# Patient Record
Sex: MALE | Race: WHITE | NOT HISPANIC OR LATINO | Employment: OTHER | URBAN - METROPOLITAN AREA
[De-identification: names, ages, dates, MRNs, and addresses within clinical notes are randomized per-mention and may not be internally consistent; named-entity substitution may affect disease eponyms.]

---

## 2020-01-16 DIAGNOSIS — M25.572 PAIN IN JOINT, ANKLE AND FOOT, LEFT: ICD-10-CM

## 2020-01-16 DIAGNOSIS — M20.21 HALLUX RIGIDUS OF RIGHT FOOT: Primary | ICD-10-CM

## 2020-01-23 ENCOUNTER — HOSPITAL ENCOUNTER (OUTPATIENT)
Dept: GENERAL RADIOLOGY | Facility: HOSPITAL | Age: 60
Discharge: HOME | End: 2020-01-23
Admitting: PODIATRIST

## 2020-01-23 ENCOUNTER — HOSPITAL ENCOUNTER (OUTPATIENT)
Dept: GENERAL RADIOLOGY | Facility: HOSPITAL | Age: 60
Discharge: HOME | End: 2020-01-23

## 2020-01-23 DIAGNOSIS — M25.572 PAIN IN JOINT, ANKLE AND FOOT, LEFT: ICD-10-CM

## 2020-01-23 DIAGNOSIS — M20.21 HALLUX RIGIDUS OF RIGHT FOOT: ICD-10-CM

## 2020-01-23 PROCEDURE — 73630 X-RAY EXAM OF FOOT: CPT

## 2020-01-23 PROCEDURE — 73610 X-RAY EXAM OF ANKLE: CPT

## 2025-03-20 ENCOUNTER — APPOINTMENT (OUTPATIENT)
Dept: GENERAL RADIOLOGY | Facility: HOSPITAL | Age: 65
End: 2025-03-20
Payer: MEDICARE

## 2025-03-20 ENCOUNTER — APPOINTMENT (OUTPATIENT)
Dept: CT IMAGING | Facility: HOSPITAL | Age: 65
End: 2025-03-20
Payer: MEDICARE

## 2025-03-20 ENCOUNTER — HOSPITAL ENCOUNTER (INPATIENT)
Facility: HOSPITAL | Age: 65
LOS: 4 days | End: 2025-03-24
Attending: EMERGENCY MEDICINE | Admitting: INTERNAL MEDICINE
Payer: MEDICARE

## 2025-03-20 DIAGNOSIS — D72.828 NEUTROPHILIA: ICD-10-CM

## 2025-03-20 DIAGNOSIS — L03.116 CELLULITIS OF LEFT LEG: ICD-10-CM

## 2025-03-20 DIAGNOSIS — E87.20 LACTIC ACIDOSIS: ICD-10-CM

## 2025-03-20 DIAGNOSIS — M54.32 SCIATICA OF LEFT SIDE: Primary | ICD-10-CM

## 2025-03-20 LAB
ALBUMIN SERPL-MCNC: 4.1 G/DL (ref 3.5–5.2)
ALBUMIN/GLOB SERPL: 1.1 G/DL
ALP SERPL-CCNC: 95 U/L (ref 39–117)
ALT SERPL W P-5'-P-CCNC: 26 U/L (ref 1–41)
ANION GAP SERPL CALCULATED.3IONS-SCNC: 13.7 MMOL/L (ref 5–15)
AST SERPL-CCNC: 26 U/L (ref 1–40)
BACTERIA UR QL AUTO: ABNORMAL /HPF
BASOPHILS # BLD AUTO: 0.03 10*3/MM3 (ref 0–0.2)
BASOPHILS # BLD AUTO: 0.1 10*3/MM3 (ref 0–0.2)
BASOPHILS NFR BLD AUTO: 0.1 % (ref 0–1.5)
BASOPHILS NFR BLD AUTO: 0.3 % (ref 0–1.5)
BILIRUB SERPL-MCNC: 0.8 MG/DL (ref 0–1.2)
BILIRUB UR QL STRIP: NEGATIVE
BUN SERPL-MCNC: 26 MG/DL (ref 8–23)
BUN/CREAT SERPL: 18.4 (ref 7–25)
CALCIUM SPEC-SCNC: 9.8 MG/DL (ref 8.6–10.5)
CHLORIDE SERPL-SCNC: 97 MMOL/L (ref 98–107)
CLARITY UR: CLEAR
CO2 SERPL-SCNC: 19.3 MMOL/L (ref 22–29)
COLOR UR: YELLOW
CREAT SERPL-MCNC: 1.41 MG/DL (ref 0.76–1.27)
CRP SERPL-MCNC: 20.82 MG/DL (ref 0–0.5)
D-LACTATE SERPL-SCNC: 3 MMOL/L (ref 0.5–2)
DEPRECATED RDW RBC AUTO: 44.2 FL (ref 37–54)
DEPRECATED RDW RBC AUTO: 45 FL (ref 37–54)
EGFRCR SERPLBLD CKD-EPI 2021: 56 ML/MIN/1.73
EOSINOPHIL # BLD AUTO: 0 10*3/MM3 (ref 0–0.4)
EOSINOPHIL # BLD AUTO: 0.01 10*3/MM3 (ref 0–0.4)
EOSINOPHIL NFR BLD AUTO: 0 % (ref 0.3–6.2)
EOSINOPHIL NFR BLD AUTO: 0 % (ref 0.3–6.2)
ERYTHROCYTE [DISTWIDTH] IN BLOOD BY AUTOMATED COUNT: 14.7 % (ref 12.3–15.4)
ERYTHROCYTE [DISTWIDTH] IN BLOOD BY AUTOMATED COUNT: 15 % (ref 12.3–15.4)
ERYTHROCYTE [SEDIMENTATION RATE] IN BLOOD: 45 MM/HR (ref 0–20)
FLUAV SUBTYP SPEC NAA+PROBE: NOT DETECTED
FLUBV RNA ISLT QL NAA+PROBE: NOT DETECTED
GLOBULIN UR ELPH-MCNC: 3.8 GM/DL
GLUCOSE BLDC GLUCOMTR-MCNC: 503 MG/DL (ref 70–130)
GLUCOSE SERPL-MCNC: 290 MG/DL (ref 65–99)
GLUCOSE UR STRIP-MCNC: ABNORMAL MG/DL
HBA1C MFR BLD: 11 % (ref 4.8–5.6)
HCT VFR BLD AUTO: 44.6 % (ref 37.5–51)
HCT VFR BLD AUTO: 46.9 % (ref 37.5–51)
HGB BLD-MCNC: 14.8 G/DL (ref 13–17.7)
HGB BLD-MCNC: 15.5 G/DL (ref 13–17.7)
HGB UR QL STRIP.AUTO: ABNORMAL
HOLD SPECIMEN: NORMAL
HOLD SPECIMEN: NORMAL
HYALINE CASTS UR QL AUTO: ABNORMAL /LPF
IMM GRANULOCYTES # BLD AUTO: 0.17 10*3/MM3 (ref 0–0.05)
IMM GRANULOCYTES # BLD AUTO: 0.52 10*3/MM3 (ref 0–0.05)
IMM GRANULOCYTES NFR BLD AUTO: 0.6 % (ref 0–0.5)
IMM GRANULOCYTES NFR BLD AUTO: 1.5 % (ref 0–0.5)
KETONES UR QL STRIP: ABNORMAL
LEUKOCYTE ESTERASE UR QL STRIP.AUTO: NEGATIVE
LIPASE SERPL-CCNC: 35 U/L (ref 13–60)
LYMPHOCYTES # BLD AUTO: 0.8 10*3/MM3 (ref 0.7–3.1)
LYMPHOCYTES # BLD AUTO: 0.87 10*3/MM3 (ref 0.7–3.1)
LYMPHOCYTES NFR BLD AUTO: 2.3 % (ref 19.6–45.3)
LYMPHOCYTES NFR BLD AUTO: 3.1 % (ref 19.6–45.3)
MCH RBC QN AUTO: 27.2 PG (ref 26.6–33)
MCH RBC QN AUTO: 27.5 PG (ref 26.6–33)
MCHC RBC AUTO-ENTMCNC: 33 G/DL (ref 31.5–35.7)
MCHC RBC AUTO-ENTMCNC: 33.2 G/DL (ref 31.5–35.7)
MCV RBC AUTO: 82.3 FL (ref 79–97)
MCV RBC AUTO: 82.7 FL (ref 79–97)
MONOCYTES # BLD AUTO: 1.79 10*3/MM3 (ref 0.1–0.9)
MONOCYTES # BLD AUTO: 2.19 10*3/MM3 (ref 0.1–0.9)
MONOCYTES NFR BLD AUTO: 5.1 % (ref 5–12)
MONOCYTES NFR BLD AUTO: 7.8 % (ref 5–12)
MRSA DNA SPEC QL NAA+PROBE: ABNORMAL
NEUTROPHILS NFR BLD AUTO: 24.94 10*3/MM3 (ref 1.7–7)
NEUTROPHILS NFR BLD AUTO: 31.97 10*3/MM3 (ref 1.7–7)
NEUTROPHILS NFR BLD AUTO: 88.4 % (ref 42.7–76)
NEUTROPHILS NFR BLD AUTO: 90.8 % (ref 42.7–76)
NITRITE UR QL STRIP: NEGATIVE
NRBC BLD AUTO-RTO: 0 /100 WBC (ref 0–0.2)
PH UR STRIP.AUTO: 5.5 [PH] (ref 4.5–8)
PLAT MORPH BLD: NORMAL
PLATELET # BLD AUTO: 297 10*3/MM3 (ref 140–450)
PLATELET # BLD AUTO: 309 10*3/MM3 (ref 140–450)
PMV BLD AUTO: 10.5 FL (ref 6–12)
PMV BLD AUTO: 10.6 FL (ref 6–12)
POTASSIUM SERPL-SCNC: 4.6 MMOL/L (ref 3.5–5.2)
PROCALCITONIN SERPL-MCNC: 8.02 NG/ML (ref 0–0.25)
PROT SERPL-MCNC: 7.9 G/DL (ref 6–8.5)
PROT UR QL STRIP: ABNORMAL
RBC # BLD AUTO: 5.39 10*6/MM3 (ref 4.14–5.8)
RBC # BLD AUTO: 5.7 10*6/MM3 (ref 4.14–5.8)
RBC # UR STRIP: ABNORMAL /HPF
RBC MORPH BLD: NORMAL
REF LAB TEST METHOD: ABNORMAL
SARS-COV-2 RNA RESP QL NAA+PROBE: NOT DETECTED
SODIUM SERPL-SCNC: 130 MMOL/L (ref 136–145)
SP GR UR STRIP: 1.02 (ref 1–1.03)
SQUAMOUS #/AREA URNS HPF: ABNORMAL /HPF
UROBILINOGEN UR QL STRIP: ABNORMAL
WBC # UR STRIP: ABNORMAL /HPF
WBC MORPH BLD: NORMAL
WBC NRBC COR # BLD AUTO: 28.21 10*3/MM3 (ref 3.4–10.8)
WBC NRBC COR # BLD AUTO: 35.18 10*3/MM3 (ref 3.4–10.8)
WHOLE BLOOD HOLD COAG: NORMAL
WHOLE BLOOD HOLD SPECIMEN: NORMAL

## 2025-03-20 PROCEDURE — 99223 1ST HOSP IP/OBS HIGH 75: CPT | Performed by: INTERNAL MEDICINE

## 2025-03-20 PROCEDURE — 85007 BL SMEAR W/DIFF WBC COUNT: CPT | Performed by: EMERGENCY MEDICINE

## 2025-03-20 PROCEDURE — 25010000002 VANCOMYCIN 750 MG RECONSTITUTED SOLUTION 1 EACH VIAL: Performed by: INTERNAL MEDICINE

## 2025-03-20 PROCEDURE — 25810000003 SODIUM CHLORIDE 0.9 % SOLUTION: Performed by: EMERGENCY MEDICINE

## 2025-03-20 PROCEDURE — 73630 X-RAY EXAM OF FOOT: CPT

## 2025-03-20 PROCEDURE — 25010000002 FENTANYL CITRATE (PF) 50 MCG/ML SOLUTION: Performed by: EMERGENCY MEDICINE

## 2025-03-20 PROCEDURE — 71046 X-RAY EXAM CHEST 2 VIEWS: CPT

## 2025-03-20 PROCEDURE — 82948 REAGENT STRIP/BLOOD GLUCOSE: CPT

## 2025-03-20 PROCEDURE — 87077 CULTURE AEROBIC IDENTIFY: CPT | Performed by: EMERGENCY MEDICINE

## 2025-03-20 PROCEDURE — 87641 MR-STAPH DNA AMP PROBE: CPT | Performed by: INTERNAL MEDICINE

## 2025-03-20 PROCEDURE — 25810000003 SODIUM CHLORIDE 0.9 % SOLUTION: Performed by: INTERNAL MEDICINE

## 2025-03-20 PROCEDURE — 85025 COMPLETE CBC W/AUTO DIFF WBC: CPT | Performed by: EMERGENCY MEDICINE

## 2025-03-20 PROCEDURE — 25010000002 VANCOMYCIN 1.5 G RECONSTITUTED SOLUTION 1 EACH VIAL: Performed by: INTERNAL MEDICINE

## 2025-03-20 PROCEDURE — 36415 COLL VENOUS BLD VENIPUNCTURE: CPT

## 2025-03-20 PROCEDURE — 87040 BLOOD CULTURE FOR BACTERIA: CPT | Performed by: EMERGENCY MEDICINE

## 2025-03-20 PROCEDURE — 87154 CUL TYP ID BLD PTHGN 6+ TRGT: CPT | Performed by: EMERGENCY MEDICINE

## 2025-03-20 PROCEDURE — 25010000002 ENOXAPARIN PER 10 MG: Performed by: INTERNAL MEDICINE

## 2025-03-20 PROCEDURE — 74176 CT ABD & PELVIS W/O CONTRAST: CPT

## 2025-03-20 PROCEDURE — 25010000002 DEXAMETHASONE PER 1 MG: Performed by: EMERGENCY MEDICINE

## 2025-03-20 PROCEDURE — 63710000001 INSULIN LISPRO (HUMAN) PER 5 UNITS: Performed by: INTERNAL MEDICINE

## 2025-03-20 PROCEDURE — 81001 URINALYSIS AUTO W/SCOPE: CPT | Performed by: EMERGENCY MEDICINE

## 2025-03-20 PROCEDURE — 84145 PROCALCITONIN (PCT): CPT | Performed by: INTERNAL MEDICINE

## 2025-03-20 PROCEDURE — 73610 X-RAY EXAM OF ANKLE: CPT

## 2025-03-20 PROCEDURE — 25010000002 CEFEPIME PER 500 MG: Performed by: INTERNAL MEDICINE

## 2025-03-20 PROCEDURE — 87636 SARSCOV2 & INF A&B AMP PRB: CPT | Performed by: EMERGENCY MEDICINE

## 2025-03-20 PROCEDURE — 83605 ASSAY OF LACTIC ACID: CPT | Performed by: EMERGENCY MEDICINE

## 2025-03-20 PROCEDURE — 99285 EMERGENCY DEPT VISIT HI MDM: CPT | Performed by: EMERGENCY MEDICINE

## 2025-03-20 PROCEDURE — 94799 UNLISTED PULMONARY SVC/PX: CPT

## 2025-03-20 PROCEDURE — 80053 COMPREHEN METABOLIC PANEL: CPT | Performed by: EMERGENCY MEDICINE

## 2025-03-20 PROCEDURE — 85652 RBC SED RATE AUTOMATED: CPT | Performed by: INTERNAL MEDICINE

## 2025-03-20 PROCEDURE — 87181 SC STD AGAR DILUTION PER AGT: CPT | Performed by: EMERGENCY MEDICINE

## 2025-03-20 PROCEDURE — 83036 HEMOGLOBIN GLYCOSYLATED A1C: CPT | Performed by: INTERNAL MEDICINE

## 2025-03-20 PROCEDURE — 86140 C-REACTIVE PROTEIN: CPT | Performed by: INTERNAL MEDICINE

## 2025-03-20 PROCEDURE — 87186 SC STD MICRODIL/AGAR DIL: CPT | Performed by: EMERGENCY MEDICINE

## 2025-03-20 PROCEDURE — 63710000001 INSULIN GLARGINE PER 5 UNITS: Performed by: INTERNAL MEDICINE

## 2025-03-20 PROCEDURE — 25810000003 SODIUM CHLORIDE 0.9 % SOLUTION 500 ML FLEX CONT: Performed by: INTERNAL MEDICINE

## 2025-03-20 PROCEDURE — 94660 CPAP INITIATION&MGMT: CPT

## 2025-03-20 PROCEDURE — 83690 ASSAY OF LIPASE: CPT | Performed by: EMERGENCY MEDICINE

## 2025-03-20 RX ORDER — METOPROLOL SUCCINATE 50 MG/1
50 TABLET, EXTENDED RELEASE ORAL DAILY
Status: DISCONTINUED | OUTPATIENT
Start: 2025-03-20 | End: 2025-03-24 | Stop reason: HOSPADM

## 2025-03-20 RX ORDER — ENOXAPARIN SODIUM 100 MG/ML
40 INJECTION SUBCUTANEOUS EVERY 12 HOURS
Status: DISCONTINUED | OUTPATIENT
Start: 2025-03-20 | End: 2025-03-24 | Stop reason: HOSPADM

## 2025-03-20 RX ORDER — DEXAMETHASONE SODIUM PHOSPHATE 4 MG/ML
10 INJECTION, SOLUTION INTRA-ARTICULAR; INTRALESIONAL; INTRAMUSCULAR; INTRAVENOUS; SOFT TISSUE ONCE
Status: COMPLETED | OUTPATIENT
Start: 2025-03-20 | End: 2025-03-20

## 2025-03-20 RX ORDER — ACETAMINOPHEN 325 MG/1
650 TABLET ORAL EVERY 4 HOURS PRN
Status: DISCONTINUED | OUTPATIENT
Start: 2025-03-20 | End: 2025-03-24 | Stop reason: HOSPADM

## 2025-03-20 RX ORDER — DULOXETIN HYDROCHLORIDE 60 MG/1
60 CAPSULE, DELAYED RELEASE ORAL DAILY
Status: DISCONTINUED | OUTPATIENT
Start: 2025-03-20 | End: 2025-03-24 | Stop reason: HOSPADM

## 2025-03-20 RX ORDER — BISACODYL 5 MG/1
5 TABLET, DELAYED RELEASE ORAL DAILY PRN
Status: DISCONTINUED | OUTPATIENT
Start: 2025-03-20 | End: 2025-03-24 | Stop reason: HOSPADM

## 2025-03-20 RX ORDER — BISACODYL 10 MG
10 SUPPOSITORY, RECTAL RECTAL DAILY PRN
Status: DISCONTINUED | OUTPATIENT
Start: 2025-03-20 | End: 2025-03-24 | Stop reason: HOSPADM

## 2025-03-20 RX ORDER — SODIUM CHLORIDE 9 MG/ML
125 INJECTION, SOLUTION INTRAVENOUS CONTINUOUS
Status: DISCONTINUED | OUTPATIENT
Start: 2025-03-20 | End: 2025-03-21

## 2025-03-20 RX ORDER — SODIUM CHLORIDE 0.9 % (FLUSH) 0.9 %
10 SYRINGE (ML) INJECTION EVERY 12 HOURS SCHEDULED
Status: DISCONTINUED | OUTPATIENT
Start: 2025-03-20 | End: 2025-03-24 | Stop reason: HOSPADM

## 2025-03-20 RX ORDER — SODIUM CHLORIDE 9 MG/ML
40 INJECTION, SOLUTION INTRAVENOUS AS NEEDED
Status: DISCONTINUED | OUTPATIENT
Start: 2025-03-20 | End: 2025-03-24 | Stop reason: HOSPADM

## 2025-03-20 RX ORDER — NICOTINE POLACRILEX 4 MG
15 LOZENGE BUCCAL
Status: DISCONTINUED | OUTPATIENT
Start: 2025-03-20 | End: 2025-03-24 | Stop reason: HOSPADM

## 2025-03-20 RX ORDER — SODIUM CHLORIDE 0.9 % (FLUSH) 0.9 %
10 SYRINGE (ML) INJECTION AS NEEDED
Status: DISCONTINUED | OUTPATIENT
Start: 2025-03-20 | End: 2025-03-24 | Stop reason: HOSPADM

## 2025-03-20 RX ORDER — HYDROCODONE BITARTRATE AND ACETAMINOPHEN 5; 325 MG/1; MG/1
1 TABLET ORAL 2 TIMES DAILY
Refills: 0 | Status: DISCONTINUED | OUTPATIENT
Start: 2025-03-20 | End: 2025-03-23

## 2025-03-20 RX ORDER — ONDANSETRON 2 MG/ML
4 INJECTION INTRAMUSCULAR; INTRAVENOUS EVERY 6 HOURS PRN
Status: DISCONTINUED | OUTPATIENT
Start: 2025-03-20 | End: 2025-03-24 | Stop reason: HOSPADM

## 2025-03-20 RX ORDER — AMOXICILLIN 250 MG
2 CAPSULE ORAL 2 TIMES DAILY PRN
Status: DISCONTINUED | OUTPATIENT
Start: 2025-03-20 | End: 2025-03-24 | Stop reason: HOSPADM

## 2025-03-20 RX ORDER — LOSARTAN POTASSIUM 50 MG/1
100 TABLET ORAL DAILY
Status: DISCONTINUED | OUTPATIENT
Start: 2025-03-20 | End: 2025-03-24 | Stop reason: HOSPADM

## 2025-03-20 RX ORDER — INSULIN LISPRO 100 [IU]/ML
1-200 INJECTION, SOLUTION INTRAVENOUS; SUBCUTANEOUS
Status: DISCONTINUED | OUTPATIENT
Start: 2025-03-20 | End: 2025-03-24 | Stop reason: HOSPADM

## 2025-03-20 RX ORDER — ONDANSETRON 4 MG/1
4 TABLET, ORALLY DISINTEGRATING ORAL EVERY 6 HOURS PRN
Status: DISCONTINUED | OUTPATIENT
Start: 2025-03-20 | End: 2025-03-24 | Stop reason: HOSPADM

## 2025-03-20 RX ORDER — ACETAMINOPHEN 650 MG/1
650 SUPPOSITORY RECTAL EVERY 4 HOURS PRN
Status: DISCONTINUED | OUTPATIENT
Start: 2025-03-20 | End: 2025-03-24 | Stop reason: HOSPADM

## 2025-03-20 RX ORDER — ATORVASTATIN CALCIUM 20 MG/1
20 TABLET, FILM COATED ORAL NIGHTLY
Status: DISCONTINUED | OUTPATIENT
Start: 2025-03-20 | End: 2025-03-24 | Stop reason: HOSPADM

## 2025-03-20 RX ORDER — NITROGLYCERIN 0.4 MG/1
0.4 TABLET SUBLINGUAL
Status: DISCONTINUED | OUTPATIENT
Start: 2025-03-20 | End: 2025-03-22

## 2025-03-20 RX ORDER — DEXTROSE MONOHYDRATE 25 G/50ML
10-50 INJECTION, SOLUTION INTRAVENOUS
Status: DISCONTINUED | OUTPATIENT
Start: 2025-03-20 | End: 2025-03-24 | Stop reason: HOSPADM

## 2025-03-20 RX ORDER — PREGABALIN 75 MG/1
150 CAPSULE ORAL 2 TIMES DAILY
Status: DISCONTINUED | OUTPATIENT
Start: 2025-03-20 | End: 2025-03-24 | Stop reason: HOSPADM

## 2025-03-20 RX ORDER — IBUPROFEN 600 MG/1
1 TABLET ORAL
Status: DISCONTINUED | OUTPATIENT
Start: 2025-03-20 | End: 2025-03-24 | Stop reason: HOSPADM

## 2025-03-20 RX ORDER — FENTANYL CITRATE 50 UG/ML
50 INJECTION, SOLUTION INTRAMUSCULAR; INTRAVENOUS ONCE
Refills: 0 | Status: COMPLETED | OUTPATIENT
Start: 2025-03-20 | End: 2025-03-20

## 2025-03-20 RX ORDER — INSULIN LISPRO 100 [IU]/ML
1-200 INJECTION, SOLUTION INTRAVENOUS; SUBCUTANEOUS AS NEEDED
Status: DISCONTINUED | OUTPATIENT
Start: 2025-03-20 | End: 2025-03-24 | Stop reason: HOSPADM

## 2025-03-20 RX ORDER — AMLODIPINE BESYLATE 5 MG/1
10 TABLET ORAL DAILY
Status: DISCONTINUED | OUTPATIENT
Start: 2025-03-20 | End: 2025-03-24 | Stop reason: HOSPADM

## 2025-03-20 RX ORDER — ACETAMINOPHEN 160 MG/5ML
650 SOLUTION ORAL EVERY 4 HOURS PRN
Status: DISCONTINUED | OUTPATIENT
Start: 2025-03-20 | End: 2025-03-24 | Stop reason: HOSPADM

## 2025-03-20 RX ORDER — POLYETHYLENE GLYCOL 3350 17 G/17G
17 POWDER, FOR SOLUTION ORAL DAILY PRN
Status: DISCONTINUED | OUTPATIENT
Start: 2025-03-20 | End: 2025-03-24 | Stop reason: HOSPADM

## 2025-03-20 RX ADMIN — INSULIN LISPRO 16 UNITS: 100 INJECTION, SOLUTION INTRAVENOUS; SUBCUTANEOUS at 21:08

## 2025-03-20 RX ADMIN — VANCOMYCIN HYDROCHLORIDE 2250 MG: 750 INJECTION, POWDER, LYOPHILIZED, FOR SOLUTION INTRAVENOUS at 18:54

## 2025-03-20 RX ADMIN — FENTANYL CITRATE 50 MCG: 50 INJECTION, SOLUTION INTRAMUSCULAR; INTRAVENOUS at 10:49

## 2025-03-20 RX ADMIN — AMLODIPINE BESYLATE 10 MG: 5 TABLET ORAL at 21:06

## 2025-03-20 RX ADMIN — ENOXAPARIN SODIUM 40 MG: 100 INJECTION SUBCUTANEOUS at 21:08

## 2025-03-20 RX ADMIN — INSULIN GLARGINE 39 UNITS: 100 INJECTION, SOLUTION SUBCUTANEOUS at 21:08

## 2025-03-20 RX ADMIN — METOPROLOL SUCCINATE 50 MG: 50 TABLET, EXTENDED RELEASE ORAL at 21:04

## 2025-03-20 RX ADMIN — LOSARTAN POTASSIUM 100 MG: 50 TABLET, FILM COATED ORAL at 21:05

## 2025-03-20 RX ADMIN — HYDROCODONE BITARTRATE AND ACETAMINOPHEN 1 TABLET: 5; 325 TABLET ORAL at 21:05

## 2025-03-20 RX ADMIN — PREGABALIN 150 MG: 75 CAPSULE ORAL at 21:05

## 2025-03-20 RX ADMIN — SODIUM CHLORIDE 125 ML/HR: 9 INJECTION, SOLUTION INTRAVENOUS at 21:58

## 2025-03-20 RX ADMIN — CEFEPIME 2000 MG: 2 INJECTION, POWDER, FOR SOLUTION INTRAVENOUS at 17:46

## 2025-03-20 RX ADMIN — ATORVASTATIN CALCIUM 20 MG: 20 TABLET, FILM COATED ORAL at 21:05

## 2025-03-20 RX ADMIN — DEXAMETHASONE SODIUM PHOSPHATE 10 MG: 4 INJECTION, SOLUTION INTRAMUSCULAR; INTRAVENOUS at 12:22

## 2025-03-20 RX ADMIN — DULOXETINE HYDROCHLORIDE 60 MG: 60 CAPSULE, DELAYED RELEASE ORAL at 21:05

## 2025-03-20 RX ADMIN — Medication 10 ML: at 21:59

## 2025-03-20 RX ADMIN — SODIUM CHLORIDE 1000 ML: 9 INJECTION, SOLUTION INTRAVENOUS at 12:22

## 2025-03-20 NOTE — ED PROVIDER NOTES
"Subjective   History of Present Illness    Chief complaint: Back pain    Location: Left flank    Quality/Severity: Moderate, sharp    Timing/Onset/Duration: Yesterday    Modifying Factors: No modifying factor    Associated Symptoms: No headache.  No fever.  Patient said chills.  No cough sore throat earache or nasal congestion.  No chest pain or shortness of breath.  No diarrhea or burning on urination.  Patient does have some radiation down the left leg.  Patient has had nausea and vomiting.  Emesis has been nonbloody and nonbilious.    Narrative: This 65-year-old presents with left lower back pain that radiates down the left leg a bit.  Patient's had sciatica before and states that this does not feel like that.  She had vomiting last night but no vomiting today.  Patient states he is able to urinate but not much.  Patient denies trauma.    PCP: Anand Alford    Review of Systems    No past medical history on file.    Allergies   Allergen Reactions    Naproxen Anaphylaxis    Sulfamethoxazole-Trimethoprim Shortness Of Breath and Swelling    Etodolac Other (See Comments)     stomach irritation  Annotation - [id number]: STOMACH      Gabapentin Dizziness     Annotation - [id number]: \"Felt Drunk\"    Zonisamide Other (See Comments) and Myalgia     Drowsy, Fatigue         No past surgical history on file.    No family history on file.    Social History     Socioeconomic History    Marital status:    Tobacco Use    Smoking status: Never    Smokeless tobacco: Never   Vaping Use    Vaping status: Never Used   Substance and Sexual Activity    Alcohol use: Not Currently    Drug use: Defer    Sexual activity: Defer           Objective   Physical Exam  Vitals (The temperature is 97.8 °F, pulse 112, respirations 16, /92, room air pulse ox 96%.) and nursing note reviewed.   Constitutional:       Appearance: Normal appearance.   HENT:      Head: Normocephalic and atraumatic.      Right Ear: Tympanic membrane normal.      " Left Ear: Tympanic membrane normal.      Nose: Nose normal.   Cardiovascular:      Rate and Rhythm: Normal rate and regular rhythm.      Pulses: Normal pulses.      Heart sounds: Normal heart sounds. No murmur heard.     No friction rub. No gallop.   Pulmonary:      Effort: Pulmonary effort is normal.      Breath sounds: Normal breath sounds.   Abdominal:      General: Abdomen is flat. Bowel sounds are normal. There is no distension.      Palpations: Abdomen is soft. There is no mass.      Tenderness: There is no abdominal tenderness. There is no right CVA tenderness, left CVA tenderness, guarding or rebound.      Hernia: No hernia is present.   Musculoskeletal:         General: Normal range of motion.      Cervical back: Normal range of motion and neck supple.      Comments: There is redness and areas of scabbed skin about the left distal leg.  The patient states that this is actually better than it has been.  Patient apparently has history of a torn tendon in his left ankle.   Skin:     General: Skin is warm and dry.      Capillary Refill: Capillary refill takes less than 2 seconds.   Neurological:      General: No focal deficit present.      Mental Status: He is alert and oriented to person, place, and time.         Procedures           ED Course  ED Course as of 03/20/25 1522   Thu Mar 20, 2025   1204 The urine microscopic shows 3-5 RBCs, no WBCs, no bacteria, 0-2 squamous epithelial cells, 2 and 50 mg/dL glucose, ketones trace, blood small, protein 300 mg/dL, nitrite negative, leukocyte negative.  Urinalysis is otherwise unremarkable. [RC]   1204 The lipase is 35 and normal. [RC]   1204 The glucose is 290.  The BUN is 26 and the creatinine is 1.41, the sodium is 130, chloride is 97, CO2 is 19, GFR is 56, CMP is otherwise unremarkable. [RC]   1205 5 months ago the BUN was 20 and the creatinine was 1.13.  These values are elevated today. [RC]   1503 The white blood cell count is 35,000, neutrophil percentage 90,  absolute neutrophil count is 31.9.  These values have increased. [RC]   1516 COVID flu is negative [RC]   1516 Lactic acid 3.0 [RC]      ED Course User Index  [RC] Rey Nichole MD    14:36 EDT, 03/20/25: The patient has a 28,000 white count with left shift.  The urinalysis reveals only 3-5 red blood cells, leukocyte negative and nitrite negative.  CAT scan does not show any etiology for this elevated white blood cell count.  Patient is slightly dehydrated and we have given him IV fluids here.  He is CMP shows an elevated glucose at 290, CO2 19 and chloride of 97, the GFR is 56.  The CMP is otherwise unremarkable.  I will order a lactic acid, draw a blood culture and get a chest x-ray.  Patient does state that he is a little more short of breath than usual.    15:24 EDT, 03/20/25:  I spoke with Dr. Zhong, on-call for the hospitalist.  She will admit the patient.  She wants to hold on antibiotics until she sees the patient.  The patient has received IV fluids.  He is on spironolactone.  Further assessment of additional fluid requirements will be done by the hospitalist.  All the patient's questions were answered he will be admitted.                                                         Medical Decision Making  Amount and/or Complexity of Data Reviewed  Labs: ordered.  Radiology: ordered.    Risk  Prescription drug management.        Final diagnoses:   None       ED Disposition  ED Disposition       None            No follow-up provider specified.       Medication List      No changes were made to your prescriptions during this visit.       No orders to display     Labs Reviewed   RAINBOW DRAW    Narrative:     The following orders were created for panel order [place] Draw.  Procedure                               Abnormality         Status                     ---------                               -----------         ------                     Green Top (Gel)[[id number]]                                                              Lavender Top[[id number]]                                                                Gold Top - SST[[id number]]                                                              Light Blue Top[[id number]]                                                                Please view results for these tests on the individual orders.   COMPREHENSIVE METABOLIC PANEL   LIPASE   URINALYSIS W/ MICROSCOPIC IF INDICATED (NO CULTURE)   CBC WITH AUTO DIFFERENTIAL   CBC AND DIFFERENTIAL    Narrative:     The following orders were created for panel order CBC & Differential.  Procedure                               Abnormality         Status                     ---------                               -----------         ------                     CBC Auto Differential[[id number]]                                                         Please view results for these tests on the individual orders.   GREEN TOP   LAVENDER TOP   GOLD TOP - SST   LIGHT BLUE TOP     No results found.    Final diagnoses:   None         ED Medications:  Medications   sodium chloride 0.9 % flush 10 mL (has no administration in time range)       New Medications:     Medication List        ASK your doctor about these medications      amLODIPine 10 MG tablet  Commonly known as: NORVASC     amoxicillin-clavulanate 875-125 MG per tablet  Commonly known as: AUGMENTIN  Take 1 tablet by mouth 2 (Two) Times a Day.     atorvastatin 20 MG tablet  Commonly known as: LIPITOR     DULoxetine 60 MG capsule  Commonly known as: CYMBALTA     furosemide 20 MG tablet  Commonly known as: LASIX     HYDROcodone-acetaminophen 5-325 MG per tablet  Commonly known as: NORCO     Insulin Degludec 200 UNIT/ML solution pen-injector pen injection  Commonly known as: TRESIBA FLEXTOUCH     Insulin Lispro (1 Unit Dial) 100 UNIT/ML solution pen-injector  Commonly known as: HUMALOG     insulin NPH-insulin regular (70-30) 100 UNIT/ML injection  Commonly known as: humuLIN  70/30,novoLIN 70/30     losartan 100 MG tablet  Commonly known as: COZAAR     * metFORMIN 500 MG tablet  Commonly known as: GLUCOPHAGE     * metFORMIN  MG 24 hr tablet  Commonly known as: GLUCOPHAGE-XR     metoprolol succinate XL 50 MG 24 hr tablet  Commonly known as: TOPROL-XL     pregabalin 150 MG capsule  Commonly known as: LYRICA     spironolactone 25 MG tablet  Commonly known as: ALDACTONE     Testosterone Cypionate 200 MG/ML injection  Commonly known as: DEPOTESTOTERONE CYPIONATE     Toujeo SoloStar 300 UNIT/ML solution pen-injector injection  Generic drug: Insulin Glargine (1 Unit Dial)           * This list has 2 medication(s) that are the same as other medications prescribed for you. Read the directions carefully, and ask your doctor or other care provider to review them with you.                  Stopped Medications:     Medication List        ASK your doctor about these medications      amLODIPine 10 MG tablet  Commonly known as: NORVASC     amoxicillin-clavulanate 875-125 MG per tablet  Commonly known as: AUGMENTIN  Take 1 tablet by mouth 2 (Two) Times a Day.     atorvastatin 20 MG tablet  Commonly known as: LIPITOR     DULoxetine 60 MG capsule  Commonly known as: CYMBALTA     furosemide 20 MG tablet  Commonly known as: LASIX     HYDROcodone-acetaminophen 5-325 MG per tablet  Commonly known as: NORCO     Insulin Degludec 200 UNIT/ML solution pen-injector pen injection  Commonly known as: TRESIBA FLEXTOUCH     Insulin Lispro (1 Unit Dial) 100 UNIT/ML solution pen-injector  Commonly known as: HUMALOG     insulin NPH-insulin regular (70-30) 100 UNIT/ML injection  Commonly known as: humuLIN 70/30,novoLIN 70/30     losartan 100 MG tablet  Commonly known as: COZAAR     * metFORMIN 500 MG tablet  Commonly known as: GLUCOPHAGE     * metFORMIN  MG 24 hr tablet  Commonly known as: GLUCOPHAGE-XR     metoprolol succinate XL 50 MG 24 hr tablet  Commonly known as: TOPROL-XL     pregabalin 150 MG  capsule  Commonly known as: LYRICA     spironolactone 25 MG tablet  Commonly known as: ALDACTONE     Testosterone Cypionate 200 MG/ML injection  Commonly known as: DEPOTESTOTERONE CYPIONATE     Toujeo SoloStar 300 UNIT/ML solution pen-injector injection  Generic drug: Insulin Glargine (1 Unit Dial)           * This list has 2 medication(s) that are the same as other medications prescribed for you. Read the directions carefully, and ask your doctor or other care provider to review them with you.                       Rey Nichole MD  03/20/25 1500       Rey Nichole MD  03/20/25 5190

## 2025-03-20 NOTE — H&P
[facility] HOSPITALIST     Anand Alford MD    CHIEF COMPLAINT:     Back pain, leukocytosis    HISTORY OF PRESENT ILLNESS:  Arnold Ramírez is a 65 y.o. morbidly obese male with a past medical history of DM2, HTN, and HLP who presented to the ED d/t left flank pain initially. Patient reports pain started yesterday afternoon and he was unable to rest all night due to the pain. He went to urgent care and was referred to the ED. Patient underwent CT abd/pel that was negative for acute process. Patient states the pain has now moved from his left flank to his midline back. He denies any injury, but reports his left foot/ankle needs surgery and causes his to walk unevenly causing pain. He reports some chills and nausea. States he has not taken any of his home medications today, due to going to the urgent care.   In the ED, he was found to have significantly elevated WBC. Noted to have some redness of the LLE with some abrasions, though he reports this looks improved from how it normally appears. Given poorly controlled diabetes and significant leukocytosis concern for infection, possibly osteomyelitis.       Past Medical History:   Diagnosis Date    Diabetes mellitus     Hyperlipidemia     Hypertension      History reviewed. No pertinent surgical history.  History reviewed. No pertinent family history.  Social History     Tobacco Use    Smoking status: Never    Smokeless tobacco: Never   Vaping Use    Vaping status: Never Used   Substance Use Topics    Alcohol use: Not Currently    Drug use: Defer     Medications Prior to Admission   Medication Sig Dispense Refill Last Dose/Taking    amLODIPine (NORVASC) 10 MG tablet Take 1 tablet by mouth Daily.   3/19/2025    atorvastatin (LIPITOR) 20 MG tablet Take 1 tablet by mouth Every Night.   3/19/2025    DULoxetine (CYMBALTA) 60 MG capsule Take 1 capsule by mouth Daily.   3/19/2025    furosemide (LASIX) 20 MG tablet Take 1 tablet by mouth Daily As Needed  (edema).   Past Week    HYDROcodone-acetaminophen (NORCO) 5-325 MG per tablet Take 1 tablet by mouth 2 (Two) Times a Day.   3/19/2025    Insulin Degludec (TRESIBA FLEXTOUCH) 200 UNIT/ML solution pen-injector pen injection Inject 150 Units under the skin into the appropriate area as directed 2 (Two) Times a Day.   Taking    Insulin Glargine, 1 Unit Dial, (Toujeo SoloStar) 300 UNIT/ML solution pen-injector injection Inject 150 Units under the skin into the appropriate area as directed.   Taking    insulin NPH-insulin regular (humuLIN 70/30,novoLIN 70/30) (70-30) 100 UNIT/ML injection Inject 100 Units under the skin into the appropriate area as directed As Needed.   Taking As Needed    losartan (COZAAR) 100 MG tablet Take 1 tablet by mouth Daily.   3/19/2025    metFORMIN ER (GLUCOPHAGE-XR) 500 MG 24 hr tablet Take 1 tablet by mouth 2 (Two) Times a Day.   3/19/2025    metoprolol succinate XL (TOPROL-XL) 50 MG 24 hr tablet Take 1 tablet by mouth Daily.   3/19/2025    pregabalin (LYRICA) 150 MG capsule Take 1 capsule by mouth 2 (Two) Times a Day.   3/19/2025    spironolactone (ALDACTONE) 25 MG tablet Take 1 tablet by mouth Daily.   3/19/2025    Testosterone Cypionate (DEPOTESTOTERONE CYPIONATE) 200 MG/ML injection Inject 1 mL into the appropriate muscle as directed by prescriber Every 14 (Fourteen) Days.   Taking    Insulin Lispro, 1 Unit Dial, (HUMALOG) 100 UNIT/ML solution pen-injector Inject 5-10 units before breakfast and supper        Allergies:  Naproxen, Sulfamethoxazole-trimethoprim, Etodolac, Gabapentin, and Zonisamide    Immunization History   Administered Date(s) Administered    COVID-19 (MODERNA) 12YRS+ (SPIKEVAX) 03/06/2025    COVID-19 (MODERNA) 1st,2nd,3rd Dose Monovalent 03/21/2021, 04/14/2021    COVID-19 (MODERNA) Monovalent Original Booster 12/13/2021    Influenza recombinant 03/06/2025           REVIEW OF SYSTEMS:    Please see the above history of present illness for pertinent positives and negatives.   "The remainder of the patient's systems have been reviewed and are negative.     Vital Signs  Temp:  [97.8 °F (36.6 °C)-98.4 °F (36.9 °C)] 98.4 °F (36.9 °C)  Heart Rate:  [] 98  Resp:  [16-18] 18  BP: (124-145)/(76-92) 145/76    Oxygen Therapy  SpO2: 92 %  Pulse Oximetry Type: Intermittent  Device (Oxygen Therapy): room air}    Body mass index is 40.5 kg/m².    Flowsheet Rows      Flowsheet Row First Filed Value   Admission Height 195.6 cm (77\") Documented at 03/20/2025 1024   Admission Weight 155 kg (341 lb) Documented at 03/20/2025 1024                 Physical Exam:    Physical Exam  Vitals reviewed.   Constitutional:       General: He is not in acute distress.     Appearance: He is morbidly obese.   HENT:      Head: Normocephalic and atraumatic.      Mouth/Throat:      Mouth: Mucous membranes are moist.   Cardiovascular:      Rate and Rhythm: Normal rate and regular rhythm.      Heart sounds: No murmur heard.  Pulmonary:      Effort: Pulmonary effort is normal. No respiratory distress.      Breath sounds: No wheezing.   Abdominal:      Comments: Obese, nt, +bs   Musculoskeletal:         General: Deformity present.      Right lower leg: Edema present.      Left lower leg: Edema present.   Skin:     General: Skin is warm and dry.      Comments: Mild redness and abrasion to the LLE   Neurological:      Mental Status: He is alert. Mental status is at baseline.   Psychiatric:         Mood and Affect: Mood normal.         Behavior: Behavior normal. Behavior is cooperative.       Results Review:    I reviewed the patient's new clinical results.  Lab Results (most recent)       Procedure Component Value Units Date/Time    MRSA Screen, PCR (Inpatient) - Swab, Nares [[id number]] Collected: 03/20/25 1750    Specimen: Swab from Nares Updated: 03/20/25 1750    Procalcitonin [[id number]]  (Abnormal) Collected: 03/20/25 1606    Specimen: Blood Updated: 03/20/25 1639     Procalcitonin 8.02 ng/mL     Narrative:      As a " "Marker for Sepsis (Non-Neonates):    1. <0.5 ng/mL represents a low risk of severe sepsis and/or septic shock.  2. >2 ng/mL represents a high risk of severe sepsis and/or septic shock.    As a Marker for Lower Respiratory Tract Infections that require antibiotic therapy:    PCT on Admission    Antibiotic Therapy       6-12 Hrs later    >0.5                Strongly Recommended  >0.25 - <0.5        Recommended   0.1 - 0.25          Discouraged              Remeasure/reassess PCT  <0.1                Strongly Discouraged     Remeasure/reassess PCT    As 28 day mortality risk marker: \"Change in Procalcitonin Result\" (>80% or <=80%) if Day 0 (or Day 1) and Day 4 values are available. Refer to http://www.[employer] [facility] [employer]-pct-calculator.com    Change in PCT <=80%  A decrease of PCT levels below or equal to 80% defines a positive change in PCT test result representing a higher risk for 28-day all-cause mortality of patients diagnosed with severe sepsis for septic shock.    Change in PCT >80%  A decrease of PCT levels of more than 80% defines a negative change in PCT result representing a lower risk for 28-day all-cause mortality of patients diagnosed with severe sepsis or septic shock.       Hemoglobin A1c [[id number]]  (Abnormal) Collected: 03/20/25 1443    Specimen: Blood Updated: 03/20/25 1631     Hemoglobin A1C 11.00 %     Narrative:      Hemoglobin A1C Ranges:    Increased Risk for Diabetes  5.7% to 6.4%  Diabetes                     >= 6.5%  Diabetic Goal                < 7.0%    Sedimentation Rate [[id number]]  (Abnormal) Collected: 03/20/25 1443    Specimen: Blood Updated: 03/20/25 1615     Sed Rate 45 mm/hr     C-reactive Protein [[id number]] Collected: 03/20/25 1606    Specimen: Blood Updated: 03/20/25 1611    Blood Culture - Blood, Arm, Right [[id number]] Collected: 03/20/25 1530    Specimen: Blood from Arm, Right Updated: 03/20/25 1544    CBC & Differential [[id number]]  (Abnormal) Collected: 03/20/25 1443    Specimen: Blood " Updated: 03/20/25 1527    Narrative:      The following orders were created for panel order CBC & Differential.  Procedure                               Abnormality         Status                     ---------                               -----------         ------                     CBC Auto Differential[[id number]]        Abnormal            Final result               Scan Slide[[id number]]                   Normal              Final result                 Please view results for these tests on the individual orders.    Scan Slide [[id number]]  (Normal) Collected: 03/20/25 1443    Specimen: Blood Updated: 03/20/25 1527     RBC Morphology Normal     WBC Morphology Normal     Platelet Morphology Normal    Lactic Acid, Plasma [[id number]]  (Abnormal) Collected: 03/20/25 1435    Specimen: Blood Updated: 03/20/25 1517     Lactate 3.0 mmol/L     COVID-19 and FLU A/B PCR, 1 HR TAT - Swab, Nasopharynx [[id number]]  (Normal) Collected: 03/20/25 1435    Specimen: Swab from Nasopharynx Updated: 03/20/25 1515     COVID19 Not Detected     Influenza A PCR Not Detected     Influenza B PCR Not Detected    Narrative:      Fact sheet for providers: https://www.fda.gov/media/665568/download    Fact sheet for patients: https://www.fda.gov/media/522738/download    Test performed by PCR.    CBC Auto Differential [[id number]]  (Abnormal) Collected: 03/20/25 1443    Specimen: Blood Updated: 03/20/25 1500     WBC 35.18 10*3/mm3      RBC 5.39 10*6/mm3      Hemoglobin 14.8 g/dL      Hematocrit 44.6 %      MCV 82.7 fL      MCH 27.5 pg      MCHC 33.2 g/dL      RDW 15.0 %      RDW-SD 45.0 fl      MPV 10.6 fL      Platelets 297 10*3/mm3      Neutrophil % 90.8 %      Lymphocyte % 2.3 %      Monocyte % 5.1 %      Eosinophil % 0.0 %      Basophil % 0.3 %      Immature Grans % 1.5 %      Neutrophils, Absolute 31.97 10*3/mm3      Lymphocytes, Absolute 0.80 10*3/mm3      Monocytes, Absolute 1.79 10*3/mm3      Eosinophils, Absolute 0.00 10*3/mm3       Basophils, Absolute 0.10 10*3/mm3      Immature Grans, Absolute 0.52 10*3/mm3      nRBC 0.0 /100 WBC     Blood Culture - Blood, Arm, Left [[id number]] Collected: 03/20/25 1435    Specimen: Blood from Arm, Left Updated: 03/20/25 1451    [place] Draw [[id number]] Collected: 03/20/25 1038    Specimen: Blood Updated: 03/20/25 1145    Narrative:      The following orders were created for panel order [place] Draw.  Procedure                               Abnormality         Status                     ---------                               -----------         ------                     Green Top (Gel)[[id number]]                                  Final result               Lavender Top[[id number]]                                     Final result               Gold Top - SST[[id number]]                                   Final result               Light Blue Top[[id number]]                                   Final result                 Please view results for these tests on the individual orders.    Gold Top - SST [[id number]] Collected: 03/20/25 1038    Specimen: Blood Updated: 03/20/25 1145     Extra Tube Hold for add-ons.     Comment: Auto resulted.       Urinalysis, Microscopic Only - Urine, Clean Catch [[id number]]  (Abnormal) Collected: 03/20/25 1038    Specimen: Urine, Clean Catch Updated: 03/20/25 1118     RBC, UA 3-5 /HPF      WBC, UA None Seen /HPF      Bacteria, UA None Seen /HPF      Squamous Epithelial Cells, UA 0-2 /HPF      Hyaline Casts, UA 0-2 /LPF      Methodology Manual Light Microscopy    CBC & Differential [[id number]]  (Abnormal) Collected: 03/20/25 1038    Specimen: Blood Updated: 03/20/25 1113    Narrative:      The following orders were created for panel order CBC & Differential.  Procedure                               Abnormality         Status                     ---------                               -----------         ------                     CBC Auto Differential[[id number]]        Abnormal             Final result                 Please view results for these tests on the individual orders.    CBC Auto Differential [[id number]]  (Abnormal) Collected: 03/20/25 1038    Specimen: Blood Updated: 03/20/25 1113     WBC 28.21 10*3/mm3      RBC 5.70 10*6/mm3      Hemoglobin 15.5 g/dL      Hematocrit 46.9 %      MCV 82.3 fL      MCH 27.2 pg      MCHC 33.0 g/dL      RDW 14.7 %      RDW-SD 44.2 fl      MPV 10.5 fL      Platelets 309 10*3/mm3      Neutrophil % 88.4 %      Lymphocyte % 3.1 %      Monocyte % 7.8 %      Eosinophil % 0.0 %      Basophil % 0.1 %      Immature Grans % 0.6 %      Neutrophils, Absolute 24.94 10*3/mm3      Lymphocytes, Absolute 0.87 10*3/mm3      Monocytes, Absolute 2.19 10*3/mm3      Eosinophils, Absolute 0.01 10*3/mm3      Basophils, Absolute 0.03 10*3/mm3      Immature Grans, Absolute 0.17 10*3/mm3     Urinalysis With Microscopic If Indicated (No Culture) - Urine, Clean Catch [[id number]]  (Abnormal) Collected: 03/20/25 1038    Specimen: Urine, Clean Catch Updated: 03/20/25 1113     Color, UA Yellow     Appearance, UA Clear     pH, UA 5.5     Specific Gravity, UA 1.025     Glucose,  mg/dL (1+)     Ketones, UA Trace     Bilirubin, UA Negative     Blood, UA Small (1+)     Protein, UA >=300 mg/dL (3+)     Leuk Esterase, UA Negative     Nitrite, UA Negative     Urobilinogen, UA 0.2 E.U./dL    Comprehensive Metabolic Panel [[id number]]  (Abnormal) Collected: 03/20/25 1038    Specimen: Blood Updated: 03/20/25 1106     Glucose 290 mg/dL      BUN 26 mg/dL      Creatinine 1.41 mg/dL      Sodium 130 mmol/L      Potassium 4.6 mmol/L      Chloride 97 mmol/L      CO2 19.3 mmol/L      Calcium 9.8 mg/dL      Total Protein 7.9 g/dL      Albumin 4.1 g/dL      ALT (SGPT) 26 U/L      AST (SGOT) 26 U/L      Alkaline Phosphatase 95 U/L      Total Bilirubin 0.8 mg/dL      Globulin 3.8 gm/dL      A/G Ratio 1.1 g/dL      BUN/Creatinine Ratio 18.4     Anion Gap 13.7 mmol/L      eGFR 56.0 mL/min/1.73     Narrative:       GFR Categories in Chronic Kidney Disease (CKD)      GFR Category          GFR (mL/min/1.73)    Interpretation  G1                     90 or greater         Normal or high (1)  G2                      60-89                Mild decrease (1)  G3a                   45-59                Mild to moderate decrease  G3b                   30-44                Moderate to severe decrease  G4                    15-29                Severe decrease  G5                    14 or less           Kidney failure          (1)In the absence of evidence of kidney disease, neither GFR category G1 or G2 fulfill the criteria for CKD.    eGFR calculation 2021 CKD-EPI creatinine equation, which does not include race as a factor    Lipase [[id number]]  (Normal) Collected: 03/20/25 1038    Specimen: Blood Updated: 03/20/25 1106     Lipase 35 U/L     Green Top (Gel) [[id number]] Collected: 03/20/25 1038    Specimen: Blood Updated: 03/20/25 1101     Extra Tube Hold for add-ons.     Comment: Auto resulted.       Lavender Top [[id number]] Collected: 03/20/25 1038    Specimen: Blood Updated: 03/20/25 1101     Extra Tube hold for add-on     Comment: Auto resulted       Light Blue Top [[id number]] Collected: 03/20/25 1038    Specimen: Blood Updated: 03/20/25 1100     Extra Tube Hold for add-ons.     Comment: Auto resulted               Imaging Results (Most Recent)       Procedure Component Value Units Date/Time    XR Ankle 3+ View Left [[id number]] Resulted: 03/20/25 1722     Updated: 03/20/25 1724    XR Foot 3+ View Left [[id number]] Resulted: 03/20/25 1723     Updated: 03/20/25 1723    XR Chest 2 View [[id number]] Collected: 03/20/25 1526     Updated: 03/20/25 1533    Narrative:      XR CHEST 2 VW    Date of Exam: 3/20/2025 2:54 PM EDT    Indication: Evaluate for pneumonia    Comparison: 9/14/2015    FINDINGS:  No new consolidations or pleural effusions are observed. The cardiac silhouette and mediastinum are unchanged. No acute osseous abnormalities  are seen.      Impression:      No change from the previous study with no evidence for acute cardiopulmonary process.      Electronically Signed: Rony Coronado MD    3/20/2025 3:29 PM EDT    Workstation ID: [id number]    CT Abdomen Pelvis Without Contrast [[id number]] Collected: 03/20/25 1120     Updated: 03/20/25 1207    Narrative:      CT ABDOMEN PELVIS WO CONTRAST    Date of Exam: 3/20/2025 11:05 AM EDT    Indication: Left flank pain. Back pain and left flank pain for 2 days.    Comparison: None available.    Technique: Axial CT images were obtained of the abdomen and pelvis without the administration of contrast. Sagittal and coronal reconstructions were performed.  Automated exposure control and iterative reconstruction methods were used.      FINDINGS:  The lung bases are clear without evidence for focal consolidation or significant pleural effusion.    The liver, spleen, and pancreas are unremarkable without contrast. The gallbladder and bile ducts are unremarkable. The bilateral adrenal glands are symmetric and unremarkable without contrast.     No definitive nephrolithiasis is seen bilaterally. There is no hydronephrosis or hydroureter. There is no evidence for obstructive uropathy. No stones are seen in the bladder. The bilateral kidneys are otherwise unremarkable without contrast.    There is no bowel dilatation or obstruction. A normal-appearing decompressed appendix or appendiceal stub is observed. There is no evidence for acute appendicitis. No significant free fluid is observed. No abnormal fluid collections are identified. No   significant lymphadenopathy is seen throughout the abdomen or pelvis. The bladder is partially decompressed. Fat-containing bilateral inguinal hernias are noted.    No acute osseous abnormalities are observed. Moderate degenerative changes are noted involving the lumbar spine. No definitive large posterior disc protrusion/extrusion is seen.      Impression:      1.No evidence  for significant nephrolithiasis. There is no evidence for obstructive uropathy.  2.No evidence for acute abnormality throughout the abdomen or pelvis on this noncontrast exam.            Electronically Signed: Rony Coronado MD    3/20/2025 11:24 AM EDT    Workstation ID: [id number]          reviewed    ECG/EMG Results (most recent)       None          reviewed    Assessment & Plan   Active Hospital Problems    Diagnosis  POA    **Neutrophilia [D72.828]  Yes      Resolved Hospital Problems   No resolved problems to display.       Leukocytosis  - afebrile, patient reports chills at home   - given poorly controlled diabetes concern for infection  - check sed rate, crp, procal  - blood cx- pending  - UA unremarkable   - CT abd/pel negative for acute process   - check xray left foot  - mri L spine  - start Vanc and Cefepime for now     Diabetes Mellitus type 2 with hyperglycemia  - hgb A1c 11  - will transition patient to gluccomander given his high insulin requirements   - counseled on dietary changes  - monitor with routine accu-checks and make adjustments as needed     Hyponatremia  - likely component of pseudohyponatremia  - IV fluids for now  - repeat in am     HTN  HLP  - resume home meds    Morbid obesity  - bmi 40.5  - complicates all aspects of care   -  on lifestyle changes as appropriate     MARGO on bipap    DVT Prophylaxis  - enoxaparin     Code Status  - full code    I discussed the patient's findings and my recommendations with patient and his wife at bedside.     Patient is high risk       Kathleen HE DO [place]  03/20/25  19:05 EDT      Note disclaimer: At [facility], we believe that sharing information builds trust and better relationships. You are receiving this note because you recently visited [facility]. It is possible you will see health information before a provider has talked with you about it. This kind of information can be easy to misunderstand. To help you fully understand what it  means for your health, we urge you to discuss this note with your provider.

## 2025-03-20 NOTE — PROGRESS NOTES
"Pharmacy Antimicrobial Dosing Service    Subjective:  Arnold Ramírez is a 65 y.o.male admitted with neutrophilia. Pharmacy has been consulted to dose Vancomycin as empiric treatment for possible infection.    PMH: T2DM, HTN, HLD, DM peripheral neuropathy, BPH, sleep apnea      Assessment/Plan    1. Day #1 Vancomycin: Pulse dosing d/t renal dysfxn. Patient's SCr on admission slightly elevated from baseline (~1.15).  Will start with pulse dose of vancomycin 2250 mg (~15 mg/kg ABW) IV x1 and obtain random levels tomorrow AM. Will re-dose when level anticipated <20 mcg/mL.    2. Day #1 Cefepime: 2000 mg IV q8h for estCrCl > 50 mL/min.    Will continue to monitor drug levels, renal function, culture and sensitivities, and patient clinical status.       Objective:  Relevant clinical data and objective history reviewed:  195.6 cm (77\")   (!) 155 kg (341 lb 7.9 oz)   Ideal body weight: 89.1 kg (196 lb 6.9 oz)  Adjusted ideal body weight: 115 kg (254 lb 7.3 oz)  Body mass index is 40.5 kg/m².        Results from last 7 days   Lab Units 03/20/25  1038   CREATININE mg/dL 1.41*     Estimated Creatinine Clearance: 87.2 mL/min (A) (by C-G formula based on SCr of 1.41 mg/dL (H)).  No intake/output data recorded.    Results from last 7 days   Lab Units 03/20/25  1443 03/20/25  1038   WBC 10*3/mm3 35.18* 28.21*     Temperature    03/20/25 1024   Temp: 98.4 °F (36.9 °C)     Baseline culture/source/susceptibility:  Microbiology Results (last 10 days)       Procedure Component Value - Date/Time    COVID-19 and FLU A/B PCR, 1 HR TAT - Swab, Nasopharynx [[id number]]  (Normal) Collected: 03/20/25 1435    Lab Status: Final result Specimen: Swab from Nasopharynx Updated: 03/20/25 1515     COVID19 Not Detected     Influenza A PCR Not Detected     Influenza B PCR Not Detected    Narrative:      Fact sheet for providers: https://www.fda.gov/media/170326/download    Fact sheet for patients: https://www.fda.gov/media/651638/download    Test " performed by PCR.            Anti-Infectives (From admission, onward)      Ordered     Dose/Rate Route Frequency Start Stop    03/20/25 1656  cefepime 2000 mg IVPB in 100 mL NS (VTB)        Ordering Provider: Kathleen Zhong DO    2,000 mg  over 4 Hours Intravenous Every 8 Hours 03/21/25 0145 03/27/25 1744    03/20/25 1648  cefepime 2000 mg IVPB in 100 mL NS (VTB)  Status:  Discontinued        Ordering Provider: Kathleen Zhong DO    2,000 mg  over 30 Minutes Intravenous Every 8 Hours 03/20/25 1745 03/20/25 1656    03/20/25 1656  cefepime 2000 mg IVPB in 100 mL NS (VTB)        Ordering Provider: Kathleen Zhong DO    2,000 mg  over 30 Minutes Intravenous Once 03/20/25 1745      03/20/25 1648  Pharmacy to dose vancomycin        Ordering Provider: Kathleen Zhong DO     Not Applicable Continuous PRN 03/20/25 1647 03/27/25 1646            Ingrid HE PharmD, 3/20/652865:58 EDT

## 2025-03-20 NOTE — PLAN OF CARE
Goal Outcome Evaluation:  Plan of Care Reviewed With: patient        Progress: no change  Outcome Evaluation: new admit cellulitis and sepsis. anbx ordered and bolus of fluids in ER. vss.

## 2025-03-21 ENCOUNTER — APPOINTMENT (OUTPATIENT)
Dept: MRI IMAGING | Facility: HOSPITAL | Age: 65
End: 2025-03-21
Payer: MEDICARE

## 2025-03-21 LAB
ALBUMIN SERPL-MCNC: 3.9 G/DL (ref 3.5–5.2)
ALBUMIN/GLOB SERPL: 1 G/DL
ALP SERPL-CCNC: 84 U/L (ref 39–117)
ALT SERPL W P-5'-P-CCNC: 24 U/L (ref 1–41)
ANION GAP SERPL CALCULATED.3IONS-SCNC: 14.3 MMOL/L (ref 5–15)
AST SERPL-CCNC: 22 U/L (ref 1–40)
BASOPHILS # BLD AUTO: 0.01 10*3/MM3 (ref 0–0.2)
BASOPHILS NFR BLD AUTO: 0 % (ref 0–1.5)
BILIRUB SERPL-MCNC: 0.6 MG/DL (ref 0–1.2)
BUN SERPL-MCNC: 29 MG/DL (ref 8–23)
BUN/CREAT SERPL: 23.2 (ref 7–25)
CALCIUM SPEC-SCNC: 9.1 MG/DL (ref 8.6–10.5)
CHLORIDE SERPL-SCNC: 96 MMOL/L (ref 98–107)
CO2 SERPL-SCNC: 21.7 MMOL/L (ref 22–29)
CREAT SERPL-MCNC: 1.25 MG/DL (ref 0.76–1.27)
DEPRECATED RDW RBC AUTO: 46 FL (ref 37–54)
EGFRCR SERPLBLD CKD-EPI 2021: 64.7 ML/MIN/1.73
EOSINOPHIL # BLD AUTO: 0.01 10*3/MM3 (ref 0–0.4)
EOSINOPHIL NFR BLD AUTO: 0 % (ref 0.3–6.2)
ERYTHROCYTE [DISTWIDTH] IN BLOOD BY AUTOMATED COUNT: 14.8 % (ref 12.3–15.4)
GLOBULIN UR ELPH-MCNC: 4 GM/DL
GLUCOSE BLDC GLUCOMTR-MCNC: 289 MG/DL (ref 70–130)
GLUCOSE BLDC GLUCOMTR-MCNC: 309 MG/DL (ref 70–130)
GLUCOSE BLDC GLUCOMTR-MCNC: 312 MG/DL (ref 70–130)
GLUCOSE BLDC GLUCOMTR-MCNC: 369 MG/DL (ref 70–130)
GLUCOSE SERPL-MCNC: 319 MG/DL (ref 65–99)
HCT VFR BLD AUTO: 45.4 % (ref 37.5–51)
HGB BLD-MCNC: 14.8 G/DL (ref 13–17.7)
IMM GRANULOCYTES # BLD AUTO: 0.14 10*3/MM3 (ref 0–0.05)
IMM GRANULOCYTES NFR BLD AUTO: 0.5 % (ref 0–0.5)
LYMPHOCYTES # BLD AUTO: 1.25 10*3/MM3 (ref 0.7–3.1)
LYMPHOCYTES NFR BLD AUTO: 4.2 % (ref 19.6–45.3)
MAGNESIUM SERPL-MCNC: 1.9 MG/DL (ref 1.6–2.4)
MCH RBC QN AUTO: 27.2 PG (ref 26.6–33)
MCHC RBC AUTO-ENTMCNC: 32.6 G/DL (ref 31.5–35.7)
MCV RBC AUTO: 83.3 FL (ref 79–97)
MONOCYTES # BLD AUTO: 1.53 10*3/MM3 (ref 0.1–0.9)
MONOCYTES NFR BLD AUTO: 5.1 % (ref 5–12)
NEUTROPHILS NFR BLD AUTO: 27.05 10*3/MM3 (ref 1.7–7)
NEUTROPHILS NFR BLD AUTO: 90.2 % (ref 42.7–76)
PLATELET # BLD AUTO: 300 10*3/MM3 (ref 140–450)
PMV BLD AUTO: 10.9 FL (ref 6–12)
POTASSIUM SERPL-SCNC: 4.2 MMOL/L (ref 3.5–5.2)
PROT SERPL-MCNC: 7.9 G/DL (ref 6–8.5)
RBC # BLD AUTO: 5.45 10*6/MM3 (ref 4.14–5.8)
SODIUM SERPL-SCNC: 132 MMOL/L (ref 136–145)
VANCOMYCIN SERPL-MCNC: 9.03 MCG/ML (ref 5–40)
WBC NRBC COR # BLD AUTO: 29.99 10*3/MM3 (ref 3.4–10.8)

## 2025-03-21 PROCEDURE — 25810000003 SODIUM CHLORIDE 0.9 % SOLUTION 250 ML FLEX CONT: Performed by: INTERNAL MEDICINE

## 2025-03-21 PROCEDURE — 25010000002 VANCOMYCIN HCL 1.25 G RECONSTITUTED SOLUTION 1 EACH VIAL: Performed by: INTERNAL MEDICINE

## 2025-03-21 PROCEDURE — 85025 COMPLETE CBC W/AUTO DIFF WBC: CPT | Performed by: INTERNAL MEDICINE

## 2025-03-21 PROCEDURE — 99232 SBSQ HOSP IP/OBS MODERATE 35: CPT | Performed by: INTERNAL MEDICINE

## 2025-03-21 PROCEDURE — 80202 ASSAY OF VANCOMYCIN: CPT | Performed by: INTERNAL MEDICINE

## 2025-03-21 PROCEDURE — 94799 UNLISTED PULMONARY SVC/PX: CPT

## 2025-03-21 PROCEDURE — 94761 N-INVAS EAR/PLS OXIMETRY MLT: CPT

## 2025-03-21 PROCEDURE — 63710000001 INSULIN GLARGINE PER 5 UNITS: Performed by: INTERNAL MEDICINE

## 2025-03-21 PROCEDURE — 94660 CPAP INITIATION&MGMT: CPT

## 2025-03-21 PROCEDURE — 63710000001 INSULIN LISPRO (HUMAN) PER 5 UNITS: Performed by: INTERNAL MEDICINE

## 2025-03-21 PROCEDURE — 25810000003 SODIUM CHLORIDE 0.9 % SOLUTION: Performed by: INTERNAL MEDICINE

## 2025-03-21 PROCEDURE — 25010000002 ENOXAPARIN PER 10 MG: Performed by: INTERNAL MEDICINE

## 2025-03-21 PROCEDURE — 80053 COMPREHEN METABOLIC PANEL: CPT | Performed by: INTERNAL MEDICINE

## 2025-03-21 PROCEDURE — 83735 ASSAY OF MAGNESIUM: CPT | Performed by: INTERNAL MEDICINE

## 2025-03-21 PROCEDURE — 25010000002 CEFEPIME PER 500 MG: Performed by: INTERNAL MEDICINE

## 2025-03-21 PROCEDURE — 82948 REAGENT STRIP/BLOOD GLUCOSE: CPT

## 2025-03-21 RX ADMIN — INSULIN LISPRO 27 UNITS: 100 INJECTION, SOLUTION INTRAVENOUS; SUBCUTANEOUS at 17:31

## 2025-03-21 RX ADMIN — DULOXETINE HYDROCHLORIDE 60 MG: 60 CAPSULE, DELAYED RELEASE ORAL at 09:31

## 2025-03-21 RX ADMIN — ENOXAPARIN SODIUM 40 MG: 100 INJECTION SUBCUTANEOUS at 21:35

## 2025-03-21 RX ADMIN — Medication 10 ML: at 21:25

## 2025-03-21 RX ADMIN — CEFEPIME 2000 MG: 2 INJECTION, POWDER, FOR SOLUTION INTRAVENOUS at 02:14

## 2025-03-21 RX ADMIN — LOSARTAN POTASSIUM 100 MG: 50 TABLET, FILM COATED ORAL at 09:31

## 2025-03-21 RX ADMIN — Medication 10 ML: at 09:54

## 2025-03-21 RX ADMIN — INSULIN LISPRO 10 UNITS: 100 INJECTION, SOLUTION INTRAVENOUS; SUBCUTANEOUS at 21:34

## 2025-03-21 RX ADMIN — CEFEPIME 2000 MG: 2 INJECTION, POWDER, FOR SOLUTION INTRAVENOUS at 09:29

## 2025-03-21 RX ADMIN — HYDROCODONE BITARTRATE AND ACETAMINOPHEN 1 TABLET: 5; 325 TABLET ORAL at 21:39

## 2025-03-21 RX ADMIN — INSULIN LISPRO 20 UNITS: 100 INJECTION, SOLUTION INTRAVENOUS; SUBCUTANEOUS at 08:53

## 2025-03-21 RX ADMIN — VANCOMYCIN HYDROCHLORIDE 1250 MG: 1.25 INJECTION, POWDER, LYOPHILIZED, FOR SOLUTION INTRAVENOUS at 10:26

## 2025-03-21 RX ADMIN — AMLODIPINE BESYLATE 10 MG: 5 TABLET ORAL at 09:31

## 2025-03-21 RX ADMIN — ENOXAPARIN SODIUM 40 MG: 100 INJECTION SUBCUTANEOUS at 09:31

## 2025-03-21 RX ADMIN — PREGABALIN 150 MG: 75 CAPSULE ORAL at 09:31

## 2025-03-21 RX ADMIN — PREGABALIN 150 MG: 75 CAPSULE ORAL at 21:40

## 2025-03-21 RX ADMIN — INSULIN LISPRO 23 UNITS: 100 INJECTION, SOLUTION INTRAVENOUS; SUBCUTANEOUS at 12:49

## 2025-03-21 RX ADMIN — SODIUM CHLORIDE 125 ML/HR: 9 INJECTION, SOLUTION INTRAVENOUS at 12:52

## 2025-03-21 RX ADMIN — INSULIN GLARGINE 51 UNITS: 100 INJECTION, SOLUTION SUBCUTANEOUS at 21:35

## 2025-03-21 RX ADMIN — METOPROLOL SUCCINATE 50 MG: 50 TABLET, EXTENDED RELEASE ORAL at 09:31

## 2025-03-21 RX ADMIN — CEFEPIME 2000 MG: 2 INJECTION, POWDER, FOR SOLUTION INTRAVENOUS at 18:00

## 2025-03-21 RX ADMIN — HYDROCODONE BITARTRATE AND ACETAMINOPHEN 1 TABLET: 5; 325 TABLET ORAL at 09:31

## 2025-03-21 RX ADMIN — VANCOMYCIN HYDROCHLORIDE 1250 MG: 1.25 INJECTION, POWDER, LYOPHILIZED, FOR SOLUTION INTRAVENOUS at 21:30

## 2025-03-21 RX ADMIN — ATORVASTATIN CALCIUM 20 MG: 20 TABLET, FILM COATED ORAL at 21:39

## 2025-03-21 NOTE — CASE MANAGEMENT/SOCIAL WORK
Continued Stay Note  FABBY Britt     Patient Name: Arnold Ramírez  MRN: [id number]  Today's Date: 3/21/2025    Admit Date: 3/20/2025    Plan: plan home with wife   Discharge Plan       Row Name 03/21/25 1354       Plan    Plan plan home with wife    Patient/Family in Agreement with Plan yes    Plan Comments Spoke with patient at bedside, permission to speak with wife present. Face sheet verified. IMM explained, signed and copy declined. Patient lives in a home with his wife and is independent of ADLs including driving. He uses [employer] and denies additional DME. He states he has been fitted for a mechanical ankle brace that will be arriving soon. He has not used  or inpatient rehab services previously He does not have a living will. He sees Anand Alford as PCP. He uses [facility] pharmacy [place] and denies issues obtaining medications. He  plans to return home with his wife to assist as needed at [place]. CM # placed on white board, will continue to follow.                   Discharge Codes    No documentation.                       Chace De Santiago RN

## 2025-03-21 NOTE — SIGNIFICANT NOTE
03/21/25 0743   Clinician Notification   Reason for Communication Critical lab value   Clinician Name Zhong   Clinician Role Hospitalist   Method of Communication Other (Comment)  (MD aware, trending down from yesterday)   Response No new orders

## 2025-03-21 NOTE — SIGNIFICANT NOTE
03/20/25 2120   Clinician Notification   Reason for Communication Critical lab value  (+ MRSA)   Clinician Name Dr. Pagan   Clinician Role Hospitalist   Method of Communication Call   Response No new orders

## 2025-03-21 NOTE — NURSING NOTE
Cwon consult received.  Patient admitted with c/o left abdominal pain and lower back pain.  He was found to have significant elevation in his WBC which are beginning to trend downward. ID has been consulted.  I was asked to see him for chronic LLE wounds. See photos above.    Patient with a hx of LLE lymphedema and reports that the wounds to his left leg have been there on and off for over a year. X-rays did not suggest deep tissue involvement and the wounds on assessment today are fairly unremarkable and I would be surprised if they are his source of infection but will defer to ID. There is some blanchable redness to areas of scattered partial thickness wound but redness does not extend up leg and no streaking is seen. There is no purulence and no necrosis noted.  Patient does follow with podiatry outpatient.     Cwon recommends Xeroform gauze and mild compression of ace wraps from toes to knees.  Wound care provided and he tolerated well. Only complaint is back pain.    All discussed with Dr. Zhong.  I have added wound care orders in [facility].  If patient remains inpatient I will plan to reassess him on Monday.     Please call with any questions.

## 2025-03-21 NOTE — PLAN OF CARE
Goal Outcome Evaluation:  Plan of Care Reviewed With: patient        Progress: improving  Outcome Evaluation: vss, MRI unable to be compelted due to patient not tolerating- back pain continues..positionally irritated.  fluids discontinued. IV anbx continued, blood sugar monitored

## 2025-03-21 NOTE — PLAN OF CARE
Goal Outcome Evaluation:  Plan of Care Reviewed With: patient        Progress: no change  Tolerated room air prior to bipap, otherwise VSS, Tele, compliant to bipap through the night, labs in the AM, up with assist

## 2025-03-21 NOTE — PROGRESS NOTES
"Pharmacy Antimicrobial Dosing Service    Subjective:  Arnold Ramírez is a 65 y.o.male admitted with neutrophilia. Pharmacy has been consulted to dose Vancomycin as empiric treatment for possible osteomyelitis/empiric tx for infection.    PMH: T2DM, HTN, HLD, DM peripheral neuropathy, BPH, sleep apnea      Assessment/Plan    1. Day #2 Vancomycin: Goal -600 mcg*h/mL. Level this AM (3/21) was 9.03 mcg/mL ~12 hours post-dose and serum creatinine trending to goal will start scheduled vancomycin 1250 (~ 10 mg/kg AdjBW) IV q12h and obtain repeat levels prior to 4th dose or sooner if clinically indicated    2. Day #2 Cefepime: 2000 mg IV q8h for estCrCl > 50 mL/min.    Will continue to monitor drug levels, renal function, culture and sensitivities, and patient clinical status.       Objective:  Relevant clinical data and objective history reviewed:  195.6 cm (77\")   (!) 155 kg (341 lb 7.9 oz)   Ideal body weight: 89.1 kg (196 lb 6.9 oz)  Adjusted ideal body weight: 115 kg (254 lb 7.3 oz)  Body mass index is 40.5 kg/m².    Results from last 7 days   Lab Units 03/21/25  0700   VANCOMYCIN RM mcg/mL 9.03     Results from last 7 days   Lab Units 03/21/25  0700 03/20/25  1038   CREATININE mg/dL 1.25 1.41*     Estimated Creatinine Clearance: 98.4 mL/min (by C-G formula based on SCr of 1.25 mg/dL).  I/O last 3 completed shifts:  In: -   Out: 450 [Urine:450]    Results from last 7 days   Lab Units 03/21/25  0700 03/20/25  1443 03/20/25  1038   WBC 10*3/mm3 29.99* 35.18* 28.21*     Temperature    03/20/25 2321 03/21/25 0311 03/21/25 0739   Temp: 97.8 °F (36.6 °C) 97.3 °F (36.3 °C) 97.5 °F (36.4 °C)     Baseline culture/source/susceptibility:  Microbiology Results (last 10 days)       Procedure Component Value - Date/Time    MRSA Screen, PCR (Inpatient) - Swab, Nares [[id number]]  (Abnormal) Collected: 03/20/25 1750    Lab Status: Final result Specimen: Swab from Nares Updated: 03/20/25 4734     MRSA PCR MRSA Detected    " Narrative:      The negative predictive value of this diagnostic test is high and should only be used to consider de-escalating anti-MRSA therapy. A positive result may indicate colonization with MRSA and must be correlated clinically.    COVID-19 and FLU A/B PCR, 1 HR TAT - Swab, Nasopharynx [[id number]]  (Normal) Collected: 03/20/25 1435    Lab Status: Final result Specimen: Swab from Nasopharynx Updated: 03/20/25 1515     COVID19 Not Detected     Influenza A PCR Not Detected     Influenza B PCR Not Detected    Narrative:      Fact sheet for providers: https://www.fda.gov/media/447687/download    Fact sheet for patients: https://www.fda.gov/media/137543/download    Test performed by PCR.            Anti-Infectives (From admission, onward)      Ordered     Dose/Rate Route Frequency Start Stop    03/20/25 1706  vancomycin (VANCOCIN) 1,000 mg in sodium chloride 0.9 % 250 mL IVPB-VTB  Status:  Discontinued        Ordering Provider: Kathleen Zhong DO    1,000 mg  250 mL/hr over 60 Minutes Intravenous Every 12 Hours 03/21/25 0600 03/20/25 1714    03/20/25 2130  vancomycin (VANCOCIN) 2,000 mg in sodium chloride 0.9 % 500 mL IVPB  Status:  Discontinued        Ordering Provider: Juvencio Pagan MD    2,000 mg  275 mL/hr over 120 Minutes Intravenous Every 12 Hours 03/21/25 0600 03/20/25 2136    03/20/25 1656  cefepime 2000 mg IVPB in 100 mL NS (VTB)        Ordering Provider: Kathleen Zhong DO    2,000 mg  over 4 Hours Intravenous Every 8 Hours 03/21/25 0145 03/27/25 1744    03/20/25 1706  vancomycin (VANCOCIN) 3,000 mg in sodium chloride 0.9 % 500 mL IVPB  Status:  Discontinued        Ordering Provider: Kathleen Zhong DO    20 mg/kg × 155 kg  over 210 Minutes Intravenous Once 03/20/25 1800 03/20/25 1714    03/20/25 1714  vancomycin 2250 mg in sodium chloride 0.9% 500 mL IVPB        Ordering Provider: Zhong, Birrilla Simona, DO    15 mg/kg × 155 kg  over 135 Minutes Intravenous Once 03/20/25 1800  03/20/25 2109    03/20/25 1648  cefepime 2000 mg IVPB in 100 mL NS (VTB)  Status:  Discontinued        Ordering Provider: Kathleen Zhong DO    2,000 mg  over 30 Minutes Intravenous Every 8 Hours 03/20/25 1745 03/20/25 1656    03/20/25 1656  cefepime 2000 mg IVPB in 100 mL NS (VTB)        Ordering Provider: Kathleen Zhong DO    2,000 mg  over 30 Minutes Intravenous Once 03/20/25 1745 03/20/25 1816    03/20/25 1648  Pharmacy to dose vancomycin        Ordering Provider: Kathleen Zhong DO     Not Applicable Continuous PRN 03/20/25 1647 03/27/25 1646            Ingrid HE PharmD, 3/21/500139:04 EDT

## 2025-03-21 NOTE — PROGRESS NOTES
Patient Care Team:  Anand Alford MD as PCP - General  Anand Alford MD as PCP - Family Medicine    Date: 3/21/2025  Patient Name: Arnold Ramírez  : 1960  MRN: [id number]  Date of admission: 3/20/2025  Room/Bed: [facility]      Subjective   Subjective         Chief Complaint: f/u leukocytosis, back pain    Summary:Arnold Ramírez is a 65 y.o. male morbidly obese male with a past medical history of DM2, HTN, and HLP who presented to the ED d/t left flank pain initially. Patient reports pain started yesterday afternoon and he was unable to rest all night due to the pain. He went to urgent care and was referred to the ED. Patient underwent CT abd/pel that was negative for acute process. Patient states the pain has now moved from his left flank to his midline back. He denies any injury, but reports his left foot/ankle needs surgery and causes his to walk unevenly causing pain. He reports some chills and nausea. States he has not taken any of his home medications today, due to going to the urgent care.   In the ED, he was found to have significantly elevated WBC. Noted to have some redness of the LLE with some abrasions, though he reports this looks improved from how it normally appears. Given poorly controlled diabetes and significant leukocytosis concern for infection, possibly osteomyelitis.        Interval Followup:   Patient reports he was unable to tolerate MRI this am. Still complaining of some lower back pain, but now reports it is more to the left side/flank. Still complaining of pain in his left foot/ankle. No fever overnight.       Review of Systems   All other systems reviewed and are negative.        Objective   Objective       Vital Signs  Temp:  [97.3 °F (36.3 °C)-98.6 °F (37 °C)] 97.6 °F (36.4 °C)  Heart Rate:  [82-98] 89  Resp:  [18-24] 24  BP: (120-154)/(67-83) 154/73  Oxygen Therapy  SpO2: 92 %  Pulse Oximetry Type: Continuous  Device (Oxygen Therapy): room air  Oxygen Concentration (%):  "28  Flowsheet Rows      Flowsheet Row First Filed Value   Admission Height 195.6 cm (77\") Documented at 03/20/2025 1024   Admission Weight 155 kg (341 lb) Documented at 03/20/2025 1024          Intake & Output (last 3 days)         03/18 0701  03/19 0700 03/19 0701  03/20 0700 03/20 0701  03/21 0700 03/21 0701  03/22 0700    P.O.    600    Total Intake(mL/kg)    600 (3.9)    Urine (mL/kg/hr)   450     Stool   0     Total Output   450     Net   -450 +600            Urine Unmeasured Occurrence   1 x 2 x    Stool Unmeasured Occurrence   1 x           Lines, Drains & Airways       Active LDAs       Name Placement date Placement time Site Days    Peripheral IV 03/20/25 1038 Left Antecubital 03/20/25  1038  Antecubital  1    Peripheral IV 03/20/25 1521 Right Antecubital 03/20/25  1521  Antecubital  1                      Physical Exam  Constitutional:       General: He is not in acute distress.     Appearance: He is morbidly obese.   HENT:      Head: Normocephalic and atraumatic.      Mouth/Throat:      Mouth: Mucous membranes are moist.   Cardiovascular:      Rate and Rhythm: Normal rate and regular rhythm.      Heart sounds: No murmur heard.  Pulmonary:      Effort: Pulmonary effort is normal. No respiratory distress.      Breath sounds: No wheezing.   Abdominal:      Comments: Obese, nt, +bs   Musculoskeletal:         General: Deformity present.      Right lower leg: Edema present.      Left lower leg: Edema present.   Skin:     General: Skin is warm and dry.      Findings: Rash present.   Neurological:      Mental Status: He is alert and oriented to person, place, and time. Mental status is at baseline.   Psychiatric:         Mood and Affect: Mood normal.         Behavior: Behavior normal.           Results Review:      Results from last 7 days   Lab Units 03/21/25  0700 03/20/25  1443 03/20/25  1038   WBC 10*3/mm3 29.99* 35.18* 28.21*   HEMOGLOBIN g/dL 14.8 14.8 15.5   HEMATOCRIT % 45.4 44.6 46.9   PLATELETS 10*3/mm3 " "300 297 309     Results from last 7 days   Lab Units 03/21/25  0700 03/20/25  1038   SODIUM mmol/L 132* 130*   POTASSIUM mmol/L 4.2 4.6   CHLORIDE mmol/L 96* 97*   CO2 mmol/L 21.7* 19.3*   BUN mg/dL 29* 26*   CREATININE mg/dL 1.25 1.41*   CALCIUM mg/dL 9.1 9.8   BILIRUBIN mg/dL 0.6 0.8   ALK PHOS U/L 84 95   ALT (SGPT) U/L 24 26   AST (SGOT) U/L 22 26   GLUCOSE mg/dL 319* 290*       Results from last 7 days   Lab Units 03/21/25  0700   MAGNESIUM mg/dL 1.9       Blood Culture   Date Value Ref Range Status   03/20/2025 No growth at 24 hours  Preliminary   03/20/2025 No growth at 24 hours  Preliminary       No results found for: \"MRSACX\"    No results found for: \"STOOLCX\"    No results found for: \"URINECX\"    No results found for: \"WOUNDCX\"    Imaging Results (Last 24 Hours)       Procedure Component Value Units Date/Time    XR Foot 3+ View Left [[id number]] Collected: 03/20/25 1912     Updated: 03/20/25 1922    Narrative:      XR ANKLE 3+ VW LEFT, XR FOOT 3+ VW LEFT    Date of Exam: 3/20/2025 5:21 PM EDT    Indication: left foot pain, swelling, leukocytosis    Comparison: Left foot and ankle x-rays 1/23/2020    Findings:  Ankle: There is diffuse soft tissue swelling, but no acute fracture or dislocation is identified. Arthritic change with narrowing of the tibiotalar joint has progressed compared to 1/23/2020 x-rays. There are mildly increased soft tissue calcifications.    Foot: There are increased arthritic changes of the midfoot and hindfoot. There is stable narrowing of the great toe metatarsal phalangeal joint. Plantar calcaneal spur with associated calcifications appear similar. No acute fracture or dislocation is   identified.      Impression:      Impression:  1.Soft tissue swelling without definite or acute osseous abnormality.  2.Progression of ankle and foot arthritic changes compared to 2020 x-rays.        Electronically Signed: Roxann Avery    3/20/2025 7:19 PM EDT    Workstation ID: [id number]    XR Ankle " 3+ View Left [[id number]] Collected: 03/20/25 1912     Updated: 03/20/25 1922    Narrative:      XR ANKLE 3+ VW LEFT, XR FOOT 3+ VW LEFT    Date of Exam: 3/20/2025 5:21 PM EDT    Indication: left foot pain, swelling, leukocytosis    Comparison: Left foot and ankle x-rays 1/23/2020    Findings:  Ankle: There is diffuse soft tissue swelling, but no acute fracture or dislocation is identified. Arthritic change with narrowing of the tibiotalar joint has progressed compared to 1/23/2020 x-rays. There are mildly increased soft tissue calcifications.    Foot: There are increased arthritic changes of the midfoot and hindfoot. There is stable narrowing of the great toe metatarsal phalangeal joint. Plantar calcaneal spur with associated calcifications appear similar. No acute fracture or dislocation is   identified.      Impression:      Impression:  1.Soft tissue swelling without definite or acute osseous abnormality.  2.Progression of ankle and foot arthritic changes compared to 2020 x-rays.        Electronically Signed: Roxann Avery    3/20/2025 7:19 PM EDT    Workstation ID: [id number]            I have personally reviewed the patient's new imaging and lab results.          ECG/EMG Results (most recent)       None                    Medication Review:     Scheduled Meds:amLODIPine, 10 mg, Oral, Daily  atorvastatin, 20 mg, Oral, Nightly  cefepime, 2,000 mg, Intravenous, Q8H  DULoxetine, 60 mg, Oral, Daily  enoxaparin sodium, 40 mg, Subcutaneous, Q12H  HYDROcodone-acetaminophen, 1 tablet, Oral, BID  insulin glargine, 1-200 Units, Subcutaneous, Nightly - Glucommander  insulin lispro, 1-200 Units, Subcutaneous, 4x Daily With Meals & Nightly  losartan, 100 mg, Oral, Daily  metoprolol succinate XL, 50 mg, Oral, Daily  pregabalin, 150 mg, Oral, BID  sodium chloride, 10 mL, Intravenous, Q12H  vancomycin, 1,250 mg, Intravenous, Q12H      Continuous Infusions:Pharmacy to dose vancomycin,   sodium chloride, 125 mL/hr, Last Rate: 125  mL/hr (03/21/25 1252)      PRN Meds:.  acetaminophen **OR** acetaminophen **OR** acetaminophen    senna-docusate sodium **AND** polyethylene glycol **AND** bisacodyl **AND** bisacodyl    dextrose    dextrose    glucagon (human recombinant)    insulin lispro    nitroglycerin    ondansetron ODT **OR** ondansetron    Pharmacy to dose vancomycin    sodium chloride    sodium chloride    sodium chloride      I have reviewed the patient's current medication list    Assessment & Plan   Assessment / Plan       Active Hospital Problems    Diagnosis  POA    **Neutrophilia [D72.828]  Yes       Leukocytosis  - afebrile, patient reports chills at home   - given poorly controlled diabetes concern for infection  - check sed rate 45, crp 20, procal 8  - blood cx still pending  - UA unremarkable   - CT abd/pel negative for acute process   - xray left foot reviewed   - unable to tolerate MRI L spine   - continue Vanc and Cefepime for now   - leukocytosis trending down slightly  - patient did receive a dose of IV decadron in the ED yesterday  - afebrile  - will ask ID for opinion      Diabetes Mellitus type 2 with hyperglycemia and peripheral neuropathy   - hgb A1c 11  - resume home Cymbalta and Lyrica  - continue gluccomander given his high insulin requirements   - counseled on dietary and lifestyle changes  - monitor with routine accu-checks and make adjustments as needed   - encouraged patient to follow up with endocrinology      Pseudohyponatremia  - corrected for hyperglycemia na 137  - dc IV fluids  - repeat in am      HTN  - resume home amlodipine, losartan, toprol xl  - monitor bp with routine vs and make adjustments as needed     HLP  - resume home atorvastatin      Morbid obesity  - bmi 40.5  - complicates all aspects of care   -  on lifestyle changes as appropriate      MARGO on bipap     DVT Prophylaxis  - enoxaparin      Code Status  - full code     Patient is high risk        Plan for disposition: likely home pending  improvement     Kathleen Zhong DO  03/21/25  16:08 EDT      Note disclaimer: At [facility], we believe that sharing information builds trust and better relationships. You are receiving this note because you recently visited [facility]. It is possible you will see health information before a provider has talked with you about it. This kind of information can be easy to misunderstand. To help you fully understand what it means for your health, we urge you to discuss this note with your provider.

## 2025-03-22 ENCOUNTER — APPOINTMENT (OUTPATIENT)
Dept: CT IMAGING | Facility: HOSPITAL | Age: 65
End: 2025-03-22
Payer: MEDICARE

## 2025-03-22 ENCOUNTER — APPOINTMENT (OUTPATIENT)
Dept: MRI IMAGING | Facility: HOSPITAL | Age: 65
End: 2025-03-22
Payer: MEDICARE

## 2025-03-22 LAB
ANION GAP SERPL CALCULATED.3IONS-SCNC: 9.5 MMOL/L (ref 5–15)
BACTERIA BLD CULT: ABNORMAL
BASOPHILS # BLD AUTO: 0.03 10*3/MM3 (ref 0–0.2)
BASOPHILS NFR BLD AUTO: 0.2 % (ref 0–1.5)
BOTTLE TYPE: ABNORMAL
BUN SERPL-MCNC: 31 MG/DL (ref 8–23)
BUN/CREAT SERPL: 31.3 (ref 7–25)
CALCIUM SPEC-SCNC: 8.9 MG/DL (ref 8.6–10.5)
CHLORIDE SERPL-SCNC: 101 MMOL/L (ref 98–107)
CO2 SERPL-SCNC: 24.5 MMOL/L (ref 22–29)
CREAT SERPL-MCNC: 0.99 MG/DL (ref 0.76–1.27)
DEPRECATED RDW RBC AUTO: 46.8 FL (ref 37–54)
EGFRCR SERPLBLD CKD-EPI 2021: 85.6 ML/MIN/1.73
EOSINOPHIL # BLD AUTO: 0.01 10*3/MM3 (ref 0–0.4)
EOSINOPHIL NFR BLD AUTO: 0.1 % (ref 0.3–6.2)
ERYTHROCYTE [DISTWIDTH] IN BLOOD BY AUTOMATED COUNT: 15.2 % (ref 12.3–15.4)
GLUCOSE BLDC GLUCOMTR-MCNC: 146 MG/DL (ref 70–130)
GLUCOSE BLDC GLUCOMTR-MCNC: 186 MG/DL (ref 70–130)
GLUCOSE BLDC GLUCOMTR-MCNC: 195 MG/DL (ref 70–130)
GLUCOSE BLDC GLUCOMTR-MCNC: 195 MG/DL (ref 70–130)
GLUCOSE SERPL-MCNC: 170 MG/DL (ref 65–99)
HCT VFR BLD AUTO: 43 % (ref 37.5–51)
HGB BLD-MCNC: 14 G/DL (ref 13–17.7)
IMM GRANULOCYTES # BLD AUTO: 0.09 10*3/MM3 (ref 0–0.05)
IMM GRANULOCYTES NFR BLD AUTO: 0.5 % (ref 0–0.5)
LYMPHOCYTES # BLD AUTO: 1.51 10*3/MM3 (ref 0.7–3.1)
LYMPHOCYTES NFR BLD AUTO: 8.9 % (ref 19.6–45.3)
MAGNESIUM SERPL-MCNC: 2.1 MG/DL (ref 1.6–2.4)
MCH RBC QN AUTO: 27.3 PG (ref 26.6–33)
MCHC RBC AUTO-ENTMCNC: 32.6 G/DL (ref 31.5–35.7)
MCV RBC AUTO: 84 FL (ref 79–97)
MONOCYTES # BLD AUTO: 1.17 10*3/MM3 (ref 0.1–0.9)
MONOCYTES NFR BLD AUTO: 6.9 % (ref 5–12)
NEUTROPHILS NFR BLD AUTO: 14.19 10*3/MM3 (ref 1.7–7)
NEUTROPHILS NFR BLD AUTO: 83.4 % (ref 42.7–76)
NRBC BLD AUTO-RTO: 0 /100 WBC (ref 0–0.2)
PLATELET # BLD AUTO: 271 10*3/MM3 (ref 140–450)
PMV BLD AUTO: 10.9 FL (ref 6–12)
POTASSIUM SERPL-SCNC: 4.2 MMOL/L (ref 3.5–5.2)
RBC # BLD AUTO: 5.12 10*6/MM3 (ref 4.14–5.8)
SODIUM SERPL-SCNC: 135 MMOL/L (ref 136–145)
VANCOMYCIN PEAK SERPL-MCNC: 18.5 MCG/ML (ref 20–40)
VANCOMYCIN TROUGH SERPL-MCNC: 10.7 MCG/ML (ref 5–20)
WBC NRBC COR # BLD AUTO: 17 10*3/MM3 (ref 3.4–10.8)

## 2025-03-22 PROCEDURE — 83735 ASSAY OF MAGNESIUM: CPT | Performed by: INTERNAL MEDICINE

## 2025-03-22 PROCEDURE — 63710000001 INSULIN LISPRO (HUMAN) PER 5 UNITS: Performed by: INTERNAL MEDICINE

## 2025-03-22 PROCEDURE — 25810000003 SODIUM CHLORIDE 0.9 % SOLUTION 250 ML FLEX CONT: Performed by: INTERNAL MEDICINE

## 2025-03-22 PROCEDURE — 85025 COMPLETE CBC W/AUTO DIFF WBC: CPT | Performed by: INTERNAL MEDICINE

## 2025-03-22 PROCEDURE — 25510000001 IOPAMIDOL PER 1 ML: Performed by: INTERNAL MEDICINE

## 2025-03-22 PROCEDURE — 94799 UNLISTED PULMONARY SVC/PX: CPT

## 2025-03-22 PROCEDURE — 63710000001 INSULIN GLARGINE PER 5 UNITS: Performed by: INTERNAL MEDICINE

## 2025-03-22 PROCEDURE — 25010000002 CEFTRIAXONE PER 250 MG: Performed by: INTERNAL MEDICINE

## 2025-03-22 PROCEDURE — 80048 BASIC METABOLIC PNL TOTAL CA: CPT | Performed by: INTERNAL MEDICINE

## 2025-03-22 PROCEDURE — 94660 CPAP INITIATION&MGMT: CPT

## 2025-03-22 PROCEDURE — 72133 CT LUMBAR SPINE W/O & W/DYE: CPT

## 2025-03-22 PROCEDURE — 25010000002 VANCOMYCIN HCL 1.25 G RECONSTITUTED SOLUTION 1 EACH VIAL: Performed by: INTERNAL MEDICINE

## 2025-03-22 PROCEDURE — 25010000002 ENOXAPARIN PER 10 MG: Performed by: INTERNAL MEDICINE

## 2025-03-22 PROCEDURE — 25010000002 CEFEPIME PER 500 MG: Performed by: INTERNAL MEDICINE

## 2025-03-22 PROCEDURE — 99232 SBSQ HOSP IP/OBS MODERATE 35: CPT | Performed by: INTERNAL MEDICINE

## 2025-03-22 PROCEDURE — 80202 ASSAY OF VANCOMYCIN: CPT | Performed by: INTERNAL MEDICINE

## 2025-03-22 PROCEDURE — 82948 REAGENT STRIP/BLOOD GLUCOSE: CPT

## 2025-03-22 RX ORDER — LIDOCAINE 4 G/G
2 PATCH TOPICAL
Status: DISCONTINUED | OUTPATIENT
Start: 2025-03-22 | End: 2025-03-24 | Stop reason: HOSPADM

## 2025-03-22 RX ORDER — IOPAMIDOL 755 MG/ML
100 INJECTION, SOLUTION INTRAVASCULAR
Status: COMPLETED | OUTPATIENT
Start: 2025-03-22 | End: 2025-03-22

## 2025-03-22 RX ADMIN — LOSARTAN POTASSIUM 100 MG: 50 TABLET, FILM COATED ORAL at 08:43

## 2025-03-22 RX ADMIN — DULOXETINE HYDROCHLORIDE 60 MG: 60 CAPSULE, DELAYED RELEASE ORAL at 08:43

## 2025-03-22 RX ADMIN — INSULIN LISPRO 16 UNITS: 100 INJECTION, SOLUTION INTRAVENOUS; SUBCUTANEOUS at 08:52

## 2025-03-22 RX ADMIN — WHITE PETROLATUM 41 % TOPICAL OINTMENT 1 APPLICATION: OINTMENT at 20:12

## 2025-03-22 RX ADMIN — INSULIN LISPRO 20 UNITS: 100 INJECTION, SOLUTION INTRAVENOUS; SUBCUTANEOUS at 17:17

## 2025-03-22 RX ADMIN — PREGABALIN 150 MG: 75 CAPSULE ORAL at 20:11

## 2025-03-22 RX ADMIN — CEFEPIME 2000 MG: 2 INJECTION, POWDER, FOR SOLUTION INTRAVENOUS at 10:14

## 2025-03-22 RX ADMIN — AMLODIPINE BESYLATE 10 MG: 5 TABLET ORAL at 08:43

## 2025-03-22 RX ADMIN — HYDROCODONE BITARTRATE AND ACETAMINOPHEN 1 TABLET: 5; 325 TABLET ORAL at 08:43

## 2025-03-22 RX ADMIN — HYDROCODONE BITARTRATE AND ACETAMINOPHEN 1 TABLET: 5; 325 TABLET ORAL at 20:11

## 2025-03-22 RX ADMIN — IOPAMIDOL 100 ML: 755 INJECTION, SOLUTION INTRAVENOUS at 12:26

## 2025-03-22 RX ADMIN — ENOXAPARIN SODIUM 40 MG: 100 INJECTION SUBCUTANEOUS at 08:43

## 2025-03-22 RX ADMIN — INSULIN GLARGINE 51 UNITS: 100 INJECTION, SOLUTION SUBCUTANEOUS at 21:00

## 2025-03-22 RX ADMIN — METOPROLOL SUCCINATE 50 MG: 50 TABLET, EXTENDED RELEASE ORAL at 08:43

## 2025-03-22 RX ADMIN — Medication 10 ML: at 08:43

## 2025-03-22 RX ADMIN — CEFTRIAXONE SODIUM 2000 MG: 2 INJECTION, POWDER, FOR SOLUTION INTRAMUSCULAR; INTRAVENOUS at 16:55

## 2025-03-22 RX ADMIN — VANCOMYCIN HYDROCHLORIDE 1250 MG: 1.25 INJECTION, POWDER, LYOPHILIZED, FOR SOLUTION INTRAVENOUS at 08:52

## 2025-03-22 RX ADMIN — INSULIN LISPRO 23 UNITS: 100 INJECTION, SOLUTION INTRAVENOUS; SUBCUTANEOUS at 12:54

## 2025-03-22 RX ADMIN — CEFEPIME 2000 MG: 2 INJECTION, POWDER, FOR SOLUTION INTRAVENOUS at 05:02

## 2025-03-22 RX ADMIN — INSULIN LISPRO 6 UNITS: 100 INJECTION, SOLUTION INTRAVENOUS; SUBCUTANEOUS at 21:00

## 2025-03-22 RX ADMIN — ATORVASTATIN CALCIUM 20 MG: 20 TABLET, FILM COATED ORAL at 20:11

## 2025-03-22 RX ADMIN — Medication 10 ML: at 20:12

## 2025-03-22 RX ADMIN — LIDOCAINE 2 PATCH: 4 PATCH TOPICAL at 10:16

## 2025-03-22 RX ADMIN — ENOXAPARIN SODIUM 40 MG: 100 INJECTION SUBCUTANEOUS at 20:11

## 2025-03-22 RX ADMIN — PREGABALIN 150 MG: 75 CAPSULE ORAL at 08:43

## 2025-03-22 NOTE — PROGRESS NOTES
RT EQUIPMENT DEVICE RELATED - SKIN ASSESSMENT    RT Medical Equipment/Device:  NIV Mask: Full-face  size: lg    Skin Assessment: Cheek: Intact    Device Skin Pressure Protection: Pressure points protected    Nurse Notification: Latonya Fowler, RRT

## 2025-03-22 NOTE — PROGRESS NOTES
Patient Care Team:  Anand Alford MD as PCP - General  Anand Alford MD as PCP - Family Medicine    Date: 3/22/2025  Patient Name: Arnold Ramírez  : 1960  MRN: [id number]  Date of admission: 3/20/2025  Room/Bed: [facility]/      Subjective   Subjective         Chief Complaint: f/u leukocytosis, back pain    Summary:Arnold Ramírez is a 65 y.o. male morbidly obese male with a past medical history of DM2, HTN, and HLP who presented to the ED d/t left flank pain initially. Patient reports pain started yesterday afternoon and he was unable to rest all night due to the pain. He went to urgent care and was referred to the ED. Patient underwent CT abd/pel that was negative for acute process. Patient states the pain has now moved from his left flank to his midline back. He denies any injury, but reports his left foot/ankle needs surgery and causes his to walk unevenly causing pain. He reports some chills and nausea. States he has not taken any of his home medications today, due to going to the urgent care.   In the ED, he was found to have significantly elevated WBC. Noted to have some redness of the LLE with some abrasions, though he reports this looks improved from how it normally appears. Given poorly controlled diabetes and significant leukocytosis concern for infection, possibly osteomyelitis.        Interval Followup:   Patient is complaining of lower back pain today. States bed is quite uncomfortable. No fever or chills. Nursing reports no acute overnight events.     Review of Systems   All other systems reviewed and are negative.        Objective   Objective       Vital Signs  Temp:  [96.8 °F (36 °C)-98 °F (36.7 °C)] 98 °F (36.7 °C)  Heart Rate:  [73-88] 85  Resp:  [16-24] 20  BP: (109-154)/(64-75) 132/70  Oxygen Therapy  SpO2: 92 %  Pulse Oximetry Type: Intermittent  Device (Oxygen Therapy): room air  Oxygen Concentration (%): 28  Flowsheet Rows      Flowsheet Row First Filed Value   Admission Height 195.6 cm  "(77\") Documented at 03/20/2025 1024   Admission Weight 155 kg (341 lb) Documented at 03/20/2025 1024          Intake & Output (last 3 days)         03/18 0701  03/19 0700 03/19 0701 03/20 0700 03/20 0701 03/21 0700 03/21 0701 03/22 0700    P.O.    600    Total Intake(mL/kg)    600 (3.9)    Urine (mL/kg/hr)   450     Stool   0     Total Output   450     Net   -450 +600            Urine Unmeasured Occurrence   1 x 2 x    Stool Unmeasured Occurrence   1 x           Lines, Drains & Airways       Active LDAs       Name Placement date Placement time Site Days    Peripheral IV 03/20/25 1038 Left Antecubital 03/20/25  1038  Antecubital  1    Peripheral IV 03/20/25 1521 Right Antecubital 03/20/25  1521  Antecubital  1                      Physical Exam  Vitals reviewed.   Constitutional:       General: He is not in acute distress.     Appearance: He is morbidly obese.   HENT:      Head: Normocephalic and atraumatic.      Mouth/Throat:      Mouth: Mucous membranes are moist.   Cardiovascular:      Rate and Rhythm: Normal rate and regular rhythm.      Heart sounds: No murmur heard.  Pulmonary:      Effort: Pulmonary effort is normal. No respiratory distress.      Breath sounds: No wheezing.   Abdominal:      Comments: Obese, nt, +bs   Musculoskeletal:         General: Deformity present.      Right lower leg: Edema present.      Left lower leg: Edema present.   Skin:     General: Skin is warm and dry.      Comments: Ace wrap in place to LLE   Neurological:      Mental Status: He is alert and oriented to person, place, and time. Mental status is at baseline.   Psychiatric:         Mood and Affect: Mood normal.         Behavior: Behavior normal.           Results Review:      Results from last 7 days   Lab Units 03/22/25  0807 03/21/25  0700 03/20/25  1443   WBC 10*3/mm3 17.00* 29.99* 35.18*   HEMOGLOBIN g/dL 14.0 14.8 14.8   HEMATOCRIT % 43.0 45.4 44.6   PLATELETS 10*3/mm3 271 300 297     Results from last 7 days   Lab Units " "03/22/25  0807 03/21/25  0700 03/20/25  1038   SODIUM mmol/L 135* 132* 130*   POTASSIUM mmol/L 4.2 4.2 4.6   CHLORIDE mmol/L 101 96* 97*   CO2 mmol/L 24.5 21.7* 19.3*   BUN mg/dL 31* 29* 26*   CREATININE mg/dL 0.99 1.25 1.41*   CALCIUM mg/dL 8.9 9.1 9.8   BILIRUBIN mg/dL  --  0.6 0.8   ALK PHOS U/L  --  84 95   ALT (SGPT) U/L  --  24 26   AST (SGOT) U/L  --  22 26   GLUCOSE mg/dL 170* 319* 290*       Results from last 7 days   Lab Units 03/22/25  0807 03/21/25  0700   MAGNESIUM mg/dL 2.1 1.9       Blood Culture   Date Value Ref Range Status   03/20/2025 No growth at 24 hours  Preliminary   03/20/2025 No growth at 24 hours  Preliminary       No results found for: \"MRSACX\"    No results found for: \"STOOLCX\"    No results found for: \"URINECX\"    No results found for: \"WOUNDCX\"    Imaging Results (Last 24 Hours)       Procedure Component Value Units Date/Time    CT Lumbar Spine With & Without Contrast [[id number]] Resulted: 03/22/25 1227     Updated: 03/22/25 1228            I have personally reviewed the patient's new imaging and lab results.          ECG/EMG Results (most recent)       None                    Medication Review:     Scheduled Meds:amLODIPine, 10 mg, Oral, Daily  atorvastatin, 20 mg, Oral, Nightly  cefTRIAXone, 2,000 mg, Intravenous, Q24H  DULoxetine, 60 mg, Oral, Daily  enoxaparin sodium, 40 mg, Subcutaneous, Q12H  HYDROcodone-acetaminophen, 1 tablet, Oral, BID  insulin glargine, 1-200 Units, Subcutaneous, Nightly - Glucommander  insulin lispro, 1-200 Units, Subcutaneous, 4x Daily With Meals & Nightly  Lidocaine, 2 patch, Transdermal, Q24H  losartan, 100 mg, Oral, Daily  metoprolol succinate XL, 50 mg, Oral, Daily  pregabalin, 150 mg, Oral, BID  sodium chloride, 10 mL, Intravenous, Q12H      Continuous Infusions:Pharmacy to dose vancomycin,       PRN Meds:.  acetaminophen **OR** acetaminophen **OR** acetaminophen    senna-docusate sodium **AND** polyethylene glycol **AND** bisacodyl **AND** bisacodyl    " dextrose    dextrose    glucagon (human recombinant)    insulin lispro    nitroglycerin    ondansetron ODT **OR** ondansetron    Pharmacy to dose vancomycin    sodium chloride    sodium chloride    sodium chloride      I have reviewed the patient's current medication list    Assessment & Plan   Assessment / Plan       Active Hospital Problems    Diagnosis  POA    **Neutrophilia [D72.828]  Yes       Leukocytosis d/t strep bacteremia   - afebrile, patient reports chills at home   - given poorly controlled diabetes concern for infection  - check sed rate 45, crp 20, procal 8  - blood cx- strep  - repeat blood cx 3/22- pending   - UA unremarkable   - CT abd/pel negative for acute process   - xray left foot reviewed   - unable to tolerate MRI L spine   - will check CT L spine  - ID consulted  - echo ordered and pending  - discussed with ID and will dc Vanc and Cefepime and start IV Rocephin 2g  - leukocytosis improving    Diabetes Mellitus type 2 with hyperglycemia and peripheral neuropathy   - hgb A1c 11  - resume home Cymbalta and Lyrica  - continue gluccomander given his high insulin requirements   - counseled extensively on dietary and lifestyle changes  - monitor with routine accu-checks and make adjustments as needed   - encouraged patient to follow up with endocrinology      Pseudohyponatremia  - corrected for hyperglycemia na 137     HTN  - resume home amlodipine, losartan, toprol xl  - monitor bp with routine vs and make adjustments as needed     HLP  - resume home atorvastatin      Morbid obesity  - bmi 40.5  - complicates all aspects of care   -  on lifestyle changes as appropriate      MARGO on bipap     DVT Prophylaxis  - enoxaparin      Code Status  - full code          Plan for disposition: likely home pending improvement     Kathleen Zhong DO  03/22/25  14:03 EDT      Note disclaimer: At [facility], we believe that sharing information builds trust and better relationships. You are receiving  this note because you recently visited [facility]. It is possible you will see health information before a provider has talked with you about it. This kind of information can be easy to misunderstand. To help you fully understand what it means for your health, we urge you to discuss this note with your provider.

## 2025-03-22 NOTE — PLAN OF CARE
Goal Outcome Evaluation:  Plan of Care Reviewed With: patient, spouse        Progress: improving  Outcome Evaluation: Patient here for leukocyosis/back pain. Continues with lower back pain, heat & lidoderm patches help at times today. Positive blood cultures. Tried MRI of foot today but back unable to tolerate putting foot in boot due to intense pain, delcined morphine IV to help, still stating he needs open MRI to have MRI of lower spine. Glucomander with sugars under better control.  Started on IV rocephine. Echo on Monday. Encouraging patient to try to keep blood sugar levels better controlled at home, A1c is 11.0. Plan home with spouse at discharge.

## 2025-03-22 NOTE — PROGRESS NOTES
RT EQUIPMENT DEVICE RELATED - SKIN ASSESSMENT    RT Medical Equipment/Device:  NIV Mask: Full-face  size:      Skin Assessment: Nose: Intact    Device Skin Pressure Protection: Pressure points protected    Nurse Notification: Latonya Boateng, RRT

## 2025-03-22 NOTE — CONSULTS
LOS: 2 days   Patient Care Team:  Anand Alford MD as PCP - General  Anand Alford MD as PCP - Family Medicine        Subjective       Attending MD : Kathleen Zhong, *    Patient Complaints: fever and back pain         History of Present Illness  : 65 y.o. morbidly obese male with a past medical history of DM2, HTN, and HLP who presented to the ED d/t left flank pain initially. Patient reports pain started yesterday afternoon and he was unable to rest all night due to the pain. He went to urgent care and was referred to the ED. Patient underwent CT abd/pel that was negative for acute process. Patient states the pain has now moved from his left flank to his midline back. He denies any injury, but reports his left foot/ankle needs surgery and causes his to walk unevenly causing pain. He reports some chills and nausea. States he has not taken any of his home medications today, due to going to the urgent care.   In the ED, he was found to have significantly elevated WBC. Noted to have some redness of the LLE with some abrasions, though he reports this looks improved from how it normally appears. Given poorly controlled diabetes and significant leukocytosis concern for infection, possibly osteomyelitis.         Patient Denies:  NV    PMH :   Neutrophilia      Review of Systems:    Pain    Objective     Vital Signs  Temp:  [96.8 °F (36 °C)-98 °F (36.7 °C)] 98 °F (36.7 °C)  Heart Rate:  [73-88] 85  Resp:  [16-24] 20  BP: (109-154)/(64-75) 132/70    Physical Exam:     General Appearance:  Alert, cooperative, in no acute distress   Head:  Normocephalic, without obvious abnormality, atraumatic   Eyes:  Lids and lashes normal, conjunctivae and sclerae normal, no icterus, no pallor, corneas clear, PERRLA   Ears:  Ears appear intact with no abnormalities noted   Throat:  No oral lesions, no thrush, oral mucosa moist   Neck:  No adenopathy, supple, trachea midline, no thyromegaly, no carotid bruit, no JVD   Back:   No kyphosis present, no scoliosis present, no skin lesions, erythema or scars, no tenderness to percussion or palpation, range of motion normal   Lungs:  Clear to auscultation, respirations regular, even and unlabored    Heart:  Regular rhythm and normal rate, normal S1 and S2, no murmur, no gallop, no rub, no click   Chest Wall:  No abnormalities observed   Abdomen:  Normal bowel sounds, no masses, no organomegaly, soft non-tender, non-distended, no guarding, no rebound tenderness   Rectal:  Deferred   Extremities:  Moves all extremities well, no edema, no cyanosis, no redness   Pulses:  Pulses palpable and equal bilaterally   Skin:  No bleeding, bruising or rash   Lymph nodes:  No palpable adenopathy   Neurologic:  Cranial nerves 2 - 12 grossly intact, sensation intact, DTR present and equal bilaterally                                                                                  Results Review:    Lab Results (last 72 hours)       Procedure Component Value Units Date/Time    POC Glucose Once [[id number]]  (Abnormal) Collected: 03/22/25 1226    Specimen: Blood Updated: 03/22/25 1234     Glucose 195 mg/dL     Blood Culture ID, PCR - Blood, Arm, Right [[id number]]  (Abnormal) Collected: 03/20/25 1530    Specimen: Blood from Arm, Right Updated: 03/22/25 1030     BCID, PCR Streptococcus spp, not A, B, or pneumoniae. Identification by BCID2 PCR.     BOTTLE TYPE Anaerobic Bottle    POC Glucose Once [[id number]]  (Abnormal) Collected: 03/22/25 0832    Specimen: Blood Updated: 03/22/25 0849     Glucose 146 mg/dL     Blood Culture - Blood, Arm, Left [[id number]]  (Abnormal) Collected: 03/20/25 1435    Specimen: Blood from Arm, Left Updated: 03/22/25 0847     Blood Culture Abnormal Stain     Gram Stain Anaerobic Bottle Gram positive cocci in pairs and chains    Blood Culture - Blood, Arm, Right [[id number]]  (Abnormal) Collected: 03/20/25 1530    Specimen: Blood from Arm, Right Updated: 03/22/25 0847     Blood Culture  Abnormal Stain     Gram Stain Anaerobic Bottle Gram positive cocci in pairs and chains    Basic Metabolic Panel [[id number]]  (Abnormal) Collected: 03/22/25 0807    Specimen: Blood Updated: 03/22/25 0846     Glucose 170 mg/dL      BUN 31 mg/dL      Creatinine 0.99 mg/dL      Sodium 135 mmol/L      Potassium 4.2 mmol/L      Chloride 101 mmol/L      CO2 24.5 mmol/L      Calcium 8.9 mg/dL      BUN/Creatinine Ratio 31.3     Anion Gap 9.5 mmol/L      eGFR 85.6 mL/min/1.73     Narrative:      GFR Categories in Chronic Kidney Disease (CKD)      GFR Category          GFR (mL/min/1.73)    Interpretation  G1                     90 or greater         Normal or high (1)  G2                      60-89                Mild decrease (1)  G3a                   45-59                Mild to moderate decrease  G3b                   30-44                Moderate to severe decrease  G4                    15-29                Severe decrease  G5                    14 or less           Kidney failure          (1)In the absence of evidence of kidney disease, neither GFR category G1 or G2 fulfill the criteria for CKD.    eGFR calculation 2021 CKD-EPI creatinine equation, which does not include race as a factor    Magnesium [[id number]]  (Normal) Collected: 03/22/25 0807    Specimen: Blood Updated: 03/22/25 0846     Magnesium 2.1 mg/dL     Vancomycin, Trough [[id number]]  (Normal) Collected: 03/22/25 0807    Specimen: Blood Updated: 03/22/25 0846     Vancomycin Trough 10.70 mcg/mL     Narrative:      Therapeutic Ranges for Vancomycin    Vancomycin Random   5.0-40.0 mcg/mL  Vancomycin Trough   5.0-20.0 mcg/mL  Vancomycin Peak     20.0-40.0 mcg/mL    CBC & Differential [[id number]]  (Abnormal) Collected: 03/22/25 0807    Specimen: Blood Updated: 03/22/25 0818    Narrative:      The following orders were created for panel order CBC & Differential.  Procedure                               Abnormality         Status                     ---------                                -----------         ------                     CBC Auto Differential[[id number]]        Abnormal            Final result                 Please view results for these tests on the individual orders.    CBC Auto Differential [[id number]]  (Abnormal) Collected: 03/22/25 0807    Specimen: Blood Updated: 03/22/25 0818     WBC 17.00 10*3/mm3      RBC 5.12 10*6/mm3      Hemoglobin 14.0 g/dL      Hematocrit 43.0 %      MCV 84.0 fL      MCH 27.3 pg      MCHC 32.6 g/dL      RDW 15.2 %      RDW-SD 46.8 fl      MPV 10.9 fL      Platelets 271 10*3/mm3      Neutrophil % 83.4 %      Lymphocyte % 8.9 %      Monocyte % 6.9 %      Eosinophil % 0.1 %      Basophil % 0.2 %      Immature Grans % 0.5 %      Neutrophils, Absolute 14.19 10*3/mm3      Lymphocytes, Absolute 1.51 10*3/mm3      Monocytes, Absolute 1.17 10*3/mm3      Eosinophils, Absolute 0.01 10*3/mm3      Basophils, Absolute 0.03 10*3/mm3      Immature Grans, Absolute 0.09 10*3/mm3      nRBC 0.0 /100 WBC     Vancomycin, Peak [[id number]]  (Abnormal) Collected: 03/22/25 0107    Specimen: Blood from Arm, Left Updated: 03/22/25 0131     Vancomycin Peak 18.50 mcg/mL     Narrative:      Therapeutic Ranges for Vancomycin    Vancomycin Random   5.0-40.0 mcg/mL  Vancomycin Trough   5.0-20.0 mcg/mL  Vancomycin Peak     20.0-40.0 mcg/mL    POC Glucose Once [[id number]]  (Abnormal) Collected: 03/21/25 2119    Specimen: Blood Updated: 03/21/25 2124     Glucose 289 mg/dL     POC Glucose Once [[id number]]  (Abnormal) Collected: 03/21/25 1712    Specimen: Blood Updated: 03/21/25 1718     Glucose 369 mg/dL     POC Glucose Once [[id number]]  (Abnormal) Collected: 03/21/25 1242    Specimen: Blood Updated: 03/21/25 1257     Glucose 312 mg/dL     POC Glucose Once [[id number]]  (Abnormal) Collected: 03/21/25 0851    Specimen: Blood Updated: 03/21/25 0858     Glucose 309 mg/dL     Comprehensive Metabolic Panel [[id number]]  (Abnormal) Collected: 03/21/25 0700    Specimen: Blood  Updated: 03/21/25 0755     Glucose 319 mg/dL      BUN 29 mg/dL      Creatinine 1.25 mg/dL      Sodium 132 mmol/L      Potassium 4.2 mmol/L      Chloride 96 mmol/L      CO2 21.7 mmol/L      Calcium 9.1 mg/dL      Total Protein 7.9 g/dL      Albumin 3.9 g/dL      ALT (SGPT) 24 U/L      AST (SGOT) 22 U/L      Alkaline Phosphatase 84 U/L      Total Bilirubin 0.6 mg/dL      Globulin 4.0 gm/dL      A/G Ratio 1.0 g/dL      BUN/Creatinine Ratio 23.2     Anion Gap 14.3 mmol/L      eGFR 64.7 mL/min/1.73     Narrative:      GFR Categories in Chronic Kidney Disease (CKD)      GFR Category          GFR (mL/min/1.73)    Interpretation  G1                     90 or greater         Normal or high (1)  G2                      60-89                Mild decrease (1)  G3a                   45-59                Mild to moderate decrease  G3b                   30-44                Moderate to severe decrease  G4                    15-29                Severe decrease  G5                    14 or less           Kidney failure          (1)In the absence of evidence of kidney disease, neither GFR category G1 or G2 fulfill the criteria for CKD.    eGFR calculation 2021 CKD-EPI creatinine equation, which does not include race as a factor    Magnesium [[id number]]  (Normal) Collected: 03/21/25 0700    Specimen: Blood Updated: 03/21/25 0743     Magnesium 1.9 mg/dL     CBC Auto Differential [[id number]]  (Abnormal) Collected: 03/21/25 0700    Specimen: Blood Updated: 03/21/25 0743     WBC 29.99 10*3/mm3      RBC 5.45 10*6/mm3      Hemoglobin 14.8 g/dL      Hematocrit 45.4 %      MCV 83.3 fL      MCH 27.2 pg      MCHC 32.6 g/dL      RDW 14.8 %      RDW-SD 46.0 fl      MPV 10.9 fL      Platelets 300 10*3/mm3      Neutrophil % 90.2 %      Lymphocyte % 4.2 %      Monocyte % 5.1 %      Eosinophil % 0.0 %      Basophil % 0.0 %      Immature Grans % 0.5 %      Neutrophils, Absolute 27.05 10*3/mm3      Lymphocytes, Absolute 1.25 10*3/mm3      Monocytes,  "Absolute 1.53 10*3/mm3      Eosinophils, Absolute 0.01 10*3/mm3      Basophils, Absolute 0.01 10*3/mm3      Immature Grans, Absolute 0.14 10*3/mm3     Vancomycin, Random [[id number]]  (Normal) Collected: 03/21/25 0700    Specimen: Blood Updated: 03/21/25 0742     Vancomycin Random 9.03 mcg/mL     Narrative:      Therapeutic Ranges for Vancomycin    Vancomycin Random   5.0-40.0 mcg/mL  Vancomycin Trough   5.0-20.0 mcg/mL  Vancomycin Peak     20.0-40.0 mcg/mL    MRSA Screen, PCR (Inpatient) - Swab, Nares [[id number]]  (Abnormal) Collected: 03/20/25 1750    Specimen: Swab from Nares Updated: 03/20/25 2105     MRSA PCR MRSA Detected    Narrative:      The negative predictive value of this diagnostic test is high and should only be used to consider de-escalating anti-MRSA therapy. A positive result may indicate colonization with MRSA and must be correlated clinically.    POC Glucose Once [[id number]]  (Abnormal) Collected: 03/20/25 2020    Specimen: Blood Updated: 03/20/25 2030     Glucose 503 mg/dL     C-reactive Protein [[id number]]  (Abnormal) Collected: 03/20/25 1606    Specimen: Blood Updated: 03/20/25 2029     C-Reactive Protein 20.82 mg/dL     Procalcitonin [[id number]]  (Abnormal) Collected: 03/20/25 1606    Specimen: Blood Updated: 03/20/25 1639     Procalcitonin 8.02 ng/mL     Narrative:      As a Marker for Sepsis (Non-Neonates):    1. <0.5 ng/mL represents a low risk of severe sepsis and/or septic shock.  2. >2 ng/mL represents a high risk of severe sepsis and/or septic shock.    As a Marker for Lower Respiratory Tract Infections that require antibiotic therapy:    PCT on Admission    Antibiotic Therapy       6-12 Hrs later    >0.5                Strongly Recommended  >0.25 - <0.5        Recommended   0.1 - 0.25          Discouraged              Remeasure/reassess PCT  <0.1                Strongly Discouraged     Remeasure/reassess PCT    As 28 day mortality risk marker: \"Change in Procalcitonin Result\" (>80% or " <=80%) if Day 0 (or Day 1) and Day 4 values are available. Refer to http://[email or web address]-pct-calculator.com    Change in PCT <=80%  A decrease of PCT levels below or equal to 80% defines a positive change in PCT test result representing a higher risk for 28-day all-cause mortality of patients diagnosed with severe sepsis for septic shock.    Change in PCT >80%  A decrease of PCT levels of more than 80% defines a negative change in PCT result representing a lower risk for 28-day all-cause mortality of patients diagnosed with severe sepsis or septic shock.       Hemoglobin A1c [[id number]]  (Abnormal) Collected: 03/20/25 1443    Specimen: Blood Updated: 03/20/25 1631     Hemoglobin A1C 11.00 %     Narrative:      Hemoglobin A1C Ranges:    Increased Risk for Diabetes  5.7% to 6.4%  Diabetes                     >= 6.5%  Diabetic Goal                < 7.0%    Sedimentation Rate [[id number]]  (Abnormal) Collected: 03/20/25 1443    Specimen: Blood Updated: 03/20/25 1615     Sed Rate 45 mm/hr     CBC & Differential [[id number]]  (Abnormal) Collected: 03/20/25 1443    Specimen: Blood Updated: 03/20/25 1527    Narrative:      The following orders were created for panel order CBC & Differential.  Procedure                               Abnormality         Status                     ---------                               -----------         ------                     CBC Auto Differential[[id number]]        Abnormal            Final result               Scan Slide[[id number]]                   Normal              Final result                 Please view results for these tests on the individual orders.    Scan Slide [[id number]]  (Normal) Collected: 03/20/25 1443    Specimen: Blood Updated: 03/20/25 1527     RBC Morphology Normal     WBC Morphology Normal     Platelet Morphology Normal    Lactic Acid, Plasma [[id number]]  (Abnormal) Collected: 03/20/25 1435    Specimen: Blood Updated: 03/20/25 1517     Lactate 3.0 mmol/L     COVID-19  and FLU A/B PCR, 1 HR TAT - Swab, Nasopharynx [[id number]]  (Normal) Collected: 03/20/25 1435    Specimen: Swab from Nasopharynx Updated: 03/20/25 1515     COVID19 Not Detected     Influenza A PCR Not Detected     Influenza B PCR Not Detected    Narrative:      Fact sheet for providers: https://www.fda.gov/media/414320/download    Fact sheet for patients: https://www.fda.gov/media/481275/download    Test performed by PCR.    CBC Auto Differential [[id number]]  (Abnormal) Collected: 03/20/25 1443    Specimen: Blood Updated: 03/20/25 1500     WBC 35.18 10*3/mm3      RBC 5.39 10*6/mm3      Hemoglobin 14.8 g/dL      Hematocrit 44.6 %      MCV 82.7 fL      MCH 27.5 pg      MCHC 33.2 g/dL      RDW 15.0 %      RDW-SD 45.0 fl      MPV 10.6 fL      Platelets 297 10*3/mm3      Neutrophil % 90.8 %      Lymphocyte % 2.3 %      Monocyte % 5.1 %      Eosinophil % 0.0 %      Basophil % 0.3 %      Immature Grans % 1.5 %      Neutrophils, Absolute 31.97 10*3/mm3      Lymphocytes, Absolute 0.80 10*3/mm3      Monocytes, Absolute 1.79 10*3/mm3      Eosinophils, Absolute 0.00 10*3/mm3      Basophils, Absolute 0.10 10*3/mm3      Immature Grans, Absolute 0.52 10*3/mm3      nRBC 0.0 /100 WBC     [place] Draw [[id number]] Collected: 03/20/25 1038    Specimen: Blood Updated: 03/20/25 1145    Narrative:      The following orders were created for panel order [place] Draw.  Procedure                               Abnormality         Status                     ---------                               -----------         ------                     Green Top (Gel)[[id number]]                                  Final result               Lavender Top[[id number]]                                     Final result               Gold Top - SST[[id number]]                                   Final result               Light Blue Top[[id number]]                                   Final result                 Please view results for these tests on the individual  orders.    [facility] [[id number]] Collected: 03/20/25 1038    Specimen: Blood Updated: 03/20/25 1145     Extra Tube Hold for add-ons.     Comment: Auto resulted.       Urinalysis, Microscopic Only - Urine, Clean Catch [[id number]]  (Abnormal) Collected: 03/20/25 1038    Specimen: Urine, Clean Catch Updated: 03/20/25 1118     RBC, UA 3-5 /HPF      WBC, UA None Seen /HPF      Bacteria, UA None Seen /HPF      Squamous Epithelial Cells, UA 0-2 /HPF      Hyaline Casts, UA 0-2 /LPF      Methodology Manual Light Microscopy    CBC & Differential [[id number]]  (Abnormal) Collected: 03/20/25 1038    Specimen: Blood Updated: 03/20/25 1113    Narrative:      The following orders were created for panel order CBC & Differential.  Procedure                               Abnormality         Status                     ---------                               -----------         ------                     CBC Auto Differential[[id number]]        Abnormal            Final result                 Please view results for these tests on the individual orders.    CBC Auto Differential [[id number]]  (Abnormal) Collected: 03/20/25 1038    Specimen: Blood Updated: 03/20/25 1113     WBC 28.21 10*3/mm3      RBC 5.70 10*6/mm3      Hemoglobin 15.5 g/dL      Hematocrit 46.9 %      MCV 82.3 fL      MCH 27.2 pg      MCHC 33.0 g/dL      RDW 14.7 %      RDW-SD 44.2 fl      MPV 10.5 fL      Platelets 309 10*3/mm3      Neutrophil % 88.4 %      Lymphocyte % 3.1 %      Monocyte % 7.8 %      Eosinophil % 0.0 %      Basophil % 0.1 %      Immature Grans % 0.6 %      Neutrophils, Absolute 24.94 10*3/mm3      Lymphocytes, Absolute 0.87 10*3/mm3      Monocytes, Absolute 2.19 10*3/mm3      Eosinophils, Absolute 0.01 10*3/mm3      Basophils, Absolute 0.03 10*3/mm3      Immature Grans, Absolute 0.17 10*3/mm3     Urinalysis With Microscopic If Indicated (No Culture) - Urine, Clean Catch [[id number]]  (Abnormal) Collected: 03/20/25 1038    Specimen: Urine, Clean Catch  Updated: 03/20/25 1113     Color, UA Yellow     Appearance, UA Clear     pH, UA 5.5     Specific Gravity, UA 1.025     Glucose,  mg/dL (1+)     Ketones, UA Trace     Bilirubin, UA Negative     Blood, UA Small (1+)     Protein, UA >=300 mg/dL (3+)     Leuk Esterase, UA Negative     Nitrite, UA Negative     Urobilinogen, UA 0.2 E.U./dL    Comprehensive Metabolic Panel [[id number]]  (Abnormal) Collected: 03/20/25 1038    Specimen: Blood Updated: 03/20/25 1106     Glucose 290 mg/dL      BUN 26 mg/dL      Creatinine 1.41 mg/dL      Sodium 130 mmol/L      Potassium 4.6 mmol/L      Chloride 97 mmol/L      CO2 19.3 mmol/L      Calcium 9.8 mg/dL      Total Protein 7.9 g/dL      Albumin 4.1 g/dL      ALT (SGPT) 26 U/L      AST (SGOT) 26 U/L      Alkaline Phosphatase 95 U/L      Total Bilirubin 0.8 mg/dL      Globulin 3.8 gm/dL      A/G Ratio 1.1 g/dL      BUN/Creatinine Ratio 18.4     Anion Gap 13.7 mmol/L      eGFR 56.0 mL/min/1.73     Narrative:      GFR Categories in Chronic Kidney Disease (CKD)      GFR Category          GFR (mL/min/1.73)    Interpretation  G1                     90 or greater         Normal or high (1)  G2                      60-89                Mild decrease (1)  G3a                   45-59                Mild to moderate decrease  G3b                   30-44                Moderate to severe decrease  G4                    15-29                Severe decrease  G5                    14 or less           Kidney failure          (1)In the absence of evidence of kidney disease, neither GFR category G1 or G2 fulfill the criteria for CKD.    eGFR calculation 2021 CKD-EPI creatinine equation, which does not include race as a factor    Lipase [[id number]]  (Normal) Collected: 03/20/25 1038    Specimen: Blood Updated: 03/20/25 1106     Lipase 35 U/L     Green Top (Gel) [[id number]] Collected: 03/20/25 1038    Specimen: Blood Updated: 03/20/25 1101     Extra Tube Hold for add-ons.     Comment: Auto  resulted.       Lavender Top [[id number]] Collected: 03/20/25 1038    Specimen: Blood Updated: 03/20/25 1101     Extra Tube hold for add-on     Comment: Auto resulted       Light Blue Top [[id number]] Collected: 03/20/25 1038    Specimen: Blood Updated: 03/20/25 1100     Extra Tube Hold for add-ons.     Comment: Auto resulted                   Imaging Results (Last 72 Hours)       Procedure Component Value Units Date/Time    CT Lumbar Spine With & Without Contrast [[id number]] Resulted: 03/22/25 1227     Updated: 03/22/25 1228    XR Foot 3+ View Left [[id number]] Collected: 03/20/25 1912     Updated: 03/20/25 1922    Narrative:      XR ANKLE 3+ VW LEFT, XR FOOT 3+ VW LEFT    Date of Exam: 3/20/2025 5:21 PM EDT    Indication: left foot pain, swelling, leukocytosis    Comparison: Left foot and ankle x-rays 1/23/2020    Findings:  Ankle: There is diffuse soft tissue swelling, but no acute fracture or dislocation is identified. Arthritic change with narrowing of the tibiotalar joint has progressed compared to 1/23/2020 x-rays. There are mildly increased soft tissue calcifications.    Foot: There are increased arthritic changes of the midfoot and hindfoot. There is stable narrowing of the great toe metatarsal phalangeal joint. Plantar calcaneal spur with associated calcifications appear similar. No acute fracture or dislocation is   identified.      Impression:      Impression:  1.Soft tissue swelling without definite or acute osseous abnormality.  2.Progression of ankle and foot arthritic changes compared to 2020 x-rays.        Electronically Signed: Roxann Avery    3/20/2025 7:19 PM EDT    Workstation ID: [id number]    XR Ankle 3+ View Left [[id number]] Collected: 03/20/25 1912     Updated: 03/20/25 1922    Narrative:      XR ANKLE 3+ VW LEFT, XR FOOT 3+ VW LEFT    Date of Exam: 3/20/2025 5:21 PM EDT    Indication: left foot pain, swelling, leukocytosis    Comparison: Left foot and ankle x-rays 1/23/2020    Findings:  Ankle:  There is diffuse soft tissue swelling, but no acute fracture or dislocation is identified. Arthritic change with narrowing of the tibiotalar joint has progressed compared to 1/23/2020 x-rays. There are mildly increased soft tissue calcifications.    Foot: There are increased arthritic changes of the midfoot and hindfoot. There is stable narrowing of the great toe metatarsal phalangeal joint. Plantar calcaneal spur with associated calcifications appear similar. No acute fracture or dislocation is   identified.      Impression:      Impression:  1.Soft tissue swelling without definite or acute osseous abnormality.  2.Progression of ankle and foot arthritic changes compared to 2020 x-rays.        Electronically Signed: Roxann Avery    3/20/2025 7:19 PM EDT    Workstation ID: [id number]    XR Chest 2 View [[id number]] Collected: 03/20/25 1526     Updated: 03/20/25 1533    Narrative:      XR CHEST 2 VW    Date of Exam: 3/20/2025 2:54 PM EDT    Indication: Evaluate for pneumonia    Comparison: 9/14/2015    FINDINGS:  No new consolidations or pleural effusions are observed. The cardiac silhouette and mediastinum are unchanged. No acute osseous abnormalities are seen.      Impression:      No change from the previous study with no evidence for acute cardiopulmonary process.      Electronically Signed: Rony Coronado MD    3/20/2025 3:29 PM EDT    Workstation ID: [id number]    CT Abdomen Pelvis Without Contrast [[id number]] Collected: 03/20/25 1120     Updated: 03/20/25 1207    Narrative:      CT ABDOMEN PELVIS WO CONTRAST    Date of Exam: 3/20/2025 11:05 AM EDT    Indication: Left flank pain. Back pain and left flank pain for 2 days.    Comparison: None available.    Technique: Axial CT images were obtained of the abdomen and pelvis without the administration of contrast. Sagittal and coronal reconstructions were performed.  Automated exposure control and iterative reconstruction methods were used.      FINDINGS:  The lung  bases are clear without evidence for focal consolidation or significant pleural effusion.    The liver, spleen, and pancreas are unremarkable without contrast. The gallbladder and bile ducts are unremarkable. The bilateral adrenal glands are symmetric and unremarkable without contrast.     No definitive nephrolithiasis is seen bilaterally. There is no hydronephrosis or hydroureter. There is no evidence for obstructive uropathy. No stones are seen in the bladder. The bilateral kidneys are otherwise unremarkable without contrast.    There is no bowel dilatation or obstruction. A normal-appearing decompressed appendix or appendiceal stub is observed. There is no evidence for acute appendicitis. No significant free fluid is observed. No abnormal fluid collections are identified. No   significant lymphadenopathy is seen throughout the abdomen or pelvis. The bladder is partially decompressed. Fat-containing bilateral inguinal hernias are noted.    No acute osseous abnormalities are observed. Moderate degenerative changes are noted involving the lumbar spine. No definitive large posterior disc protrusion/extrusion is seen.      Impression:      1.No evidence for significant nephrolithiasis. There is no evidence for obstructive uropathy.  2.No evidence for acute abnormality throughout the abdomen or pelvis on this noncontrast exam.            Electronically Signed: Rony Coronado MD    3/20/2025 11:24 AM EDT    Workstation ID: [id number]              Medication Review:      Hospital Medications (active)         Dose Frequency Start End    acetaminophen (TYLENOL) 160 MG/5ML oral solution 650 mg 650 mg Every 4 Hours PRN 3/20/2025 --    Admin Instructions: If given for fever, use fever parameter: fever greater than 100.4 °F  Based on patient request - if ordered for moderate or severe pain, provider allows for administration of a medication prescribed for a lower pain scale.    Do not exceed 4 grams of acetaminophen in a 24 hr  "period. Max dose of 2gm for AST/ALT greater than 120 units/L.    If given for pain, use the following pain scale:   Mild Pain = Pain Score of 1-3, CPOT 1-2  Moderate Pain = Pain Score of 4-6, CPOT 3-4  Severe Pain = Pain Score of 7-10, CPOT 5-8    Route: Oral    Linked Group 1: Placed in \"Or\" Linked Group        acetaminophen (TYLENOL) suppository 650 mg 650 mg Every 4 Hours PRN 3/20/2025 --    Admin Instructions: If given for fever, use fever parameter: fever greater than 100.4 °F  Based on patient request - if ordered for moderate or severe pain, provider allows for administration of a medication prescribed for a lower pain scale.    Do not exceed 4 grams of acetaminophen in a 24 hr period. Max dose of 2gm for AST/ALT greater than 120 units/L.    If given for pain, use the following pain scale:   Mild Pain = Pain Score of 1-3, CPOT 1-2  Moderate Pain = Pain Score of 4-6, CPOT 3-4  Severe Pain = Pain Score of 7-10, CPOT 5-8    Route: Rectal    Linked Group 1: Placed in \"Or\" Linked Group        acetaminophen (TYLENOL) tablet 650 mg 650 mg Every 4 Hours PRN 3/20/2025 --    Admin Instructions: If given for fever, use fever parameter: fever greater than 100.4 °F  Based on patient request - if ordered for moderate or severe pain, provider allows for administration of a medication prescribed for a lower pain scale.    Do not exceed 4 grams of acetaminophen in a 24 hr period. Max dose of 2gm for AST/ALT greater than 120 units/L.    If given for pain, use the following pain scale:   Mild Pain = Pain Score of 1-3, CPOT 1-2  Moderate Pain = Pain Score of 4-6, CPOT 3-4  Severe Pain = Pain Score of 7-10, CPOT 5-8    Route: Oral    Linked Group 1: Placed in \"Or\" Linked Group        amLODIPine (NORVASC) tablet 10 mg 10 mg Daily 3/20/2025 --    Admin Instructions: Hold for SBP less than 100, DBP less than 60.  Caution: Look alike/sound alike drug alert. Avoid grapefruit juice.    Route: Oral    atorvastatin (LIPITOR) tablet 20 mg " "20 mg Nightly 3/20/2025 --    Admin Instructions: Avoid grapefruit juice.    Route: Oral    bisacodyl (DULCOLAX) EC tablet 5 mg 5 mg Daily PRN 3/20/2025 --    Admin Instructions: Use if no bowel movement after 12 hours.  Swallow whole. Do not crush, split, or chew tablet.    Route: Oral    Linked Group 2: Placed in \"And\" Linked Group        bisacodyl (DULCOLAX) suppository 10 mg 10 mg Daily PRN 3/20/2025 --    Admin Instructions: Use if no bowel movement after 12 hours.  Hold for diarrhea    Route: Rectal    Linked Group 2: Placed in \"And\" Linked Group        cefepime 2000 mg IVPB in 100 mL NS (VTB) 2,000 mg Every 8 Hours 3/21/2025 3/27/2025    Route: Intravenous    dextrose (D50W) (25 g/50 mL) IV injection 10-50 mL 10-50 mL Every 15 Minutes PRN 3/20/2025 --    Admin Instructions: Blood Glucose <70  Patient Has IV Access, Is Unresponsive, NPO or Unable to Safely Swallow  Recheck Blood Glucose Per Glucommander™    Route: Intravenous    dextrose (GLUTOSE) oral gel 15 g 15 g Every 15 Minutes PRN 3/20/2025 --    Admin Instructions: Blood Glucose <70  Patient Alert, NOT NPO, Can Safely Swallow  Recheck Blood Glucose Per Glucommander™    Route: Oral    DULoxetine (CYMBALTA) DR capsule 60 mg 60 mg Daily 3/20/2025 --    Admin Instructions: Do not crush or chew the capsules or tablets. The drug may not work as designed if the capsule or tablet is crushed or chewed. Swallow whole.  Caution: Look alike/sound alike drug alert. Capsule may be opened and sprinkled on applesauce or apple juice. Do not crush or chew capsule.    Route: Oral    enoxaparin sodium (LOVENOX) syringe 40 mg 40 mg Every 12 Hours 3/20/2025 --    Admin Instructions: Give subcutaneous in abdomen only. Do not massage site after injection.    Route: Subcutaneous    glucagon (GLUCAGEN) injection 1 mg 1 mg Every 15 Minutes PRN 3/20/2025 --    Admin Instructions: Blood Glucose <70  Patient Without IV Access, Unresponsive, NPO or Unable to Safely Swallow  Recheck " Blood Glucose Per Glu[employer] Feinter™  Reconstitute powder for injection by adding 1 mL of -supplied sterile diluent or sterile water for injection to a vial containing 1 mg of the drug, to provide solutions containing 1 mg/mL. Shake vial gently to dissolve.    Route: Intramuscular    HYDROcodone-acetaminophen (NORCO) 5-325 MG per tablet 1 tablet 1 tablet 2 Times Daily 3/20/2025 --    Admin Instructions: Based on patient request - if ordered for moderate or severe pain, provider allows for administration of a medication prescribed for a lower pain scale.  [LEORA]    Do not exceed 4 grams of acetaminophen in a 24 hr period. Max dose of 2gm for AST/ALT greater than 120 units/L        If given for pain, use the following pain scale:   Mild Pain = Pain Score of 1-3, CPOT 1-2  Moderate Pain = Pain Score of 4-6, CPOT 3-4  Severe Pain = Pain Score of 7-10, CPOT 5-8    Route: Oral    insulin glargine (LANTUS, SEMGLEE) injection 1-200 Units 1-200 Units Nightly - Glucommander 3/20/2025 --    Admin Instructions: Do not hold basal insulin without an order. Consider requesting a dose edit, if needed.      Route: Subcutaneous    insulin lispro (humaLOG) injection 1-200 Units 1-200 Units 4 Times Daily With Meals & Nightly 3/20/2025 --    Admin Instructions: Document Total Bolus Dose Using This MAR Entry   Total Bolus Dose Includes Scheduled Meal & Associated Correction Dose  If Patient NPO or Unable to Eat Administer Correction Insulin Only  per Glucommander    Route: Subcutaneous    insulin lispro (humaLOG) injection 1-200 Units 1-200 Units As Needed 3/20/2025 --    Admin Instructions: Correction Doses Outside of Scheduled Meal & Bedtime Per Glucommander™   Use This MAR Entry For Insulin Doses When There is NO SCHEDULED MAR Entry Available  Administer Correction Insulin Even if Patient NPO or Unable to Eat  per Glucommander    Route: Subcutaneous    Lidocaine 4 % 2 patch 2 patch Every 24 Hours Scheduled 3/22/2025 --    Admin  "Instructions: Apply to skin on lower back . Remove patch in 12 hours. Apply patch only once for up to 12 hours in 24 hour period. Based on patient request -  if ordered for moderate or severe pain, provider allows for administration of a medication prescribed for a lower pain scale.  If given for pain, use the following pain scale:  Mild Pain = Pain Score of 1-3, CPOT 1-2  Moderate Pain = Pain Score of 4-6, CPOT 3-4  Severe Pain = Pain Score of 7-10, CPOT 5-8    Route: Transdermal    losartan (COZAAR) tablet 100 mg 100 mg Daily 3/20/2025 --    Admin Instructions: Hold for SBP less than 100, DBP less than 60.    Route: Oral    metoprolol succinate XL (TOPROL-XL) 24 hr tablet 50 mg 50 mg Daily 3/20/2025 --    Admin Instructions: Hold for SBP less than 100, DBP less than 60, or heart rate less than 50    Do not crush or chew the capsules or tablets. The drug may not work as designed if the capsule or tablet is crushed or chewed. Swallow whole.  Do not crush or chew.    Route: Oral    nitroglycerin (NITROSTAT) SL tablet 0.4 mg 0.4 mg Every 5 Minutes PRN 3/20/2025 --    Admin Instructions: If Pain Unrelieved After 3 Doses Notify MD  May administer up to 3 doses per episode. Hold if SBP less than 100.    Route: Sublingual    ondansetron (ZOFRAN) injection 4 mg 4 mg Every 6 Hours PRN 3/20/2025 --    Admin Instructions: If BOTH ondansetron (ZOFRAN) and promethazine (PHENERGAN) are ordered use ondansetron first and THEN promethazine IF ondansetron is ineffective.    Route: Intravenous    Linked Group 3: Placed in \"Or\" Linked Group        ondansetron ODT (ZOFRAN-ODT) disintegrating tablet 4 mg 4 mg Every 6 Hours PRN 3/20/2025 --    Admin Instructions: If BOTH ondansetron (ZOFRAN) and promethazine (PHENERGAN) are ordered use ondansetron first and THEN promethazine IF ondansetron is ineffective.  Place on tongue and allow to dissolve.    Route: Oral    Linked Group 3: Placed in \"Or\" Linked Group        polyethylene glycol " "(MIRALAX) packet 17 g 17 g Daily PRN 3/20/2025 --    Admin Instructions: Use if no bowel movement after 12 hours. Mix in 6-8 ounces of water.  Use 4-8 ounces of water, tea, or juice for each 17 gram dose.    Route: Oral    Linked Group 2: Placed in \"And\" Linked Group        pregabalin (LYRICA) capsule 150 mg 150 mg 2 Times Daily 3/20/2025 --    Admin Instructions:     Route: Oral    sennosides-docusate (PERICOLACE) 8.6-50 MG per tablet 2 tablet 2 tablet 2 Times Daily PRN 3/20/2025 --    Admin Instructions: Start bowel management regimen if patient has not had a bowel movement after 12 hours.    Route: Oral    Linked Group 2: Placed in \"And\" Linked Group        sodium chloride 0.9 % flush 10 mL 10 mL As Needed 3/20/2025 --    Route: Intravenous    Cosign for Ordering: Accepted by Rey Nihcole MD on 3/20/2025  3:01 PM    sodium chloride 0.9 % flush 10 mL 10 mL Every 12 Hours Scheduled 3/20/2025 --    Route: Intravenous    sodium chloride 0.9 % flush 10 mL 10 mL As Needed 3/20/2025 --    Route: Intravenous    sodium chloride 0.9 % infusion 40 mL 40 mL As Needed 3/20/2025 --    Admin Instructions: Following administration of an IV intermittent medication, flush line with 40mL NS at 100mL/hr.    Route: Intravenous            Assessment & Plan         Neutrophilia    + BC  Strep   Nasal screen + MRSA  Back pain r/o Osteo   L foot cellulitis    Plan :    IV  rocephin   DC cefepim  Echo  MRI of T and L Spine  Repeat BC  Will follow  Thank you     Jaleesa Pryor MD  03/22/25  13:25 EDT        "

## 2025-03-22 NOTE — PLAN OF CARE
Goal Outcome Evaluation:            VSS, Tele: SR, IV abx given, scheduled pain medication given, monitor LLE dressing, gluccomander, monitor blood sugar,

## 2025-03-23 ENCOUNTER — APPOINTMENT (OUTPATIENT)
Dept: MRI IMAGING | Facility: HOSPITAL | Age: 65
End: 2025-03-23
Payer: MEDICARE

## 2025-03-23 LAB
ANION GAP SERPL CALCULATED.3IONS-SCNC: 12.9 MMOL/L (ref 5–15)
BASOPHILS # BLD AUTO: 0.05 10*3/MM3 (ref 0–0.2)
BASOPHILS NFR BLD AUTO: 0.4 % (ref 0–1.5)
BUN SERPL-MCNC: 29 MG/DL (ref 8–23)
BUN/CREAT SERPL: 28.7 (ref 7–25)
CALCIUM SPEC-SCNC: 9.1 MG/DL (ref 8.6–10.5)
CHLORIDE SERPL-SCNC: 102 MMOL/L (ref 98–107)
CO2 SERPL-SCNC: 22.1 MMOL/L (ref 22–29)
CREAT SERPL-MCNC: 1.01 MG/DL (ref 0.76–1.27)
DEPRECATED RDW RBC AUTO: 45.7 FL (ref 37–54)
EGFRCR SERPLBLD CKD-EPI 2021: 83.6 ML/MIN/1.73
EOSINOPHIL # BLD AUTO: 0.07 10*3/MM3 (ref 0–0.4)
EOSINOPHIL NFR BLD AUTO: 0.6 % (ref 0.3–6.2)
ERYTHROCYTE [DISTWIDTH] IN BLOOD BY AUTOMATED COUNT: 15.1 % (ref 12.3–15.4)
GLUCOSE BLDC GLUCOMTR-MCNC: 174 MG/DL (ref 70–130)
GLUCOSE BLDC GLUCOMTR-MCNC: 177 MG/DL (ref 70–130)
GLUCOSE BLDC GLUCOMTR-MCNC: 218 MG/DL (ref 70–130)
GLUCOSE BLDC GLUCOMTR-MCNC: 280 MG/DL (ref 70–130)
GLUCOSE SERPL-MCNC: 149 MG/DL (ref 65–99)
HCT VFR BLD AUTO: 43.9 % (ref 37.5–51)
HGB BLD-MCNC: 14.5 G/DL (ref 13–17.7)
IMM GRANULOCYTES # BLD AUTO: 0.08 10*3/MM3 (ref 0–0.05)
IMM GRANULOCYTES NFR BLD AUTO: 0.7 % (ref 0–0.5)
LYMPHOCYTES # BLD AUTO: 1.25 10*3/MM3 (ref 0.7–3.1)
LYMPHOCYTES NFR BLD AUTO: 10.5 % (ref 19.6–45.3)
MAGNESIUM SERPL-MCNC: 2.1 MG/DL (ref 1.6–2.4)
MCH RBC QN AUTO: 27.3 PG (ref 26.6–33)
MCHC RBC AUTO-ENTMCNC: 33 G/DL (ref 31.5–35.7)
MCV RBC AUTO: 82.7 FL (ref 79–97)
MONOCYTES # BLD AUTO: 1.15 10*3/MM3 (ref 0.1–0.9)
MONOCYTES NFR BLD AUTO: 9.6 % (ref 5–12)
NEUTROPHILS NFR BLD AUTO: 78.2 % (ref 42.7–76)
NEUTROPHILS NFR BLD AUTO: 9.34 10*3/MM3 (ref 1.7–7)
NRBC BLD AUTO-RTO: 0 /100 WBC (ref 0–0.2)
PLATELET # BLD AUTO: 317 10*3/MM3 (ref 140–450)
PMV BLD AUTO: 11.2 FL (ref 6–12)
POTASSIUM SERPL-SCNC: 4.2 MMOL/L (ref 3.5–5.2)
RBC # BLD AUTO: 5.31 10*6/MM3 (ref 4.14–5.8)
SODIUM SERPL-SCNC: 137 MMOL/L (ref 136–145)
WBC NRBC COR # BLD AUTO: 11.94 10*3/MM3 (ref 3.4–10.8)

## 2025-03-23 PROCEDURE — 80048 BASIC METABOLIC PNL TOTAL CA: CPT | Performed by: INTERNAL MEDICINE

## 2025-03-23 PROCEDURE — 63710000001 INSULIN LISPRO (HUMAN) PER 5 UNITS: Performed by: INTERNAL MEDICINE

## 2025-03-23 PROCEDURE — 94799 UNLISTED PULMONARY SVC/PX: CPT

## 2025-03-23 PROCEDURE — 99232 SBSQ HOSP IP/OBS MODERATE 35: CPT | Performed by: INTERNAL MEDICINE

## 2025-03-23 PROCEDURE — 85025 COMPLETE CBC W/AUTO DIFF WBC: CPT | Performed by: INTERNAL MEDICINE

## 2025-03-23 PROCEDURE — 82948 REAGENT STRIP/BLOOD GLUCOSE: CPT

## 2025-03-23 PROCEDURE — 25010000002 ENOXAPARIN PER 10 MG: Performed by: INTERNAL MEDICINE

## 2025-03-23 PROCEDURE — 87040 BLOOD CULTURE FOR BACTERIA: CPT | Performed by: INTERNAL MEDICINE

## 2025-03-23 PROCEDURE — 63710000001 INSULIN GLARGINE PER 5 UNITS: Performed by: INTERNAL MEDICINE

## 2025-03-23 PROCEDURE — 94660 CPAP INITIATION&MGMT: CPT

## 2025-03-23 PROCEDURE — 25010000002 CEFTRIAXONE PER 250 MG: Performed by: INTERNAL MEDICINE

## 2025-03-23 PROCEDURE — 83735 ASSAY OF MAGNESIUM: CPT | Performed by: INTERNAL MEDICINE

## 2025-03-23 PROCEDURE — 25010000002 MORPHINE PER 10 MG: Performed by: INTERNAL MEDICINE

## 2025-03-23 RX ORDER — HYDROCODONE BITARTRATE AND ACETAMINOPHEN 5; 325 MG/1; MG/1
1 TABLET ORAL 3 TIMES DAILY
Refills: 0 | Status: DISCONTINUED | OUTPATIENT
Start: 2025-03-23 | End: 2025-03-23

## 2025-03-23 RX ORDER — MORPHINE SULFATE 2 MG/ML
2 INJECTION, SOLUTION INTRAMUSCULAR; INTRAVENOUS EVERY 4 HOURS PRN
Status: DISCONTINUED | OUTPATIENT
Start: 2025-03-23 | End: 2025-03-24

## 2025-03-23 RX ORDER — HYDROCODONE BITARTRATE AND ACETAMINOPHEN 7.5; 325 MG/1; MG/1
1 TABLET ORAL EVERY 6 HOURS PRN
Refills: 0 | Status: DISCONTINUED | OUTPATIENT
Start: 2025-03-23 | End: 2025-03-24 | Stop reason: HOSPADM

## 2025-03-23 RX ORDER — HYDROCODONE BITARTRATE AND ACETAMINOPHEN 5; 325 MG/1; MG/1
1 TABLET ORAL EVERY 6 HOURS PRN
Refills: 0 | Status: DISCONTINUED | OUTPATIENT
Start: 2025-03-23 | End: 2025-03-24 | Stop reason: HOSPADM

## 2025-03-23 RX ADMIN — INSULIN LISPRO 20 UNITS: 100 INJECTION, SOLUTION INTRAVENOUS; SUBCUTANEOUS at 08:56

## 2025-03-23 RX ADMIN — HYDROCODONE BITARTRATE AND ACETAMINOPHEN 1 TABLET: 5; 325 TABLET ORAL at 05:15

## 2025-03-23 RX ADMIN — MORPHINE SULFATE 2 MG: 2 INJECTION, SOLUTION INTRAMUSCULAR; INTRAVENOUS at 20:58

## 2025-03-23 RX ADMIN — Medication 10 ML: at 08:56

## 2025-03-23 RX ADMIN — METOPROLOL SUCCINATE 50 MG: 50 TABLET, EXTENDED RELEASE ORAL at 08:55

## 2025-03-23 RX ADMIN — DULOXETINE HYDROCHLORIDE 60 MG: 60 CAPSULE, DELAYED RELEASE ORAL at 08:55

## 2025-03-23 RX ADMIN — Medication 10 ML: at 21:06

## 2025-03-23 RX ADMIN — PREGABALIN 150 MG: 75 CAPSULE ORAL at 08:55

## 2025-03-23 RX ADMIN — CEFTRIAXONE SODIUM 2000 MG: 2 INJECTION, POWDER, FOR SOLUTION INTRAMUSCULAR; INTRAVENOUS at 16:55

## 2025-03-23 RX ADMIN — HYDROCODONE BITARTRATE AND ACETAMINOPHEN 1 TABLET: 5; 325 TABLET ORAL at 16:55

## 2025-03-23 RX ADMIN — ENOXAPARIN SODIUM 40 MG: 100 INJECTION SUBCUTANEOUS at 20:58

## 2025-03-23 RX ADMIN — WHITE PETROLATUM 41 % TOPICAL OINTMENT 1 APPLICATION: OINTMENT at 08:56

## 2025-03-23 RX ADMIN — MORPHINE SULFATE 2 MG: 2 INJECTION, SOLUTION INTRAMUSCULAR; INTRAVENOUS at 13:13

## 2025-03-23 RX ADMIN — LOSARTAN POTASSIUM 100 MG: 50 TABLET, FILM COATED ORAL at 08:55

## 2025-03-23 RX ADMIN — AMLODIPINE BESYLATE 10 MG: 5 TABLET ORAL at 08:55

## 2025-03-23 RX ADMIN — PREGABALIN 150 MG: 75 CAPSULE ORAL at 20:57

## 2025-03-23 RX ADMIN — ATORVASTATIN CALCIUM 20 MG: 20 TABLET, FILM COATED ORAL at 20:57

## 2025-03-23 RX ADMIN — ENOXAPARIN SODIUM 40 MG: 100 INJECTION SUBCUTANEOUS at 08:55

## 2025-03-23 RX ADMIN — INSULIN LISPRO 23 UNITS: 100 INJECTION, SOLUTION INTRAVENOUS; SUBCUTANEOUS at 17:13

## 2025-03-23 RX ADMIN — INSULIN LISPRO 4 UNITS: 100 INJECTION, SOLUTION INTRAVENOUS; SUBCUTANEOUS at 22:44

## 2025-03-23 RX ADMIN — INSULIN GLARGINE 51 UNITS: 100 INJECTION, SOLUTION SUBCUTANEOUS at 22:44

## 2025-03-23 RX ADMIN — LIDOCAINE 2 PATCH: 4 PATCH TOPICAL at 08:56

## 2025-03-23 RX ADMIN — WHITE PETROLATUM 41 % TOPICAL OINTMENT 1 APPLICATION: OINTMENT at 20:58

## 2025-03-23 RX ADMIN — INSULIN LISPRO 22 UNITS: 100 INJECTION, SOLUTION INTRAVENOUS; SUBCUTANEOUS at 12:46

## 2025-03-23 NOTE — PLAN OF CARE
Goal Outcome Evaluation:  Plan of Care Reviewed With: patient, spouse        Progress: improving  Outcome Evaluation: Patient here for leukocytosis & back pain. Infectious disease consulted & recommends IV rocephine & patient to try to have MRI of spine. Patient having severe back pain today & started with small dose of IV morphine to see if he tolerated the pain medication to have MRI also. Patient rested for a few hours this afternoon after IV pain medicine. Changed PO pain medication to every 6 hour PRN. Patient with bariatric bed & still having trouble getting comfortable to sleep. Glucomander to control blood glucose. Educating spouse & patient the importance of getting A1C more controlled to help with patient's health & if he would possibly need surgery at some point.  Spouse at bedside this afternoon. Anticipate home with spouse at discharge.

## 2025-03-23 NOTE — PROGRESS NOTES
LOS: 3 days   Patient Care Team:  Anand Alford MD as PCP - General  Anand Alford MD as PCP - Family Medicine        Subjective     Interval History:     Patient Complaints:   Patient Denies:  NV       Review of Systems:    pain    Objective     Vital Signs  Temp:  [97.1 °F (36.2 °C)-98.8 °F (37.1 °C)] 97.9 °F (36.6 °C)  Heart Rate:  [79-92] 81  Resp:  [18-20] 20  BP: (115-158)/(65-87) 158/84    Physical Exam:     General Appearance:  Alert, cooperative, in no acute distress   Head:  Normocephalic, without obvious abnormality, atraumatic   Eyes:  Lids and lashes normal, conjunctivae and sclerae normal, no icterus, no pallor, corneas clear, PERRLA   Ears:  Ears appear intact with no abnormalities noted   Throat:  No oral lesions, no thrush, oral mucosa moist   Neck:  No adenopathy, supple, trachea midline, no thyromegaly, no carotid bruit, no JVD   Back:  No kyphosis present, no scoliosis present, no skin lesions, erythema or scars, no tenderness to percussion or palpation, range of motion normal   Lungs:  Clear to auscultation, respirations regular, even and unlabored    Heart:  Regular rhythm and normal rate, normal S1 and S2, no murmur, no gallop, no rub, no click   Chest Wall:  No abnormalities observed   Abdomen:  Normal bowel sounds, no masses, no organomegaly, soft non-tender, non-distended, no guarding, no rebound tenderness   Rectal:  Deferred   Extremities:  Moves all extremities well, no edema, no cyanosis, no redness   Pulses:  Pulses palpable and equal bilaterally   Skin:  No bleeding, bruising or rash   Lymph nodes:  No palpable adenopathy   Neurologic:  Cranial nerves 2 - 12 grossly intact, sensation intact, DTR present and equal bilaterally                                                                                  Results Review:      Lab Results (last 72 hours)       Procedure Component Value Units Date/Time    POC Glucose Once [[id number]]  (Abnormal) Collected: 03/23/25 2012     Specimen: Blood Updated: 03/23/25 1707     Glucose 177 mg/dL     POC Glucose Once [[id number]]  (Abnormal) Collected: 03/23/25 1223    Specimen: Blood Updated: 03/23/25 1232     Glucose 280 mg/dL     Blood Culture - Blood, Hand, Right [[id number]] Collected: 03/23/25 0847    Specimen: Blood from Hand, Right Updated: 03/23/25 0851    Blood Culture - Blood, Arm, Right [[id number]] Collected: 03/23/25 0847    Specimen: Blood from Arm, Right Updated: 03/23/25 0850    POC Glucose Once [[id number]]  (Abnormal) Collected: 03/23/25 0822    Specimen: Blood Updated: 03/23/25 0831     Glucose 174 mg/dL     Blood Culture - Blood, Arm, Left [[id number]]  (Abnormal) Collected: 03/20/25 1435    Specimen: Blood from Arm, Left Updated: 03/23/25 0642     Blood Culture Gram Positive Cocci     Isolated from Aerobic and Anaerobic Bottles     Gram Stain Anaerobic Bottle Gram positive cocci in pairs and chains      Aerobic Bottle Gram positive cocci in pairs and chains    Blood Culture - Blood, Arm, Right [[id number]]  (Abnormal) Collected: 03/20/25 1530    Specimen: Blood from Arm, Right Updated: 03/23/25 0641     Blood Culture Gram Positive Cocci     Isolated from Aerobic and Anaerobic Bottles     Gram Stain Anaerobic Bottle Gram positive cocci in pairs and chains      Aerobic Bottle Gram positive cocci in pairs and chains    Basic Metabolic Panel [[id number]]  (Abnormal) Collected: 03/23/25 0425    Specimen: Blood Updated: 03/23/25 0553     Glucose 149 mg/dL      BUN 29 mg/dL      Creatinine 1.01 mg/dL      Sodium 137 mmol/L      Potassium 4.2 mmol/L      Chloride 102 mmol/L      CO2 22.1 mmol/L      Calcium 9.1 mg/dL      BUN/Creatinine Ratio 28.7     Anion Gap 12.9 mmol/L      eGFR 83.6 mL/min/1.73     Narrative:      GFR Categories in Chronic Kidney Disease (CKD)      GFR Category          GFR (mL/min/1.73)    Interpretation  G1                     90 or greater         Normal or high (1)  G2                      60-89                Mild  decrease (1)  G3a                   45-59                Mild to moderate decrease  G3b                   30-44                Moderate to severe decrease  G4                    15-29                Severe decrease  G5                    14 or less           Kidney failure          (1)In the absence of evidence of kidney disease, neither GFR category G1 or G2 fulfill the criteria for CKD.    eGFR calculation 2021 CKD-EPI creatinine equation, which does not include race as a factor    Magnesium [[id number]]  (Normal) Collected: 03/23/25 0425    Specimen: Blood Updated: 03/23/25 0553     Magnesium 2.1 mg/dL     CBC & Differential [[id number]]  (Abnormal) Collected: 03/23/25 0425    Specimen: Blood Updated: 03/23/25 0533    Narrative:      The following orders were created for panel order CBC & Differential.  Procedure                               Abnormality         Status                     ---------                               -----------         ------                     CBC Auto Differential[[id number]]        Abnormal            Final result                 Please view results for these tests on the individual orders.    CBC Auto Differential [[id number]]  (Abnormal) Collected: 03/23/25 0425    Specimen: Blood Updated: 03/23/25 0533     WBC 11.94 10*3/mm3      RBC 5.31 10*6/mm3      Hemoglobin 14.5 g/dL      Hematocrit 43.9 %      MCV 82.7 fL      MCH 27.3 pg      MCHC 33.0 g/dL      RDW 15.1 %      RDW-SD 45.7 fl      MPV 11.2 fL      Platelets 317 10*3/mm3      Neutrophil % 78.2 %      Lymphocyte % 10.5 %      Monocyte % 9.6 %      Eosinophil % 0.6 %      Basophil % 0.4 %      Immature Grans % 0.7 %      Neutrophils, Absolute 9.34 10*3/mm3      Lymphocytes, Absolute 1.25 10*3/mm3      Monocytes, Absolute 1.15 10*3/mm3      Eosinophils, Absolute 0.07 10*3/mm3      Basophils, Absolute 0.05 10*3/mm3      Immature Grans, Absolute 0.08 10*3/mm3      nRBC 0.0 /100 WBC     POC Glucose Once [[id number]]  (Abnormal)  Collected: 03/22/25 2018    Specimen: Blood Updated: 03/22/25 2024     Glucose 186 mg/dL     POC Glucose Once [[id number]]  (Abnormal) Collected: 03/22/25 1653    Specimen: Blood Updated: 03/22/25 1700     Glucose 195 mg/dL     POC Glucose Once [[id number]]  (Abnormal) Collected: 03/22/25 1226    Specimen: Blood Updated: 03/22/25 1234     Glucose 195 mg/dL     Blood Culture ID, PCR - Blood, Arm, Right [[id number]]  (Abnormal) Collected: 03/20/25 1530    Specimen: Blood from Arm, Right Updated: 03/22/25 1030     BCID, PCR Streptococcus spp, not A, B, or pneumoniae. Identification by BCID2 PCR.     BOTTLE TYPE Anaerobic Bottle    POC Glucose Once [[id number]]  (Abnormal) Collected: 03/22/25 0832    Specimen: Blood Updated: 03/22/25 0849     Glucose 146 mg/dL     Basic Metabolic Panel [[id number]]  (Abnormal) Collected: 03/22/25 0807    Specimen: Blood Updated: 03/22/25 0846     Glucose 170 mg/dL      BUN 31 mg/dL      Creatinine 0.99 mg/dL      Sodium 135 mmol/L      Potassium 4.2 mmol/L      Chloride 101 mmol/L      CO2 24.5 mmol/L      Calcium 8.9 mg/dL      BUN/Creatinine Ratio 31.3     Anion Gap 9.5 mmol/L      eGFR 85.6 mL/min/1.73     Narrative:      GFR Categories in Chronic Kidney Disease (CKD)      GFR Category          GFR (mL/min/1.73)    Interpretation  G1                     90 or greater         Normal or high (1)  G2                      60-89                Mild decrease (1)  G3a                   45-59                Mild to moderate decrease  G3b                   30-44                Moderate to severe decrease  G4                    15-29                Severe decrease  G5                    14 or less           Kidney failure          (1)In the absence of evidence of kidney disease, neither GFR category G1 or G2 fulfill the criteria for CKD.    eGFR calculation 2021 CKD-EPI creatinine equation, which does not include race as a factor    Magnesium [[id number]]  (Normal) Collected: 03/22/25 0807     Specimen: Blood Updated: 03/22/25 0846     Magnesium 2.1 mg/dL     Vancomycin, Trough [[id number]]  (Normal) Collected: 03/22/25 0807    Specimen: Blood Updated: 03/22/25 0846     Vancomycin Trough 10.70 mcg/mL     Narrative:      Therapeutic Ranges for Vancomycin    Vancomycin Random   5.0-40.0 mcg/mL  Vancomycin Trough   5.0-20.0 mcg/mL  Vancomycin Peak     20.0-40.0 mcg/mL    CBC & Differential [[id number]]  (Abnormal) Collected: 03/22/25 0807    Specimen: Blood Updated: 03/22/25 0818    Narrative:      The following orders were created for panel order CBC & Differential.  Procedure                               Abnormality         Status                     ---------                               -----------         ------                     CBC Auto Differential[[id number]]        Abnormal            Final result                 Please view results for these tests on the individual orders.    CBC Auto Differential [[id number]]  (Abnormal) Collected: 03/22/25 0807    Specimen: Blood Updated: 03/22/25 0818     WBC 17.00 10*3/mm3      RBC 5.12 10*6/mm3      Hemoglobin 14.0 g/dL      Hematocrit 43.0 %      MCV 84.0 fL      MCH 27.3 pg      MCHC 32.6 g/dL      RDW 15.2 %      RDW-SD 46.8 fl      MPV 10.9 fL      Platelets 271 10*3/mm3      Neutrophil % 83.4 %      Lymphocyte % 8.9 %      Monocyte % 6.9 %      Eosinophil % 0.1 %      Basophil % 0.2 %      Immature Grans % 0.5 %      Neutrophils, Absolute 14.19 10*3/mm3      Lymphocytes, Absolute 1.51 10*3/mm3      Monocytes, Absolute 1.17 10*3/mm3      Eosinophils, Absolute 0.01 10*3/mm3      Basophils, Absolute 0.03 10*3/mm3      Immature Grans, Absolute 0.09 10*3/mm3      nRBC 0.0 /100 WBC     Vancomycin, Peak [[id number]]  (Abnormal) Collected: 03/22/25 0107    Specimen: Blood from Arm, Left Updated: 03/22/25 0131     Vancomycin Peak 18.50 mcg/mL     Narrative:      Therapeutic Ranges for Vancomycin    Vancomycin Random   5.0-40.0 mcg/mL  Vancomycin Trough   5.0-20.0  mcg/mL  Vancomycin Peak     20.0-40.0 mcg/mL    POC Glucose Once [[id number]]  (Abnormal) Collected: 03/21/25 2119    Specimen: Blood Updated: 03/21/25 2124     Glucose 289 mg/dL     POC Glucose Once [[id number]]  (Abnormal) Collected: 03/21/25 1712    Specimen: Blood Updated: 03/21/25 1718     Glucose 369 mg/dL     POC Glucose Once [[id number]]  (Abnormal) Collected: 03/21/25 1242    Specimen: Blood Updated: 03/21/25 1257     Glucose 312 mg/dL     POC Glucose Once [[id number]]  (Abnormal) Collected: 03/21/25 0851    Specimen: Blood Updated: 03/21/25 0858     Glucose 309 mg/dL     Comprehensive Metabolic Panel [[id number]]  (Abnormal) Collected: 03/21/25 0700    Specimen: Blood Updated: 03/21/25 0755     Glucose 319 mg/dL      BUN 29 mg/dL      Creatinine 1.25 mg/dL      Sodium 132 mmol/L      Potassium 4.2 mmol/L      Chloride 96 mmol/L      CO2 21.7 mmol/L      Calcium 9.1 mg/dL      Total Protein 7.9 g/dL      Albumin 3.9 g/dL      ALT (SGPT) 24 U/L      AST (SGOT) 22 U/L      Alkaline Phosphatase 84 U/L      Total Bilirubin 0.6 mg/dL      Globulin 4.0 gm/dL      A/G Ratio 1.0 g/dL      BUN/Creatinine Ratio 23.2     Anion Gap 14.3 mmol/L      eGFR 64.7 mL/min/1.73     Narrative:      GFR Categories in Chronic Kidney Disease (CKD)      GFR Category          GFR (mL/min/1.73)    Interpretation  G1                     90 or greater         Normal or high (1)  G2                      60-89                Mild decrease (1)  G3a                   45-59                Mild to moderate decrease  G3b                   30-44                Moderate to severe decrease  G4                    15-29                Severe decrease  G5                    14 or less           Kidney failure          (1)In the absence of evidence of kidney disease, neither GFR category G1 or G2 fulfill the criteria for CKD.    eGFR calculation 2021 CKD-EPI creatinine equation, which does not include race as a factor    Magnesium [[id number]]  (Normal)  Collected: 03/21/25 0700    Specimen: Blood Updated: 03/21/25 0743     Magnesium 1.9 mg/dL     CBC Auto Differential [[id number]]  (Abnormal) Collected: 03/21/25 0700    Specimen: Blood Updated: 03/21/25 0743     WBC 29.99 10*3/mm3      RBC 5.45 10*6/mm3      Hemoglobin 14.8 g/dL      Hematocrit 45.4 %      MCV 83.3 fL      MCH 27.2 pg      MCHC 32.6 g/dL      RDW 14.8 %      RDW-SD 46.0 fl      MPV 10.9 fL      Platelets 300 10*3/mm3      Neutrophil % 90.2 %      Lymphocyte % 4.2 %      Monocyte % 5.1 %      Eosinophil % 0.0 %      Basophil % 0.0 %      Immature Grans % 0.5 %      Neutrophils, Absolute 27.05 10*3/mm3      Lymphocytes, Absolute 1.25 10*3/mm3      Monocytes, Absolute 1.53 10*3/mm3      Eosinophils, Absolute 0.01 10*3/mm3      Basophils, Absolute 0.01 10*3/mm3      Immature Grans, Absolute 0.14 10*3/mm3     Vancomycin, Random [[id number]]  (Normal) Collected: 03/21/25 0700    Specimen: Blood Updated: 03/21/25 0742     Vancomycin Random 9.03 mcg/mL     Narrative:      Therapeutic Ranges for Vancomycin    Vancomycin Random   5.0-40.0 mcg/mL  Vancomycin Trough   5.0-20.0 mcg/mL  Vancomycin Peak     20.0-40.0 mcg/mL    MRSA Screen, PCR (Inpatient) - Swab, Nares [[id number]]  (Abnormal) Collected: 03/20/25 1750    Specimen: Swab from Nares Updated: 03/20/25 2105     MRSA PCR MRSA Detected    Narrative:      The negative predictive value of this diagnostic test is high and should only be used to consider de-escalating anti-MRSA therapy. A positive result may indicate colonization with MRSA and must be correlated clinically.    POC Glucose Once [[id number]]  (Abnormal) Collected: 03/20/25 2020    Specimen: Blood Updated: 03/20/25 2030     Glucose 503 mg/dL     C-reactive Protein [[id number]]  (Abnormal) Collected: 03/20/25 1606    Specimen: Blood Updated: 03/20/25 2029     C-Reactive Protein 20.82 mg/dL             Imaging Results (Last 72 Hours)       Procedure Component Value Units Date/Time    CT Lumbar Spine  With & Without Contrast [[id number]] Collected: 03/22/25 1430     Updated: 03/22/25 1440    Narrative:      CT LUMBAR SPINE W WO CONTRAST    Date of Exam: 3/22/2025 12:23 PM EDT    Indication: back pain, bacteremia.    Comparison: None available.    Technique: Axial CT images were obtained of the lumbar spine before and after the uneventful intravenous administration of 100 mL of Isovue 370.  Sagittal and coronal reconstructions were performed.  Automated exposure control and iterative   reconstruction methods were used.      Findings:  No acute fracture or subluxation is identified. Moderate lumbar spondylosis is present. Alignment is otherwise unremarkable. Vertebral body heights are maintained. Lumbar lordosis is preserved. No osseous erosions are seen. There is bilateral neural   foraminal narrowing at L3-L4, L4-L5, and L5-S1. Suggestion of spinal canal stenosis at L3-L4 and L4-L5. There are degenerative changes of the sacroiliac joints. Paraspinal soft tissues demonstrate no acute abnormality. Visualized intra-abdominal   compartment demonstrates no acute abnormality. Bibasilar atelectasis is seen.      Impression:      Impression:  1.No acute fracture or subluxation is identified within the lumbar spine.  2.Lumbar spondylosis. Neural foraminal narrowing is present at L3-L4, L4-L5, and L5-S1. Possible spinal canal stenosis at L3-L4 and L4-L5.  3.No suspicious contrast enhancement is identified.  4.Please note that this is an insensitive test to detect early signs of discitis/osteomyelitis. If there is suspicion for discitis/osteomyelitis, recommend further evaluation with MRI with and without IV contrast which could also better characterized   degenerative changes of the spine.          Electronically Signed: Deshaun Sneed MD    3/22/2025 2:37 PM EDT    Workstation ID: [id number]    XR Foot 3+ View Left [[id number]] Collected: 03/20/25 1912     Updated: 03/20/25 1922    Narrative:      XR ANKLE 3+ VW LEFT, XR  FOOT 3+ VW LEFT    Date of Exam: 3/20/2025 5:21 PM EDT    Indication: left foot pain, swelling, leukocytosis    Comparison: Left foot and ankle x-rays 1/23/2020    Findings:  Ankle: There is diffuse soft tissue swelling, but no acute fracture or dislocation is identified. Arthritic change with narrowing of the tibiotalar joint has progressed compared to 1/23/2020 x-rays. There are mildly increased soft tissue calcifications.    Foot: There are increased arthritic changes of the midfoot and hindfoot. There is stable narrowing of the great toe metatarsal phalangeal joint. Plantar calcaneal spur with associated calcifications appear similar. No acute fracture or dislocation is   identified.      Impression:      Impression:  1.Soft tissue swelling without definite or acute osseous abnormality.  2.Progression of ankle and foot arthritic changes compared to 2020 x-rays.        Electronically Signed: Roxann Avery    3/20/2025 7:19 PM EDT    Workstation ID: [id number]    XR Ankle 3+ View Left [[id number]] Collected: 03/20/25 1912     Updated: 03/20/25 1922    Narrative:      XR ANKLE 3+ VW LEFT, XR FOOT 3+ VW LEFT    Date of Exam: 3/20/2025 5:21 PM EDT    Indication: left foot pain, swelling, leukocytosis    Comparison: Left foot and ankle x-rays 1/23/2020    Findings:  Ankle: There is diffuse soft tissue swelling, but no acute fracture or dislocation is identified. Arthritic change with narrowing of the tibiotalar joint has progressed compared to 1/23/2020 x-rays. There are mildly increased soft tissue calcifications.    Foot: There are increased arthritic changes of the midfoot and hindfoot. There is stable narrowing of the great toe metatarsal phalangeal joint. Plantar calcaneal spur with associated calcifications appear similar. No acute fracture or dislocation is   identified.      Impression:      Impression:  1.Soft tissue swelling without definite or acute osseous abnormality.  2.Progression of ankle and foot  "arthritic changes compared to 2020 x-rays.        Electronically Signed: Roxann Avery    3/20/2025 7:19 PM EDT    Workstation ID: [id number]              Medication Review:     Hospital Medications (active)         Dose Frequency Start End    acetaminophen (TYLENOL) 160 MG/5ML oral solution 650 mg 650 mg Every 4 Hours PRN 3/20/2025 --    Admin Instructions: If given for fever, use fever parameter: fever greater than 100.4 °F  Based on patient request - if ordered for moderate or severe pain, provider allows for administration of a medication prescribed for a lower pain scale.    Do not exceed 4 grams of acetaminophen in a 24 hr period. Max dose of 2gm for AST/ALT greater than 120 units/L.    If given for pain, use the following pain scale:   Mild Pain = Pain Score of 1-3, CPOT 1-2  Moderate Pain = Pain Score of 4-6, CPOT 3-4  Severe Pain = Pain Score of 7-10, CPOT 5-8    Route: Oral    Linked Group 1: Placed in \"Or\" Linked Group        acetaminophen (TYLENOL) suppository 650 mg 650 mg Every 4 Hours PRN 3/20/2025 --    Admin Instructions: If given for fever, use fever parameter: fever greater than 100.4 °F  Based on patient request - if ordered for moderate or severe pain, provider allows for administration of a medication prescribed for a lower pain scale.    Do not exceed 4 grams of acetaminophen in a 24 hr period. Max dose of 2gm for AST/ALT greater than 120 units/L.    If given for pain, use the following pain scale:   Mild Pain = Pain Score of 1-3, CPOT 1-2  Moderate Pain = Pain Score of 4-6, CPOT 3-4  Severe Pain = Pain Score of 7-10, CPOT 5-8    Route: Rectal    Linked Group 1: Placed in \"Or\" Linked Group        acetaminophen (TYLENOL) tablet 650 mg 650 mg Every 4 Hours PRN 3/20/2025 --    Admin Instructions: If given for fever, use fever parameter: fever greater than 100.4 °F  Based on patient request - if ordered for moderate or severe pain, provider allows for administration of a medication prescribed for a " "lower pain scale.    Do not exceed 4 grams of acetaminophen in a 24 hr period. Max dose of 2gm for AST/ALT greater than 120 units/L.    If given for pain, use the following pain scale:   Mild Pain = Pain Score of 1-3, CPOT 1-2  Moderate Pain = Pain Score of 4-6, CPOT 3-4  Severe Pain = Pain Score of 7-10, CPOT 5-8    Route: Oral    Linked Group 1: Placed in \"Or\" Linked Group        amLODIPine (NORVASC) tablet 10 mg 10 mg Daily 3/20/2025 --    Admin Instructions: Hold for SBP less than 100, DBP less than 60.  Caution: Look alike/sound alike drug alert. Avoid grapefruit juice.    Route: Oral    Aquaphor Advanced Therapy ointment 1 Application 1 Application Every 12 Hours Scheduled 3/22/2025 --    Admin Instructions: Apply to dry area  .    Route: Topical    atorvastatin (LIPITOR) tablet 20 mg 20 mg Nightly 3/20/2025 --    Admin Instructions: Avoid grapefruit juice.    Route: Oral    bisacodyl (DULCOLAX) EC tablet 5 mg 5 mg Daily PRN 3/20/2025 --    Admin Instructions: Use if no bowel movement after 12 hours.  Swallow whole. Do not crush, split, or chew tablet.    Route: Oral    Linked Group 2: Placed in \"And\" Linked Group        bisacodyl (DULCOLAX) suppository 10 mg 10 mg Daily PRN 3/20/2025 --    Admin Instructions: Use if no bowel movement after 12 hours.  Hold for diarrhea    Route: Rectal    Linked Group 2: Placed in \"And\" Linked Group        cefTRIAXone (ROCEPHIN) 2,000 mg in sodium chloride 0.9 % 100 mL IVPB-VTB 2,000 mg Every 24 Hours 3/22/2025 3/29/2025    Admin Instructions: LR should be paused and flushing of the line with NS is recommended prior to and after completion of ceftriaxone infusion due to incompatibility. Do not co-adminster with calcium-containing solutions.  Caution: Look alike/sound alike drug alert    Route: Intravenous    dextrose (D50W) (25 g/50 mL) IV injection 10-50 mL 10-50 mL Every 15 Minutes PRN 3/20/2025 --    Admin Instructions: Blood Glucose <70  Patient Has IV Access, Is " Unresponsive, NPO or Unable to Safely Swallow  Recheck Blood Glucose Per Glucommander™    Route: Intravenous    dextrose (GLUTOSE) oral gel 15 g 15 g Every 15 Minutes PRN 3/20/2025 --    Admin Instructions: Blood Glucose <70  Patient Alert, NOT NPO, Can Safely Swallow  Recheck Blood Glucose Per Glucommander™    Route: Oral    DULoxetine (CYMBALTA) DR capsule 60 mg 60 mg Daily 3/20/2025 --    Admin Instructions: Do not crush or chew the capsules or tablets. The drug may not work as designed if the capsule or tablet is crushed or chewed. Swallow whole.  Caution: Look alike/sound alike drug alert. Capsule may be opened and sprinkled on applesauce or apple juice. Do not crush or chew capsule.    Route: Oral    enoxaparin sodium (LOVENOX) syringe 40 mg 40 mg Every 12 Hours 3/20/2025 --    Admin Instructions: Give subcutaneous in abdomen only. Do not massage site after injection.    Route: Subcutaneous    glucagon (GLUCAGEN) injection 1 mg 1 mg Every 15 Minutes PRN 3/20/2025 --    Admin Instructions: Blood Glucose <70  Patient Without IV Access, Unresponsive, NPO or Unable to Safely Swallow  Recheck Blood Glucose Per Glucommander™  Reconstitute powder for injection by adding 1 mL of -supplied sterile diluent or sterile water for injection to a vial containing 1 mg of the drug, to provide solutions containing 1 mg/mL. Shake vial gently to dissolve.    Route: Intramuscular    HYDROcodone-acetaminophen (NORCO) 5-325 MG per tablet 1 tablet 1 tablet Every 6 Hours PRN 3/23/2025 --    Admin Instructions: Based on patient request - if ordered for moderate or severe pain, provider allows for administration of a medication prescribed for a lower pain scale.  [LEORA]    Do not exceed 4 grams of acetaminophen in a 24 hr period. Max dose of 2gm for AST/ALT greater than 120 units/L        If given for pain, use the following pain scale:   Mild Pain = Pain Score of 1-3, CPOT 1-2  Moderate Pain = Pain Score of 4-6, CPOT  3-4  Severe Pain = Pain Score of 7-10, CPOT 5-8    Route: Oral    HYDROcodone-acetaminophen (NORCO) 7.5-325 MG per tablet 1 tablet 1 tablet Every 6 Hours PRN 3/23/2025 3/28/2025    Admin Instructions: Based on patient request - if ordered for moderate or severe pain, provider allows for administration of a medication prescribed for a lower pain scale.  [LEORA]    Do not exceed 4 grams of acetaminophen in a 24 hr period. Max dose of 2gm for AST/ALT greater than 120 units/L        If given for pain, use the following pain scale:   Mild Pain = Pain Score of 1-3, CPOT 1-2  Moderate Pain = Pain Score of 4-6, CPOT 3-4  Severe Pain = Pain Score of 7-10, CPOT 5-8    Route: Oral    insulin glargine (LANTUS, SEMGLEE) injection 1-200 Units 1-200 Units Nightly - Glucommander 3/20/2025 --    Admin Instructions: Do not hold basal insulin without an order. Consider requesting a dose edit, if needed.      Route: Subcutaneous    insulin lispro (humaLOG) injection 1-200 Units 1-200 Units 4 Times Daily With Meals & Nightly 3/20/2025 --    Admin Instructions: Document Total Bolus Dose Using This MAR Entry   Total Bolus Dose Includes Scheduled Meal & Associated Correction Dose  If Patient NPO or Unable to Eat Administer Correction Insulin Only  per Glucommander    Route: Subcutaneous    insulin lispro (humaLOG) injection 1-200 Units 1-200 Units As Needed 3/20/2025 --    Admin Instructions: Correction Doses Outside of Scheduled Meal & Bedtime Per Glucommander™   Use This MAR Entry For Insulin Doses When There is NO SCHEDULED MAR Entry Available  Administer Correction Insulin Even if Patient NPO or Unable to Eat  per Glucommander    Route: Subcutaneous    Lidocaine 4 % 2 patch 2 patch Every 24 Hours Scheduled 3/22/2025 --    Admin Instructions: Apply to skin on lower back . Remove patch in 12 hours. Apply patch only once for up to 12 hours in 24 hour period. Based on patient request -  if ordered for moderate or severe pain, provider allows  "for administration of a medication prescribed for a lower pain scale.  If given for pain, use the following pain scale:  Mild Pain = Pain Score of 1-3, CPOT 1-2  Moderate Pain = Pain Score of 4-6, CPOT 3-4  Severe Pain = Pain Score of 7-10, CPOT 5-8    Route: Transdermal    losartan (COZAAR) tablet 100 mg 100 mg Daily 3/20/2025 --    Admin Instructions: Hold for SBP less than 100, DBP less than 60.    Route: Oral    metoprolol succinate XL (TOPROL-XL) 24 hr tablet 50 mg 50 mg Daily 3/20/2025 --    Admin Instructions: Hold for SBP less than 100, DBP less than 60, or heart rate less than 50    Do not crush or chew the capsules or tablets. The drug may not work as designed if the capsule or tablet is crushed or chewed. Swallow whole.  Do not crush or chew.    Route: Oral    morphine injection 2 mg 2 mg Every 4 Hours PRN 3/23/2025 3/28/2025    Admin Instructions: Based on patient request - if ordered for moderate or severe pain, provider allows for administration of a medication prescribed for a lower pain scale.  If given for pain, use the following pain scale:  Mild Pain = Pain Score of 1-3, CPOT 1-2  Moderate Pain = Pain Score of 4-6, CPOT 3-4  Severe Pain = Pain Score of 7-10, CPOT 5-8    Route: Intravenous    ondansetron (ZOFRAN) injection 4 mg 4 mg Every 6 Hours PRN 3/20/2025 --    Admin Instructions: If BOTH ondansetron (ZOFRAN) and promethazine (PHENERGAN) are ordered use ondansetron first and THEN promethazine IF ondansetron is ineffective.    Route: Intravenous    Linked Group 3: Placed in \"Or\" Linked Group        ondansetron ODT (ZOFRAN-ODT) disintegrating tablet 4 mg 4 mg Every 6 Hours PRN 3/20/2025 --    Admin Instructions: If BOTH ondansetron (ZOFRAN) and promethazine (PHENERGAN) are ordered use ondansetron first and THEN promethazine IF ondansetron is ineffective.  Place on tongue and allow to dissolve.    Route: Oral    Linked Group 3: Placed in \"Or\" Linked Group        polyethylene glycol (MIRALAX) " "packet 17 g 17 g Daily PRN 3/20/2025 --    Admin Instructions: Use if no bowel movement after 12 hours. Mix in 6-8 ounces of water.  Use 4-8 ounces of water, tea, or juice for each 17 gram dose.    Route: Oral    Linked Group 2: Placed in \"And\" Linked Group        pregabalin (LYRICA) capsule 150 mg 150 mg 2 Times Daily 3/20/2025 --    Admin Instructions:     Route: Oral    sennosides-docusate (PERICOLACE) 8.6-50 MG per tablet 2 tablet 2 tablet 2 Times Daily PRN 3/20/2025 --    Admin Instructions: Start bowel management regimen if patient has not had a bowel movement after 12 hours.    Route: Oral    Linked Group 2: Placed in \"And\" Linked Group        sodium chloride 0.9 % flush 10 mL 10 mL As Needed 3/20/2025 --    Route: Intravenous    Cosign for Ordering: Accepted by Rey Nichole MD on 3/20/2025  3:01 PM    sodium chloride 0.9 % flush 10 mL 10 mL Every 12 Hours Scheduled 3/20/2025 --    Route: Intravenous    sodium chloride 0.9 % flush 10 mL 10 mL As Needed 3/20/2025 --    Route: Intravenous    sodium chloride 0.9 % infusion 40 mL 40 mL As Needed 3/20/2025 --    Admin Instructions: Following administration of an IV intermittent medication, flush line with 40mL NS at 100mL/hr.    Route: Intravenous          amLODIPine, 10 mg, Oral, Daily  Aquaphor Advanced Therapy, 1 Application, Topical, Q12H  atorvastatin, 20 mg, Oral, Nightly  cefTRIAXone, 2,000 mg, Intravenous, Q24H  DULoxetine, 60 mg, Oral, Daily  enoxaparin sodium, 40 mg, Subcutaneous, Q12H  insulin glargine, 1-200 Units, Subcutaneous, Nightly - Glucommander  insulin lispro, 1-200 Units, Subcutaneous, 4x Daily With Meals & Nightly  Lidocaine, 2 patch, Transdermal, Q24H  losartan, 100 mg, Oral, Daily  metoprolol succinate XL, 50 mg, Oral, Daily  pregabalin, 150 mg, Oral, BID  sodium chloride, 10 mL, Intravenous, Q12H        Assessment & Plan         Neutrophilia  + BC  Strep   Nasal screen + MRSA  Back pain r/o Osteo   CT spine Noted  L foot " cellulitis    Plan :    IV  rocephin     Echo  MRI of T and L Spine  Repeat BC  Will follow  Thank you               Jaleesa Pryor MD  03/23/25  17:54 EDT

## 2025-03-23 NOTE — PROGRESS NOTES
"Patient Care Team:  Anand Alford MD as PCP - General  Anand Alford MD as PCP - Family Medicine    Date: 3/23/2025  Patient Name: Arnold Ramírez  : 1960  MRN: [id number]  Date of admission: 3/20/2025  Room/Bed: [facility]      Subjective   Subjective         Chief Complaint: f/u leukocytosis, back pain    Summary:Arnold Ramírez is a 65 y.o. male morbidly obese male with a past medical history of DM2, HTN, and HLP who presented to the ED d/t left flank pain initially. Patient reports pain started yesterday afternoon and he was unable to rest all night due to the pain. He went to urgent care and was referred to the ED. Patient underwent CT abd/pel that was negative for acute process. Patient states the pain has now moved from his left flank to his midline back. He denies any injury, but reports his left foot/ankle needs surgery and causes his to walk unevenly causing pain. He reports some chills and nausea. States he has not taken any of his home medications today, due to going to the urgent care.   In the ED, he was found to have significantly elevated WBC. Noted to have some redness of the LLE with some abrasions, though he reports this looks improved from how it normally appears. Given poorly controlled diabetes and significant leukocytosis concern for infection, possibly osteomyelitis.        Interval Followup:   Patient is complaining of increased lower back pain, states he has not had any rest. Denies any other complaints.     Review of Systems   All other systems reviewed and are negative.        Objective   Objective       Vital Signs  Temp:  [97.1 °F (36.2 °C)-98.8 °F (37.1 °C)] 97.5 °F (36.4 °C)  Heart Rate:  [79-92] 92  Resp:  [18-20] 18  BP: (115-155)/(65-87) 142/87  Oxygen Therapy  SpO2: 94 %  Pulse Oximetry Type: Continuous  Device (Oxygen Therapy): room air  Oxygen Concentration (%): 28  Flowsheet Rows      Flowsheet Row First Filed Value   Admission Height 195.6 cm (77\") Documented at 2025 " 1024   Admission Weight 155 kg (341 lb) Documented at 03/20/2025 1024          Intake & Output (last 3 days)         03/18 0701  03/19 0700 03/19 0701  03/20 0700 03/20 0701 03/21 0700 03/21 0701 03/22 0700    P.O.    600    Total Intake(mL/kg)    600 (3.9)    Urine (mL/kg/hr)   450     Stool   0     Total Output   450     Net   -450 +600            Urine Unmeasured Occurrence   1 x 2 x    Stool Unmeasured Occurrence   1 x           Lines, Drains & Airways       Active LDAs       Name Placement date Placement time Site Days    Peripheral IV 03/20/25 1038 Left Antecubital 03/20/25  1038  Antecubital  1    Peripheral IV 03/20/25 1521 Right Antecubital 03/20/25  1521  Antecubital  1                      Physical Exam  Vitals reviewed.   Constitutional:       General: He is not in acute distress.     Appearance: He is morbidly obese.   HENT:      Head: Normocephalic and atraumatic.      Mouth/Throat:      Mouth: Mucous membranes are moist.   Cardiovascular:      Rate and Rhythm: Normal rate and regular rhythm.      Heart sounds: No murmur heard.  Pulmonary:      Effort: Pulmonary effort is normal. No respiratory distress.      Breath sounds: No wheezing.   Abdominal:      Comments: Obese, nt, +bs   Musculoskeletal:         General: Deformity present.      Right lower leg: Edema present.      Left lower leg: Edema present.   Skin:     General: Skin is warm and dry.      Comments: Dressing in place to LLE    Neurological:      Mental Status: He is alert and oriented to person, place, and time. Mental status is at baseline.   Psychiatric:         Mood and Affect: Mood normal.         Behavior: Behavior normal.           Results Review:      Results from last 7 days   Lab Units 03/23/25 0425 03/22/25  0807 03/21/25  0700   WBC 10*3/mm3 11.94* 17.00* 29.99*   HEMOGLOBIN g/dL 14.5 14.0 14.8   HEMATOCRIT % 43.9 43.0 45.4   PLATELETS 10*3/mm3 317 271 300     Results from last 7 days   Lab Units 03/23/25  0425 03/22/25  0807  "03/21/25  0700 03/20/25  1038   SODIUM mmol/L 137 135* 132* 130*   POTASSIUM mmol/L 4.2 4.2 4.2 4.6   CHLORIDE mmol/L 102 101 96* 97*   CO2 mmol/L 22.1 24.5 21.7* 19.3*   BUN mg/dL 29* 31* 29* 26*   CREATININE mg/dL 1.01 0.99 1.25 1.41*   CALCIUM mg/dL 9.1 8.9 9.1 9.8   BILIRUBIN mg/dL  --   --  0.6 0.8   ALK PHOS U/L  --   --  84 95   ALT (SGPT) U/L  --   --  24 26   AST (SGOT) U/L  --   --  22 26   GLUCOSE mg/dL 149* 170* 319* 290*       Results from last 7 days   Lab Units 03/23/25  0425 03/22/25  0807 03/21/25  0700   MAGNESIUM mg/dL 2.1 2.1 1.9       Blood Culture   Date Value Ref Range Status   03/20/2025 No growth at 24 hours  Preliminary   03/20/2025 No growth at 24 hours  Preliminary       No results found for: \"MRSACX\"    No results found for: \"STOOLCX\"    No results found for: \"URINECX\"    No results found for: \"WOUNDCX\"    Imaging Results (Last 24 Hours)       Procedure Component Value Units Date/Time    CT Lumbar Spine With & Without Contrast [[id number]] Collected: 03/22/25 1430     Updated: 03/22/25 1440    Narrative:      CT LUMBAR SPINE W WO CONTRAST    Date of Exam: 3/22/2025 12:23 PM EDT    Indication: back pain, bacteremia.    Comparison: None available.    Technique: Axial CT images were obtained of the lumbar spine before and after the uneventful intravenous administration of 100 mL of Isovue 370.  Sagittal and coronal reconstructions were performed.  Automated exposure control and iterative   reconstruction methods were used.      Findings:  No acute fracture or subluxation is identified. Moderate lumbar spondylosis is present. Alignment is otherwise unremarkable. Vertebral body heights are maintained. Lumbar lordosis is preserved. No osseous erosions are seen. There is bilateral neural   foraminal narrowing at L3-L4, L4-L5, and L5-S1. Suggestion of spinal canal stenosis at L3-L4 and L4-L5. There are degenerative changes of the sacroiliac joints. Paraspinal soft tissues demonstrate no acute " abnormality. Visualized intra-abdominal   compartment demonstrates no acute abnormality. Bibasilar atelectasis is seen.      Impression:      Impression:  1.No acute fracture or subluxation is identified within the lumbar spine.  2.Lumbar spondylosis. Neural foraminal narrowing is present at L3-L4, L4-L5, and L5-S1. Possible spinal canal stenosis at L3-L4 and L4-L5.  3.No suspicious contrast enhancement is identified.  4.Please note that this is an insensitive test to detect early signs of discitis/osteomyelitis. If there is suspicion for discitis/osteomyelitis, recommend further evaluation with MRI with and without IV contrast which could also better characterized   degenerative changes of the spine.          Electronically Signed: Deshaun Sneed MD    3/22/2025 2:37 PM EDT    Workstation ID: [id number]            I have personally reviewed the patient's new imaging and lab results.          ECG/EMG Results (most recent)       None                    Medication Review:     Scheduled Meds:amLODIPine, 10 mg, Oral, Daily  Aquaphor Advanced Therapy, 1 Application, Topical, Q12H  atorvastatin, 20 mg, Oral, Nightly  cefTRIAXone, 2,000 mg, Intravenous, Q24H  DULoxetine, 60 mg, Oral, Daily  enoxaparin sodium, 40 mg, Subcutaneous, Q12H  insulin glargine, 1-200 Units, Subcutaneous, Nightly - Glucommander  insulin lispro, 1-200 Units, Subcutaneous, 4x Daily With Meals & Nightly  Lidocaine, 2 patch, Transdermal, Q24H  losartan, 100 mg, Oral, Daily  metoprolol succinate XL, 50 mg, Oral, Daily  pregabalin, 150 mg, Oral, BID  sodium chloride, 10 mL, Intravenous, Q12H      Continuous Infusions:     PRN Meds:.  acetaminophen **OR** acetaminophen **OR** acetaminophen    senna-docusate sodium **AND** polyethylene glycol **AND** bisacodyl **AND** bisacodyl    dextrose    dextrose    glucagon (human recombinant)    HYDROcodone-acetaminophen    HYDROcodone-acetaminophen    insulin lispro    Morphine    ondansetron ODT **OR**  ondansetron    sodium chloride    sodium chloride    sodium chloride      I have reviewed the patient's current medication list    Assessment & Plan   Assessment / Plan       Active Hospital Problems    Diagnosis  POA    **Neutrophilia [D72.828]  Yes       Leukocytosis d/t strep bacteremia   - unclear source of the bacteremia   - afebrile, patient reports chills at home   - check sed rate 45, crp 20, procal 8  - blood cx- strep species   - repeat blood cx 3/23- pending (unfortunately these were not performed yesterday)  - UA unremarkable   - CT abd/pel negative for acute process   - xray left foot reviewed   - will check CT L spine un-revealing   - patient has been unable to tolerate MRI of the L spine and MRI of the foot, I have called [facility], and Romie, no one has an inpatient open MRI. Patient has refused any morphine or ativan to help with pain and anxiety so that MRI could be performed   - ID consulted  - echo ordered and pending  - discussed with ID and will continue IV Rocephin 2g  - leukocytosis improving    Back pain  - increase hydrocodone to q6h, add increased dose and prn morphine     Diabetes Mellitus type 2 with hyperglycemia and peripheral neuropathy   - hgb A1c 11  - resume home Cymbalta and Lyrica  - continue gluccomander given his high insulin requirements   - counseled extensively on dietary and lifestyle changes  - monitor with routine accu-checks and make adjustments as needed   - encouraged patient to follow up with endocrinology      Pseudohyponatremia     HTN  - resume home amlodipine, losartan, toprol xl  - monitor bp with routine vs and make adjustments as needed     HLP  - resume home atorvastatin      Morbid obesity  - bmi 40.5  - complicates all aspects of care   -  on lifestyle changes as appropriate      MARGO on bipap     DVT Prophylaxis  - enoxaparin      Code Status  - full code      Patient is high risk     Plan for disposition: likely home pending improvement      Kathleen Zhong DO  03/23/25  13:56 EDT      Note disclaimer: At [facility], we believe that sharing information builds trust and better relationships. You are receiving this note because you recently visited [facility]. It is possible you will see health information before a provider has talked with you about it. This kind of information can be easy to misunderstand. To help you fully understand what it means for your health, we urge you to discuss this note with your provider.

## 2025-03-23 NOTE — PROGRESS NOTES
RT EQUIPMENT DEVICE RELATED - SKIN ASSESSMENT    RT Medical Equipment/Device:  NIV Mask: Full-face  size: lrg    Skin Assessment: Nose: Intact    Device Skin Pressure Protection: Pressure points protected    Nurse Notification: Latonya Fowler, RRT

## 2025-03-23 NOTE — PLAN OF CARE
Goal Outcome Evaluation:  Plan of Care Reviewed With: patient        Progress: improving  Outcome Evaluation: VSS, worsening back pain throughout shift, increased scheduled pain med to TID, WBC continues to trend downward, blood sugars more controlled, continues on IV rocephin, bariatric bed delivered last night, safety measures in place, call light within reach.

## 2025-03-24 ENCOUNTER — APPOINTMENT (OUTPATIENT)
Dept: MRI IMAGING | Facility: HOSPITAL | Age: 65
End: 2025-03-24
Payer: MEDICARE

## 2025-03-24 ENCOUNTER — APPOINTMENT (OUTPATIENT)
Dept: CARDIOLOGY | Facility: HOSPITAL | Age: 65
End: 2025-03-24
Payer: MEDICARE

## 2025-03-24 ENCOUNTER — HOSPITAL ENCOUNTER (INPATIENT)
Facility: HOSPITAL | Age: 65
LOS: 4 days | Discharge: HOME HEALTH | DRG: 872 | End: 2025-03-28
Attending: INTERNAL MEDICINE | Admitting: INTERNAL MEDICINE
Payer: MEDICARE

## 2025-03-24 VITALS
HEIGHT: 77 IN | SYSTOLIC BLOOD PRESSURE: 113 MMHG | BODY MASS INDEX: 37.19 KG/M2 | HEART RATE: 88 BPM | WEIGHT: 315 LBS | TEMPERATURE: 97.9 F | DIASTOLIC BLOOD PRESSURE: 57 MMHG | RESPIRATION RATE: 16 BRPM | OXYGEN SATURATION: 91 %

## 2025-03-24 DIAGNOSIS — M54.40 ACUTE LEFT-SIDED LOW BACK PAIN WITH SCIATICA, SCIATICA LATERALITY UNSPECIFIED: Primary | ICD-10-CM

## 2025-03-24 DIAGNOSIS — R78.81 STREPTOCOCCAL BACTEREMIA: ICD-10-CM

## 2025-03-24 DIAGNOSIS — B95.5 STREPTOCOCCAL BACTEREMIA: ICD-10-CM

## 2025-03-24 PROBLEM — M54.9 ACUTE BACK PAIN: Status: ACTIVE | Noted: 2025-03-24

## 2025-03-24 PROBLEM — E66.9 OBESITY: Status: ACTIVE | Noted: 2025-03-24

## 2025-03-24 PROBLEM — E78.5 DYSLIPIDEMIA: Status: ACTIVE | Noted: 2025-03-24

## 2025-03-24 PROBLEM — M46.20 PARASPINAL ABSCESS: Status: ACTIVE | Noted: 2025-03-24

## 2025-03-24 PROBLEM — M47.816 FACET ARTHRITIS OF LUMBAR REGION: Status: ACTIVE | Noted: 2025-03-24

## 2025-03-24 PROBLEM — E11.9 DM (DIABETES MELLITUS): Status: ACTIVE | Noted: 2025-03-24

## 2025-03-24 PROBLEM — D72.828 NEUTROPHILIA: Status: RESOLVED | Noted: 2025-03-20 | Resolved: 2025-03-24

## 2025-03-24 PROBLEM — I10 HTN (HYPERTENSION): Status: ACTIVE | Noted: 2025-03-24

## 2025-03-24 PROBLEM — Z22.322 MRSA COLONIZATION: Status: ACTIVE | Noted: 2025-03-24

## 2025-03-24 LAB
ANION GAP SERPL CALCULATED.3IONS-SCNC: 11.2 MMOL/L (ref 5–15)
AORTIC DIMENSIONLESS INDEX: 0.82 (DI)
ASCENDING AORTA: 3.7 CM
AV MEAN PRESS GRAD SYS DOP V1V2: 5 MMHG
AV VMAX SYS DOP: 141 CM/SEC
BASOPHILS # BLD AUTO: 0.04 10*3/MM3 (ref 0–0.2)
BASOPHILS NFR BLD AUTO: 0.4 % (ref 0–1.5)
BH CV ECHO MEAS - AO MAX PG: 8 MMHG
BH CV ECHO MEAS - AO ROOT DIAM: 3.7 CM
BH CV ECHO MEAS - AO V2 VTI: 27.3 CM
BH CV ECHO MEAS - AVA(I,D): 3.2 CM2
BH CV ECHO MEAS - EDV(CUBED): 213.4 ML
BH CV ECHO MEAS - EDV(MOD-SP2): 84 ML
BH CV ECHO MEAS - EDV(MOD-SP4): 151 ML
BH CV ECHO MEAS - EF(MOD-SP2): 60.7 %
BH CV ECHO MEAS - EF(MOD-SP4): 63.6 %
BH CV ECHO MEAS - ESV(CUBED): 72.1 ML
BH CV ECHO MEAS - ESV(MOD-SP2): 33 ML
BH CV ECHO MEAS - ESV(MOD-SP4): 55 ML
BH CV ECHO MEAS - FS: 30.4 %
BH CV ECHO MEAS - IVS/LVPW: 1.01 CM
BH CV ECHO MEAS - IVSD: 1.17 CM
BH CV ECHO MEAS - LAT PEAK E' VEL: 14.3 CM/SEC
BH CV ECHO MEAS - LV DIASTOLIC VOL/BSA (35-75): 53.2 CM2
BH CV ECHO MEAS - LV MASS(C)D: 299.2 GRAMS
BH CV ECHO MEAS - LV MAX PG: 5.6 MMHG
BH CV ECHO MEAS - LV MEAN PG: 3 MMHG
BH CV ECHO MEAS - LV SYSTOLIC VOL/BSA (12-30): 19.4 CM2
BH CV ECHO MEAS - LV V1 MAX: 118 CM/SEC
BH CV ECHO MEAS - LV V1 VTI: 22.4 CM
BH CV ECHO MEAS - LVIDD: 6 CM
BH CV ECHO MEAS - LVIDS: 4.2 CM
BH CV ECHO MEAS - LVOT AREA: 3.9 CM2
BH CV ECHO MEAS - LVOT DIAM: 2.24 CM
BH CV ECHO MEAS - LVPWD: 1.16 CM
BH CV ECHO MEAS - MED PEAK E' VEL: 7.2 CM/SEC
BH CV ECHO MEAS - MV A MAX VEL: 76.5 CM/SEC
BH CV ECHO MEAS - MV DEC SLOPE: 502.4 CM/SEC2
BH CV ECHO MEAS - MV DEC TIME: 0.17 SEC
BH CV ECHO MEAS - MV E MAX VEL: 98 CM/SEC
BH CV ECHO MEAS - MV E/A: 1.28
BH CV ECHO MEAS - MV MAX PG: 3.3 MMHG
BH CV ECHO MEAS - MV MEAN PG: 1.46 MMHG
BH CV ECHO MEAS - MV P1/2T: 55.3 MSEC
BH CV ECHO MEAS - MV V2 VTI: 25.8 CM
BH CV ECHO MEAS - MVA(P1/2T): 4 CM2
BH CV ECHO MEAS - MVA(VTI): 3.4 CM2
BH CV ECHO MEAS - PA ACC TIME: 0.09 SEC
BH CV ECHO MEAS - PA V2 MAX: 112 CM/SEC
BH CV ECHO MEAS - RV MAX PG: 3 MMHG
BH CV ECHO MEAS - RV V1 MAX: 85.9 CM/SEC
BH CV ECHO MEAS - RV V1 VTI: 18.2 CM
BH CV ECHO MEAS - SV(LVOT): 88.3 ML
BH CV ECHO MEAS - SV(MOD-SP2): 51 ML
BH CV ECHO MEAS - SV(MOD-SP4): 96 ML
BH CV ECHO MEAS - SVI(LVOT): 31.1 ML/M2
BH CV ECHO MEAS - SVI(MOD-SP2): 18 ML/M2
BH CV ECHO MEAS - SVI(MOD-SP4): 33.8 ML/M2
BH CV ECHO MEAS - TAPSE (>1.6): 4.8 CM
BH CV ECHO MEASUREMENTS AVERAGE E/E' RATIO: 9.12
BH CV XLRA - RV BASE: 3.2 CM
BH CV XLRA - RV LENGTH: 9 CM
BH CV XLRA - RV MID: 2.34 CM
BH CV XLRA - TDI S': 15.1 CM/SEC
BUN SERPL-MCNC: 25 MG/DL (ref 8–23)
BUN/CREAT SERPL: 25.8 (ref 7–25)
CALCIUM SPEC-SCNC: 8.9 MG/DL (ref 8.6–10.5)
CHLORIDE SERPL-SCNC: 102 MMOL/L (ref 98–107)
CO2 SERPL-SCNC: 20.8 MMOL/L (ref 22–29)
CREAT SERPL-MCNC: 0.97 MG/DL (ref 0.76–1.27)
DEPRECATED RDW RBC AUTO: 45.4 FL (ref 37–54)
EGFRCR SERPLBLD CKD-EPI 2021: 87.7 ML/MIN/1.73
EOSINOPHIL # BLD AUTO: 0.09 10*3/MM3 (ref 0–0.4)
EOSINOPHIL NFR BLD AUTO: 0.9 % (ref 0.3–6.2)
ERYTHROCYTE [DISTWIDTH] IN BLOOD BY AUTOMATED COUNT: 15.2 % (ref 12.3–15.4)
GLUCOSE BLDC GLUCOMTR-MCNC: 159 MG/DL (ref 70–130)
GLUCOSE BLDC GLUCOMTR-MCNC: 160 MG/DL (ref 70–130)
GLUCOSE BLDC GLUCOMTR-MCNC: 226 MG/DL (ref 70–130)
GLUCOSE BLDC GLUCOMTR-MCNC: 229 MG/DL (ref 70–130)
GLUCOSE SERPL-MCNC: 185 MG/DL (ref 65–99)
HCT VFR BLD AUTO: 42.3 % (ref 37.5–51)
HGB BLD-MCNC: 13.7 G/DL (ref 13–17.7)
IMM GRANULOCYTES # BLD AUTO: 0.09 10*3/MM3 (ref 0–0.05)
IMM GRANULOCYTES NFR BLD AUTO: 0.9 % (ref 0–0.5)
LV EF BIPLANE MOD: 62.1 %
LYMPHOCYTES # BLD AUTO: 1.46 10*3/MM3 (ref 0.7–3.1)
LYMPHOCYTES NFR BLD AUTO: 15.1 % (ref 19.6–45.3)
MCH RBC QN AUTO: 27.1 PG (ref 26.6–33)
MCHC RBC AUTO-ENTMCNC: 32.4 G/DL (ref 31.5–35.7)
MCV RBC AUTO: 83.6 FL (ref 79–97)
MONOCYTES # BLD AUTO: 1.11 10*3/MM3 (ref 0.1–0.9)
MONOCYTES NFR BLD AUTO: 11.5 % (ref 5–12)
NEUTROPHILS NFR BLD AUTO: 6.87 10*3/MM3 (ref 1.7–7)
NEUTROPHILS NFR BLD AUTO: 71.2 % (ref 42.7–76)
NRBC BLD AUTO-RTO: 0 /100 WBC (ref 0–0.2)
PLATELET # BLD AUTO: 234 10*3/MM3 (ref 140–450)
PMV BLD AUTO: 11.2 FL (ref 6–12)
POTASSIUM SERPL-SCNC: 4.1 MMOL/L (ref 3.5–5.2)
RBC # BLD AUTO: 5.06 10*6/MM3 (ref 4.14–5.8)
SINUS: 3.7 CM
SODIUM SERPL-SCNC: 134 MMOL/L (ref 136–145)
STJ: 3.4 CM
WBC NRBC COR # BLD AUTO: 9.66 10*3/MM3 (ref 3.4–10.8)

## 2025-03-24 PROCEDURE — 25010000002 VANCOMYCIN 10 G RECONSTITUTED SOLUTION: Performed by: INTERNAL MEDICINE

## 2025-03-24 PROCEDURE — 99239 HOSP IP/OBS DSCHRG MGMT >30: CPT | Performed by: INTERNAL MEDICINE

## 2025-03-24 PROCEDURE — 25010000002 LORAZEPAM PER 2 MG: Performed by: INTERNAL MEDICINE

## 2025-03-24 PROCEDURE — 63710000001 INSULIN GLARGINE PER 5 UNITS: Performed by: INTERNAL MEDICINE

## 2025-03-24 PROCEDURE — 25010000002 MORPHINE PER 10 MG: Performed by: INTERNAL MEDICINE

## 2025-03-24 PROCEDURE — 25810000003 SODIUM CHLORIDE 0.9 % SOLUTION: Performed by: INTERNAL MEDICINE

## 2025-03-24 PROCEDURE — 93306 TTE W/DOPPLER COMPLETE: CPT | Performed by: INTERNAL MEDICINE

## 2025-03-24 PROCEDURE — 25510000001 PERFLUTREN 6.52 MG/ML SUSPENSION 2 ML VIAL: Performed by: INTERNAL MEDICINE

## 2025-03-24 PROCEDURE — 82948 REAGENT STRIP/BLOOD GLUCOSE: CPT

## 2025-03-24 PROCEDURE — 72146 MRI CHEST SPINE W/O DYE: CPT

## 2025-03-24 PROCEDURE — 63710000001 INSULIN LISPRO (HUMAN) PER 5 UNITS: Performed by: INTERNAL MEDICINE

## 2025-03-24 PROCEDURE — 25010000002 ENOXAPARIN PER 10 MG: Performed by: INTERNAL MEDICINE

## 2025-03-24 PROCEDURE — 80048 BASIC METABOLIC PNL TOTAL CA: CPT | Performed by: INTERNAL MEDICINE

## 2025-03-24 PROCEDURE — 85025 COMPLETE CBC W/AUTO DIFF WBC: CPT | Performed by: INTERNAL MEDICINE

## 2025-03-24 PROCEDURE — 72148 MRI LUMBAR SPINE W/O DYE: CPT

## 2025-03-24 PROCEDURE — 94799 UNLISTED PULMONARY SVC/PX: CPT

## 2025-03-24 PROCEDURE — 25010000002 CEFTRIAXONE PER 250 MG: Performed by: INTERNAL MEDICINE

## 2025-03-24 PROCEDURE — 93306 TTE W/DOPPLER COMPLETE: CPT

## 2025-03-24 RX ORDER — ACETAMINOPHEN 325 MG/1
650 TABLET ORAL EVERY 4 HOURS PRN
Status: DISCONTINUED | OUTPATIENT
Start: 2025-03-24 | End: 2025-03-28 | Stop reason: HOSPADM

## 2025-03-24 RX ORDER — ONDANSETRON 4 MG/1
4 TABLET, ORALLY DISINTEGRATING ORAL EVERY 6 HOURS PRN
Status: DISCONTINUED | OUTPATIENT
Start: 2025-03-24 | End: 2025-03-24 | Stop reason: SDUPTHER

## 2025-03-24 RX ORDER — POLYETHYLENE GLYCOL 3350 17 G/17G
17 POWDER, FOR SOLUTION ORAL DAILY PRN
Status: DISCONTINUED | OUTPATIENT
Start: 2025-03-24 | End: 2025-03-26

## 2025-03-24 RX ORDER — LIDOCAINE 4 G/G
2 PATCH TOPICAL
Status: DISCONTINUED | OUTPATIENT
Start: 2025-03-25 | End: 2025-03-28 | Stop reason: HOSPADM

## 2025-03-24 RX ORDER — INSULIN LISPRO 100 [IU]/ML
2-9 INJECTION, SOLUTION INTRAVENOUS; SUBCUTANEOUS
Status: DISCONTINUED | OUTPATIENT
Start: 2025-03-24 | End: 2025-03-24

## 2025-03-24 RX ORDER — AMOXICILLIN 250 MG
2 CAPSULE ORAL 2 TIMES DAILY PRN
Status: CANCELLED | OUTPATIENT
Start: 2025-03-24

## 2025-03-24 RX ORDER — LOSARTAN POTASSIUM 50 MG/1
100 TABLET ORAL DAILY
Status: CANCELLED | OUTPATIENT
Start: 2025-03-25

## 2025-03-24 RX ORDER — ONDANSETRON 4 MG/1
4 TABLET, ORALLY DISINTEGRATING ORAL EVERY 6 HOURS PRN
Status: DISCONTINUED | OUTPATIENT
Start: 2025-03-24 | End: 2025-03-28 | Stop reason: HOSPADM

## 2025-03-24 RX ORDER — PREGABALIN 75 MG/1
150 CAPSULE ORAL 2 TIMES DAILY
Status: DISCONTINUED | OUTPATIENT
Start: 2025-03-24 | End: 2025-03-28 | Stop reason: HOSPADM

## 2025-03-24 RX ORDER — ACETAMINOPHEN 650 MG/1
650 SUPPOSITORY RECTAL EVERY 4 HOURS PRN
Status: CANCELLED | OUTPATIENT
Start: 2025-03-24

## 2025-03-24 RX ORDER — SODIUM CHLORIDE 9 MG/ML
40 INJECTION, SOLUTION INTRAVENOUS AS NEEDED
Status: DISCONTINUED | OUTPATIENT
Start: 2025-03-24 | End: 2025-03-28 | Stop reason: HOSPADM

## 2025-03-24 RX ORDER — BISACODYL 10 MG
10 SUPPOSITORY, RECTAL RECTAL DAILY PRN
Status: DISCONTINUED | OUTPATIENT
Start: 2025-03-24 | End: 2025-03-26

## 2025-03-24 RX ORDER — SODIUM CHLORIDE 0.9 % (FLUSH) 0.9 %
10 SYRINGE (ML) INJECTION AS NEEDED
Status: CANCELLED | OUTPATIENT
Start: 2025-03-24

## 2025-03-24 RX ORDER — ONDANSETRON 2 MG/ML
4 INJECTION INTRAMUSCULAR; INTRAVENOUS EVERY 6 HOURS PRN
Status: DISCONTINUED | OUTPATIENT
Start: 2025-03-24 | End: 2025-03-28 | Stop reason: HOSPADM

## 2025-03-24 RX ORDER — ACETAMINOPHEN 325 MG/1
650 TABLET ORAL EVERY 4 HOURS PRN
Status: DISCONTINUED | OUTPATIENT
Start: 2025-03-24 | End: 2025-03-24

## 2025-03-24 RX ORDER — SODIUM CHLORIDE 9 MG/ML
40 INJECTION, SOLUTION INTRAVENOUS AS NEEDED
Status: CANCELLED | OUTPATIENT
Start: 2025-03-24

## 2025-03-24 RX ORDER — SODIUM CHLORIDE 0.9 % (FLUSH) 0.9 %
10 SYRINGE (ML) INJECTION EVERY 12 HOURS SCHEDULED
Status: DISCONTINUED | OUTPATIENT
Start: 2025-03-24 | End: 2025-03-28 | Stop reason: HOSPADM

## 2025-03-24 RX ORDER — ATORVASTATIN CALCIUM 20 MG/1
20 TABLET, FILM COATED ORAL NIGHTLY
Status: CANCELLED | OUTPATIENT
Start: 2025-03-24

## 2025-03-24 RX ORDER — LORAZEPAM 2 MG/ML
1 INJECTION INTRAMUSCULAR ONCE
Status: COMPLETED | OUTPATIENT
Start: 2025-03-24 | End: 2025-03-24

## 2025-03-24 RX ORDER — INSULIN LISPRO 100 [IU]/ML
1-200 INJECTION, SOLUTION INTRAVENOUS; SUBCUTANEOUS
Status: DISCONTINUED | OUTPATIENT
Start: 2025-03-24 | End: 2025-03-24

## 2025-03-24 RX ORDER — VANCOMYCIN/0.9 % SOD CHLORIDE 1.5G/250ML
1500 PLASTIC BAG, INJECTION (ML) INTRAVENOUS EVERY 12 HOURS
Status: DISCONTINUED | OUTPATIENT
Start: 2025-03-25 | End: 2025-03-27

## 2025-03-24 RX ORDER — HYDROMORPHONE HYDROCHLORIDE 1 MG/ML
0.5 INJECTION, SOLUTION INTRAMUSCULAR; INTRAVENOUS; SUBCUTANEOUS
Refills: 0 | Status: DISCONTINUED | OUTPATIENT
Start: 2025-03-24 | End: 2025-03-24

## 2025-03-24 RX ORDER — BISACODYL 10 MG
10 SUPPOSITORY, RECTAL RECTAL DAILY PRN
Status: CANCELLED | OUTPATIENT
Start: 2025-03-24

## 2025-03-24 RX ORDER — IBUPROFEN 600 MG/1
1 TABLET ORAL
Status: CANCELLED | OUTPATIENT
Start: 2025-03-24

## 2025-03-24 RX ORDER — AMLODIPINE BESYLATE 5 MG/1
10 TABLET ORAL DAILY
Status: CANCELLED | OUTPATIENT
Start: 2025-03-25

## 2025-03-24 RX ORDER — DULOXETIN HYDROCHLORIDE 60 MG/1
60 CAPSULE, DELAYED RELEASE ORAL DAILY
Status: DISCONTINUED | OUTPATIENT
Start: 2025-03-25 | End: 2025-03-28 | Stop reason: HOSPADM

## 2025-03-24 RX ORDER — METOPROLOL SUCCINATE 50 MG/1
50 TABLET, EXTENDED RELEASE ORAL DAILY
Status: CANCELLED | OUTPATIENT
Start: 2025-03-25

## 2025-03-24 RX ORDER — POLYETHYLENE GLYCOL 3350 17 G/17G
17 POWDER, FOR SOLUTION ORAL DAILY PRN
Status: CANCELLED | OUTPATIENT
Start: 2025-03-24

## 2025-03-24 RX ORDER — INSULIN LISPRO 100 [IU]/ML
1-200 INJECTION, SOLUTION INTRAVENOUS; SUBCUTANEOUS AS NEEDED
Status: CANCELLED | OUTPATIENT
Start: 2025-03-24

## 2025-03-24 RX ORDER — SODIUM CHLORIDE 0.9 % (FLUSH) 0.9 %
10 SYRINGE (ML) INJECTION EVERY 12 HOURS SCHEDULED
Status: CANCELLED | OUTPATIENT
Start: 2025-03-24

## 2025-03-24 RX ORDER — DEXTROSE MONOHYDRATE 25 G/50ML
10-50 INJECTION, SOLUTION INTRAVENOUS
Status: CANCELLED | OUTPATIENT
Start: 2025-03-24

## 2025-03-24 RX ORDER — HYDROCODONE BITARTRATE AND ACETAMINOPHEN 7.5; 325 MG/1; MG/1
1 TABLET ORAL EVERY 6 HOURS PRN
Refills: 0 | Status: CANCELLED | OUTPATIENT
Start: 2025-03-24 | End: 2025-03-28

## 2025-03-24 RX ORDER — INSULIN LISPRO 100 [IU]/ML
1-200 INJECTION, SOLUTION INTRAVENOUS; SUBCUTANEOUS AS NEEDED
Status: DISCONTINUED | OUTPATIENT
Start: 2025-03-24 | End: 2025-03-25

## 2025-03-24 RX ORDER — HYDROCODONE BITARTRATE AND ACETAMINOPHEN 7.5; 325 MG/1; MG/1
1 TABLET ORAL EVERY 6 HOURS PRN
Refills: 0 | Status: DISCONTINUED | OUTPATIENT
Start: 2025-03-24 | End: 2025-03-25

## 2025-03-24 RX ORDER — HYDROCODONE BITARTRATE AND ACETAMINOPHEN 5; 325 MG/1; MG/1
1 TABLET ORAL EVERY 6 HOURS PRN
Refills: 0 | Status: DISCONTINUED | OUTPATIENT
Start: 2025-03-24 | End: 2025-03-24

## 2025-03-24 RX ORDER — DEXTROSE MONOHYDRATE 25 G/50ML
25 INJECTION, SOLUTION INTRAVENOUS
Status: DISCONTINUED | OUTPATIENT
Start: 2025-03-24 | End: 2025-03-24

## 2025-03-24 RX ORDER — ENOXAPARIN SODIUM 100 MG/ML
40 INJECTION SUBCUTANEOUS EVERY 12 HOURS
Status: DISCONTINUED | OUTPATIENT
Start: 2025-03-24 | End: 2025-03-24

## 2025-03-24 RX ORDER — ENOXAPARIN SODIUM 100 MG/ML
40 INJECTION SUBCUTANEOUS EVERY 12 HOURS
Status: CANCELLED | OUTPATIENT
Start: 2025-03-24

## 2025-03-24 RX ORDER — ACETAMINOPHEN 160 MG/5ML
650 SOLUTION ORAL EVERY 4 HOURS PRN
Status: DISCONTINUED | OUTPATIENT
Start: 2025-03-24 | End: 2025-03-28 | Stop reason: HOSPADM

## 2025-03-24 RX ORDER — ACETAMINOPHEN 160 MG/5ML
650 SOLUTION ORAL EVERY 4 HOURS PRN
Status: CANCELLED | OUTPATIENT
Start: 2025-03-24

## 2025-03-24 RX ORDER — SODIUM CHLORIDE 0.9 % (FLUSH) 0.9 %
10 SYRINGE (ML) INJECTION AS NEEDED
Status: DISCONTINUED | OUTPATIENT
Start: 2025-03-24 | End: 2025-03-28 | Stop reason: HOSPADM

## 2025-03-24 RX ORDER — SODIUM CHLORIDE 9 MG/ML
100 INJECTION, SOLUTION INTRAVENOUS CONTINUOUS
Status: ACTIVE | OUTPATIENT
Start: 2025-03-24 | End: 2025-03-25

## 2025-03-24 RX ORDER — HYDROCODONE BITARTRATE AND ACETAMINOPHEN 5; 325 MG/1; MG/1
1 TABLET ORAL EVERY 4 HOURS PRN
Refills: 0 | Status: DISCONTINUED | OUTPATIENT
Start: 2025-03-24 | End: 2025-03-24

## 2025-03-24 RX ORDER — ONDANSETRON 4 MG/1
4 TABLET, ORALLY DISINTEGRATING ORAL EVERY 6 HOURS PRN
Status: CANCELLED | OUTPATIENT
Start: 2025-03-24

## 2025-03-24 RX ORDER — METOPROLOL SUCCINATE 50 MG/1
50 TABLET, EXTENDED RELEASE ORAL DAILY
Status: DISCONTINUED | OUTPATIENT
Start: 2025-03-25 | End: 2025-03-28 | Stop reason: HOSPADM

## 2025-03-24 RX ORDER — INSULIN LISPRO 100 [IU]/ML
1-200 INJECTION, SOLUTION INTRAVENOUS; SUBCUTANEOUS
Status: CANCELLED | OUTPATIENT
Start: 2025-03-24

## 2025-03-24 RX ORDER — NICOTINE POLACRILEX 4 MG
15 LOZENGE BUCCAL
Status: DISCONTINUED | OUTPATIENT
Start: 2025-03-24 | End: 2025-03-24

## 2025-03-24 RX ORDER — NITROGLYCERIN 0.4 MG/1
0.4 TABLET SUBLINGUAL
Status: DISCONTINUED | OUTPATIENT
Start: 2025-03-24 | End: 2025-03-28 | Stop reason: HOSPADM

## 2025-03-24 RX ORDER — BISACODYL 5 MG/1
5 TABLET, DELAYED RELEASE ORAL DAILY PRN
Status: CANCELLED | OUTPATIENT
Start: 2025-03-24

## 2025-03-24 RX ORDER — BISACODYL 5 MG/1
5 TABLET, DELAYED RELEASE ORAL DAILY PRN
Status: DISCONTINUED | OUTPATIENT
Start: 2025-03-24 | End: 2025-03-26

## 2025-03-24 RX ORDER — ACETAMINOPHEN 160 MG/5ML
650 SOLUTION ORAL EVERY 4 HOURS PRN
Status: DISCONTINUED | OUTPATIENT
Start: 2025-03-24 | End: 2025-03-24

## 2025-03-24 RX ORDER — AMOXICILLIN 250 MG
2 CAPSULE ORAL 2 TIMES DAILY PRN
Status: DISCONTINUED | OUTPATIENT
Start: 2025-03-24 | End: 2025-03-26

## 2025-03-24 RX ORDER — ACETAMINOPHEN 325 MG/1
650 TABLET ORAL EVERY 4 HOURS PRN
Status: CANCELLED | OUTPATIENT
Start: 2025-03-24

## 2025-03-24 RX ORDER — NALOXONE HCL 0.4 MG/ML
0.4 VIAL (ML) INJECTION
Status: DISCONTINUED | OUTPATIENT
Start: 2025-03-24 | End: 2025-03-28 | Stop reason: HOSPADM

## 2025-03-24 RX ORDER — LOSARTAN POTASSIUM 50 MG/1
100 TABLET ORAL DAILY
Status: DISCONTINUED | OUTPATIENT
Start: 2025-03-25 | End: 2025-03-28 | Stop reason: HOSPADM

## 2025-03-24 RX ORDER — PETROLATUM 420 MG/G
1 OINTMENT TOPICAL EVERY 12 HOURS SCHEDULED
Status: DISCONTINUED | OUTPATIENT
Start: 2025-03-24 | End: 2025-03-28 | Stop reason: HOSPADM

## 2025-03-24 RX ORDER — NICOTINE POLACRILEX 4 MG
15 LOZENGE BUCCAL
Status: DISCONTINUED | OUTPATIENT
Start: 2025-03-24 | End: 2025-03-28 | Stop reason: HOSPADM

## 2025-03-24 RX ORDER — IBUPROFEN 600 MG/1
1 TABLET ORAL
Status: DISCONTINUED | OUTPATIENT
Start: 2025-03-24 | End: 2025-03-24

## 2025-03-24 RX ORDER — MORPHINE SULFATE 2 MG/ML
4 INJECTION, SOLUTION INTRAMUSCULAR; INTRAVENOUS
Status: DISCONTINUED | OUTPATIENT
Start: 2025-03-24 | End: 2025-03-28 | Stop reason: HOSPADM

## 2025-03-24 RX ORDER — ONDANSETRON 2 MG/ML
4 INJECTION INTRAMUSCULAR; INTRAVENOUS EVERY 6 HOURS PRN
Status: DISCONTINUED | OUTPATIENT
Start: 2025-03-24 | End: 2025-03-24

## 2025-03-24 RX ORDER — IBUPROFEN 600 MG/1
1 TABLET ORAL
Status: DISCONTINUED | OUTPATIENT
Start: 2025-03-24 | End: 2025-03-25

## 2025-03-24 RX ORDER — AMLODIPINE BESYLATE 10 MG/1
10 TABLET ORAL DAILY
Status: DISCONTINUED | OUTPATIENT
Start: 2025-03-25 | End: 2025-03-28 | Stop reason: HOSPADM

## 2025-03-24 RX ORDER — ATORVASTATIN CALCIUM 20 MG/1
20 TABLET, FILM COATED ORAL NIGHTLY
Status: DISCONTINUED | OUTPATIENT
Start: 2025-03-24 | End: 2025-03-28 | Stop reason: HOSPADM

## 2025-03-24 RX ORDER — NICOTINE POLACRILEX 4 MG
15 LOZENGE BUCCAL
Status: CANCELLED | OUTPATIENT
Start: 2025-03-24

## 2025-03-24 RX ORDER — ONDANSETRON 2 MG/ML
4 INJECTION INTRAMUSCULAR; INTRAVENOUS EVERY 6 HOURS PRN
Status: CANCELLED | OUTPATIENT
Start: 2025-03-24

## 2025-03-24 RX ORDER — ACETAMINOPHEN 650 MG/1
650 SUPPOSITORY RECTAL EVERY 4 HOURS PRN
Status: DISCONTINUED | OUTPATIENT
Start: 2025-03-24 | End: 2025-03-24

## 2025-03-24 RX ORDER — DULOXETIN HYDROCHLORIDE 60 MG/1
60 CAPSULE, DELAYED RELEASE ORAL DAILY
Status: CANCELLED | OUTPATIENT
Start: 2025-03-25

## 2025-03-24 RX ORDER — LIDOCAINE 4 G/G
2 PATCH TOPICAL
Status: CANCELLED | OUTPATIENT
Start: 2025-03-25

## 2025-03-24 RX ORDER — HYDROCODONE BITARTRATE AND ACETAMINOPHEN 5; 325 MG/1; MG/1
1 TABLET ORAL EVERY 6 HOURS PRN
Refills: 0 | Status: CANCELLED | OUTPATIENT
Start: 2025-03-24

## 2025-03-24 RX ORDER — ACETAMINOPHEN 650 MG/1
650 SUPPOSITORY RECTAL EVERY 4 HOURS PRN
Status: DISCONTINUED | OUTPATIENT
Start: 2025-03-24 | End: 2025-03-28 | Stop reason: HOSPADM

## 2025-03-24 RX ORDER — PREGABALIN 75 MG/1
150 CAPSULE ORAL 2 TIMES DAILY
Status: CANCELLED | OUTPATIENT
Start: 2025-03-24

## 2025-03-24 RX ORDER — DEXTROSE MONOHYDRATE 25 G/50ML
10-50 INJECTION, SOLUTION INTRAVENOUS
Status: DISCONTINUED | OUTPATIENT
Start: 2025-03-24 | End: 2025-03-28 | Stop reason: HOSPADM

## 2025-03-24 RX ADMIN — MORPHINE SULFATE 4 MG: 4 INJECTION, SOLUTION INTRAMUSCULAR; INTRAVENOUS at 17:58

## 2025-03-24 RX ADMIN — SODIUM CHLORIDE 2 ML: 9 INJECTION INTRAMUSCULAR; INTRAVENOUS; SUBCUTANEOUS at 12:56

## 2025-03-24 RX ADMIN — INSULIN LISPRO 26 UNITS: 100 INJECTION, SOLUTION INTRAVENOUS; SUBCUTANEOUS at 17:27

## 2025-03-24 RX ADMIN — PREGABALIN 150 MG: 75 CAPSULE ORAL at 20:02

## 2025-03-24 RX ADMIN — INSULIN GLARGINE 20 UNITS: 100 INJECTION, SOLUTION SUBCUTANEOUS at 22:22

## 2025-03-24 RX ADMIN — PREGABALIN 150 MG: 75 CAPSULE ORAL at 08:46

## 2025-03-24 RX ADMIN — HYDROCODONE BITARTRATE AND ACETAMINOPHEN 1 TABLET: 7.5; 325 TABLET ORAL at 08:46

## 2025-03-24 RX ADMIN — ATORVASTATIN CALCIUM 20 MG: 20 TABLET, FILM COATED ORAL at 20:03

## 2025-03-24 RX ADMIN — HYDROCODONE BITARTRATE AND ACETAMINOPHEN 1 TABLET: 7.5; 325 TABLET ORAL at 22:22

## 2025-03-24 RX ADMIN — Medication 10 ML: at 20:06

## 2025-03-24 RX ADMIN — LOSARTAN POTASSIUM 100 MG: 50 TABLET, FILM COATED ORAL at 08:46

## 2025-03-24 RX ADMIN — MORPHINE SULFATE 4 MG: 2 INJECTION, SOLUTION INTRAMUSCULAR; INTRAVENOUS at 19:44

## 2025-03-24 RX ADMIN — LIDOCAINE 2 PATCH: 4 PATCH TOPICAL at 08:50

## 2025-03-24 RX ADMIN — MORPHINE SULFATE 2 MG: 2 INJECTION, SOLUTION INTRAMUSCULAR; INTRAVENOUS at 03:19

## 2025-03-24 RX ADMIN — ENOXAPARIN SODIUM 40 MG: 100 INJECTION SUBCUTANEOUS at 08:47

## 2025-03-24 RX ADMIN — LORAZEPAM 1 MG: 2 INJECTION INTRAMUSCULAR; INTRAVENOUS at 13:02

## 2025-03-24 RX ADMIN — Medication 10 ML: at 20:07

## 2025-03-24 RX ADMIN — HYDROCODONE BITARTRATE AND ACETAMINOPHEN 1 TABLET: 7.5; 325 TABLET ORAL at 14:52

## 2025-03-24 RX ADMIN — MORPHINE SULFATE 2 MG: 2 INJECTION, SOLUTION INTRAMUSCULAR; INTRAVENOUS at 13:04

## 2025-03-24 RX ADMIN — HYDROCODONE BITARTRATE AND ACETAMINOPHEN 1 TABLET: 7.5; 325 TABLET ORAL at 00:30

## 2025-03-24 RX ADMIN — SODIUM CHLORIDE 100 ML/HR: 9 INJECTION, SOLUTION INTRAVENOUS at 20:07

## 2025-03-24 RX ADMIN — METOPROLOL SUCCINATE 50 MG: 50 TABLET, EXTENDED RELEASE ORAL at 08:46

## 2025-03-24 RX ADMIN — DULOXETINE HYDROCHLORIDE 60 MG: 60 CAPSULE, DELAYED RELEASE ORAL at 08:46

## 2025-03-24 RX ADMIN — VANCOMYCIN HYDROCHLORIDE 3000 MG: 10 INJECTION, POWDER, LYOPHILIZED, FOR SOLUTION INTRAVENOUS at 22:22

## 2025-03-24 RX ADMIN — AMLODIPINE BESYLATE 10 MG: 5 TABLET ORAL at 08:45

## 2025-03-24 RX ADMIN — INSULIN LISPRO 29 UNITS: 100 INJECTION, SOLUTION INTRAVENOUS; SUBCUTANEOUS at 08:50

## 2025-03-24 RX ADMIN — INSULIN LISPRO 27 UNITS: 100 INJECTION, SOLUTION INTRAVENOUS; SUBCUTANEOUS at 13:10

## 2025-03-24 RX ADMIN — CEFTRIAXONE SODIUM 2000 MG: 2 INJECTION, POWDER, FOR SOLUTION INTRAMUSCULAR; INTRAVENOUS at 16:17

## 2025-03-24 RX ADMIN — MORPHINE SULFATE 4 MG: 4 INJECTION, SOLUTION INTRAMUSCULAR; INTRAVENOUS at 15:52

## 2025-03-24 RX ADMIN — WHITE PETROLATUM 41 % TOPICAL OINTMENT 1 APPLICATION: OINTMENT at 08:50

## 2025-03-24 RX ADMIN — Medication 10 ML: at 08:50

## 2025-03-24 NOTE — H&P
Internal medicine history and physical  INTERNAL MEDICINE   [facility]       Patient Identification:  Name: Arnold Ramírez  Age: 65 y.o.  Sex: male  :  1960  MRN: [id number]                   Primary Care Physician: Anand Alford MD                               Date of admission:3/24/2025    Chief Complaint: Sent from [facility] for further workup of back pain and abnormal MRI of the lumbar spine in the setting of strep bacteremia.    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  Patient was noted to be diaphoretic ill-appearing and very pale and was noted to have blood sugar of 285.  Patient was assessed to be too sick to be managed at [facility] and was referred to the emergency room and subsequently was admitted for further management of acute back pain with leukocytosis with white blood cell count of 35,000.  Patient was noted to have poorly controlled blood sugar.  Blood cultures were drawn patient was started on antibiotics infectious disease service is consulted and because of his persistent back pain MRI was performed which showed evidence of paraspinal abscess and facet arthritis.  Patient was transferred to our facility to be evaluated by neurosurgery service and further care.  Patient has been treated with IV Rocephin.  He was found to have MRSA screen positive for which he received a dose of vancomycin.  Patient has chronic changes in his legs due to neuropathy and there is Areas with skin changes that has been present on his legs for almost a year.  Patient denies any specific open wound on his feet but states that his neuropathy is  severe and he cannot feel things at times.  Patient has issues with claustrophobia and had difficulty getting MRI initially but eventually was able to get MRI of the thoracic and lumbar spine earlier today.  His initial workup was with a CT scan of the lumbar spine.  X-ray of the left foot did not show any acute abnormality except for arthritic changes.  Patient apparently had some redness and swelling of the left foot and leg when he arrived on 3/20/2025.  Upon further questioning patient says that since he arrived to our facility he left  hip is hurting pretty bad and he is unable to ambulate because of the pain in his hip.  It was not the case when he was at the other facility.  He thought that maybe he was placed in the stretcher on his way to [facility] from [place] and may have hurt himself during transportation.      Past Medical History:  Past Medical History:   Diagnosis Date    Diabetes mellitus     Hyperlipidemia     Hypertension      Past Surgical History:  No past surgical history on file.   Home Meds:  Medications Prior to Admission   Medication Sig Dispense Refill Last Dose/Taking    amLODIPine (NORVASC) 10 MG tablet Take 1 tablet by mouth Daily.       atorvastatin (LIPITOR) 20 MG tablet Take 1 tablet by mouth Every Night.       DULoxetine (CYMBALTA) 60 MG capsule Take 1 capsule by mouth Daily.       furosemide (LASIX) 20 MG tablet Take 1 tablet by mouth Daily As Needed (edema).       HYDROcodone-acetaminophen (NORCO) 5-325 MG per tablet Take 1 tablet by mouth 2 (Two) Times a Day.       Insulin Degludec (TRESIBA FLEXTOUCH) 200 UNIT/ML solution pen-injector pen injection Inject 150 Units under the skin into the appropriate area as directed 2 (Two) Times a Day.       Insulin Glargine, 1 Unit Dial, (Toujeo SoloStar) 300 UNIT/ML solution pen-injector injection Inject 150 Units under the skin into the appropriate area as directed.       Insulin Lispro, 1 Unit Dial, (HUMALOG) 100 UNIT/ML solution  "pen-injector Inject 5-10 units before breakfast and supper       insulin NPH-insulin regular (humuLIN 70/30,novoLIN 70/30) (70-30) 100 UNIT/ML injection Inject 100 Units under the skin into the appropriate area as directed As Needed.       losartan (COZAAR) 100 MG tablet Take 1 tablet by mouth Daily.       metFORMIN ER (GLUCOPHAGE-XR) 500 MG 24 hr tablet Take 1 tablet by mouth 2 (Two) Times a Day.       metoprolol succinate XL (TOPROL-XL) 50 MG 24 hr tablet Take 1 tablet by mouth Daily.       pregabalin (LYRICA) 150 MG capsule Take 1 capsule by mouth 2 (Two) Times a Day.       spironolactone (ALDACTONE) 25 MG tablet Take 1 tablet by mouth Daily.       Testosterone Cypionate (DEPOTESTOTERONE CYPIONATE) 200 MG/ML injection Inject 1 mL into the appropriate muscle as directed by prescriber Every 14 (Fourteen) Days.        Current Meds:   No current facility-administered medications for this encounter.  Allergies:  Allergies   Allergen Reactions    Naproxen Anaphylaxis    Sulfamethoxazole-Trimethoprim Shortness Of Breath and Swelling    Etodolac Other (See Comments)     stomach irritation  Annotation - [id number]: STOMACH      Gabapentin Dizziness     Annotation - [id number]: \"Felt Drunk\"    Zonisamide Other (See Comments) and Myalgia     Drowsy, Fatigue       Social History:   Social History     Tobacco Use    Smoking status: Never    Smokeless tobacco: Never   Substance Use Topics    Alcohol use: Not Currently      Family History:  No family history on file.       Review of Systems  See history of present illness and past medical history.  As described in the history of presenting illness.      Vitals:   There were no vitals taken for this visit.  I/O: No intake or output data in the 24 hours ending 03/24/25 1920  Exam:  Patient is examined using the personal protective equipment as per guidelines from infection control for this particular patient as enacted.  Hand washing was performed before and after patient " interaction.  General Appearance:  Alert cooperative morbidly obese ill-appearing male   Head:    Normocephalic, without obvious abnormality, atraumatic   Eyes:    PERRL, conjunctiva/corneas clear, EOM's intact, both eyes   Ears:    Normal external ear canals, both ears   Nose:   Nares normal, septum midline, mucosa normal, no drainage    or sinus tenderness   Throat:   Lips, tongue, gums normal; oral mucosa pink and moist   Neck:   Supple   Back:     Symmetric, no curvature, ROM normal, no CVA tenderness   Lungs:     Clear to auscultation bilaterally, respirations unlabored   Chest Wall:    No tenderness or deformity    Heart:  S1-S2 regular   Abdomen:   Obese soft nontender   Extremities: Left groin tenderness with significant discomfort upon passive range of motion of the left hip.   Pulses:   Pulses palpable in all extremities; symmetric all extremities   Skin:            Neurologic: Alert and oriented x 3 mobility is affected because of the back pain and pain in his left hip.       Data Review:      I reviewed the patient's new clinical results.  Results from last 7 days   Lab Units 03/24/25  0400 03/23/25  0425 03/22/25  0807 03/21/25  0700 03/20/25  1443 03/20/25  1038   WBC 10*3/mm3 9.66 11.94* 17.00* 29.99* 35.18* 28.21*   HEMOGLOBIN g/dL 13.7 14.5 14.0 14.8 14.8 15.5   PLATELETS 10*3/mm3 234 317 271 300 297 309     Results from last 7 days   Lab Units 03/24/25  0400 03/23/25  0425 03/22/25  0807 03/21/25  0700 03/20/25  1038   SODIUM mmol/L 134* 137 135* 132* 130*   POTASSIUM mmol/L 4.1 4.2 4.2 4.2 4.6   CHLORIDE mmol/L 102 102 101 96* 97*   CO2 mmol/L 20.8* 22.1 24.5 21.7* 19.3*   BUN mg/dL 25* 29* 31* 29* 26*   CREATININE mg/dL 0.97 1.01 0.99 1.25 1.41*   CALCIUM mg/dL 8.9 9.1 8.9 9.1 9.8   GLUCOSE mg/dL 185* 149* 170* 319* 290*     Microbiology Results (last 10 days)       Procedure Component Value - Date/Time    Blood Culture - Blood, Arm, Right [[id number]]  (Normal) Collected: 03/23/25 0847    Lab  Status: Preliminary result Specimen: Blood from Arm, Right Updated: 03/24/25 0901     Blood Culture No growth at 24 hours    Blood Culture - Blood, Hand, Right [[id number]]  (Normal) Collected: 03/23/25 0847    Lab Status: Preliminary result Specimen: Blood from Hand, Right Updated: 03/24/25 0901     Blood Culture No growth at 24 hours    MRSA Screen, PCR (Inpatient) - Swab, Nares [[id number]]  (Abnormal) Collected: 03/20/25 1750    Lab Status: Final result Specimen: Swab from Nares Updated: 03/20/25 2105     MRSA PCR MRSA Detected    Narrative:      The negative predictive value of this diagnostic test is high and should only be used to consider de-escalating anti-MRSA therapy. A positive result may indicate colonization with MRSA and must be correlated clinically.    Blood Culture - Blood, Arm, Right [[id number]]  (Abnormal) Collected: 03/20/25 1530    Lab Status: Preliminary result Specimen: Blood from Arm, Right Updated: 03/24/25 1036     Blood Culture Streptococcus intermedius     Isolated from Aerobic and Anaerobic Bottles     Gram Stain Anaerobic Bottle Gram positive cocci in pairs and chains      Aerobic Bottle Gram positive cocci in pairs and chains    Blood Culture ID, PCR - Blood, Arm, Right [[id number]]  (Abnormal) Collected: 03/20/25 1530    Lab Status: Final result Specimen: Blood from Arm, Right Updated: 03/22/25 1030     BCID, PCR Streptococcus spp, not A, B, or pneumoniae. Identification by BCID2 PCR.     BOTTLE TYPE Anaerobic Bottle    COVID-19 and FLU A/B PCR, 1 HR TAT - Swab, Nasopharynx [[id number]]  (Normal) Collected: 03/20/25 1435    Lab Status: Final result Specimen: Swab from Nasopharynx Updated: 03/20/25 1515     COVID19 Not Detected     Influenza A PCR Not Detected     Influenza B PCR Not Detected    Narrative:      Fact sheet for providers: https://www.fda.gov/media/448648/download    Fact sheet for patients: https://www.fda.gov/media/498481/download    Test performed by PCR.    Blood Culture  - Blood, Arm, Left [[id number]]  (Abnormal) Collected: 03/20/25 1435    Lab Status: Preliminary result Specimen: Blood from Arm, Left Updated: 03/23/25 0642     Blood Culture Gram Positive Cocci     Isolated from Aerobic and Anaerobic Bottles     Gram Stain Anaerobic Bottle Gram positive cocci in pairs and chains      Aerobic Bottle Gram positive cocci in pairs and chains          MRI Lumbar Spine Without Contrast  Result Date: 3/24/2025  1.Severely limited study due to motion degradation despite attempts at optimal imaging. 2.Evidence for abnormal edema within the paraspinal soft tissues along the right posterior lateral margin of the L3-L5 spinal levels. There is suggestion of more focally pronounced fluid signal in this region. The findings appear to be centered around the level of the right L4/5 posterior facet. The findings may indicate inflammation/infection and this region. The changes may be related to right posterior facet septic arthropathy with adjacent soft tissue cellulitis. The more focally pronounced fluid signal focus may indicate developing abscess in this region measuring 2 cm. Again the assessment is markedly limited. 3.No evidence for discitis/osteomyelitis. No evidence for abscess involving the central canal. 4.Degenerative changes are seen throughout the mid to lower lumbar spine. Electronically Signed: Rony Coronado MD  3/24/2025 2:34 PM EDT  Workstation ID: [id number]    MRI Thoracic Spine Without Contrast  Result Date: 3/24/2025  1.No evidence for discitis/osteomyelitis. No evidence for abscess.. Electronically Signed: Rony Coronado MD  3/24/2025 2:23 PM EDT  Workstation ID: [id number]    CT Lumbar Spine With & Without Contrast  Result Date: 3/22/2025  Impression: 1.No acute fracture or subluxation is identified within the lumbar spine. 2.Lumbar spondylosis. Neural foraminal narrowing is present at L3-L4, L4-L5, and L5-S1. Possible spinal canal stenosis at L3-L4 and L4-L5. 3.No suspicious  contrast enhancement is identified. 4.Please note that this is an insensitive test to detect early signs of discitis/osteomyelitis. If there is suspicion for discitis/osteomyelitis, recommend further evaluation with MRI with and without IV contrast which could also better characterized degenerative changes of the spine. Electronically Signed: Deshaun Sneed MD  3/22/2025 2:37 PM EDT  Workstation ID: [id number]    XR Foot 3+ View Left  Result Date: 3/20/2025  Impression: 1.Soft tissue swelling without definite or acute osseous abnormality. 2.Progression of ankle and foot arthritic changes compared to 2020 x-rays. Electronically Signed: Roxann Avery  3/20/2025 7:19 PM EDT  Workstation ID: [id number]    XR Ankle 3+ View Left  Result Date: 3/20/2025  Impression: 1.Soft tissue swelling without definite or acute osseous abnormality. 2.Progression of ankle and foot arthritic changes compared to 2020 x-rays. Electronically Signed: Roxann Avery  3/20/2025 7:19 PM EDT  Workstation ID: [id number]    XR Chest 2 View  Result Date: 3/20/2025  No change from the previous study with no evidence for acute cardiopulmonary process. Electronically Signed: Rony Coronado MD  3/20/2025 3:29 PM EDT  Workstation ID: [id number]    CT Abdomen Pelvis Without Contrast  Result Date: 3/20/2025  1.No evidence for significant nephrolithiasis. There is no evidence for obstructive uropathy. 2.No evidence for acute abnormality throughout the abdomen or pelvis on this noncontrast exam. 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  Severe pain in his left hip upon arrival to our facility in the setting of streptococcal bacteremia and no apparent injury-rule out septic arthritis-plan is to admit the patient continue with pain control and IR consultation for left hip aspiration.  Check x-ray of the right hip.  Strep intermedius bacteremia-patient has been on started on IV antibiotic therapy, repeat blood cultures were drawn on 3/23/2025 so far have been negative. Patient had extensive workup at the outside facility including 2D echo with Doppler which showed left ventricular ejection fraction 62% and mild to moderate dilatation of the ascending aorta noted.  No mention of any vegetation.  Performed.  Review of system to identify other areas of pain and discomfort that could be affected due to secondary seeding.  If the repeat blood cultures come back positive may need further workup for endocarditis.  MRSA colonization-continue with vancomycin along with Rocephin.  Diabetes mellitus poorly controlled-continue his home regimen perform Accu-Cheks and sliding scale coverage and watch for hypoglycemia.  May need diabetic education consult.  Chronic diabetic neuropathy with skin changes-monitor for any secondary cellulitis while receiving antibiotic therapy.  Obstructive sleep apnea-allow him to use his CPAP device provided with continuous pulse ox monitoring  Hypertension-continue his antihypertensive regimen and avoid hypotensive episodes.    Lizette López MD   3/24/2025  19:20 EDT    Parts of this note may be an electronic transcription/translation of spoken language to printed text using the Dragon  dictation system.

## 2025-03-24 NOTE — PLAN OF CARE
Problem: Adult Inpatient Plan of Care  Goal: Plan of Care Review  Outcome: Progressing  Flowsheets (Taken 3/24/2025 3209)  Progress: improving  Outcome Evaluation: In significant amount of pain, Pain meds given as req. Rotated between bed and recliner all night. Has not slept much at all. Wound nurse to see again today. accuchecks done. MRI foot today. encouraged patient to take some ativan or morphine before Scan.  Plan of Care Reviewed With: patient   Goal Outcome Evaluation:  Plan of Care Reviewed With: patient        Progress: improving  Outcome Evaluation: In significant amount of pain, Pain meds given as req. Rotated between bed and recliner all night. Has not slept much at all. Wound nurse to see again today. accuchecks done. MRI foot today. encouraged patient to take some ativan or morphine before Scan.

## 2025-03-24 NOTE — PLAN OF CARE
Goal Outcome Evaluation:  Plan of Care Reviewed With: patient, spouse        Progress: improving  Outcome Evaluation: Patient here for back pain, positive blood cultures. IV rocephine daily @ 1600. Increased pain medication today to try to make more comfortable. MRI & echo today. MRI abnormal & needing to go go [facility] for neurosurgery consult. Transferring to [facility] today.

## 2025-03-24 NOTE — NURSING NOTE
Cwon follow up to reassess wounds to LLE. Significant improvement in appearance of LLE wounds. Little to no erythema today and scabs are resolving. Will dc the wound care I ordered and just recommend keeping area moisturized with Aquaphor.  Update provided to HAMILTON Zafar.

## 2025-03-25 ENCOUNTER — APPOINTMENT (OUTPATIENT)
Dept: GENERAL RADIOLOGY | Facility: HOSPITAL | Age: 65
DRG: 872 | End: 2025-03-25
Payer: MEDICARE

## 2025-03-25 PROBLEM — E11.42 DIABETIC PERIPHERAL NEUROPATHY: Status: ACTIVE | Noted: 2025-03-25

## 2025-03-25 PROBLEM — E11.9 TYPE 2 DIABETES MELLITUS, WITH LONG-TERM CURRENT USE OF INSULIN: Status: ACTIVE | Noted: 2025-03-25

## 2025-03-25 PROBLEM — E87.1 HYPONATREMIA: Status: ACTIVE | Noted: 2025-03-25

## 2025-03-25 PROBLEM — Z79.4 TYPE 2 DIABETES MELLITUS, WITH LONG-TERM CURRENT USE OF INSULIN: Status: ACTIVE | Noted: 2025-03-25

## 2025-03-25 LAB
ALBUMIN SERPL-MCNC: 3 G/DL (ref 3.5–5.2)
ALBUMIN/GLOB SERPL: 0.8 G/DL
ALP SERPL-CCNC: 92 U/L (ref 39–117)
ALT SERPL W P-5'-P-CCNC: 21 U/L (ref 1–41)
ANION GAP SERPL CALCULATED.3IONS-SCNC: 10 MMOL/L (ref 5–15)
APPEARANCE FLD: ABNORMAL
AST SERPL-CCNC: 23 U/L (ref 1–40)
BACTERIA SPEC AEROBE CULT: ABNORMAL
BACTERIA SPEC AEROBE CULT: ABNORMAL
BASOPHILS # BLD MANUAL: 0 10*3/MM3 (ref 0–0.2)
BASOPHILS NFR BLD MANUAL: 0 % (ref 0–1.5)
BILIRUB SERPL-MCNC: 0.5 MG/DL (ref 0–1.2)
BUN SERPL-MCNC: 20 MG/DL (ref 8–23)
BUN/CREAT SERPL: 23 (ref 7–25)
CALCIUM SPEC-SCNC: 8.5 MG/DL (ref 8.6–10.5)
CHLORIDE SERPL-SCNC: 102 MMOL/L (ref 98–107)
CLUMPED PLATELETS: PRESENT
CO2 SERPL-SCNC: 20 MMOL/L (ref 22–29)
COLOR FLD: ABNORMAL
CREAT SERPL-MCNC: 0.87 MG/DL (ref 0.76–1.27)
CRYSTALS FLD MICRO: NORMAL
DEPRECATED RDW RBC AUTO: 42.6 FL (ref 37–54)
EGFRCR SERPLBLD CKD-EPI 2021: 97 ML/MIN/1.73
EOSINOPHIL # BLD MANUAL: 0 10*3/MM3 (ref 0–0.4)
EOSINOPHIL NFR BLD MANUAL: 0 % (ref 0.3–6.2)
EOSINOPHIL NFR FLD MANUAL: 2 %
ERYTHROCYTE [DISTWIDTH] IN BLOOD BY AUTOMATED COUNT: 14 % (ref 12.3–15.4)
GLOBULIN UR ELPH-MCNC: 3.6 GM/DL
GLUCOSE BLDC GLUCOMTR-MCNC: 211 MG/DL (ref 70–130)
GLUCOSE BLDC GLUCOMTR-MCNC: 230 MG/DL (ref 70–130)
GLUCOSE BLDC GLUCOMTR-MCNC: 280 MG/DL (ref 70–130)
GLUCOSE BLDC GLUCOMTR-MCNC: 308 MG/DL (ref 70–130)
GLUCOSE SERPL-MCNC: 204 MG/DL (ref 65–99)
GRAM STN SPEC: ABNORMAL
HCT VFR BLD AUTO: 39.9 % (ref 37.5–51)
HGB BLD-MCNC: 12.9 G/DL (ref 13–17.7)
HYPOCHROMIA BLD QL: ABNORMAL
ISOLATED FROM: ABNORMAL
ISOLATED FROM: ABNORMAL
LYMPHOCYTES # BLD MANUAL: 0.79 10*3/MM3 (ref 0.7–3.1)
LYMPHOCYTES NFR BLD MANUAL: 6.1 % (ref 5–12)
LYMPHOCYTES NFR FLD MANUAL: 5 %
MCH RBC QN AUTO: 27.4 PG (ref 26.6–33)
MCHC RBC AUTO-ENTMCNC: 32.3 G/DL (ref 31.5–35.7)
MCV RBC AUTO: 84.7 FL (ref 79–97)
METHOD: ABNORMAL
MONOCYTES # BLD: 0.59 10*3/MM3 (ref 0.1–0.9)
MONOCYTES NFR FLD: 2 %
MYELOCYTES NFR BLD MANUAL: 1 % (ref 0–0)
NEUTROPHILS # BLD AUTO: 8.2 10*3/MM3 (ref 1.7–7)
NEUTROPHILS NFR BLD MANUAL: 84.7 % (ref 42.7–76)
NEUTROPHILS NFR FLD MANUAL: 91 %
NUC CELL # FLD: 35 /MM3
PLAT MORPH BLD: NORMAL
PLATELET # BLD AUTO: 196 10*3/MM3 (ref 140–450)
PMV BLD AUTO: 10.4 FL (ref 6–12)
POTASSIUM SERPL-SCNC: 4.6 MMOL/L (ref 3.5–5.2)
PROT SERPL-MCNC: 6.6 G/DL (ref 6–8.5)
RBC # BLD AUTO: 4.71 10*6/MM3 (ref 4.14–5.8)
RBC # FLD AUTO: ABNORMAL /MM3
SODIUM SERPL-SCNC: 132 MMOL/L (ref 136–145)
VARIANT LYMPHS NFR BLD MANUAL: 8.2 % (ref 19.6–45.3)
WBC MORPH BLD: NORMAL
WBC NRBC COR # BLD AUTO: 9.68 10*3/MM3 (ref 3.4–10.8)

## 2025-03-25 PROCEDURE — 73502 X-RAY EXAM HIP UNI 2-3 VIEWS: CPT

## 2025-03-25 PROCEDURE — 85025 COMPLETE CBC W/AUTO DIFF WBC: CPT | Performed by: INTERNAL MEDICINE

## 2025-03-25 PROCEDURE — 82948 REAGENT STRIP/BLOOD GLUCOSE: CPT

## 2025-03-25 PROCEDURE — 63710000001 INSULIN LISPRO (HUMAN) PER 5 UNITS: Performed by: HOSPITALIST

## 2025-03-25 PROCEDURE — 63710000001 INSULIN GLARGINE PER 5 UNITS: Performed by: HOSPITALIST

## 2025-03-25 PROCEDURE — 25010000002 CEFTRIAXONE PER 250 MG: Performed by: INTERNAL MEDICINE

## 2025-03-25 PROCEDURE — 85007 BL SMEAR W/DIFF WBC COUNT: CPT | Performed by: INTERNAL MEDICINE

## 2025-03-25 PROCEDURE — 99222 1ST HOSP IP/OBS MODERATE 55: CPT | Performed by: NURSE PRACTITIONER

## 2025-03-25 PROCEDURE — 25010000002 LIDOCAINE 1 % SOLUTION: Performed by: HOSPITALIST

## 2025-03-25 PROCEDURE — 0S9B3ZX DRAINAGE OF LEFT HIP JOINT, PERCUTANEOUS APPROACH, DIAGNOSTIC: ICD-10-PCS | Performed by: RADIOLOGY

## 2025-03-25 PROCEDURE — 25810000003 SODIUM CHLORIDE 0.9 % SOLUTION: Performed by: INTERNAL MEDICINE

## 2025-03-25 PROCEDURE — 89060 EXAM SYNOVIAL FLUID CRYSTALS: CPT | Performed by: INTERNAL MEDICINE

## 2025-03-25 PROCEDURE — 77002 NEEDLE LOCALIZATION BY XRAY: CPT

## 2025-03-25 PROCEDURE — 87205 SMEAR GRAM STAIN: CPT | Performed by: INTERNAL MEDICINE

## 2025-03-25 PROCEDURE — 25010000002 VANCOMYCIN 10 G RECONSTITUTED SOLUTION: Performed by: INTERNAL MEDICINE

## 2025-03-25 PROCEDURE — 25010000002 MORPHINE PER 10 MG: Performed by: INTERNAL MEDICINE

## 2025-03-25 PROCEDURE — 80053 COMPREHEN METABOLIC PANEL: CPT | Performed by: INTERNAL MEDICINE

## 2025-03-25 PROCEDURE — 87070 CULTURE OTHR SPECIMN AEROBIC: CPT | Performed by: INTERNAL MEDICINE

## 2025-03-25 PROCEDURE — 89051 BODY FLUID CELL COUNT: CPT | Performed by: INTERNAL MEDICINE

## 2025-03-25 RX ORDER — INSULIN LISPRO 100 [IU]/ML
2-9 INJECTION, SOLUTION INTRAVENOUS; SUBCUTANEOUS
Status: DISCONTINUED | OUTPATIENT
Start: 2025-03-25 | End: 2025-03-28 | Stop reason: HOSPADM

## 2025-03-25 RX ORDER — HYDROCODONE BITARTRATE AND ACETAMINOPHEN 7.5; 325 MG/1; MG/1
1 TABLET ORAL EVERY 4 HOURS PRN
Refills: 0 | Status: DISCONTINUED | OUTPATIENT
Start: 2025-03-25 | End: 2025-03-28 | Stop reason: HOSPADM

## 2025-03-25 RX ORDER — IBUPROFEN 600 MG/1
1 TABLET ORAL
Status: DISCONTINUED | OUTPATIENT
Start: 2025-03-25 | End: 2025-03-28 | Stop reason: HOSPADM

## 2025-03-25 RX ORDER — METHOCARBAMOL 750 MG/1
750 TABLET, FILM COATED ORAL EVERY 6 HOURS PRN
Status: DISCONTINUED | OUTPATIENT
Start: 2025-03-25 | End: 2025-03-28 | Stop reason: HOSPADM

## 2025-03-25 RX ORDER — DEXTROSE MONOHYDRATE 25 G/50ML
25 INJECTION, SOLUTION INTRAVENOUS
Status: DISCONTINUED | OUTPATIENT
Start: 2025-03-25 | End: 2025-03-28 | Stop reason: HOSPADM

## 2025-03-25 RX ORDER — LIDOCAINE HYDROCHLORIDE 10 MG/ML
10 INJECTION, SOLUTION INFILTRATION; PERINEURAL ONCE
Status: COMPLETED | OUTPATIENT
Start: 2025-03-25 | End: 2025-03-25

## 2025-03-25 RX ORDER — NICOTINE POLACRILEX 4 MG
15 LOZENGE BUCCAL
Status: DISCONTINUED | OUTPATIENT
Start: 2025-03-25 | End: 2025-03-28 | Stop reason: HOSPADM

## 2025-03-25 RX ADMIN — MORPHINE SULFATE 4 MG: 2 INJECTION, SOLUTION INTRAMUSCULAR; INTRAVENOUS at 01:01

## 2025-03-25 RX ADMIN — HYDROCODONE BITARTRATE AND ACETAMINOPHEN 1 TABLET: 7.5; 325 TABLET ORAL at 18:29

## 2025-03-25 RX ADMIN — Medication 10 ML: at 20:26

## 2025-03-25 RX ADMIN — LIDOCAINE 2 PATCH: 4 PATCH TOPICAL at 12:34

## 2025-03-25 RX ADMIN — PETROLATUM 1 APPLICATION: 420 OINTMENT TOPICAL at 20:25

## 2025-03-25 RX ADMIN — Medication 10 ML: at 08:32

## 2025-03-25 RX ADMIN — LOSARTAN POTASSIUM 100 MG: 50 TABLET, FILM COATED ORAL at 08:30

## 2025-03-25 RX ADMIN — DULOXETINE 60 MG: 60 CAPSULE, DELAYED RELEASE ORAL at 08:30

## 2025-03-25 RX ADMIN — HYDROCODONE BITARTRATE AND ACETAMINOPHEN 1 TABLET: 7.5; 325 TABLET ORAL at 12:35

## 2025-03-25 RX ADMIN — PREGABALIN 150 MG: 75 CAPSULE ORAL at 20:13

## 2025-03-25 RX ADMIN — METHOCARBAMOL TABLETS 750 MG: 750 TABLET, COATED ORAL at 21:27

## 2025-03-25 RX ADMIN — ATORVASTATIN CALCIUM 20 MG: 20 TABLET, FILM COATED ORAL at 20:13

## 2025-03-25 RX ADMIN — INSULIN LISPRO 7 UNITS: 100 INJECTION, SOLUTION INTRAVENOUS; SUBCUTANEOUS at 20:23

## 2025-03-25 RX ADMIN — VANCOMYCIN HYDROCHLORIDE 1500 MG: 10 INJECTION, POWDER, LYOPHILIZED, FOR SOLUTION INTRAVENOUS at 08:28

## 2025-03-25 RX ADMIN — MORPHINE SULFATE 4 MG: 2 INJECTION, SOLUTION INTRAMUSCULAR; INTRAVENOUS at 08:47

## 2025-03-25 RX ADMIN — PREGABALIN 150 MG: 75 CAPSULE ORAL at 08:30

## 2025-03-25 RX ADMIN — METOPROLOL SUCCINATE 50 MG: 50 TABLET, EXTENDED RELEASE ORAL at 08:30

## 2025-03-25 RX ADMIN — LIDOCAINE HYDROCHLORIDE 10 ML: 10 INJECTION, SOLUTION INFILTRATION; PERINEURAL at 11:25

## 2025-03-25 RX ADMIN — CEFTRIAXONE 2000 MG: 2 INJECTION, POWDER, FOR SOLUTION INTRAMUSCULAR; INTRAVENOUS at 15:56

## 2025-03-25 RX ADMIN — VANCOMYCIN HYDROCHLORIDE 1500 MG: 10 INJECTION, POWDER, LYOPHILIZED, FOR SOLUTION INTRAVENOUS at 20:13

## 2025-03-25 RX ADMIN — INSULIN GLARGINE 50 UNITS: 100 INJECTION, SOLUTION SUBCUTANEOUS at 20:12

## 2025-03-25 RX ADMIN — AMLODIPINE BESYLATE 10 MG: 10 TABLET ORAL at 08:30

## 2025-03-25 RX ADMIN — PETROLATUM 1 APPLICATION: 420 OINTMENT TOPICAL at 08:32

## 2025-03-25 RX ADMIN — METHOCARBAMOL TABLETS 750 MG: 750 TABLET, COATED ORAL at 15:53

## 2025-03-25 RX ADMIN — MORPHINE SULFATE 4 MG: 2 INJECTION, SOLUTION INTRAMUSCULAR; INTRAVENOUS at 15:53

## 2025-03-25 RX ADMIN — HYDROCODONE BITARTRATE AND ACETAMINOPHEN 1 TABLET: 7.5; 325 TABLET ORAL at 06:07

## 2025-03-25 NOTE — PLAN OF CARE
Problem: Adult Inpatient Plan of Care  Goal: Plan of Care Review  3/25/2025 0659 by Faustina Husain RN  Outcome: Progressing  Flowsheets  Taken 3/25/2025 0659  Progress: declining  Taken 3/25/2025 0657  Plan of Care Reviewed With: patient  3/25/2025 0657 by Faustina Husain RN  Outcome: Progressing  Flowsheets (Taken 3/25/2025 0657)  Progress: declining  Plan of Care Reviewed With: patient  Goal: Patient-Specific Goal (Individualized)  3/25/2025 0659 by Faustina Husain RN  Outcome: Progressing  3/25/2025 0657 by Faustina Husain RN  Outcome: Progressing  Goal: Absence of Hospital-Acquired Illness or Injury  3/25/2025 0659 by Faustina Husain RN  Outcome: Progressing  3/25/2025 0657 by Faustina Husain RN  Outcome: Progressing  Intervention: Identify and Manage Fall Risk  Recent Flowsheet Documentation  Taken 3/25/2025 0600 by Faustina Husain RN  Safety Promotion/Fall Prevention:   room organization consistent   safety round/check completed   nonskid shoes/slippers when out of bed   fall prevention program maintained   clutter free environment maintained  Taken 3/25/2025 0400 by Faustina Husain RN  Safety Promotion/Fall Prevention:   room organization consistent   safety round/check completed   nonskid shoes/slippers when out of bed   fall prevention program maintained   clutter free environment maintained  Taken 3/25/2025 0200 by Faustina Husain RN  Safety Promotion/Fall Prevention:   room organization consistent   safety round/check completed   nonskid shoes/slippers when out of bed   fall prevention program maintained   clutter free environment maintained  Taken 3/25/2025 0000 by Faustina Husain RN  Safety Promotion/Fall Prevention:   room organization consistent   safety round/check completed   nonskid shoes/slippers when out of bed   fall prevention program maintained   clutter free environment maintained  Taken 3/24/2025 2222 by Faustina Husain RN  Safety Promotion/Fall Prevention:   room organization consistent   safety round/check  completed   nonskid shoes/slippers when out of bed   fall prevention program maintained   clutter free environment maintained  Taken 3/24/2025 2200 by Faustina Husain RN  Safety Promotion/Fall Prevention:   room organization consistent   safety round/check completed   nonskid shoes/slippers when out of bed   fall prevention program maintained   clutter free environment maintained  Taken 3/24/2025 2000 by Faustina Husain RN  Safety Promotion/Fall Prevention:   room organization consistent   safety round/check completed   nonskid shoes/slippers when out of bed   fall prevention program maintained   clutter free environment maintained  Intervention: Prevent Skin Injury  Recent Flowsheet Documentation  Taken 3/25/2025 0600 by Faustina Husain RN  Body Position: position changed independently  Taken 3/25/2025 0400 by Faustina Husain RN  Body Position: position changed independently  Taken 3/25/2025 0200 by Faustina Husain RN  Body Position: position changed independently  Taken 3/25/2025 0000 by Faustina Husain RN  Body Position: position changed independently  Taken 3/24/2025 2222 by Faustina Husain RN  Body Position: position changed independently  Taken 3/24/2025 2200 by Faustina Husain RN  Body Position: position changed independently  Taken 3/24/2025 2000 by Faustina Husain RN  Body Position: position changed independently  Taken 3/24/2025 1944 by Faustina Husain RN  Skin Protection: incontinence pads utilized  Intervention: Prevent and Manage VTE (Venous Thromboembolism) Risk  Recent Flowsheet Documentation  Taken 3/24/2025 1944 by Faustina Husain RN  VTE Prevention/Management: (patient wont stay in bed or still because of pain) patient refused intervention  Intervention: Prevent Infection  Recent Flowsheet Documentation  Taken 3/25/2025 0600 by Faustina Husain RN  Infection Prevention:   environmental surveillance performed   hand hygiene promoted   personal protective equipment utilized   rest/sleep promoted   single patient room  provided  Taken 3/25/2025 0400 by Faustina Husain RN  Infection Prevention:   environmental surveillance performed   hand hygiene promoted   personal protective equipment utilized   rest/sleep promoted   single patient room provided  Taken 3/25/2025 0200 by Faustina Husain RN  Infection Prevention:   environmental surveillance performed   hand hygiene promoted   personal protective equipment utilized   rest/sleep promoted   single patient room provided  Taken 3/25/2025 0000 by Faustina Husain RN  Infection Prevention:   environmental surveillance performed   hand hygiene promoted   personal protective equipment utilized   rest/sleep promoted   single patient room provided  Taken 3/24/2025 2222 by Faustina Husain RN  Infection Prevention:   environmental surveillance performed   hand hygiene promoted   personal protective equipment utilized   rest/sleep promoted   single patient room provided  Taken 3/24/2025 2200 by Faustina Husain RN  Infection Prevention:   environmental surveillance performed   hand hygiene promoted   personal protective equipment utilized   rest/sleep promoted   single patient room provided  Taken 3/24/2025 2000 by Faustina Husain RN  Infection Prevention:   environmental surveillance performed   hand hygiene promoted   personal protective equipment utilized   rest/sleep promoted   single patient room provided  Goal: Optimal Comfort and Wellbeing  3/25/2025 0659 by Faustina Husain RN  Outcome: Progressing  3/25/2025 0657 by Faustina Husain RN  Outcome: Progressing  Intervention: Monitor Pain and Promote Comfort  Recent Flowsheet Documentation  Taken 3/24/2025 1944 by Faustina Husain RN  Pain Management Interventions:   diversional activity provided   pain medication given  Intervention: Provide Person-Centered Care  Recent Flowsheet Documentation  Taken 3/24/2025 1944 by Faustina Husain RN  Trust Relationship/Rapport:   care explained   choices provided   questions answered  Goal: Readiness for Transition of  Care  3/25/2025 0659 by Faustina Husain, RN  Outcome: Progressing  3/25/2025 0657 by Faustina Husain, RN  Outcome: Progressing  Intervention: Mutually Develop Transition Plan  Recent Flowsheet Documentation  Taken 3/25/2025 0045 by Faustina Husain, RN  Equipment Currently Used at Home: bipap     Problem: Pain Acute  Goal: Optimal Pain Control and Function  Outcome: Progressing     Problem: Infection  Goal: Absence of Infection Signs and Symptoms  Outcome: Progressing   Goal Outcome Evaluation:  Plan of Care Reviewed With: patient        Progress: declining        Patients pain not controlled with Morphine 4mg IV and Norco7.5/325mg PO. Maintained NPO for procedure this am. Dose of Vancomycin 3000mg given.

## 2025-03-25 NOTE — PROGRESS NOTES
[facility] Clinical Pharmacy Services: Vancomycin Pharmacokinetic Initial Consult Note    Arnold Ramírez is a 65 y.o. male who is on day 1 of pharmacy to dose vancomycin.    Indication: Bone and/or Joint Infection, MRSA colonization w/septic lumbar facet arthritis, bacteremia    Consulting Provider: Dr. López  Planned Duration of Therapy: 7 days  Loading Dose Ordered or Given: 3000 mg  MRSA PCR performed: 3/20; Result: positive  Culture/Source:   3/23 blood cx: NGTD  3/20 blood cx: Streptococcus intermedius  Target: -600 mg/L.hr   Pertinent Vanc Dosing History:   3/21-3/22: 1250 mg q12h (trough 10.7, ~11 hours after 3rd dose)   3/20: 2250 mg x1  Other Antimicrobials: ceftriaxone 2 g q24h    Vitals/Labs  Ht: 195 cm; Wt: 154 kg  Temp Readings from Last 1 Encounters:   03/24/25 97.9 °F (36.6 °C) (Temporal)    Estimated Creatinine Clearance: 126.8 mL/min (by C-G formula based on SCr of 0.97 mg/dL).       Results from last 7 days   Lab Units 03/24/25  0400 03/23/25  0425 03/22/25  0807   CREATININE mg/dL 0.97 1.01 0.99   WBC 10*3/mm3 9.66 11.94* 17.00*     Assessment/Plan:  Since pt has not received vanc in 2 days, will give another LD of 3000 mg  Vancomycin Dose: 1500 mg IV every 12 hours  Predictive AUC level for the dose ordered is 459 mg/L.hr, which is within the target of 400-600 mg/L.hr  Vanc Trough to be ordered by daytime pharmacist     Pharmacy will follow patient's kidney function and will adjust doses and obtain levels as necessary. Thank you for involving pharmacy in this patient's care. Please contact pharmacy with any questions or concerns.                           Freya Sigala, [facility]  Clinical Pharmacist

## 2025-03-25 NOTE — SIGNIFICANT NOTE
03/25/25 0849   OTHER   Discipline occupational therapist   Rehab Time/Intention   Session Not Performed other (see comments)  (RN deffered, reports Pt with high levels of pain and going off the floor for hip aspiration today. Will hold and f/u next service date.)   Therapy Assessment/Plan (PT)   Criteria for Skilled Interventions Met (PT) yes;meets criteria   Recommendation   OT - Next Appointment 03/26/25

## 2025-03-25 NOTE — PLAN OF CARE
Problem: Adult Inpatient Plan of Care  Goal: Plan of Care Review  Outcome: Progressing  Flowsheets (Taken 3/25/2025 0657)  Progress: declining  Plan of Care Reviewed With: patient  Goal: Patient-Specific Goal (Individualized)  Outcome: Progressing  Goal: Absence of Hospital-Acquired Illness or Injury  Outcome: Progressing  Intervention: Identify and Manage Fall Risk  Recent Flowsheet Documentation  Taken 3/25/2025 0600 by Faustina Husain RN  Safety Promotion/Fall Prevention:   room organization consistent   safety round/check completed   nonskid shoes/slippers when out of bed   fall prevention program maintained   clutter free environment maintained  Taken 3/25/2025 0400 by Faustina Husain RN  Safety Promotion/Fall Prevention:   room organization consistent   safety round/check completed   nonskid shoes/slippers when out of bed   fall prevention program maintained   clutter free environment maintained  Taken 3/25/2025 0200 by Faustina Husain RN  Safety Promotion/Fall Prevention:   room organization consistent   safety round/check completed   nonskid shoes/slippers when out of bed   fall prevention program maintained   clutter free environment maintained  Taken 3/25/2025 0000 by Faustina Husain RN  Safety Promotion/Fall Prevention:   room organization consistent   safety round/check completed   nonskid shoes/slippers when out of bed   fall prevention program maintained   clutter free environment maintained  Taken 3/24/2025 2222 by Faustina Husain RN  Safety Promotion/Fall Prevention:   room organization consistent   safety round/check completed   nonskid shoes/slippers when out of bed   fall prevention program maintained   clutter free environment maintained  Taken 3/24/2025 2200 by Faustina Husain RN  Safety Promotion/Fall Prevention:   room organization consistent   safety round/check completed   nonskid shoes/slippers when out of bed   fall prevention program maintained   clutter free environment maintained  Taken 3/24/2025  2000 by Faustina Husain RN  Safety Promotion/Fall Prevention:   room organization consistent   safety round/check completed   nonskid shoes/slippers when out of bed   fall prevention program maintained   clutter free environment maintained  Intervention: Prevent Skin Injury  Recent Flowsheet Documentation  Taken 3/25/2025 0600 by Faustina Husain RN  Body Position: position changed independently  Taken 3/25/2025 0400 by Faustina Husain RN  Body Position: position changed independently  Taken 3/25/2025 0200 by Faustina Husain RN  Body Position: position changed independently  Taken 3/25/2025 0000 by Faustina Husain RN  Body Position: position changed independently  Taken 3/24/2025 2222 by Faustina Husain RN  Body Position: position changed independently  Taken 3/24/2025 2200 by Faustina Husain RN  Body Position: position changed independently  Taken 3/24/2025 2000 by Faustina Husain RN  Body Position: position changed independently  Taken 3/24/2025 1944 by Faustina Husain RN  Skin Protection: incontinence pads utilized  Intervention: Prevent and Manage VTE (Venous Thromboembolism) Risk  Recent Flowsheet Documentation  Taken 3/24/2025 1944 by Faustina Husain RN  VTE Prevention/Management: (patient wont stay in bed or still because of pain) patient refused intervention  Intervention: Prevent Infection  Recent Flowsheet Documentation  Taken 3/25/2025 0600 by Faustina Husain RN  Infection Prevention:   environmental surveillance performed   hand hygiene promoted   personal protective equipment utilized   rest/sleep promoted   single patient room provided  Taken 3/25/2025 0400 by Faustina Husain RN  Infection Prevention:   environmental surveillance performed   hand hygiene promoted   personal protective equipment utilized   rest/sleep promoted   single patient room provided  Taken 3/25/2025 0200 by Faustina Husain RN  Infection Prevention:   environmental surveillance performed   hand hygiene promoted   personal protective equipment utilized    rest/sleep promoted   single patient room provided  Taken 3/25/2025 0000 by Faustina Husain RN  Infection Prevention:   environmental surveillance performed   hand hygiene promoted   personal protective equipment utilized   rest/sleep promoted   single patient room provided  Taken 3/24/2025 2222 by Faustina Husain RN  Infection Prevention:   environmental surveillance performed   hand hygiene promoted   personal protective equipment utilized   rest/sleep promoted   single patient room provided  Taken 3/24/2025 2200 by Faustina Husain RN  Infection Prevention:   environmental surveillance performed   hand hygiene promoted   personal protective equipment utilized   rest/sleep promoted   single patient room provided  Taken 3/24/2025 2000 by Faustina Husain RN  Infection Prevention:   environmental surveillance performed   hand hygiene promoted   personal protective equipment utilized   rest/sleep promoted   single patient room provided  Goal: Optimal Comfort and Wellbeing  Outcome: Progressing  Intervention: Monitor Pain and Promote Comfort  Recent Flowsheet Documentation  Taken 3/24/2025 1944 by Faustina Husain RN  Pain Management Interventions:   diversional activity provided   pain medication given  Intervention: Provide Person-Centered Care  Recent Flowsheet Documentation  Taken 3/24/2025 1944 by Faustina Husain RN  Trust Relationship/Rapport:   care explained   choices provided   questions answered  Goal: Readiness for Transition of Care  Outcome: Progressing  Intervention: Mutually Develop Transition Plan  Recent Flowsheet Documentation  Taken 3/25/2025 0045 by Faustina Husain RN  Equipment Currently Used at Home: bipap   Goal Outcome Evaluation:  Plan of Care Reviewed With: patient        Progress: declining

## 2025-03-25 NOTE — PROGRESS NOTES
[facility] Clinical Pharmacy Services: Vancomycin Monitoring Note    Arnold Ramírez is a 65 y.o. male who is on day 2/7 of pharmacy to dose vancomycin for Bone and/or Joint Infection, MRSA colonization w/septic lumbar facet arthritis, bacteremia .    Previous Vancomycin Dose:    1500 mg IV every  12  hours  Updated Cultures and Sensitivities:   3/23 blood cx: NGTD  3/20 blood cx: Streptococcus intermedius      Results from last 7 days   Lab Units 03/22/25  0807 03/22/25  0107 03/21/25  0700   VANCOMYCIN RM mcg/mL  --   --  9.03   VANCOMYCIN TR mcg/mL 10.70  --   --    VANCOMYCIN PK mcg/mL  --  18.50*  --      Vitals/Labs  Ht:  ; Wt: (!) 164 kg (361 lb 1.8 oz)   Temp Readings from Last 1 Encounters:   03/25/25 97.7 °F (36.5 °C) (Oral)     Estimated Creatinine Clearance: 131.2 mL/min (by C-G formula based on SCr of 0.97 mg/dL).       Results from last 7 days   Lab Units 03/25/25  0706 03/24/25  0400 03/23/25  0425 03/22/25  0807   CREATININE mg/dL  --  0.97 1.01 0.99   WBC 10*3/mm3 9.68 9.66 11.94* 17.00*     Assessment/Plan    Current Vancomycin Dose: 1500 mg IV every  12  hours; provides a predicted  mg/L.hr   Next Level Date and Time: Vanc Trough on 3/27 at 0800. Daily BMP ordered  We will continue to monitor patient changes and renal function     Thank you for involving pharmacy in this patient's care. Please contact pharmacy with any questions or concerns.       Petr Saunders, [facility]  Clinical Pharmacist

## 2025-03-25 NOTE — PROGRESS NOTES
Name: Arnold Ramírez ADMIT: 3/24/2025   : 1960  PCP: Anand Alford MD    MRN: [id number] LOS: 1 days   AGE/SEX: 65 y.o. male  ROOM: [facility]     Subjective   Subjective   C/o pain in left hip mainly. No F/C/NS. No SOA or CP. No N/V/D/abd pain. Voiding well. Tolerating diet.        Objective   Objective   Vital Signs  Temp:  [97.7 °F (36.5 °C)-98.2 °F (36.8 °C)] 97.7 °F (36.5 °C)  Heart Rate:  [75-88] 75  Resp:  [18-20] 18  BP: (129-163)/(70-89) 135/70  SpO2:  [93 %-100 %] 97 %  on   ;   Device (Oxygen Therapy): room air  Body mass index is 43.08 kg/m².  Physical Exam  Vitals and nursing note reviewed. Exam conducted with a chaperone present (Wife).   Constitutional:       General: He is not in acute distress.     Appearance: He is obese. He is ill-appearing (chronically). He is not toxic-appearing or diaphoretic.   HENT:      Head: Normocephalic.      Nose: Nose normal.      Mouth/Throat:      Mouth: Mucous membranes are moist.      Pharynx: Oropharynx is clear.   Eyes:      General: No scleral icterus.        Right eye: No discharge.         Left eye: No discharge.      Extraocular Movements: Extraocular movements intact.      Conjunctiva/sclera: Conjunctivae normal.   Cardiovascular:      Rate and Rhythm: Normal rate and regular rhythm.      Pulses: Normal pulses.   Pulmonary:      Effort: Pulmonary effort is normal. No respiratory distress.      Breath sounds: Normal breath sounds. No wheezing or rales.   Abdominal:      General: Bowel sounds are normal. There is no distension.      Palpations: Abdomen is soft.      Tenderness: There is no abdominal tenderness.   Musculoskeletal:         General: Swelling (trace in BLEs) present.      Cervical back: Neck supple.   Skin:     General: Skin is warm and dry.      Capillary Refill: Capillary refill takes less than 2 seconds.      Coloration: Skin is not jaundiced.      Findings: Lesion (excoriated lesions to BLEs--L>R) present.   Neurological:       General: No focal deficit present.      Mental Status: He is alert. Mental status is at baseline.   Psychiatric:         Mood and Affect: Mood normal.         Behavior: Behavior normal.         Thought Content: Thought content normal.       Results Review     I reviewed the patient's new clinical results.  Results from last 7 days   Lab Units 03/25/25  0706 03/24/25  0400 03/23/25  0425 03/22/25  0807   WBC 10*3/mm3 9.68 9.66 11.94* 17.00*   HEMOGLOBIN g/dL 12.9* 13.7 14.5 14.0   PLATELETS 10*3/mm3 196 234 317 271     Results from last 7 days   Lab Units 03/25/25  0915 03/24/25  0400 03/23/25  0425 03/22/25  0807   SODIUM mmol/L 132* 134* 137 135*   POTASSIUM mmol/L 4.6 4.1 4.2 4.2   CHLORIDE mmol/L 102 102 102 101   CO2 mmol/L 20.0* 20.8* 22.1 24.5   BUN mg/dL 20 25* 29* 31*   CREATININE mg/dL 0.87 0.97 1.01 0.99   GLUCOSE mg/dL 204* 185* 149* 170*   EGFR mL/min/1.73 97.0 87.7 83.6 85.6     Results from last 7 days   Lab Units 03/25/25  0915 03/21/25  0700 03/20/25  1038   ALBUMIN g/dL 3.0* 3.9 4.1   BILIRUBIN mg/dL 0.5 0.6 0.8   ALK PHOS U/L 92 84 95   AST (SGOT) U/L 23 22 26   ALT (SGPT) U/L 21 24 26     Results from last 7 days   Lab Units 03/25/25  0915 03/24/25  0400 03/23/25  0425 03/22/25  0807 03/21/25  0700 03/20/25  1038   CALCIUM mg/dL 8.5* 8.9 9.1 8.9 9.1 9.8   ALBUMIN g/dL 3.0*  --   --   --  3.9 4.1   MAGNESIUM mg/dL  --   --  2.1 2.1 1.9  --      Results from last 7 days   Lab Units 03/20/25  1606 03/20/25  1435   PROCALCITONIN ng/mL 8.02*  --    LACTATE mmol/L  --  3.0*     Glucose   Date/Time Value Ref Range Status   03/25/2025 1232 230 (H) 70 - 130 mg/dL Final   03/25/2025 0707 211 (H) 70 - 130 mg/dL Final   03/24/2025 2233 159 (H) 70 - 130 mg/dL Final   03/24/2025 1708 226 (H) 70 - 130 mg/dL Final   03/24/2025 1254 229 (H) 70 - 130 mg/dL Final   03/24/2025 0807 160 (H) 70 - 130 mg/dL Final   03/23/2025 2226 218 (H) 70 - 130 mg/dL Final       FL Guided Aspiration Joint  Result Date:  3/25/2025  Technically successful left hip joint aspiration as described above.   Procedure performed by JAX [place]. By electronically signing this report, I, the supervising radiologist, attest that I was not present for the procedure(s) but agree with the final edited report.      XR Hip With or Without Pelvis 2 - 3 View Left  Result Date: 3/25/2025   As described.    This report was finalized on 3/25/2025 12:56 PM by Dr. Fidencio Zaman M.D on Workstation: [id number]      XR Hip With or Without Pelvis 2 - 3 View Right  Result Date: 3/25/2025   As described.    This report was finalized on 3/25/2025 12:56 PM by Dr. Fidencio Zaman M.D on Workstation: [id number]      MRI Lumbar Spine Without Contrast  Result Date: 3/24/2025  1.Severely limited study due to motion degradation despite attempts at optimal imaging. 2.Evidence for abnormal edema within the paraspinal soft tissues along the right posterior lateral margin of the L3-L5 spinal levels. There is suggestion of more focally pronounced fluid signal in this region. The findings appear to be centered around the level of the right L4/5 posterior facet. The findings may indicate inflammation/infection and this region. The changes may be related to right posterior facet septic arthropathy with adjacent soft tissue cellulitis. The more focally pronounced fluid signal focus may indicate developing abscess in this region measuring 2 cm. Again the assessment is markedly limited. 3.No evidence for discitis/osteomyelitis. No evidence for abscess involving the central canal. 4.Degenerative changes are seen throughout the mid to lower lumbar spine. Electronically Signed: Rony Coronado MD  3/24/2025 2:34 PM EDT  Workstation ID: [id number]    MRI Thoracic Spine Without Contrast  Result Date: 3/24/2025  1.No evidence for discitis/osteomyelitis. No evidence for abscess.. Electronically Signed: Rony Coronado MD  3/24/2025 2:23 PM EDT  Workstation ID: [id number]      I  have personally reviewed all medications:  Scheduled Medications  amLODIPine, 10 mg, Oral, Daily  atorvastatin, 20 mg, Oral, Nightly  cefTRIAXone, 2,000 mg, Intravenous, Q24H  DULoxetine, 60 mg, Oral, Daily  insulin glargine, 20 Units, Subcutaneous, Nightly  Lidocaine, 2 patch, Transdermal, Q24H  losartan, 100 mg, Oral, Daily  metoprolol succinate XL, 50 mg, Oral, Daily  Petrolatum, 1 Application, Topical, Q12H  pregabalin, 150 mg, Oral, BID  sodium chloride, 10 mL, Intravenous, Q12H  sodium chloride, 10 mL, Intravenous, Q12H  vancomycin, 1,500 mg, Intravenous, Q12H    Infusions  Pharmacy to dose vancomycin,   sodium chloride, 100 mL/hr, Last Rate: 100 mL/hr (03/24/25 2007)    Diet  Diet: Regular/House, Diabetic, Cardiac; Healthy Heart (2-3 Na+); Consistent Carbohydrate; Fluid Consistency: Thin (IDDSI 0)    I have personally reviewed:  [x]  Laboratory   [x]  Microbiology   [x]  Radiology   [x]  EKG/Telemetry  [x]  Cardiology/Vascular   []  Pathology    [x]  Records       Assessment/Plan     Active Hospital Problems    Diagnosis  POA    **Streptococcal bacteremia [R78.81, B95.5]  Yes    Type 2 diabetes mellitus, with long-term current use of insulin [E11.9, Z79.4]  Not Applicable    Hyponatremia [E87.1]  Yes    Diabetic peripheral neuropathy [E11.42]  Yes    Acute back pain [M54.9]  Yes    Facet arthritis of lumbar region [M47.816]  Yes    Obesity [E66.9]  Yes    Dyslipidemia [E78.5]  Yes    HTN (hypertension) [I10]  Yes    MRSA colonization [Z22.322]  Not Applicable      Resolved Hospital Problems   No resolved problems to display.       64yo gentleman with HTN, HLD, morbid obesity, MARGO, DM2 with DPN, and MRSA colonization, who was admitted to [facility] with left flank/low back/hip pain and found to have Strep intermedius bacteremia. MRI suggested possible lumbar spine abscess (though study was severely limited due to motion degradation) and arrangements were made to transfer pt to [facility] so he could be  seen by Neurosurgery.    Strep bacteremia  Continue Rocephin and Vanc per ID recs  ID to follow here as well  Repeat blood cultures NGTD  Echo neg for vegetation    Left low back pain  Left hip pain  [facility] has seen and reviewed MRI L-spine--they do not feel any spine infection is present, suggest repeat MRI in 6 weeks  S/p left hip aspiration this AM, fluid cultured  ID to follow here    MRSA colonization  On IV Vanc per ID  No MRSA in blood cultures    DM2 with DPN  Sugars high  A1c 11  Continue Lantus at 50 units QPM and SSI, adjust as necessary  Continue Cymbalta and Lyrica    MARGO  Continue BiPAP    Morbid obesity  Complicating all aspects of care    HTN  BPs acceptable  Continue amlodipine, losartan, and metoprolol    HLD  Continue statin    Hyponatremia  Na+ 135 when corrected for hyperglycemia      SCDs for DVT prophylaxis.  Full code.  Discussed with patient and wife. D/w Dr. López.  Anticipate discharge home, timing yet to be determined.  Expected discharge date/ time has not been documented.      Elder Hernandez MD  [facility]  03/25/25  15:24 EDT

## 2025-03-25 NOTE — CONSULTS
[facility] NEUROSURGERY CONSULT NOTE    Patient name: Arnold Ramírez  Referring Provider: Dr. López  Reason for Consultation: abnormal lUmbar MRI with strep bacteremia     Patient Care Team:  Anand Alford MD as PCP - General  Anand Alford MD as PCP - Family Medicine    Chief complaint: back pain, hip pain    Subjective .     History of present illness:    Patient is a 65 y.o.  male patient presented to urgent care center on 3/20/2025 with complaints of low back and left hip pain that began 6 days ago but worsening.  He was diaphoretic and appeared acutely ill and was sent to ER for further evaluation.  He was evaluated at [facility] and admitted with concern for sepsis/possible osteomyelitis with severe leukocytosis and elevated lactate.  He was found to have positive blood culture for Streptococcus and initiated on IV antibiotics. He was evaluated by ID, Dr. Pryor.  MRI left foot, MRI T/L spine ordered but delayed in completion due to patient discomfort.  It was completed yesterday showing concern for paraspinal soft tissue infection and right facet infection at L4/5.  Foot MRI still pending.  He was accepted in transfer to [facility] for neurosurgical evaluation by hospitalist, Dr. López.  Vancomycin was added to his antibiotic regimen.  He has MRSA positive nasal swab.  He had left hip aspiration today.    Patient reports chronic severe neuropathy and chronic wounds on his legs.  He has been unable to ambulate due to pain in his left low back and left hip.  He has chronic weakness of his left ankle due to an injury which he states should have had surgery 2 years ago but his hemoglobin A1c has been too high to consider.  He denies any new weakness outside of general sense of weakness since the back pain started.  Denies fever at home.  No saddle paresthesia.  No incontinence of bowel or bladder.    No cancer history.  Non-smoker.  No prior spine surgery.  Poorly controlled diabetes  with hemoglobin A1c 11. Chronic Norco 2 times daily x 10 years, Lyrica    Review of Systems  Review of Systems   Constitutional:  Positive for diaphoresis. Negative for fever.   Genitourinary:  Negative for enuresis.   Musculoskeletal:  Positive for arthralgias (left hip) and gait problem.   Neurological:  Positive for weakness (generalized) and numbness (chronic legs).       History  PAST MEDICAL HISTORY  Past Medical History:   Diagnosis Date    Diabetes mellitus     Hyperlipidemia     Hypertension        PAST SURGICAL HISTORY  History reviewed. No pertinent surgical history.    FAMILY HISTORY  History reviewed. No pertinent family history.    SOCIAL HISTORY  Social History     Tobacco Use    Smoking status: Never    Smokeless tobacco: Never   Vaping Use    Vaping status: Never Used   Substance Use Topics    Alcohol use: Not Currently    Drug use: Defer       unemployed, disabled  Lives with wife    Allergies:  Naproxen, Sulfamethoxazole-trimethoprim, Etodolac, Gabapentin, and Zonisamide    MEDICATIONS:  Medications Prior to Admission   Medication Sig Dispense Refill Last Dose/Taking    amLODIPine (NORVASC) 10 MG tablet Take 1 tablet by mouth Daily.   3/25/2025 Morning    atorvastatin (LIPITOR) 20 MG tablet Take 1 tablet by mouth Every Night.   3/25/2025 Morning    DULoxetine (CYMBALTA) 60 MG capsule Take 1 capsule by mouth Daily.   3/25/2025 Morning    furosemide (LASIX) 20 MG tablet Take 1 tablet by mouth Daily As Needed (edema).   3/25/2025 Morning    HYDROcodone-acetaminophen (NORCO) 5-325 MG per tablet Take 1 tablet by mouth 2 (Two) Times a Day.   3/25/2025 Morning    Insulin Degludec (TRESIBA FLEXTOUCH) 200 UNIT/ML solution pen-injector pen injection Inject 150 Units under the skin into the appropriate area as directed 2 (Two) Times a Day.   3/25/2025 Morning    losartan (COZAAR) 100 MG tablet Take 1 tablet by mouth Daily.   3/25/2025 Morning    metFORMIN ER (GLUCOPHAGE-XR) 500 MG 24 hr tablet Take 1  tablet by mouth 2 (Two) Times a Day.   3/25/2025 Morning    metoprolol succinate XL (TOPROL-XL) 50 MG 24 hr tablet Take 1 tablet by mouth Daily.   3/25/2025 Morning    pregabalin (LYRICA) 150 MG capsule Take 1 capsule by mouth 2 (Two) Times a Day.   3/24/2025    spironolactone (ALDACTONE) 25 MG tablet Take 1 tablet by mouth Daily.   3/25/2025 Morning    Testosterone Cypionate (DEPOTESTOTERONE CYPIONATE) 200 MG/ML injection Inject 1 mL into the appropriate muscle as directed by prescriber Every 14 (Fourteen) Days.   3/25/2025 Morning         Current Facility-Administered Medications:     acetaminophen (TYLENOL) tablet 650 mg, 650 mg, Oral, Q4H PRN **OR** acetaminophen (TYLENOL) 160 MG/5ML oral solution 650 mg, 650 mg, Oral, Q4H PRN **OR** acetaminophen (TYLENOL) suppository 650 mg, 650 mg, Rectal, Q4H PRN, Kathleen Zhong DO    amLODIPine (NORVASC) tablet 10 mg, 10 mg, Oral, Daily, Zhong, Kathleen Jarvis DO, 10 mg at 03/25/25 0830    atorvastatin (LIPITOR) tablet 20 mg, 20 mg, Oral, Nightly, Zhong, Kathleen Jarvis DO, 20 mg at 03/24/25 2003    sennosides-docusate (PERICOLACE) 8.6-50 MG per tablet 2 tablet, 2 tablet, Oral, BID PRN **AND** polyethylene glycol (MIRALAX) packet 17 g, 17 g, Oral, Daily PRN **AND** bisacodyl (DULCOLAX) EC tablet 5 mg, 5 mg, Oral, Daily PRN **AND** bisacodyl (DULCOLAX) suppository 10 mg, 10 mg, Rectal, Daily PRN, Kathleen Zhong DO    cefTRIAXone (ROCEPHIN) 2,000 mg in sodium chloride 0.9 % 100 mL MBP, 2,000 mg, Intravenous, Q24H, Lizette López MD    dextrose (D50W) (25 g/50 mL) IV injection 10-50 mL, 10-50 mL, Intravenous, Q15 Min PRN, Kathleen Zhong DO    dextrose (GLUTOSE) oral gel 15 g, 15 g, Oral, Q15 Min PRN, Kathleen Zhong DO    DULoxetine (CYMBALTA) DR capsule 60 mg, 60 mg, Oral, Daily, Kathleen Zhong DO, 60 mg at 03/25/25 0830    glucagon (GLUCAGEN) injection 1 mg, 1 mg, Intramuscular, Q15 Min PRN, Kathleen Zhong DO     HYDROcodone-acetaminophen (NORCO) 7.5-325 MG per tablet 1 tablet, 1 tablet, Oral, Q6H PRN, Zhong, Kathleen Jarvis, DO, 1 tablet at 03/25/25 1235    insulin glargine (LANTUS, SEMGLEE) injection 20 Units, 20 Units, Subcutaneous, Nightly, Lizette López MD, 20 Units at 03/24/25 2222    insulin lispro (HUMALOG/ADMELOG) injection 1-200 Units, 1-200 Units, Subcutaneous, PRN, Zhong, Kathleen Jarvis, DO    Lidocaine 4 % 2 patch, 2 patch, Transdermal, Q24H, Zhong, Kathleen Jarvis DO, 2 patch at 03/25/25 1234    losartan (COZAAR) tablet 100 mg, 100 mg, Oral, Daily, Zhong, Kathleen Jarvis, DO, 100 mg at 03/25/25 0830    metoprolol succinate XL (TOPROL-XL) 24 hr tablet 50 mg, 50 mg, Oral, Daily, Zhong, Kathleen Jarvis, DO, 50 mg at 03/25/25 0830    morphine injection 4 mg, 4 mg, Intravenous, Q2H PRN, Zhong, Kathleen Jarvis, DO, 4 mg at 03/25/25 0847    [DISCONTINUED] HYDROmorphone (DILAUDID) injection 0.5 mg, 0.5 mg, Intravenous, Q2H PRN **AND** naloxone (NARCAN) injection 0.4 mg, 0.4 mg, Intravenous, Q5 Min PRN, Lizette López MD    nitroglycerin (NITROSTAT) SL tablet 0.4 mg, 0.4 mg, Sublingual, Q5 Min PRN, Lizette López MD    ondansetron ODT (ZOFRAN-ODT) disintegrating tablet 4 mg, 4 mg, Oral, Q6H PRN **OR** ondansetron (ZOFRAN) injection 4 mg, 4 mg, Intravenous, Q6H PRN, ZhongKathleen, DO    Petrolatum ointment 1 Application, 1 Application, Topical, Q12H, ZhongKathleen DO, 1 Application at 03/25/25 0832    Pharmacy to dose vancomycin, , Not Applicable, Continuous PRN, Lizette López MD    pregabalin (LYRICA) capsule 150 mg, 150 mg, Oral, BID, ZhongKathleen DO, 150 mg at 03/25/25 0830    sodium chloride 0.9 % flush 10 mL, 10 mL, Intravenous, Q12H, Kathleen Zhong DO, 10 mL at 03/25/25 0832    sodium chloride 0.9 % flush 10 mL, 10 mL, Intravenous, PRN, ZhongKathleen,     sodium chloride 0.9 % flush 10 mL, 10 mL, Intravenous, PRN, Kathleen Zhong,     sodium chloride 0.9 %  flush 10 mL, 10 mL, Intravenous, Q12H, Lizette López MD, 10 mL at 03/25/25 0832    sodium chloride 0.9 % flush 10 mL, 10 mL, Intravenous, PRN, Lizette López MD    sodium chloride 0.9 % infusion 40 mL, 40 mL, Intravenous, PRN, Kathleen Zhong DO    sodium chloride 0.9 % infusion 40 mL, 40 mL, Intravenous, PRN, Lizette López MD    sodium chloride 0.9 % infusion, 100 mL/hr, Intravenous, Continuous, Lizette López MD, Last Rate: 100 mL/hr at 03/24/25 2007, 100 mL/hr at 03/24/25 2007    vancomycin IVPB 1500 mg in 0.9% NaCl (Premix) 500 mL, 1,500 mg, Intravenous, Q12H, Lizette López MD, Last Rate: 333.3 mL/hr at 03/25/25 0828, 1,500 mg at 03/25/25 0828      Objective     Results Review:  LABS:  Results from last 7 days   Lab Units 03/25/25  0706 03/24/25  0400 03/23/25  0425   WBC 10*3/mm3 9.68 9.66 11.94*   HEMOGLOBIN g/dL 12.9* 13.7 14.5   HEMATOCRIT % 39.9 42.3 43.9   PLATELETS 10*3/mm3 196 234 317     Results from last 7 days   Lab Units 03/25/25  0915 03/24/25  0400 03/23/25  0425 03/22/25  0807 03/21/25  0700 03/20/25  1038   SODIUM mmol/L 132* 134* 137   < > 132* 130*   POTASSIUM mmol/L 4.6 4.1 4.2   < > 4.2 4.6   CHLORIDE mmol/L 102 102 102   < > 96* 97*   CO2 mmol/L 20.0* 20.8* 22.1   < > 21.7* 19.3*   BUN mg/dL 20 25* 29*   < > 29* 26*   CREATININE mg/dL 0.87 0.97 1.01   < > 1.25 1.41*   CALCIUM mg/dL 8.5* 8.9 9.1   < > 9.1 9.8   BILIRUBIN mg/dL 0.5  --   --   --  0.6 0.8   ALK PHOS U/L 92  --   --   --  84 95   ALT (SGPT) U/L 21  --   --   --  24 26   AST (SGOT) U/L 23  --   --   --  22 26   GLUCOSE mg/dL 204* 185* 149*   < > 319* 290*    < > = values in this interval not displayed.     Results from last 7 days   Lab Units 03/20/25  1443   SED RATE mm/hr 45*     Results from last 7 days   Lab Units 03/20/25  1606   CRP mg/dL 20.82*     Left hip body fluid culture 3/25-pending    Blood culture 3/20/2025-Streptococcus intermedius  Blood culture 3/23/2025-NGTD    MRSA screen  3/20/2025-positive    DIAGNOSTICS:  MRI thoracic spine-no evidence of abnormal marrow edema.  No evidence of fracture or malalignment.  No disc herniation or cord compression, cord signal change.  No evidence of mass within the central canal.  Limited noncontrast study.    MRI lumbar spine Limited noncontrast study.  There are degenerative endplate changes but no erosive changes of the endplates intensity changes of the disc space to suggest osteomyelitis/discitis.  There is a T1 hyperintense soft tissue collection 2.7 x 1.0 cm behind the body of L4 which could be epidural lipomatosis or potentially an epidural abscess.  There is moderate canal stenosis at this level.  There is abnormal edema within the paraspinal soft tissues right L3-5 and centered around the right L4/5 facet.  Radiologist reports degenerative changes seen but no evidence of discitis/osteomyelitis and no evidence for abscess involving the central canal.    CT lumbar spine-degenerative changes. moderate canal stenosis at L3/4 from posterior element and stenosis behind the body of L4 due to hypointense collection.  This results in moderate canal stenosis.  Vacuum disc phenomenon at several levels.  DDD most severe at L5/S1.  There is a Schmorl's node at the superior endplate of L3.  No evidence of fracture or malalignment.  No erosive endplate changes.    2D echo:  Interpretation Summary         Left ventricular systolic function is normal. Calculated left ventricular EF = 62.1%    The left ventricular cavity is mildly dilated.    Left ventricular diastolic function was normal.    Moderate dilation of the aortic root is present. The aortic root measures 4.7 cm. Mild dilation of the ascending aorta is present 3.7 cm.    Borderline dilation of the aortic root is present. Aortic root = 3.7 cm borderline dilation of the ascending aorta is present. Ascending aorta = 3.7 cm       Results Review:   I reviewed the patient's new clinical results.  I  personally viewed and interpreted the patient's MRI thoracic and lumbar spine    Vital Signs   Temp:  [97.7 °F (36.5 °C)-98.2 °F (36.8 °C)] 97.7 °F (36.5 °C)  Heart Rate:  [75-88] 75  Resp:  [16-20] 18  BP: (113-163)/(57-89) 135/70    Physical Exam:  Physical Exam  Vitals reviewed.   Constitutional:       Comments: Body mass index is 43.08 kg/m².     Pulmonary:      Effort: Pulmonary effort is normal.   Musculoskeletal:      Lumbar back: Tenderness (Left SI joint/piriformis region) present. No bony tenderness. Negative right straight leg raise test.      Comments: Patient has pain in his left low back/buttock region with strength testing of left iliopsoas.  No midline or right of midline spinal tenderness   Skin:     General: Skin is warm and dry.      Findings: Lesion (Lower legs especially left.  Thickening of the skin and vascular changes in the left lower leg) present.   Neurological:      General: No focal deficit present.   Psychiatric:         Mood and Affect: Mood normal.         Thought Content: Thought content normal.       Neurological Exam  Mental Status  Awake, alert and oriented to person, place and time.    Motor  Normal muscle bulk throughout. No fasciculations present. Normal muscle tone. No abnormal involuntary movements.  5/5 bilateral lower extremity.    Sensory  Light touch is normal in upper and lower extremities.     Reflexes    Right pathological reflexes: Ankle clonus absent.  Left pathological reflexes: Ankle clonus absent.    Gait    Went from sit to stand position at bedside unassisted.  Stood with no loss of balance.      Assessment & Plan       Streptococcal bacteremia    Acute back pain    Paraspinal abscess    Facet arthritis of lumbar region    Obesity    DM (diabetes mellitus)    Dyslipidemia    HTN (hypertension)    MRSA colonization      Problem List Items Addressed This Visit    None       COMORBID CONDITIONS:  Diabetes Type 2, Hypertension, Morbid obesity, Sepsis, and MRSA  "carrier    Patient admitted with strep bacteremia and \"back pain\".  Today patient states is really not so much of his back as it is his left posterior hip region.  He has no midline back pain or tenderness.  He has no right sided paraspinal tenderness.  He denies any acute weakness, sensory changes, or incontinence.  He had left hip aspiration and has not noticed any improvement as of yet.  His follow-up blood cultures show no growth and he is currently on Rocephin and vancomycin (positive MRSA swab).  MRI thoracic and lumbar spine poor quality but no obvious epidural abscess.  There is a collection behind the body of L4 resulting in some moderate canal stenosis but this may be epidural lipomatosis..  He has some inflammatory changes in the right paraspinous region as well as the right L4/5 facet but he is not tender or complaining of pain in these areas.  No surgical indication at this time, but advised the patient if he begins to experience any changes in strength, sensation, or bowel/bladder function he needs to return to the hospital immediately.  Will defer antibiotic management to ID service but we will plan outpatient follow-up in 6 weeks with repeat MRI study.    PLAN:   Antibiotic management per ID service  Will add some muscle relaxant, but defer any other changes of pain medication to primary team  May need further hip evaluation due to location of pain  Follow-up neurosurgery 6 weeks with MRI lumbar with and without contrast  Return to ER for any acute changes in strength, bowel or bladder function or sensation    I discussed the patient's findings and my recommendations with patient, family, and Dr. Garcia    During patient visit, I utilized appropriate personal protective equipment including gown and gloves.  Appropriate PPE was worn during the entire visit.  Hand hygiene was completed before and after.       Patricia Casanova, APRN  03/25/25  14:27 EDT    \"Dictated utilizing Dragon dictation\".      "

## 2025-03-25 NOTE — DISCHARGE SUMMARY
"  Date of Admission: 3/20/2025    Date of Discharge:  3/24/2025    Length of stay:  LOS: 4 days     Admission Diagnosis:   Lactic acidosis [E87.20]  Neutrophilia [D72.828]  Cellulitis of left leg [L03.116]  Sciatica of left side [M54.32]      Discharge Diagnosis:     Leukocytosis d/t strep bacteremia and concern for possible spinal abscess   - check sed rate 45, crp 20, procal 8  - blood cx- strep species   - repeat blood cx 3/23- NGTD  - UA unremarkable   - CT abd/pel negative for acute process   - xray left foot reviewed   -  CT L spine un-revealing   - patient finally agreeable to MRI spine, there was a lot of motion artifact and unable to use contrast but concern for possible abscess and some evidence for abnormal edema within the paraspinal soft tissues along the right posterior lateral margin of the L3-L5 spinal levels. There is suggestion of more focally pronounced fluid signal in this region. The findings appear to be centered around the level of the right L4/5 posterior facet. The findings may indicate inflammation/infection and this region. The changes may be related to right posterior facet septic arthropathy with adjacent soft tissue cellulitis. The more focally pronounced fluid signal focus may indicate developing abscess in this region\"  - discussed with ID and will continue IV Rocephin 2g  - we were still unable to get MRI of the foot      Back pain  - d/t above  - increased morphine, continue hydrocodone      Diabetes Mellitus type 2 with hyperglycemia and peripheral neuropathy   - hgb A1c 11  - resume home Cymbalta and Lyrica  - continue gluccomander given his high insulin requirements   - counseled extensively on dietary and lifestyle changes  - monitor with routine accu-checks and make adjustments as needed   - encouraged patient to follow up with endocrinology      Pseudohyponatremia     HTN  - resume home amlodipine, losartan, toprol xl     HLP  - resume home atorvastatin      Morbid obesity  - " bmi 40.5  - complicates all aspects of care   -  on lifestyle changes as appropriate      MARGO on bipap       Hospital Course:  Arnold Ramírez is a 65 y.o. male  morbidly obese male with a past medical history of DM2, HTN, and HLP who presented to the ED d/t left flank pain initially. Patient reports pain started yesterday afternoon and he was unable to rest all night due to the pain. He went to urgent care and was referred to the ED. Patient underwent CT abd/pel that was negative for acute process. Patient states the pain has now moved from his left flank to his midline back. He denies any injury, but reports his left foot/ankle needs surgery and causes his to walk unevenly causing pain. He reports some chills and nausea. States he has not taken any of his home medications today, due to going to the urgent care.   In the ED, he was found to have significantly elevated WBC. Noted to have some redness of the LLE with some abrasions, though he reports this looks improved from how it normally appears. Given poorly controlled diabetes and significant leukocytosis concern for infection, possibly osteomyelitis.   Patient was initially scheduled for MRI but unable to tolerate due to claustrophobia, he declined any anxiety medications. I called all surrounding hospitals and no one had an inpatient open MRI. CT L spine was unremarkable. I discussed again with patient the importance of imaging and he is finally agreeable to imaging with ativan and morphine. Imaging was still limited due to motion artifact, and we were still unable to get mri of the foot. It did show possible abscess as above. I have called and spoken with hospitalist at [facility] who agree that patient needs evaluation by neurosurgery. Patient will be discharged to [facility] in stable condition. He is complaining of considerably more pain today. His narcotics have been increased.     Procedures Performed:  none         Consults:    Consults       Date and Time Order Name Status Description    3/21/2025 10:18 AM Inpatient Infectious Diseases Consult Completed             Vital Signs:  Temp:  [97.1 °F (36.2 °C)-98.3 °F (36.8 °C)] 97.9 °F (36.6 °C)  Heart Rate:  [76-90] 88  Resp:  [16-18] 16  BP: (113-176)/(57-86) 113/57  Body mass index is 40.5 kg/m².    Physical Exam:  Physical Exam  Vitals reviewed.   Constitutional:       Appearance: He is morbidly obese.      Comments: Moderate distress d/t discomfort   HENT:      Head: Normocephalic and atraumatic.   Cardiovascular:      Rate and Rhythm: Normal rate.   Pulmonary:      Effort: Pulmonary effort is normal. No respiratory distress.   Abdominal:      Comments: Obese, nt, +bs   Musculoskeletal:         General: Deformity present.      Right lower leg: Edema present.      Left lower leg: Edema present.   Skin:     General: Skin is warm and dry.   Neurological:      Mental Status: He is alert. Mental status is at baseline.   Psychiatric:         Mood and Affect: Mood normal.         Behavior: Behavior normal.               Wounds (Last 24 Hours)       LDA Wound       Row Name 03/24/25 0845             Wound 03/20/25 1704 Left distal leg Infection    Wound - Properties Group Placement Date: 03/20/25  - Placement Time: 1704  -HL Present on Original Admission: Y  -HL Side: Left  -HL Orientation: distal  -HL Location: leg  -HL Primary Wound Type: Infection  -HL    Dressing Appearance dry;intact  -AS      Base scab  -AS      Retired Wound - Properties Group Placement Date: 03/20/25  - Placement Time: 1704  -HL Present on Original Admission: Y  -HL Side: Left  -HL Orientation: distal  -HL Location: leg  -HL    Retired Wound - Properties Group Placement Date: 03/20/25  - Placement Time: 1704  -HL Present on Original Admission: Y  -HL Side: Left  -HL Orientation: distal  -HL Location: leg  -HL    Retired Wound - Properties Group Date first assessed: 03/20/25  - Time first assessed: 1704  -HL  Present on Original Admission: Y  -HL Side: Left  -HL Location: leg  -HL              User Key  (r) = Recorded By, (t) = Taken By, (c) = Cosigned By      Initials Name Provider Type    AS Charisma Ramírez, RN Registered Nurse     Rebeca Hunt, RN Registered Nurse                      Pertinent Test Results:   Lab Results (last 72 hours)       Procedure Component Value Units Date/Time    POC Glucose Once [[id number]]  (Abnormal) Collected: 03/24/25 1708    Specimen: Blood Updated: 03/24/25 1714     Glucose 226 mg/dL     POC Glucose Once [[id number]]  (Abnormal) Collected: 03/24/25 1254    Specimen: Blood Updated: 03/24/25 1300     Glucose 229 mg/dL     Blood Culture - Blood, Arm, Right [[id number]]  (Abnormal) Collected: 03/20/25 1530    Specimen: Blood from Arm, Right Updated: 03/24/25 1036     Blood Culture Streptococcus intermedius     Isolated from Aerobic and Anaerobic Bottles     Gram Stain Anaerobic Bottle Gram positive cocci in pairs and chains      Aerobic Bottle Gram positive cocci in pairs and chains    Blood Culture - Blood, Hand, Right [[id number]]  (Normal) Collected: 03/23/25 0847    Specimen: Blood from Hand, Right Updated: 03/24/25 0901     Blood Culture No growth at 24 hours    Blood Culture - Blood, Arm, Right [[id number]]  (Normal) Collected: 03/23/25 0847    Specimen: Blood from Arm, Right Updated: 03/24/25 0901     Blood Culture No growth at 24 hours    POC Glucose Once [[id number]]  (Abnormal) Collected: 03/24/25 0807    Specimen: Blood Updated: 03/24/25 0813     Glucose 160 mg/dL     Basic Metabolic Panel [[id number]]  (Abnormal) Collected: 03/24/25 0400    Specimen: Blood Updated: 03/24/25 0445     Glucose 185 mg/dL      BUN 25 mg/dL      Creatinine 0.97 mg/dL      Sodium 134 mmol/L      Potassium 4.1 mmol/L      Chloride 102 mmol/L      CO2 20.8 mmol/L      Calcium 8.9 mg/dL      BUN/Creatinine Ratio 25.8     Anion Gap 11.2 mmol/L      eGFR 87.7 mL/min/1.73     Narrative:      GFR Categories in  Chronic Kidney Disease (CKD)      GFR Category          GFR (mL/min/1.73)    Interpretation  G1                     90 or greater         Normal or high (1)  G2                      60-89                Mild decrease (1)  G3a                   45-59                Mild to moderate decrease  G3b                   30-44                Moderate to severe decrease  G4                    15-29                Severe decrease  G5                    14 or less           Kidney failure          (1)In the absence of evidence of kidney disease, neither GFR category G1 or G2 fulfill the criteria for CKD.    eGFR calculation 2021 CKD-EPI creatinine equation, which does not include race as a factor    CBC & Differential [[id number]]  (Abnormal) Collected: 03/24/25 0400    Specimen: Blood Updated: 03/24/25 0429    Narrative:      The following orders were created for panel order CBC & Differential.  Procedure                               Abnormality         Status                     ---------                               -----------         ------                     CBC Auto Differential[[id number]]        Abnormal            Final result                 Please view results for these tests on the individual orders.    CBC Auto Differential [[id number]]  (Abnormal) Collected: 03/24/25 0400    Specimen: Blood Updated: 03/24/25 0429     WBC 9.66 10*3/mm3      RBC 5.06 10*6/mm3      Hemoglobin 13.7 g/dL      Hematocrit 42.3 %      MCV 83.6 fL      MCH 27.1 pg      MCHC 32.4 g/dL      RDW 15.2 %      RDW-SD 45.4 fl      MPV 11.2 fL      Platelets 234 10*3/mm3      Neutrophil % 71.2 %      Lymphocyte % 15.1 %      Monocyte % 11.5 %      Eosinophil % 0.9 %      Basophil % 0.4 %      Immature Grans % 0.9 %      Neutrophils, Absolute 6.87 10*3/mm3      Lymphocytes, Absolute 1.46 10*3/mm3      Monocytes, Absolute 1.11 10*3/mm3      Eosinophils, Absolute 0.09 10*3/mm3      Basophils, Absolute 0.04 10*3/mm3      Immature Grans, Absolute 0.09  10*3/mm3      nRBC 0.0 /100 WBC     POC Glucose Once [[id number]]  (Abnormal) Collected: 03/23/25 2226    Specimen: Blood Updated: 03/23/25 2232     Glucose 218 mg/dL     POC Glucose Once [[id number]]  (Abnormal) Collected: 03/23/25 1659    Specimen: Blood Updated: 03/23/25 1707     Glucose 177 mg/dL     POC Glucose Once [[id number]]  (Abnormal) Collected: 03/23/25 1223    Specimen: Blood Updated: 03/23/25 1232     Glucose 280 mg/dL     POC Glucose Once [[id number]]  (Abnormal) Collected: 03/23/25 0822    Specimen: Blood Updated: 03/23/25 0831     Glucose 174 mg/dL     Blood Culture - Blood, Arm, Left [[id number]]  (Abnormal) Collected: 03/20/25 1435    Specimen: Blood from Arm, Left Updated: 03/23/25 0642     Blood Culture Gram Positive Cocci     Isolated from Aerobic and Anaerobic Bottles     Gram Stain Anaerobic Bottle Gram positive cocci in pairs and chains      Aerobic Bottle Gram positive cocci in pairs and chains    Basic Metabolic Panel [[id number]]  (Abnormal) Collected: 03/23/25 0425    Specimen: Blood Updated: 03/23/25 0553     Glucose 149 mg/dL      BUN 29 mg/dL      Creatinine 1.01 mg/dL      Sodium 137 mmol/L      Potassium 4.2 mmol/L      Chloride 102 mmol/L      CO2 22.1 mmol/L      Calcium 9.1 mg/dL      BUN/Creatinine Ratio 28.7     Anion Gap 12.9 mmol/L      eGFR 83.6 mL/min/1.73     Narrative:      GFR Categories in Chronic Kidney Disease (CKD)      GFR Category          GFR (mL/min/1.73)    Interpretation  G1                     90 or greater         Normal or high (1)  G2                      60-89                Mild decrease (1)  G3a                   45-59                Mild to moderate decrease  G3b                   30-44                Moderate to severe decrease  G4                    15-29                Severe decrease  G5                    14 or less           Kidney failure          (1)In the absence of evidence of kidney disease, neither GFR category G1 or G2 fulfill the criteria for  CKD.    eGFR calculation 2021 CKD-EPI creatinine equation, which does not include race as a factor    Magnesium [[id number]]  (Normal) Collected: 03/23/25 0425    Specimen: Blood Updated: 03/23/25 0553     Magnesium 2.1 mg/dL     CBC & Differential [[id number]]  (Abnormal) Collected: 03/23/25 0425    Specimen: Blood Updated: 03/23/25 0533    Narrative:      The following orders were created for panel order CBC & Differential.  Procedure                               Abnormality         Status                     ---------                               -----------         ------                     CBC Auto Differential[[id number]]        Abnormal            Final result                 Please view results for these tests on the individual orders.    CBC Auto Differential [[id number]]  (Abnormal) Collected: 03/23/25 0425    Specimen: Blood Updated: 03/23/25 0533     WBC 11.94 10*3/mm3      RBC 5.31 10*6/mm3      Hemoglobin 14.5 g/dL      Hematocrit 43.9 %      MCV 82.7 fL      MCH 27.3 pg      MCHC 33.0 g/dL      RDW 15.1 %      RDW-SD 45.7 fl      MPV 11.2 fL      Platelets 317 10*3/mm3      Neutrophil % 78.2 %      Lymphocyte % 10.5 %      Monocyte % 9.6 %      Eosinophil % 0.6 %      Basophil % 0.4 %      Immature Grans % 0.7 %      Neutrophils, Absolute 9.34 10*3/mm3      Lymphocytes, Absolute 1.25 10*3/mm3      Monocytes, Absolute 1.15 10*3/mm3      Eosinophils, Absolute 0.07 10*3/mm3      Basophils, Absolute 0.05 10*3/mm3      Immature Grans, Absolute 0.08 10*3/mm3      nRBC 0.0 /100 WBC     POC Glucose Once [[id number]]  (Abnormal) Collected: 03/22/25 2018    Specimen: Blood Updated: 03/22/25 2024     Glucose 186 mg/dL     POC Glucose Once [[id number]]  (Abnormal) Collected: 03/22/25 1653    Specimen: Blood Updated: 03/22/25 1700     Glucose 195 mg/dL     POC Glucose Once [[id number]]  (Abnormal) Collected: 03/22/25 1226    Specimen: Blood Updated: 03/22/25 1234     Glucose 195 mg/dL     Blood Culture ID, PCR -  Blood, Arm, Right [[id number]]  (Abnormal) Collected: 03/20/25 1530    Specimen: Blood from Arm, Right Updated: 03/22/25 1030     BCID, PCR Streptococcus spp, not A, B, or pneumoniae. Identification by BCID2 PCR.     BOTTLE TYPE Anaerobic Bottle    POC Glucose Once [[id number]]  (Abnormal) Collected: 03/22/25 0832    Specimen: Blood Updated: 03/22/25 0849     Glucose 146 mg/dL     Basic Metabolic Panel [[id number]]  (Abnormal) Collected: 03/22/25 0807    Specimen: Blood Updated: 03/22/25 0846     Glucose 170 mg/dL      BUN 31 mg/dL      Creatinine 0.99 mg/dL      Sodium 135 mmol/L      Potassium 4.2 mmol/L      Chloride 101 mmol/L      CO2 24.5 mmol/L      Calcium 8.9 mg/dL      BUN/Creatinine Ratio 31.3     Anion Gap 9.5 mmol/L      eGFR 85.6 mL/min/1.73     Narrative:      GFR Categories in Chronic Kidney Disease (CKD)      GFR Category          GFR (mL/min/1.73)    Interpretation  G1                     90 or greater         Normal or high (1)  G2                      60-89                Mild decrease (1)  G3a                   45-59                Mild to moderate decrease  G3b                   30-44                Moderate to severe decrease  G4                    15-29                Severe decrease  G5                    14 or less           Kidney failure          (1)In the absence of evidence of kidney disease, neither GFR category G1 or G2 fulfill the criteria for CKD.    eGFR calculation 2021 CKD-EPI creatinine equation, which does not include race as a factor    Magnesium [[id number]]  (Normal) Collected: 03/22/25 0807    Specimen: Blood Updated: 03/22/25 0846     Magnesium 2.1 mg/dL     Vancomycin, Trough [[id number]]  (Normal) Collected: 03/22/25 0807    Specimen: Blood Updated: 03/22/25 0846     Vancomycin Trough 10.70 mcg/mL     Narrative:      Therapeutic Ranges for Vancomycin    Vancomycin Random   5.0-40.0 mcg/mL  Vancomycin Trough   5.0-20.0 mcg/mL  Vancomycin Peak     20.0-40.0 mcg/mL    CBC &  Differential [[id number]]  (Abnormal) Collected: 03/22/25 0807    Specimen: Blood Updated: 03/22/25 0818    Narrative:      The following orders were created for panel order CBC & Differential.  Procedure                               Abnormality         Status                     ---------                               -----------         ------                     CBC Auto Differential[[id number]]        Abnormal            Final result                 Please view results for these tests on the individual orders.    CBC Auto Differential [[id number]]  (Abnormal) Collected: 03/22/25 0807    Specimen: Blood Updated: 03/22/25 0818     WBC 17.00 10*3/mm3      RBC 5.12 10*6/mm3      Hemoglobin 14.0 g/dL      Hematocrit 43.0 %      MCV 84.0 fL      MCH 27.3 pg      MCHC 32.6 g/dL      RDW 15.2 %      RDW-SD 46.8 fl      MPV 10.9 fL      Platelets 271 10*3/mm3      Neutrophil % 83.4 %      Lymphocyte % 8.9 %      Monocyte % 6.9 %      Eosinophil % 0.1 %      Basophil % 0.2 %      Immature Grans % 0.5 %      Neutrophils, Absolute 14.19 10*3/mm3      Lymphocytes, Absolute 1.51 10*3/mm3      Monocytes, Absolute 1.17 10*3/mm3      Eosinophils, Absolute 0.01 10*3/mm3      Basophils, Absolute 0.03 10*3/mm3      Immature Grans, Absolute 0.09 10*3/mm3      nRBC 0.0 /100 WBC     Vancomycin, Peak [[id number]]  (Abnormal) Collected: 03/22/25 0107    Specimen: Blood from Arm, Left Updated: 03/22/25 0131     Vancomycin Peak 18.50 mcg/mL     Narrative:      Therapeutic Ranges for Vancomycin    Vancomycin Random   5.0-40.0 mcg/mL  Vancomycin Trough   5.0-20.0 mcg/mL  Vancomycin Peak     20.0-40.0 mcg/mL    POC Glucose Once [[id number]]  (Abnormal) Collected: 03/21/25 2119    Specimen: Blood Updated: 03/21/25 2124     Glucose 289 mg/dL           Imaging Results (Most Recent)       Procedure Component Value Units Date/Time    MRI Lumbar Spine Without Contrast [[id number]] Collected: 03/24/25 1424     Updated: 03/24/25 1437    Narrative:       MRI LUMBAR SPINE WO CONTRAST    Date of Exam: 3/24/2025 2:09 PM EDT    Indication: back pain, + blood cx.     Comparison: CT lumbar 3/22/2025    Technique:  Routine multiplanar/multisequence sequence images of the lumbar spine were obtained without contrast administration.        Findings:  The study is severely limited due to motion degradation despite attempts at optimal imaging.    There is evidence for edema within the paraspinal soft tissues along the right aspect of the lower lumbar spine at approximately the L3-L5 spinal levels as noted on the STIR sagittal sequence. There appears to be more focally pronounced fluid signal in   the region of the right posterior facet of the L4/5 level. The assessment of this region on axial imaging is severely limited due to motion degradation. The findings may indicate changes of septic arthropathy of the right posterior L4/5 facet with   adjacent edema and inflammation/cellulitis in the soft tissues. The focal fluid signal in this region may indicate an adjacent abscess measuring approximately 2 cm. Again assessment of this region is limited.    No abnormal signal is noted corresponding to the discs throughout the lumbar spine. No significant endplate changes are noted. There is no evidence for discitis/osteomyelitis.     The lumbar vertebral body alignment is within normal limits. No definitive focal abnormal bone marrow signal is identified. There is no evidence for edema or stress-related changes. There is loss of disc space signal at multiple levels related to disc   desiccation from age-related changes. No definitive abnormal signal is noted corresponding to the distal cord or nerve roots of the lumbar spine. The distal conus is identified at the L1/2 vertebral level. There is no evidence for abnormal fluid   collection within the central canal.    Multilevel discogenic joint changes and degenerative posterior facet changes are noted throughout the mid to lower lumbar  spine. These findings appear to be most pronounced at the L3/4 through L5/S1 levels. Again the assessment is limited due to motion   degradation.        Impression:      1.Severely limited study due to motion degradation despite attempts at optimal imaging.  2.Evidence for abnormal edema within the paraspinal soft tissues along the right posterior lateral margin of the L3-L5 spinal levels. There is suggestion of more focally pronounced fluid signal in this region. The findings appear to be centered around   the level of the right L4/5 posterior facet. The findings may indicate inflammation/infection and this region. The changes may be related to right posterior facet septic arthropathy with adjacent soft tissue cellulitis. The more focally pronounced fluid   signal focus may indicate developing abscess in this region measuring 2 cm. Again the assessment is markedly limited.  3.No evidence for discitis/osteomyelitis. No evidence for abscess involving the central canal.  4.Degenerative changes are seen throughout the mid to lower lumbar spine.        Electronically Signed: Rony Coronado MD    3/24/2025 2:34 PM EDT    Workstation ID: [id number]    MRI Thoracic Spine Without Contrast [[id number]] Collected: 03/24/25 1420     Updated: 03/24/25 1426    Narrative:      MRI THORACIC SPINE WO CONTRAST    Date of Exam: 3/24/2025 2:10 PM EDT    Indication: back pain, + blood cx.     Comparison: None available.    Technique:  Routine multiplanar/multisequence sequence images of the thoracic spine were obtained without contrast administration.        FINDINGS:  The thoracic vertebral body alignment is within normal limits. Mildly heterogeneous degenerative bone marrow signal is seen throughout the thoracic spine. No abnormal marrow edema is observed. There is no abnormal marrow replacement. Incidental   hemangiomas are noted. There is no compression fracture deformity. There is no fracture or subluxation. Age-related changes are  noted at multiple disc space levels. Otherwise no significant abnormal signal is noted involving the discs. There are no signs   of discitis/osteomyelitis.    No abnormal signal is seen throughout the thoracic cord. No abnormal fluid collections are seen in the central canal. There is no evidence for abscess. No significant posterior disc bulge or disc protrusion/extrusion is observed. The central canal is   grossly patent. The neural foramen are grossly patent bilaterally.    No abnormal fluid collections are seen within the paraspinal soft tissues.      Impression:      1.No evidence for discitis/osteomyelitis. No evidence for abscess..      Electronically Signed: Rony Coronado MD    3/24/2025 2:23 PM EDT    Workstation ID: [id number]    MRI Outside Films [[id number]] Resulted: 03/24/25 1015     Updated: 03/24/25 1015    Narrative:      This procedure was auto-finalized with no dictation required.    CT Lumbar Spine With & Without Contrast [[id number]] Collected: 03/22/25 1430     Updated: 03/22/25 1440    Narrative:      CT LUMBAR SPINE W WO CONTRAST    Date of Exam: 3/22/2025 12:23 PM EDT    Indication: back pain, bacteremia.    Comparison: None available.    Technique: Axial CT images were obtained of the lumbar spine before and after the uneventful intravenous administration of 100 mL of Isovue 370.  Sagittal and coronal reconstructions were performed.  Automated exposure control and iterative   reconstruction methods were used.      Findings:  No acute fracture or subluxation is identified. Moderate lumbar spondylosis is present. Alignment is otherwise unremarkable. Vertebral body heights are maintained. Lumbar lordosis is preserved. No osseous erosions are seen. There is bilateral neural   foraminal narrowing at L3-L4, L4-L5, and L5-S1. Suggestion of spinal canal stenosis at L3-L4 and L4-L5. There are degenerative changes of the sacroiliac joints. Paraspinal soft tissues demonstrate no acute abnormality.  Visualized intra-abdominal   compartment demonstrates no acute abnormality. Bibasilar atelectasis is seen.      Impression:      Impression:  1.No acute fracture or subluxation is identified within the lumbar spine.  2.Lumbar spondylosis. Neural foraminal narrowing is present at L3-L4, L4-L5, and L5-S1. Possible spinal canal stenosis at L3-L4 and L4-L5.  3.No suspicious contrast enhancement is identified.  4.Please note that this is an insensitive test to detect early signs of discitis/osteomyelitis. If there is suspicion for discitis/osteomyelitis, recommend further evaluation with MRI with and without IV contrast which could also better characterized   degenerative changes of the spine.          Electronically Signed: Deshaun Sneed MD    3/22/2025 2:37 PM EDT    Workstation ID: [id number]    XR Foot 3+ View Left [[id number]] Collected: 03/20/25 1912     Updated: 03/20/25 1922    Narrative:      XR ANKLE 3+ VW LEFT, XR FOOT 3+ VW LEFT    Date of Exam: 3/20/2025 5:21 PM EDT    Indication: left foot pain, swelling, leukocytosis    Comparison: Left foot and ankle x-rays 1/23/2020    Findings:  Ankle: There is diffuse soft tissue swelling, but no acute fracture or dislocation is identified. Arthritic change with narrowing of the tibiotalar joint has progressed compared to 1/23/2020 x-rays. There are mildly increased soft tissue calcifications.    Foot: There are increased arthritic changes of the midfoot and hindfoot. There is stable narrowing of the great toe metatarsal phalangeal joint. Plantar calcaneal spur with associated calcifications appear similar. No acute fracture or dislocation is   identified.      Impression:      Impression:  1.Soft tissue swelling without definite or acute osseous abnormality.  2.Progression of ankle and foot arthritic changes compared to 2020 x-rays.        Electronically Signed: Roxann Avery    3/20/2025 7:19 PM EDT    Workstation ID: [id number]    XR Ankle 3+ View Left [[id number]]  Collected: 03/20/25 1912     Updated: 03/20/25 1922    Narrative:      XR ANKLE 3+ VW LEFT, XR FOOT 3+ VW LEFT    Date of Exam: 3/20/2025 5:21 PM EDT    Indication: left foot pain, swelling, leukocytosis    Comparison: Left foot and ankle x-rays 1/23/2020    Findings:  Ankle: There is diffuse soft tissue swelling, but no acute fracture or dislocation is identified. Arthritic change with narrowing of the tibiotalar joint has progressed compared to 1/23/2020 x-rays. There are mildly increased soft tissue calcifications.    Foot: There are increased arthritic changes of the midfoot and hindfoot. There is stable narrowing of the great toe metatarsal phalangeal joint. Plantar calcaneal spur with associated calcifications appear similar. No acute fracture or dislocation is   identified.      Impression:      Impression:  1.Soft tissue swelling without definite or acute osseous abnormality.  2.Progression of ankle and foot arthritic changes compared to 2020 x-rays.        Electronically Signed: Roxann Avery    3/20/2025 7:19 PM EDT    Workstation ID: [id number]    XR Chest 2 View [[id number]] Collected: 03/20/25 1526     Updated: 03/20/25 1533    Narrative:      XR CHEST 2 VW    Date of Exam: 3/20/2025 2:54 PM EDT    Indication: Evaluate for pneumonia    Comparison: 9/14/2015    FINDINGS:  No new consolidations or pleural effusions are observed. The cardiac silhouette and mediastinum are unchanged. No acute osseous abnormalities are seen.      Impression:      No change from the previous study with no evidence for acute cardiopulmonary process.      Electronically Signed: Rony Coronado MD    3/20/2025 3:29 PM EDT    Workstation ID: [id number]    CT Abdomen Pelvis Without Contrast [[id number]] Collected: 03/20/25 1120     Updated: 03/20/25 1207    Narrative:      CT ABDOMEN PELVIS WO CONTRAST    Date of Exam: 3/20/2025 11:05 AM EDT    Indication: Left flank pain. Back pain and left flank pain for 2 days.    Comparison: None  available.    Technique: Axial CT images were obtained of the abdomen and pelvis without the administration of contrast. Sagittal and coronal reconstructions were performed.  Automated exposure control and iterative reconstruction methods were used.      FINDINGS:  The lung bases are clear without evidence for focal consolidation or significant pleural effusion.    The liver, spleen, and pancreas are unremarkable without contrast. The gallbladder and bile ducts are unremarkable. The bilateral adrenal glands are symmetric and unremarkable without contrast.     No definitive nephrolithiasis is seen bilaterally. There is no hydronephrosis or hydroureter. There is no evidence for obstructive uropathy. No stones are seen in the bladder. The bilateral kidneys are otherwise unremarkable without contrast.    There is no bowel dilatation or obstruction. A normal-appearing decompressed appendix or appendiceal stub is observed. There is no evidence for acute appendicitis. No significant free fluid is observed. No abnormal fluid collections are identified. No   significant lymphadenopathy is seen throughout the abdomen or pelvis. The bladder is partially decompressed. Fat-containing bilateral inguinal hernias are noted.    No acute osseous abnormalities are observed. Moderate degenerative changes are noted involving the lumbar spine. No definitive large posterior disc protrusion/extrusion is seen.      Impression:      1.No evidence for significant nephrolithiasis. There is no evidence for obstructive uropathy.  2.No evidence for acute abnormality throughout the abdomen or pelvis on this noncontrast exam.            Electronically Signed: Rony Coronado MD    3/20/2025 11:24 AM EDT    Workstation ID: [id number]               Blood Culture   Date Value Ref Range Status   03/23/2025 No growth at 24 hours  Preliminary   03/23/2025 No growth at 24 hours  Preliminary            Results for orders placed during the hospital encounter  of 03/20/25    Adult Transthoracic Echo Complete W/ Cont if Necessary Per Protocol    Interpretation Summary    Left ventricular systolic function is normal. Calculated left ventricular EF = 62.1%    The left ventricular cavity is mildly dilated.    Left ventricular diastolic function was normal.    Moderate dilation of the aortic root is present. The aortic root measures 4.7 cm. Mild dilation of the ascending aorta is present 3.7 cm.    Borderline dilation of the aortic root is present. Aortic root = 3.7 cm borderline dilation of the ascending aorta is present. Ascending aorta = 3.7 cm      Imaging Results (All)       Procedure Component Value Units Date/Time    MRI Lumbar Spine Without Contrast [[id number]] Collected: 03/24/25 1424     Updated: 03/24/25 1437    Narrative:      MRI LUMBAR SPINE WO CONTRAST    Date of Exam: 3/24/2025 2:09 PM EDT    Indication: back pain, + blood cx.     Comparison: CT lumbar 3/22/2025    Technique:  Routine multiplanar/multisequence sequence images of the lumbar spine were obtained without contrast administration.        Findings:  The study is severely limited due to motion degradation despite attempts at optimal imaging.    There is evidence for edema within the paraspinal soft tissues along the right aspect of the lower lumbar spine at approximately the L3-L5 spinal levels as noted on the STIR sagittal sequence. There appears to be more focally pronounced fluid signal in   the region of the right posterior facet of the L4/5 level. The assessment of this region on axial imaging is severely limited due to motion degradation. The findings may indicate changes of septic arthropathy of the right posterior L4/5 facet with   adjacent edema and inflammation/cellulitis in the soft tissues. The focal fluid signal in this region may indicate an adjacent abscess measuring approximately 2 cm. Again assessment of this region is limited.    No abnormal signal is noted corresponding to the discs  throughout the lumbar spine. No significant endplate changes are noted. There is no evidence for discitis/osteomyelitis.     The lumbar vertebral body alignment is within normal limits. No definitive focal abnormal bone marrow signal is identified. There is no evidence for edema or stress-related changes. There is loss of disc space signal at multiple levels related to disc   desiccation from age-related changes. No definitive abnormal signal is noted corresponding to the distal cord or nerve roots of the lumbar spine. The distal conus is identified at the L1/2 vertebral level. There is no evidence for abnormal fluid   collection within the central canal.    Multilevel discogenic joint changes and degenerative posterior facet changes are noted throughout the mid to lower lumbar spine. These findings appear to be most pronounced at the L3/4 through L5/S1 levels. Again the assessment is limited due to motion   degradation.        Impression:      1.Severely limited study due to motion degradation despite attempts at optimal imaging.  2.Evidence for abnormal edema within the paraspinal soft tissues along the right posterior lateral margin of the L3-L5 spinal levels. There is suggestion of more focally pronounced fluid signal in this region. The findings appear to be centered around   the level of the right L4/5 posterior facet. The findings may indicate inflammation/infection and this region. The changes may be related to right posterior facet septic arthropathy with adjacent soft tissue cellulitis. The more focally pronounced fluid   signal focus may indicate developing abscess in this region measuring 2 cm. Again the assessment is markedly limited.  3.No evidence for discitis/osteomyelitis. No evidence for abscess involving the central canal.  4.Degenerative changes are seen throughout the mid to lower lumbar spine.        Electronically Signed: Rony Coronado MD    3/24/2025 2:34 PM EDT    Workstation ID: [id number]     MRI Thoracic Spine Without Contrast [[id number]] Collected: 03/24/25 1420     Updated: 03/24/25 1426    Narrative:      MRI THORACIC SPINE WO CONTRAST    Date of Exam: 3/24/2025 2:10 PM EDT    Indication: back pain, + blood cx.     Comparison: None available.    Technique:  Routine multiplanar/multisequence sequence images of the thoracic spine were obtained without contrast administration.        FINDINGS:  The thoracic vertebral body alignment is within normal limits. Mildly heterogeneous degenerative bone marrow signal is seen throughout the thoracic spine. No abnormal marrow edema is observed. There is no abnormal marrow replacement. Incidental   hemangiomas are noted. There is no compression fracture deformity. There is no fracture or subluxation. Age-related changes are noted at multiple disc space levels. Otherwise no significant abnormal signal is noted involving the discs. There are no signs   of discitis/osteomyelitis.    No abnormal signal is seen throughout the thoracic cord. No abnormal fluid collections are seen in the central canal. There is no evidence for abscess. No significant posterior disc bulge or disc protrusion/extrusion is observed. The central canal is   grossly patent. The neural foramen are grossly patent bilaterally.    No abnormal fluid collections are seen within the paraspinal soft tissues.      Impression:      1.No evidence for discitis/osteomyelitis. No evidence for abscess..      Electronically Signed: Rony Coronado MD    3/24/2025 2:23 PM EDT    Workstation ID: [id number]    MRI Outside Films [[id number]] Resulted: 03/24/25 1015     Updated: 03/24/25 1015    Narrative:      This procedure was auto-finalized with no dictation required.    CT Lumbar Spine With & Without Contrast [[id number]] Collected: 03/22/25 1430     Updated: 03/22/25 1440    Narrative:      CT LUMBAR SPINE W WO CONTRAST    Date of Exam: 3/22/2025 12:23 PM EDT    Indication: back pain, bacteremia.    Comparison:  None available.    Technique: Axial CT images were obtained of the lumbar spine before and after the uneventful intravenous administration of 100 mL of Isovue 370.  Sagittal and coronal reconstructions were performed.  Automated exposure control and iterative   reconstruction methods were used.      Findings:  No acute fracture or subluxation is identified. Moderate lumbar spondylosis is present. Alignment is otherwise unremarkable. Vertebral body heights are maintained. Lumbar lordosis is preserved. No osseous erosions are seen. There is bilateral neural   foraminal narrowing at L3-L4, L4-L5, and L5-S1. Suggestion of spinal canal stenosis at L3-L4 and L4-L5. There are degenerative changes of the sacroiliac joints. Paraspinal soft tissues demonstrate no acute abnormality. Visualized intra-abdominal   compartment demonstrates no acute abnormality. Bibasilar atelectasis is seen.      Impression:      Impression:  1.No acute fracture or subluxation is identified within the lumbar spine.  2.Lumbar spondylosis. Neural foraminal narrowing is present at L3-L4, L4-L5, and L5-S1. Possible spinal canal stenosis at L3-L4 and L4-L5.  3.No suspicious contrast enhancement is identified.  4.Please note that this is an insensitive test to detect early signs of discitis/osteomyelitis. If there is suspicion for discitis/osteomyelitis, recommend further evaluation with MRI with and without IV contrast which could also better characterized   degenerative changes of the spine.          Electronically Signed: Deshaun Sneed MD    3/22/2025 2:37 PM EDT    Workstation ID: [id number]    XR Foot 3+ View Left [[id number]] Collected: 03/20/25 1912     Updated: 03/20/25 1922    Narrative:      XR ANKLE 3+ VW LEFT, XR FOOT 3+ VW LEFT    Date of Exam: 3/20/2025 5:21 PM EDT    Indication: left foot pain, swelling, leukocytosis    Comparison: Left foot and ankle x-rays 1/23/2020    Findings:  Ankle: There is diffuse soft tissue swelling, but no acute  fracture or dislocation is identified. Arthritic change with narrowing of the tibiotalar joint has progressed compared to 1/23/2020 x-rays. There are mildly increased soft tissue calcifications.    Foot: There are increased arthritic changes of the midfoot and hindfoot. There is stable narrowing of the great toe metatarsal phalangeal joint. Plantar calcaneal spur with associated calcifications appear similar. No acute fracture or dislocation is   identified.      Impression:      Impression:  1.Soft tissue swelling without definite or acute osseous abnormality.  2.Progression of ankle and foot arthritic changes compared to 2020 x-rays.        Electronically Signed: Roxann Avery    3/20/2025 7:19 PM EDT    Workstation ID: [id number]    XR Ankle 3+ View Left [[id number]] Collected: 03/20/25 1912     Updated: 03/20/25 1922    Narrative:      XR ANKLE 3+ VW LEFT, XR FOOT 3+ VW LEFT    Date of Exam: 3/20/2025 5:21 PM EDT    Indication: left foot pain, swelling, leukocytosis    Comparison: Left foot and ankle x-rays 1/23/2020    Findings:  Ankle: There is diffuse soft tissue swelling, but no acute fracture or dislocation is identified. Arthritic change with narrowing of the tibiotalar joint has progressed compared to 1/23/2020 x-rays. There are mildly increased soft tissue calcifications.    Foot: There are increased arthritic changes of the midfoot and hindfoot. There is stable narrowing of the great toe metatarsal phalangeal joint. Plantar calcaneal spur with associated calcifications appear similar. No acute fracture or dislocation is   identified.      Impression:      Impression:  1.Soft tissue swelling without definite or acute osseous abnormality.  2.Progression of ankle and foot arthritic changes compared to 2020 x-rays.        Electronically Signed: Roxann Avery    3/20/2025 7:19 PM EDT    Workstation ID: [id number]    XR Chest 2 View [[id number]] Collected: 03/20/25 1526     Updated: 03/20/25 1533    Narrative:       XR CHEST 2 VW    Date of Exam: 3/20/2025 2:54 PM EDT    Indication: Evaluate for pneumonia    Comparison: 9/14/2015    FINDINGS:  No new consolidations or pleural effusions are observed. The cardiac silhouette and mediastinum are unchanged. No acute osseous abnormalities are seen.      Impression:      No change from the previous study with no evidence for acute cardiopulmonary process.      Electronically Signed: Rony Coronado MD    3/20/2025 3:29 PM EDT    Workstation ID: [id number]    CT Abdomen Pelvis Without Contrast [[id number]] Collected: 03/20/25 1120     Updated: 03/20/25 1207    Narrative:      CT ABDOMEN PELVIS WO CONTRAST    Date of Exam: 3/20/2025 11:05 AM EDT    Indication: Left flank pain. Back pain and left flank pain for 2 days.    Comparison: None available.    Technique: Axial CT images were obtained of the abdomen and pelvis without the administration of contrast. Sagittal and coronal reconstructions were performed.  Automated exposure control and iterative reconstruction methods were used.      FINDINGS:  The lung bases are clear without evidence for focal consolidation or significant pleural effusion.    The liver, spleen, and pancreas are unremarkable without contrast. The gallbladder and bile ducts are unremarkable. The bilateral adrenal glands are symmetric and unremarkable without contrast.     No definitive nephrolithiasis is seen bilaterally. There is no hydronephrosis or hydroureter. There is no evidence for obstructive uropathy. No stones are seen in the bladder. The bilateral kidneys are otherwise unremarkable without contrast.    There is no bowel dilatation or obstruction. A normal-appearing decompressed appendix or appendiceal stub is observed. There is no evidence for acute appendicitis. No significant free fluid is observed. No abnormal fluid collections are identified. No   significant lymphadenopathy is seen throughout the abdomen or pelvis. The bladder is partially decompressed.  Fat-containing bilateral inguinal hernias are noted.    No acute osseous abnormalities are observed. Moderate degenerative changes are noted involving the lumbar spine. No definitive large posterior disc protrusion/extrusion is seen.      Impression:      1.No evidence for significant nephrolithiasis. There is no evidence for obstructive uropathy.  2.No evidence for acute abnormality throughout the abdomen or pelvis on this noncontrast exam.            Electronically Signed: Rony Coronado MD    3/20/2025 11:24 AM EDT    Workstation ID: [id number]                  Discharge Disposition:  Short Term Hospital ([facility])    Discharge Medications:     Discharge Medications        ASK your doctor about these medications        Instructions Start Date   amLODIPine 10 MG tablet  Commonly known as: NORVASC   1 tablet, Daily      atorvastatin 20 MG tablet  Commonly known as: LIPITOR   1 tablet, Oral, Nightly      DULoxetine 60 MG capsule  Commonly known as: CYMBALTA   1 capsule, Daily      furosemide 20 MG tablet  Commonly known as: LASIX   20 mg, Oral, Daily PRN      HYDROcodone-acetaminophen 5-325 MG per tablet  Commonly known as: NORCO   1 tablet, Oral, 2 Times Daily      Insulin Degludec 200 UNIT/ML solution pen-injector pen injection  Commonly known as: TRESIBA FLEXTOUCH   150 Units, 2 Times Daily      Insulin Lispro (1 Unit Dial) 100 UNIT/ML solution pen-injector  Commonly known as: HUMALOG   Inject 5-10 units before breakfast and supper      insulin NPH-insulin regular (70-30) 100 UNIT/ML injection  Commonly known as: humuLIN 70/30,novoLIN 70/30   1 mL, As Needed      losartan 100 MG tablet  Commonly known as: COZAAR   1 tablet, Daily      metFORMIN  MG 24 hr tablet  Commonly known as: GLUCOPHAGE-XR  Ask about: Which instructions should I use?   500 mg, 2 Times Daily      metoprolol succinate XL 50 MG 24 hr tablet  Commonly known as: TOPROL-XL   1 tablet, Daily      pregabalin 150 MG  capsule  Commonly known as: LYRICA   150 mg, 2 Times Daily      spironolactone 25 MG tablet  Commonly known as: ALDACTONE   25 mg, Daily      Testosterone Cypionate 200 MG/ML injection  Commonly known as: DEPOTESTOTERONE CYPIONATE   1 mL, Every 14 Days      Toujeo SoloStar 300 UNIT/ML solution pen-injector injection  Generic drug: Insulin Glargine (1 Unit Dial)   150 Units                   Test Results Pending at Discharge:  Pending Labs       Order Current Status    Blood Culture - Blood, Arm, Left Preliminary result    Blood Culture - Blood, Arm, Right Preliminary result    Blood Culture - Blood, Arm, Right Preliminary result    Blood Culture - Blood, Hand, Right Preliminary result          Pending Results       Procedure [Order ID] Specimen - Date/Time    MRI Foot Left With & Without Contrast [[id number]]             Condition on Discharge:    Stable      Risk for Readmission (LACE): Score: 7 (3/24/2025  6:00 AM)          Kathleen Zhong DO  03/24/25  21:58 EDT    Time: Discharge 55 min with face-to-face history/exam, writing all prescriptions, and documenting discharge data including care coordination            Note disclaimer: At [facility], we believe that sharing information builds trust and better relationships. You are receiving this note because you recently visited [facility]. It is possible you will see health information before a provider has talked with you about it. This kind of information can be easy to misunderstand. To help you fully understand what it means for your health, we urge you to discuss this note with your provider.

## 2025-03-25 NOTE — CASE MANAGEMENT/SOCIAL WORK
Case Management Discharge Note      Final Note: dc to [facility]         Selected Continued Care - Discharged on 3/24/2025 Admission date: 3/20/2025 - Discharge disposition: Short Term Hospital ([facility])      Destination    No services have been selected for the patient.                Durable Medical Equipment    No services have been selected for the patient.                Dialysis/Infusion    No services have been selected for the patient.                Home Medical Care    No services have been selected for the patient.                Therapy    No services have been selected for the patient.                Community Resources    No services have been selected for the patient.                Community & DME    No services have been selected for the patient.                         Final Discharge Disposition Code: 02 - [facility] for [facility]

## 2025-03-26 LAB
ALBUMIN SERPL-MCNC: 3 G/DL (ref 3.5–5.2)
ALP SERPL-CCNC: 116 U/L (ref 39–117)
ALT SERPL W P-5'-P-CCNC: 21 U/L (ref 1–41)
ANION GAP SERPL CALCULATED.3IONS-SCNC: 11 MMOL/L (ref 5–15)
AST SERPL-CCNC: 16 U/L (ref 1–40)
BASOPHILS # BLD AUTO: 0.02 10*3/MM3 (ref 0–0.2)
BASOPHILS NFR BLD AUTO: 0.1 % (ref 0–1.5)
BILIRUB CONJ SERPL-MCNC: 0.1 MG/DL (ref 0–0.3)
BILIRUB INDIRECT SERPL-MCNC: 0.3 MG/DL
BILIRUB SERPL-MCNC: 0.4 MG/DL (ref 0–1.2)
BUN SERPL-MCNC: 17 MG/DL (ref 8–23)
BUN/CREAT SERPL: 20.7 (ref 7–25)
C DIFF GDH + TOXINS A+B STL QL IA.RAPID: POSITIVE
C DIFF TOX GENS STL QL NAA+PROBE: POSITIVE
CALCIUM SPEC-SCNC: 8.4 MG/DL (ref 8.6–10.5)
CHLORIDE SERPL-SCNC: 100 MMOL/L (ref 98–107)
CO2 SERPL-SCNC: 22 MMOL/L (ref 22–29)
CREAT SERPL-MCNC: 0.82 MG/DL (ref 0.76–1.27)
DEPRECATED RDW RBC AUTO: 43.2 FL (ref 37–54)
EGFRCR SERPLBLD CKD-EPI 2021: 98.7 ML/MIN/1.73
EOSINOPHIL # BLD AUTO: 0.19 10*3/MM3 (ref 0–0.4)
EOSINOPHIL NFR BLD AUTO: 1.4 % (ref 0.3–6.2)
ERYTHROCYTE [DISTWIDTH] IN BLOOD BY AUTOMATED COUNT: 14.3 % (ref 12.3–15.4)
GLUCOSE BLDC GLUCOMTR-MCNC: 176 MG/DL (ref 70–130)
GLUCOSE BLDC GLUCOMTR-MCNC: 214 MG/DL (ref 70–130)
GLUCOSE BLDC GLUCOMTR-MCNC: 240 MG/DL (ref 70–130)
GLUCOSE BLDC GLUCOMTR-MCNC: 240 MG/DL (ref 70–130)
GLUCOSE SERPL-MCNC: 188 MG/DL (ref 65–99)
HCT VFR BLD AUTO: 40.7 % (ref 37.5–51)
HGB BLD-MCNC: 13.3 G/DL (ref 13–17.7)
IMM GRANULOCYTES # BLD AUTO: 0.12 10*3/MM3 (ref 0–0.05)
IMM GRANULOCYTES NFR BLD AUTO: 0.9 % (ref 0–0.5)
LYMPHOCYTES # BLD AUTO: 1.1 10*3/MM3 (ref 0.7–3.1)
LYMPHOCYTES NFR BLD AUTO: 7.8 % (ref 19.6–45.3)
MAGNESIUM SERPL-MCNC: 2 MG/DL (ref 1.6–2.4)
MCH RBC QN AUTO: 27.1 PG (ref 26.6–33)
MCHC RBC AUTO-ENTMCNC: 32.7 G/DL (ref 31.5–35.7)
MCV RBC AUTO: 83.1 FL (ref 79–97)
MONOCYTES # BLD AUTO: 1.23 10*3/MM3 (ref 0.1–0.9)
MONOCYTES NFR BLD AUTO: 8.8 % (ref 5–12)
NEUTROPHILS NFR BLD AUTO: 11.37 10*3/MM3 (ref 1.7–7)
NEUTROPHILS NFR BLD AUTO: 81 % (ref 42.7–76)
NRBC BLD AUTO-RTO: 0 /100 WBC (ref 0–0.2)
PHOSPHATE SERPL-MCNC: 2.9 MG/DL (ref 2.5–4.5)
PLATELET # BLD AUTO: 299 10*3/MM3 (ref 140–450)
PMV BLD AUTO: 9.8 FL (ref 6–12)
POTASSIUM SERPL-SCNC: 4.3 MMOL/L (ref 3.5–5.2)
PROT SERPL-MCNC: 6.7 G/DL (ref 6–8.5)
RBC # BLD AUTO: 4.9 10*6/MM3 (ref 4.14–5.8)
SODIUM SERPL-SCNC: 133 MMOL/L (ref 136–145)
WBC NRBC COR # BLD AUTO: 14.03 10*3/MM3 (ref 3.4–10.8)

## 2025-03-26 PROCEDURE — 83735 ASSAY OF MAGNESIUM: CPT | Performed by: HOSPITALIST

## 2025-03-26 PROCEDURE — 80076 HEPATIC FUNCTION PANEL: CPT | Performed by: HOSPITALIST

## 2025-03-26 PROCEDURE — 25810000003 SODIUM CHLORIDE 0.9 % SOLUTION: Performed by: INTERNAL MEDICINE

## 2025-03-26 PROCEDURE — 97535 SELF CARE MNGMENT TRAINING: CPT

## 2025-03-26 PROCEDURE — 87493 C DIFF AMPLIFIED PROBE: CPT | Performed by: HOSPITALIST

## 2025-03-26 PROCEDURE — 25010000002 CEFTRIAXONE PER 250 MG: Performed by: INTERNAL MEDICINE

## 2025-03-26 PROCEDURE — 63710000001 ONDANSETRON ODT 4 MG TABLET DISPERSIBLE: Performed by: INTERNAL MEDICINE

## 2025-03-26 PROCEDURE — 63710000001 INSULIN GLARGINE PER 5 UNITS: Performed by: HOSPITALIST

## 2025-03-26 PROCEDURE — 87449 NOS EACH ORGANISM AG IA: CPT | Performed by: HOSPITALIST

## 2025-03-26 PROCEDURE — 25010000002 MORPHINE PER 10 MG: Performed by: INTERNAL MEDICINE

## 2025-03-26 PROCEDURE — 97166 OT EVAL MOD COMPLEX 45 MIN: CPT

## 2025-03-26 PROCEDURE — 25010000002 VANCOMYCIN 10 G RECONSTITUTED SOLUTION: Performed by: INTERNAL MEDICINE

## 2025-03-26 PROCEDURE — 85025 COMPLETE CBC W/AUTO DIFF WBC: CPT | Performed by: HOSPITALIST

## 2025-03-26 PROCEDURE — 84100 ASSAY OF PHOSPHORUS: CPT | Performed by: HOSPITALIST

## 2025-03-26 PROCEDURE — 63710000001 INSULIN LISPRO (HUMAN) PER 5 UNITS: Performed by: HOSPITALIST

## 2025-03-26 PROCEDURE — 82948 REAGENT STRIP/BLOOD GLUCOSE: CPT

## 2025-03-26 PROCEDURE — 80048 BASIC METABOLIC PNL TOTAL CA: CPT | Performed by: INTERNAL MEDICINE

## 2025-03-26 RX ORDER — VANCOMYCIN HYDROCHLORIDE 125 MG/1
125 CAPSULE ORAL EVERY 6 HOURS SCHEDULED
Status: DISCONTINUED | OUTPATIENT
Start: 2025-03-27 | End: 2025-03-28 | Stop reason: HOSPADM

## 2025-03-26 RX ADMIN — DULOXETINE 60 MG: 60 CAPSULE, DELAYED RELEASE ORAL at 08:01

## 2025-03-26 RX ADMIN — PETROLATUM 1 APPLICATION: 420 OINTMENT TOPICAL at 20:48

## 2025-03-26 RX ADMIN — Medication 10 ML: at 08:03

## 2025-03-26 RX ADMIN — HYDROCODONE BITARTRATE AND ACETAMINOPHEN 1 TABLET: 7.5; 325 TABLET ORAL at 12:44

## 2025-03-26 RX ADMIN — ONDANSETRON 4 MG: 4 TABLET, ORALLY DISINTEGRATING ORAL at 20:47

## 2025-03-26 RX ADMIN — INSULIN LISPRO 4 UNITS: 100 INJECTION, SOLUTION INTRAVENOUS; SUBCUTANEOUS at 17:32

## 2025-03-26 RX ADMIN — METHOCARBAMOL TABLETS 750 MG: 750 TABLET, COATED ORAL at 16:53

## 2025-03-26 RX ADMIN — AMLODIPINE BESYLATE 10 MG: 10 TABLET ORAL at 08:01

## 2025-03-26 RX ADMIN — ACETAMINOPHEN 650 MG: 325 TABLET, FILM COATED ORAL at 20:47

## 2025-03-26 RX ADMIN — Medication 10 ML: at 20:49

## 2025-03-26 RX ADMIN — LOSARTAN POTASSIUM 100 MG: 50 TABLET, FILM COATED ORAL at 08:01

## 2025-03-26 RX ADMIN — INSULIN LISPRO 2 UNITS: 100 INJECTION, SOLUTION INTRAVENOUS; SUBCUTANEOUS at 08:02

## 2025-03-26 RX ADMIN — METHOCARBAMOL TABLETS 750 MG: 750 TABLET, COATED ORAL at 10:44

## 2025-03-26 RX ADMIN — MORPHINE SULFATE 4 MG: 2 INJECTION, SOLUTION INTRAMUSCULAR; INTRAVENOUS at 06:34

## 2025-03-26 RX ADMIN — VANCOMYCIN HYDROCHLORIDE 1500 MG: 10 INJECTION, POWDER, LYOPHILIZED, FOR SOLUTION INTRAVENOUS at 08:03

## 2025-03-26 RX ADMIN — INSULIN LISPRO 4 UNITS: 100 INJECTION, SOLUTION INTRAVENOUS; SUBCUTANEOUS at 20:48

## 2025-03-26 RX ADMIN — MORPHINE SULFATE 4 MG: 2 INJECTION, SOLUTION INTRAMUSCULAR; INTRAVENOUS at 16:50

## 2025-03-26 RX ADMIN — VANCOMYCIN HYDROCHLORIDE 1500 MG: 10 INJECTION, POWDER, LYOPHILIZED, FOR SOLUTION INTRAVENOUS at 20:47

## 2025-03-26 RX ADMIN — PETROLATUM 1 APPLICATION: 420 OINTMENT TOPICAL at 08:03

## 2025-03-26 RX ADMIN — LIDOCAINE 2 PATCH: 4 PATCH TOPICAL at 08:02

## 2025-03-26 RX ADMIN — PREGABALIN 150 MG: 75 CAPSULE ORAL at 08:01

## 2025-03-26 RX ADMIN — HYDROCODONE BITARTRATE AND ACETAMINOPHEN 1 TABLET: 7.5; 325 TABLET ORAL at 08:01

## 2025-03-26 RX ADMIN — INSULIN LISPRO 4 UNITS: 100 INJECTION, SOLUTION INTRAVENOUS; SUBCUTANEOUS at 12:43

## 2025-03-26 RX ADMIN — CEFTRIAXONE 2000 MG: 2 INJECTION, POWDER, FOR SOLUTION INTRAMUSCULAR; INTRAVENOUS at 16:58

## 2025-03-26 RX ADMIN — HYDROCODONE BITARTRATE AND ACETAMINOPHEN 1 TABLET: 7.5; 325 TABLET ORAL at 01:43

## 2025-03-26 RX ADMIN — ATORVASTATIN CALCIUM 20 MG: 20 TABLET, FILM COATED ORAL at 20:47

## 2025-03-26 RX ADMIN — PREGABALIN 150 MG: 75 CAPSULE ORAL at 20:47

## 2025-03-26 RX ADMIN — INSULIN GLARGINE 50 UNITS: 100 INJECTION, SOLUTION SUBCUTANEOUS at 20:47

## 2025-03-26 RX ADMIN — HYDROCODONE BITARTRATE AND ACETAMINOPHEN 1 TABLET: 7.5; 325 TABLET ORAL at 19:31

## 2025-03-26 RX ADMIN — METOPROLOL SUCCINATE 50 MG: 50 TABLET, EXTENDED RELEASE ORAL at 08:01

## 2025-03-26 NOTE — PLAN OF CARE
Problem: Adult Inpatient Plan of Care  Goal: Plan of Care Review  3/26/2025 0655 by Faustina Husain RN  Outcome: Progressing  Flowsheets (Taken 3/26/2025 0655)  Progress: improving  3/26/2025 0655 by Faustina Husain RN  Outcome: Progressing  Flowsheets (Taken 3/26/2025 0655)  Progress: improving  Goal: Patient-Specific Goal (Individualized)  3/26/2025 0655 by Faustina Husain RN  Outcome: Progressing  3/26/2025 0655 by Faustina Husain RN  Outcome: Progressing  Goal: Absence of Hospital-Acquired Illness or Injury  3/26/2025 0655 by Faustina Husain RN  Outcome: Progressing  3/26/2025 0655 by Faustina Husain RN  Outcome: Progressing  Intervention: Identify and Manage Fall Risk  Recent Flowsheet Documentation  Taken 3/26/2025 0600 by Faustina Husain RN  Safety Promotion/Fall Prevention:   room organization consistent   safety round/check completed   nonskid shoes/slippers when out of bed   fall prevention program maintained   clutter free environment maintained  Taken 3/26/2025 0400 by Faustina Husain RN  Safety Promotion/Fall Prevention:   room organization consistent   safety round/check completed   nonskid shoes/slippers when out of bed   fall prevention program maintained   clutter free environment maintained  Taken 3/26/2025 0200 by Faustina Husain RN  Safety Promotion/Fall Prevention:   room organization consistent   safety round/check completed   nonskid shoes/slippers when out of bed   fall prevention program maintained   clutter free environment maintained  Taken 3/26/2025 0000 by Faustina Husain RN  Safety Promotion/Fall Prevention:   room organization consistent   safety round/check completed   nonskid shoes/slippers when out of bed   fall prevention program maintained   clutter free environment maintained  Taken 3/25/2025 2200 by Faustina Husain RN  Safety Promotion/Fall Prevention:   room organization consistent   safety round/check completed   nonskid shoes/slippers when out of bed   fall prevention program maintained    clutter free environment maintained  Taken 3/25/2025 2000 by Faustina Husain RN  Safety Promotion/Fall Prevention:   room organization consistent   safety round/check completed   nonskid shoes/slippers when out of bed   fall prevention program maintained   clutter free environment maintained  Intervention: Prevent Skin Injury  Recent Flowsheet Documentation  Taken 3/26/2025 0600 by Faustina Husain RN  Body Position: position changed independently  Taken 3/26/2025 0400 by Faustina Husain RN  Body Position: position changed independently  Taken 3/26/2025 0200 by Faustina Husain RN  Body Position: position changed independently  Skin Protection: incontinence pads utilized  Taken 3/26/2025 0000 by Faustina Husain RN  Body Position: position changed independently  Taken 3/25/2025 2200 by Faustina Husain RN  Body Position: position changed independently  Taken 3/25/2025 2000 by Faustina Husain RN  Body Position: position changed independently  Skin Protection: incontinence pads utilized  Intervention: Prevent and Manage VTE (Venous Thromboembolism) Risk  Recent Flowsheet Documentation  Taken 3/26/2025 0200 by Faustina Husain RN  VTE Prevention/Management: patient refused intervention  Taken 3/25/2025 2000 by Faustina Husain RN  VTE Prevention/Management: patient refused intervention  Intervention: Prevent Infection  Recent Flowsheet Documentation  Taken 3/26/2025 0600 by Faustina Husain RN  Infection Prevention:   environmental surveillance performed   hand hygiene promoted   personal protective equipment utilized   rest/sleep promoted   single patient room provided  Taken 3/26/2025 0400 by Faustina Husain RN  Infection Prevention:   environmental surveillance performed   hand hygiene promoted   personal protective equipment utilized   rest/sleep promoted   single patient room provided  Taken 3/26/2025 0200 by Faustina Husain RN  Infection Prevention:   environmental surveillance performed   hand hygiene promoted   personal protective equipment  utilized   rest/sleep promoted   single patient room provided  Taken 3/26/2025 0000 by Faustina Husain RN  Infection Prevention:   environmental surveillance performed   hand hygiene promoted   personal protective equipment utilized   rest/sleep promoted   single patient room provided  Taken 3/25/2025 2200 by Faustina Husain RN  Infection Prevention:   environmental surveillance performed   hand hygiene promoted   personal protective equipment utilized   rest/sleep promoted   single patient room provided  Taken 3/25/2025 2000 by Faustina Husain RN  Infection Prevention:   environmental surveillance performed   hand hygiene promoted   personal protective equipment utilized   rest/sleep promoted   single patient room provided  Goal: Optimal Comfort and Wellbeing  3/26/2025 0655 by Faustina Husain RN  Outcome: Progressing  3/26/2025 0655 by Faustina Husain RN  Outcome: Progressing  Intervention: Provide Person-Centered Care  Recent Flowsheet Documentation  Taken 3/26/2025 0200 by Faustina Husain RN  Trust Relationship/Rapport:   care explained   choices provided   questions answered  Taken 3/25/2025 2000 by Faustina Husain RN  Trust Relationship/Rapport:   care explained   choices provided   questions answered  Goal: Readiness for Transition of Care  3/26/2025 0655 by Faustina Husain RN  Outcome: Progressing  3/26/2025 0655 by Faustina Husain RN  Outcome: Progressing     Problem: Pain Acute  Goal: Optimal Pain Control and Function  3/26/2025 0655 by Faustina Husain RN  Outcome: Progressing  3/26/2025 0655 by Faustina Husain RN  Outcome: Progressing  Intervention: Optimize Psychosocial Wellbeing  Recent Flowsheet Documentation  Taken 3/26/2025 0200 by Faustina Husain RN  Diversional Activities:   smartphone   television  Taken 3/25/2025 2000 by Faustina Husain RN  Diversional Activities:   smartphone   television  Intervention: Prevent or Manage Pain  Recent Flowsheet Documentation  Taken 3/26/2025 0600 by Faustina Husain RN  Medication  Review/Management: medications reviewed  Taken 3/26/2025 0400 by Faustina Husain RN  Medication Review/Management: medications reviewed  Taken 3/26/2025 0200 by Faustina Husain RN  Medication Review/Management: medications reviewed  Taken 3/26/2025 0000 by Faustina Husain RN  Medication Review/Management: medications reviewed  Taken 3/25/2025 2200 by Faustina Husain RN  Medication Review/Management: medications reviewed  Taken 3/25/2025 2000 by Faustina Husain RN  Medication Review/Management: medications reviewed     Problem: Infection  Goal: Absence of Infection Signs and Symptoms  3/26/2025 0655 by Faustina Husain RN  Outcome: Progressing  3/26/2025 0655 by Faustina Husain RN  Outcome: Progressing  Intervention: Prevent or Manage Infection  Recent Flowsheet Documentation  Taken 3/26/2025 0600 by Faustina Husain RN  Isolation Precautions:   contact   precautions maintained  Taken 3/26/2025 0400 by Faustina Husain RN  Isolation Precautions:   contact   precautions maintained  Taken 3/26/2025 0200 by Faustina Husain RN  Isolation Precautions:   contact   precautions maintained  Taken 3/26/2025 0000 by Faustina Husain RN  Isolation Precautions:   contact   precautions maintained  Taken 3/25/2025 2200 by Faustina Husain RN  Isolation Precautions:   contact   precautions maintained  Taken 3/25/2025 2000 by Faustina Husain RN  Isolation Precautions:   contact   precautions maintained     Problem: Fall Injury Risk  Goal: Absence of Fall and Fall-Related Injury  3/26/2025 0655 by Faustina Husain RN  Outcome: Progressing  3/26/2025 0655 by Faustina Husain RN  Outcome: Progressing  Intervention: Identify and Manage Contributors  Recent Flowsheet Documentation  Taken 3/26/2025 0600 by Faustina Husain RN  Medication Review/Management: medications reviewed  Taken 3/26/2025 0400 by Faustina Husain RN  Medication Review/Management: medications reviewed  Taken 3/26/2025 0200 by Faustina Husain RN  Medication Review/Management: medications reviewed  Taken 3/26/2025  0000 by Faustina Husain RN  Medication Review/Management: medications reviewed  Taken 3/25/2025 2200 by Faustina Husain RN  Medication Review/Management: medications reviewed  Taken 3/25/2025 2000 by Faustina Husain RN  Medication Review/Management: medications reviewed  Intervention: Promote Injury-Free Environment  Recent Flowsheet Documentation  Taken 3/26/2025 0600 by Faustina Husain RN  Safety Promotion/Fall Prevention:   room organization consistent   safety round/check completed   nonskid shoes/slippers when out of bed   fall prevention program maintained   clutter free environment maintained  Taken 3/26/2025 0400 by Faustina Husain RN  Safety Promotion/Fall Prevention:   room organization consistent   safety round/check completed   nonskid shoes/slippers when out of bed   fall prevention program maintained   clutter free environment maintained  Taken 3/26/2025 0200 by Faustina Husain RN  Safety Promotion/Fall Prevention:   room organization consistent   safety round/check completed   nonskid shoes/slippers when out of bed   fall prevention program maintained   clutter free environment maintained  Taken 3/26/2025 0000 by Faustina Husain RN  Safety Promotion/Fall Prevention:   room organization consistent   safety round/check completed   nonskid shoes/slippers when out of bed   fall prevention program maintained   clutter free environment maintained  Taken 3/25/2025 2200 by Faustina Husain RN  Safety Promotion/Fall Prevention:   room organization consistent   safety round/check completed   nonskid shoes/slippers when out of bed   fall prevention program maintained   clutter free environment maintained  Taken 3/25/2025 2000 by Faustina Husain RN  Safety Promotion/Fall Prevention:   room organization consistent   safety round/check completed   nonskid shoes/slippers when out of bed   fall prevention program maintained   clutter free environment maintained   Goal Outcome Evaluation:  Plan of Care Reviewed With: patient         Progress: improving     Patient pain controlled with Robaxin and Norco7.5/325mg. He only had break through pain Morphine once through shift. Incision site from joint aspiration to left hip dry with no complication  noted.

## 2025-03-26 NOTE — PLAN OF CARE
Goal Outcome Evaluation:  Plan of Care Reviewed With: patient        Progress: improving  Outcome Evaluation: Pt is a 66 yo male admitted from [facility] on 3/24 after c/o back pain radiatiating down leg. Upon arrival into room pt in bed, c/o pain/burning sensation in L hip/anterior thigh. Lives w/ his wife at home and is (I) in ADLs at baseline. Does not use AD. Able to perform bed mobility w/ Min A to assist with trunk. RUE WFL, LUE is WFL however shoulder is slightly below WFL pt reports d/t arthritis. Total A for LBD today d/t hip pain. Pt reports he has been getting up w/o assist to use BR. SBA/CGA for STS and ambulation ~12 feet around room w/o AD. Says it is hard for him to walk longer distances d/t ongoing L ankle pain from finding out he tore a tendon a few weeks ago. Pt states his wife is at home to help as needed, and other than his pain and previous deficits, he feels he does not need therapy as he can walk to the BR himself. OT will sign off, feel free to re-order OT if needed.    Anticipated Discharge Disposition (OT): home with assist

## 2025-03-26 NOTE — THERAPY EVALUATION
Patient Name: Arnold Ramírez  : 1960    MRN: [id number]                              Today's Date: 3/26/2025       Admit Date: 3/24/2025    Visit Dx: No diagnosis found.  Patient Active Problem List   Diagnosis    Streptococcal bacteremia    Acute back pain    Facet arthritis of lumbar region    Obesity    Dyslipidemia    HTN (hypertension)    MRSA colonization    Type 2 diabetes mellitus, with long-term current use of insulin    Hyponatremia    Diabetic peripheral neuropathy     Past Medical History:   Diagnosis Date    Diabetes mellitus     Hyperlipidemia     Hypertension      History reviewed. No pertinent surgical history.   General Information       Row Name 25 1101          OT Time and Intention    Subjective Information complains of;pain  -SM (r) KL (t) SM (c)     Document Type evaluation  -SM (r) KL (t) SM (c)     Mode of Treatment individual therapy;occupational therapy  -SM (r) KL (t) SM (c)     Patient Effort good  -SM (r) KL (t) SM (c)       Row Name 25 1101          General Information    Patient Profile Reviewed yes  -SM (r) KL (t) SM (c)     Prior Level of Function independent:;ADL's  -SM (r) KL (t) SM (c)     Existing Precautions/Restrictions fall  -SM (r) KL (t) SM (c)     Barriers to Rehab none identified  -SM (r) KL (t) SM (c)       Row Name 25 1101          Living Environment    Current Living Arrangements home  -SM (r) KL (t) SM (c)     People in Home spouse  -SM (r) KL (t) SM (c)       Row Name 25 1101          Cognition    Orientation Status (Cognition) oriented x 4  -SM (r) KL (t) SM (c)       Row Name 25 1101          Safety Issues/Impairments Affecting Functional Mobility    Impairments Affecting Function (Mobility) balance;endurance/activity tolerance;range of motion (ROM);strength  -SM (r) KL (t) SM (c)               User Key  (r) = Recorded By, (t) = Taken By, (c) = Cosigned By      Initials Name Provider Type    Sheridan Manning OT  Occupational Therapist    Shannan Mcclain, OT Student OT Student                     Mobility/ADL's       Row Name 03/26/25 1102          Bed Mobility    Bed Mobility supine-sit  -SM (r) KL (t) SM (c)     Rolling Left [place] (Bed Mobility) --  -SM (r) KL (t) SM (c)     Supine-Sit [place] (Bed Mobility) minimum assist (75% patient effort);1 person assist  -SM (r) KL (t) SM (c)     Assistive Device (Bed Mobility) head of bed elevated  -SM (r) KL (t) SM (c)       Row Name 03/26/25 1102          Transfers    Transfers sit-stand transfer;bed-chair transfer  -SM (r) KL (t) SM (c)       Row Name 03/26/25 1102          Bed-Chair Transfer    Bed-Chair [place] (Transfers) contact guard;1 person assist  -SM (r) KL (t) SM (c)     Comment, (Bed-Chair Transfer) Able to take few steps form bed to chair  -SM (r) KL (t) SM (c)       Row Name 03/26/25 1102          Sit-Stand Transfer    Sit-Stand [place] (Transfers) contact guard  -SM (r) KL (t) SM (c)     Comment, (Sit-Stand Transfer) No AD, gait belt donned.  -SM (r) KL (t) SM (c)       Row Name 03/26/25 1102          Functional Mobility    Functional Mobility- Ind. Level contact guard assist;1 person  -SM (r) KL (t) SM (c)     Functional Mobility- Comment Pt ambulated around room ~12 ft before needing to sit down d/t L ankle pain. No AD required  -SM (r) KL (t) SM (c)     Patient was able to Ambulate yes  -SM (r) KL (t) SM (c)       Row Name 03/26/25 1102          Activities of Daily Living    BADL Assessment/Intervention lower body dressing;grooming;toileting  -SM (r) KL (t) SM (c)       Row Name 03/26/25 1102          Lower Body Dressing Assessment/Training    [place] Level (Lower Body Dressing) doff;don;dependent (less than 25% patient effort)  -SM (r) KL (t) SM (c)     Position (Lower Body Dressing) edge of bed sitting  -SM (r) KL (t) SM (c)     Comment, (Lower Body Dressing) Pt states before admission he could don/doff socks, but now since being  here L hip pain limits his ability to bend forward to don socks  -SM (r) KL (t) SM (c)       Row Name 03/26/25 1102          Grooming Assessment/Training    Yellow [place] Level (Grooming) oral care regimen;set up;supervision  -SM (r) KL (t) SM (c)     Position (Grooming) supported sitting  -SM (r) KL (t) SM (c)       Row Name 03/26/25 1102          Toileting Assessment/Training    Comment, (Toileting) pt reports he is getting up to use BR w/o assist as needed.  -SM (r) KL (t) SM (c)               User Key  (r) = Recorded By, (t) = Taken By, (c) = Cosigned By      Initials Name Provider Type    Sheridan Manning, OT Occupational Therapist    Shannan Mcclain, OT Student OT Student                   Obj/Interventions       Row Name 03/26/25 1150          Range of Motion Comprehensive    Comment, General Range of Motion RUE WFL, LUE slightly below WFL d/t arthritis in L shoulder  -SM (r) KL (t) SM (c)       Row Name 03/26/25 1150          Strength Comprehensive (MMT)    General Manual Muscle Testing (MMT) Assessment no strength deficits identified  -SM (r) KL (t) SM (c)       Row Name 03/26/25 1150          Balance    Balance Assessment sitting static balance;sitting dynamic balance;standing static balance;standing dynamic balance  -SM (r) KL (t) SM (c)     Static Sitting Balance standby assist  -SM (r) KL (t) SM (c)     Dynamic Sitting Balance standby assist  -SM (r) KL (t) SM (c)     Position, Sitting Balance sitting edge of bed  -SM (r) KL (t) SM (c)     Static Standing Balance contact guard  -SM (r) KL (t) SM (c)     Dynamic Standing Balance contact guard  -SM (r) KL (t) SM (c)     Position/Device Used, Standing Balance --  No AD used  -SM (r) KL (t) SM (c)               User Key  (r) = Recorded By, (t) = Taken By, (c) = Cosigned By      Initials Name Provider Type    Sheridan Manning, OT Occupational Therapist    Shannan Mcclain, OT Student OT Student                   Goals/Plan    No  documentation.                  Clinical Impression       Row Name 03/26/25 1153          Pain Assessment    Pain Location hip;ankle  -SM (r) KL (t) SM (c)     Pain Side/Orientation lower;left  -SM (r) KL (t) SM (c)     Pain Management Interventions exercise or physical activity utilized  -SM (r) KL (t) SM (c)     Response to Pain Interventions activity participation with tolerable pain  -SM (r) KL (t) SM (c)     Pre/Posttreatment Pain Comment pt did not rate pain. reported burning/pain in L thigh upon arrival into room. Also pain in L ankle after ambulating  -SM (r) KL (t) SM (c)       Row Name 03/26/25 1153          Plan of Care Review    Plan of Care Reviewed With patient  -SM (r) KL (t) SM (c)     Progress improving  -SM (r) KL (t) SM (c)     Outcome Evaluation Pt is a 64 yo male admitted from [facility] on 3/24 after c/o back pain radiatiating down leg. Upon arrival into room pt in bed, c/o pain/burning sensation in L hip/anterior thigh. Lives w/ his wife at home and is (I) in ADLs at baseline. Does not use AD. Able to perform bed mobility w/ Min A to assist with trunk. RUE WFL, LUE is WFL however shoulder is slightly below WFL pt reports d/t arthritis. Total A for LBD today d/t hip pain. Pt reports he has been getting up w/o assist to use BR. SBA/CGA for STS and ambulation ~12 feet around room w/o AD. Says it is hard for him to walk longer distances d/t ongoing L ankle pain from finding out he tore a tendon a few weeks ago. Pt states his wife is at home to help as needed, and other than his pain and previous deficits, he feels he does not need therapy as he can walk to the BR himself. OT will sign off, feel free to re-order OT if needed.  -SM (r) KL (t) SM (c)       Row Name 03/26/25 1153          Therapy Assessment/Plan (OT)    Rehab Potential (OT) --  -SM (r) KL (t) SM (c)     Therapy Frequency (OT) evaluation only  -SM (r) KL (t) SM (c)       Row Name 03/26/25 1153          Therapy Plan  Review/Discharge Plan (OT)    Anticipated Discharge Disposition (OT) home with assist  -SM (r) KL (t) SM (c)       Row Name 03/26/25 1153          Positioning and Restraints    Pre-Treatment Position in bed  -SM (r) KL (t) SM (c)     Post Treatment Position chair  -SM (r) KL (t) SM (c)     In Chair notified nsg;reclined;call light within reach;encouraged to call for assist;with family/caregiver  -SM (r) KL (t) SM (c)               User Key  (r) = Recorded By, (t) = Taken By, (c) = Cosigned By      Initials Name Provider Type    Sheridan Manning, OT Occupational Therapist    Shannan Mcclain, OT Student OT Student                   Outcome Measures       Row Name 03/26/25 1156          How much help from another is currently needed...    Putting on and taking off regular lower body clothing? 1  -SM (r) KL (t) SM (c)     Bathing (including washing, rinsing, and drying) 2  -SM (r) KL (t) SM (c)     Toileting (which includes using toilet bed pan or urinal) 3  -SM (r) KL (t) SM (c)     Putting on and taking off regular upper body clothing 3  -SM (r) KL (t) SM (c)     Taking care of personal grooming (such as brushing teeth) 3  -SM (r) KL (t) SM (c)     Eating meals 4  -SM (r) KL (t) SM (c)     AM-PAC 6 Clicks Score (OT) 16  -SM (r) KL (t)       Row Name 03/26/25 0805          How much help from another person do you currently need...    Turning from your back to your side while in flat bed without using bedrails? 4  -LC     Moving from lying on back to sitting on the side of a flat bed without bedrails? 4  -LC     Moving to and from a bed to a chair (including a wheelchair)? 4  -LC     Standing up from a chair using your arms (e.g., wheelchair, bedside chair)? 4  -LC     Climbing 3-5 steps with a railing? 3  -LC     To walk in hospital room? 4  -LC     AM-PAC 6 Clicks Score (PT) 23  -LC       Row Name 03/26/25 1156          Functional Assessment    Outcome Measure Options AM-PAC 6 Clicks Daily Activity (OT)  -SM  (r) KL (t)  (c)               User Key  (r) = Recorded By, (t) = Taken By, (c) = Cosigned By      Initials Name Provider Type     Sheridan Toro, OT Occupational Therapist    Azucena Hernandez, RN Registered Nurse    Shannan Mcclain, OT Student OT Student                    Occupational Therapy Education       Title: PT OT SLP Therapies (In Progress)       Topic: Occupational Therapy (In Progress)       Point: ADL training (Done)       Learning Progress Summary            Patient Acceptance, E, VU by RONALDO at 3/26/2025 1157    Comment: role of OT, plan of care, dc rec                                      User Key       Initials Effective Dates Name Provider Type Discipline     01/10/25 -  Shannan Galvan, OT Student OT Student OT                  OT Recommendation and Plan  Therapy Frequency (OT): evaluation only  Plan of Care Review  Plan of Care Reviewed With: patient  Progress: improving  Outcome Evaluation: Pt is a 66 yo male admitted from [facility] on 3/24 after c/o back pain radiatiating down leg. Upon arrival into room pt in bed, c/o pain/burning sensation in L hip/anterior thigh. Lives w/ his wife at home and is (I) in ADLs at baseline. Does not use AD. Able to perform bed mobility w/ Min A to assist with trunk. RUE WFL, LUE is WFL however shoulder is slightly below WFL pt reports d/t arthritis. Total A for LBD today d/t hip pain. Pt reports he has been getting up w/o assist to use BR. SBA/CGA for STS and ambulation ~12 feet around room w/o AD. Says it is hard for him to walk longer distances d/t ongoing L ankle pain from finding out he tore a tendon a few weeks ago. Pt states his wife is at home to help as needed, and other than his pain and previous deficits, he feels he does not need therapy as he can walk to the BR himself. OT will sign off, feel free to re-order OT if needed.     Time Calculation:   Evaluation Complexity (OT)  Review Occupational Profile/Medical/Therapy History  Complexity: expanded/moderate complexity  Assessment, Occupational Performance/Identification of Deficit Complexity: 3-5 performance deficits  Clinical Decision Making Complexity (OT): detailed assessment/moderate complexity  Overall Complexity of Evaluation (OT): moderate complexity     Time Calculation- OT       Row Name 03/26/25 1157             Time Calculation- OT    OT Start Time 0954  -SM (r) KL (t) SM (c)      OT Stop Time 1017  -SM (r) KL (t) SM (c)      OT Time Calculation (min) 23 min  -SM (r) KL (t)      OT Received On 03/26/25  -SM (r) KL (t) SM (c)         Timed Charges    48459 - OT Self Care/Mgmt Minutes 15  -SM (r) KL (t) SM (c)         Untimed Charges    OT Eval/Re-eval Minutes 8  -SM (r) KL (t) SM (c)         Total Minutes    Timed Charges Total Minutes 15  -SM (r) KL (t)      Untimed Charges Total Minutes 8  -SM (r) KL (t)       Total Minutes 23  -SM (r) KL (t)                User Key  (r) = Recorded By, (t) = Taken By, (c) = Cosigned By      Initials Name Provider Type    Sheridan Manning OT Occupational Therapist    Shannan Mcclain, OT Student OT Student                           Shannan Galvan OT Student  3/26/2025

## 2025-03-26 NOTE — PROGRESS NOTES
Enter Query Response Below      Query Response: Hyponatremia- clinically insignificant              If applicable, please update the problem list.

## 2025-03-26 NOTE — PROGRESS NOTES
Name: Arnold Ramírez ADMIT: 3/24/2025   : 1960  PCP: Anand Alford MD    MRN: [id number] LOS: 2 days   AGE/SEX: 65 y.o. male  ROOM: [facility]     Subjective   Subjective   C/o pain in left hip mainly. No F/C/NS. No SOA or CP. No N/V/D/abd pain. Voiding well. Tolerating diet. Up ad gianluca in room.       Objective   Objective   Vital Signs  Temp:  [97.7 °F (36.5 °C)-98.2 °F (36.8 °C)] 97.7 °F (36.5 °C)  Heart Rate:  [76-84] 77  Resp:  [16-18] 18  BP: (134-160)/(72-85) 142/75  SpO2:  [87 %-99 %] 98 %  on   ;   Device (Oxygen Therapy): room air  Body mass index is 43.08 kg/m².    (No change in exam today)    Physical Exam  Vitals and nursing note reviewed.   Constitutional:       General: He is not in acute distress.     Appearance: He is obese. He is ill-appearing (chronically). He is not toxic-appearing or diaphoretic.   HENT:      Head: Normocephalic.      Nose: Nose normal.      Mouth/Throat:      Mouth: Mucous membranes are moist.      Pharynx: Oropharynx is clear.   Eyes:      General: No scleral icterus.        Right eye: No discharge.         Left eye: No discharge.      Extraocular Movements: Extraocular movements intact.      Conjunctiva/sclera: Conjunctivae normal.   Cardiovascular:      Rate and Rhythm: Normal rate and regular rhythm.      Pulses: Normal pulses.   Pulmonary:      Effort: Pulmonary effort is normal. No respiratory distress.      Breath sounds: Normal breath sounds. No wheezing or rales.   Abdominal:      General: Bowel sounds are normal. There is no distension.      Palpations: Abdomen is soft.      Tenderness: There is no abdominal tenderness.   Musculoskeletal:         General: Swelling (trace in BLEs) present.      Cervical back: Neck supple.   Skin:     General: Skin is warm and dry.      Capillary Refill: Capillary refill takes less than 2 seconds.      Coloration: Skin is not jaundiced.      Findings: Lesion (excoriated lesions to BLEs--L>R) present.   Neurological:       General: No focal deficit present.      Mental Status: He is alert. Mental status is at baseline.   Psychiatric:         Mood and Affect: Mood normal.         Behavior: Behavior normal.         Thought Content: Thought content normal.       Results Review     I reviewed the patient's new clinical results.  Results from last 7 days   Lab Units 03/26/25  0459 03/25/25  0706 03/24/25  0400 03/23/25  0425   WBC 10*3/mm3 14.03* 9.68 9.66 11.94*   HEMOGLOBIN g/dL 13.3 12.9* 13.7 14.5   PLATELETS 10*3/mm3 299 196 234 317     Results from last 7 days   Lab Units 03/26/25  0459 03/25/25  0915 03/24/25  0400 03/23/25  0425   SODIUM mmol/L 133* 132* 134* 137   POTASSIUM mmol/L 4.3 4.6 4.1 4.2   CHLORIDE mmol/L 100 102 102 102   CO2 mmol/L 22.0 20.0* 20.8* 22.1   BUN mg/dL 17 20 25* 29*   CREATININE mg/dL 0.82 0.87 0.97 1.01   GLUCOSE mg/dL 188* 204* 185* 149*   EGFR mL/min/1.73 98.7 97.0 87.7 83.6     Results from last 7 days   Lab Units 03/26/25  0459 03/25/25  0915 03/21/25  0700 03/20/25  1038   ALBUMIN g/dL 3.0* 3.0* 3.9 4.1   BILIRUBIN mg/dL 0.4 0.5 0.6 0.8   ALK PHOS U/L 116 92 84 95   AST (SGOT) U/L 16 23 22 26   ALT (SGPT) U/L 21 21 24 26     Results from last 7 days   Lab Units 03/26/25  0459 03/25/25  0915 03/24/25  0400 03/23/25  0425 03/22/25  0807 03/21/25  0700 03/20/25  1038   CALCIUM mg/dL 8.4* 8.5* 8.9 9.1 8.9 9.1 9.8   ALBUMIN g/dL 3.0* 3.0*  --   --   --  3.9 4.1   MAGNESIUM mg/dL 2.0  --   --  2.1 2.1 1.9  --    PHOSPHORUS mg/dL 2.9  --   --   --   --   --   --      Results from last 7 days   Lab Units 03/20/25  1606 03/20/25  1435   PROCALCITONIN ng/mL 8.02*  --    LACTATE mmol/L  --  3.0*     Glucose   Date/Time Value Ref Range Status   03/26/2025 1101 240 (H) 70 - 130 mg/dL Final   03/26/2025 0700 176 (H) 70 - 130 mg/dL Final   03/25/2025 1955 308 (H) 70 - 130 mg/dL Final   03/25/2025 1600 280 (H) 70 - 130 mg/dL Final   03/25/2025 1232 230 (H) 70 - 130 mg/dL Final   03/25/2025 0707 211 (H) 70 - 130 mg/dL  Final   03/24/2025 2233 159 (H) 70 - 130 mg/dL Final       FL Guided Aspiration Joint  Result Date: 3/25/2025  Technically successful left hip joint aspiration as described above.   Procedure performed by JAX [place]. By electronically signing this report, I, the supervising radiologist, attest that I was not present for the procedure(s) but agree with the final edited report.  This report was finalized on 3/25/2025 3:51 PM by Dr. Jonnathan Ramírez M.D on Workstation: [id number]      XR Hip With or Without Pelvis 2 - 3 View Left  Result Date: 3/25/2025   As described.    This report was finalized on 3/25/2025 12:56 PM by Dr. Fidencio Zaman M.D on Workstation: [id number]      XR Hip With or Without Pelvis 2 - 3 View Right  Result Date: 3/25/2025   As described.    This report was finalized on 3/25/2025 12:56 PM by Dr. Fidencio Zaman M.D on Workstation: [id number]      MRI Lumbar Spine Without Contrast  Result Date: 3/24/2025  1.Severely limited study due to motion degradation despite attempts at optimal imaging. 2.Evidence for abnormal edema within the paraspinal soft tissues along the right posterior lateral margin of the L3-L5 spinal levels. There is suggestion of more focally pronounced fluid signal in this region. The findings appear to be centered around the level of the right L4/5 posterior facet. The findings may indicate inflammation/infection and this region. The changes may be related to right posterior facet septic arthropathy with adjacent soft tissue cellulitis. The more focally pronounced fluid signal focus may indicate developing abscess in this region measuring 2 cm. Again the assessment is markedly limited. 3.No evidence for discitis/osteomyelitis. No evidence for abscess involving the central canal. 4.Degenerative changes are seen throughout the mid to lower lumbar spine. Electronically Signed: Rony Coronado MD  3/24/2025 2:34 PM EDT  Workstation ID: [id number]    MRI Thoracic Spine Without  Contrast  Result Date: 3/24/2025  1.No evidence for discitis/osteomyelitis. No evidence for abscess.. Electronically Signed: Rony Coronado MD  3/24/2025 2:23 PM EDT  Workstation ID: [id number]      I have personally reviewed all medications:  Scheduled Medications  amLODIPine, 10 mg, Oral, Daily  atorvastatin, 20 mg, Oral, Nightly  cefTRIAXone, 2,000 mg, Intravenous, Q24H  DULoxetine, 60 mg, Oral, Daily  insulin glargine, 50 Units, Subcutaneous, Nightly  insulin lispro, 2-9 Units, Subcutaneous, 4x Daily AC & at Bedtime  Lidocaine, 2 patch, Transdermal, Q24H  losartan, 100 mg, Oral, Daily  metoprolol succinate XL, 50 mg, Oral, Daily  Petrolatum, 1 Application, Topical, Q12H  pregabalin, 150 mg, Oral, BID  sodium chloride, 10 mL, Intravenous, Q12H  sodium chloride, 10 mL, Intravenous, Q12H  vancomycin, 1,500 mg, Intravenous, Q12H    Infusions  Pharmacy to dose vancomycin,     Diet  Diet: Regular/House, Diabetic, Cardiac; Healthy Heart (2-3 Na+); Consistent Carbohydrate; Fluid Consistency: Thin (IDDSI 0)    I have personally reviewed:  [x]  Laboratory   [x]  Microbiology   []  Radiology   [x]  EKG/Telemetry  []  Cardiology/Vascular   []  Pathology    []  Records       Assessment/Plan     Active Hospital Problems    Diagnosis  POA    **Streptococcal bacteremia [R78.81, B95.5]  Yes    Type 2 diabetes mellitus, with long-term current use of insulin [E11.9, Z79.4]  Not Applicable    Hyponatremia [E87.1]  Yes    Diabetic peripheral neuropathy [E11.42]  Yes    Acute back pain [M54.9]  Yes    Facet arthritis of lumbar region [M47.816]  Yes    Obesity [E66.9]  Yes    Dyslipidemia [E78.5]  Yes    HTN (hypertension) [I10]  Yes    MRSA colonization [Z22.322]  Not Applicable      Resolved Hospital Problems   No resolved problems to display.       66yo gentleman with HTN, HLD, morbid obesity, MARGO, DM2 with DPN, and MRSA colonization, who was admitted to [facility] with left flank/low back/hip pain and found to have Strep  intermedius bacteremia. MRI suggested possible lumbar spine abscess (though study was severely limited due to motion degradation) and arrangements were made to transfer pt to [facility] so he could be seen by Neurosurgery.    Strep bacteremia  Continue Rocephin and Vanc per ID recs  ID to follow here as well  Repeat blood cultures NGTD  Echo neg for vegetation  Afebrile  WBC up to 14    Left low back pain  Left hip pain  [facility] has seen and reviewed MRI L-spine--they do not feel any spine infection is present, suggest repeat MRI in 6 weeks  S/p left hip aspiration 3/25, fluid culture NGTD  ID to follow here    MRSA colonization  On IV Vanc per ID  No MRSA in blood cultures    DM2 with DPN  Sugars high  A1c 11  Continue Lantus at 50 units QPM and SSI, adjust as necessary  Continue Cymbalta and Lyrica    MARGO  Continue BiPAP    Morbid obesity  Complicating all aspects of care    HTN  BPs acceptable if a bit robust at times  Continue amlodipine, losartan, and metoprolol    HLD  Continue statin    Pseudohyponatremia  Na+ fine when corrected for hyperglycemia      SCDs for DVT prophylaxis.  Full code.  Discussed with patient and Dr. Pryor. D/w Dr. López.  Anticipate discharge home in 1-2 days.  Expected Discharge Date: 3/28/2025; Expected Discharge Time:       Elder Hernandez MD  [facility]  03/26/25  12:12 EDT

## 2025-03-27 ENCOUNTER — TELEPHONE (OUTPATIENT)
Dept: NEUROSURGERY | Facility: CLINIC | Age: 65
End: 2025-03-27
Payer: MEDICARE

## 2025-03-27 DIAGNOSIS — M46.20 PARASPINAL ABSCESS: Primary | ICD-10-CM

## 2025-03-27 DIAGNOSIS — B95.5 STREPTOCOCCAL BACTEREMIA: ICD-10-CM

## 2025-03-27 DIAGNOSIS — R78.81 STREPTOCOCCAL BACTEREMIA: ICD-10-CM

## 2025-03-27 DIAGNOSIS — M54.89 OTHER ACUTE BACK PAIN: ICD-10-CM

## 2025-03-27 PROBLEM — A04.72 CLOSTRIDIUM DIFFICILE DIARRHEA: Status: ACTIVE | Noted: 2025-03-27

## 2025-03-27 LAB
ALBUMIN SERPL-MCNC: 3 G/DL (ref 3.5–5.2)
ALP SERPL-CCNC: 105 U/L (ref 39–117)
ALT SERPL W P-5'-P-CCNC: 16 U/L (ref 1–41)
ANION GAP SERPL CALCULATED.3IONS-SCNC: 10.6 MMOL/L (ref 5–15)
AST SERPL-CCNC: 15 U/L (ref 1–40)
BASOPHILS # BLD AUTO: 0.03 10*3/MM3 (ref 0–0.2)
BASOPHILS NFR BLD AUTO: 0.3 % (ref 0–1.5)
BILIRUB CONJ SERPL-MCNC: 0.1 MG/DL (ref 0–0.3)
BILIRUB INDIRECT SERPL-MCNC: 0.2 MG/DL
BILIRUB SERPL-MCNC: 0.3 MG/DL (ref 0–1.2)
BUN SERPL-MCNC: 13 MG/DL (ref 8–23)
BUN/CREAT SERPL: 13.1 (ref 7–25)
CALCIUM SPEC-SCNC: 8.5 MG/DL (ref 8.6–10.5)
CHLORIDE SERPL-SCNC: 100 MMOL/L (ref 98–107)
CO2 SERPL-SCNC: 24.4 MMOL/L (ref 22–29)
CREAT SERPL-MCNC: 0.99 MG/DL (ref 0.76–1.27)
DEPRECATED RDW RBC AUTO: 43.5 FL (ref 37–54)
EGFRCR SERPLBLD CKD-EPI 2021: 85.6 ML/MIN/1.73
EOSINOPHIL # BLD AUTO: 0.28 10*3/MM3 (ref 0–0.4)
EOSINOPHIL NFR BLD AUTO: 2.6 % (ref 0.3–6.2)
ERYTHROCYTE [DISTWIDTH] IN BLOOD BY AUTOMATED COUNT: 14.5 % (ref 12.3–15.4)
GLUCOSE BLDC GLUCOMTR-MCNC: 156 MG/DL (ref 70–130)
GLUCOSE BLDC GLUCOMTR-MCNC: 206 MG/DL (ref 70–130)
GLUCOSE BLDC GLUCOMTR-MCNC: 208 MG/DL (ref 70–130)
GLUCOSE BLDC GLUCOMTR-MCNC: 245 MG/DL (ref 70–130)
GLUCOSE SERPL-MCNC: 168 MG/DL (ref 65–99)
HCT VFR BLD AUTO: 40.8 % (ref 37.5–51)
HGB BLD-MCNC: 12.9 G/DL (ref 13–17.7)
IMM GRANULOCYTES # BLD AUTO: 0.1 10*3/MM3 (ref 0–0.05)
IMM GRANULOCYTES NFR BLD AUTO: 0.9 % (ref 0–0.5)
LYMPHOCYTES # BLD AUTO: 1.25 10*3/MM3 (ref 0.7–3.1)
LYMPHOCYTES NFR BLD AUTO: 11.4 % (ref 19.6–45.3)
MAGNESIUM SERPL-MCNC: 1.9 MG/DL (ref 1.6–2.4)
MCH RBC QN AUTO: 26.3 PG (ref 26.6–33)
MCHC RBC AUTO-ENTMCNC: 31.6 G/DL (ref 31.5–35.7)
MCV RBC AUTO: 83.3 FL (ref 79–97)
MONOCYTES # BLD AUTO: 1.15 10*3/MM3 (ref 0.1–0.9)
MONOCYTES NFR BLD AUTO: 10.5 % (ref 5–12)
NEUTROPHILS NFR BLD AUTO: 74.3 % (ref 42.7–76)
NEUTROPHILS NFR BLD AUTO: 8.14 10*3/MM3 (ref 1.7–7)
NRBC BLD AUTO-RTO: 0 /100 WBC (ref 0–0.2)
PHOSPHATE SERPL-MCNC: 2.7 MG/DL (ref 2.5–4.5)
PLATELET # BLD AUTO: 323 10*3/MM3 (ref 140–450)
PMV BLD AUTO: 9.8 FL (ref 6–12)
POTASSIUM SERPL-SCNC: 4 MMOL/L (ref 3.5–5.2)
PROT SERPL-MCNC: 6.6 G/DL (ref 6–8.5)
RBC # BLD AUTO: 4.9 10*6/MM3 (ref 4.14–5.8)
SODIUM SERPL-SCNC: 135 MMOL/L (ref 136–145)
VANCOMYCIN TROUGH SERPL-MCNC: 9.2 MCG/ML (ref 5–20)
WBC NRBC COR # BLD AUTO: 10.95 10*3/MM3 (ref 3.4–10.8)

## 2025-03-27 PROCEDURE — 97530 THERAPEUTIC ACTIVITIES: CPT

## 2025-03-27 PROCEDURE — 25010000002 VANCOMYCIN 10 G RECONSTITUTED SOLUTION: Performed by: INTERNAL MEDICINE

## 2025-03-27 PROCEDURE — 80076 HEPATIC FUNCTION PANEL: CPT | Performed by: HOSPITALIST

## 2025-03-27 PROCEDURE — 25810000003 SODIUM CHLORIDE 0.9 % SOLUTION: Performed by: INTERNAL MEDICINE

## 2025-03-27 PROCEDURE — 85025 COMPLETE CBC W/AUTO DIFF WBC: CPT | Performed by: HOSPITALIST

## 2025-03-27 PROCEDURE — 97162 PT EVAL MOD COMPLEX 30 MIN: CPT

## 2025-03-27 PROCEDURE — 63710000001 INSULIN LISPRO (HUMAN) PER 5 UNITS: Performed by: HOSPITALIST

## 2025-03-27 PROCEDURE — 80048 BASIC METABOLIC PNL TOTAL CA: CPT | Performed by: INTERNAL MEDICINE

## 2025-03-27 PROCEDURE — 83735 ASSAY OF MAGNESIUM: CPT | Performed by: HOSPITALIST

## 2025-03-27 PROCEDURE — 82948 REAGENT STRIP/BLOOD GLUCOSE: CPT

## 2025-03-27 PROCEDURE — 84100 ASSAY OF PHOSPHORUS: CPT | Performed by: HOSPITALIST

## 2025-03-27 PROCEDURE — 63710000001 INSULIN GLARGINE PER 5 UNITS: Performed by: HOSPITALIST

## 2025-03-27 PROCEDURE — 25010000002 MORPHINE PER 10 MG: Performed by: INTERNAL MEDICINE

## 2025-03-27 PROCEDURE — 80202 ASSAY OF VANCOMYCIN: CPT | Performed by: INTERNAL MEDICINE

## 2025-03-27 RX ORDER — TRIAMCINOLONE ACETONIDE 1 MG/G
1 CREAM TOPICAL EVERY 12 HOURS SCHEDULED
Status: DISCONTINUED | OUTPATIENT
Start: 2025-03-27 | End: 2025-03-28 | Stop reason: HOSPADM

## 2025-03-27 RX ADMIN — VANCOMYCIN HYDROCHLORIDE 125 MG: 125 CAPSULE ORAL at 00:30

## 2025-03-27 RX ADMIN — INSULIN LISPRO 4 UNITS: 100 INJECTION, SOLUTION INTRAVENOUS; SUBCUTANEOUS at 13:03

## 2025-03-27 RX ADMIN — LOSARTAN POTASSIUM 100 MG: 50 TABLET, FILM COATED ORAL at 08:36

## 2025-03-27 RX ADMIN — VANCOMYCIN HYDROCHLORIDE 125 MG: 125 CAPSULE ORAL at 06:02

## 2025-03-27 RX ADMIN — INSULIN GLARGINE 50 UNITS: 100 INJECTION, SOLUTION SUBCUTANEOUS at 21:05

## 2025-03-27 RX ADMIN — HYDROCODONE BITARTRATE AND ACETAMINOPHEN 1 TABLET: 7.5; 325 TABLET ORAL at 13:03

## 2025-03-27 RX ADMIN — INSULIN LISPRO 2 UNITS: 100 INJECTION, SOLUTION INTRAVENOUS; SUBCUTANEOUS at 08:37

## 2025-03-27 RX ADMIN — AMLODIPINE BESYLATE 10 MG: 10 TABLET ORAL at 08:36

## 2025-03-27 RX ADMIN — HYDROCODONE BITARTRATE AND ACETAMINOPHEN 1 TABLET: 7.5; 325 TABLET ORAL at 00:30

## 2025-03-27 RX ADMIN — PETROLATUM 1 APPLICATION: 420 OINTMENT TOPICAL at 21:05

## 2025-03-27 RX ADMIN — METHOCARBAMOL TABLETS 750 MG: 750 TABLET, COATED ORAL at 00:30

## 2025-03-27 RX ADMIN — Medication 10 ML: at 08:38

## 2025-03-27 RX ADMIN — Medication 10 ML: at 21:05

## 2025-03-27 RX ADMIN — INSULIN LISPRO 4 UNITS: 100 INJECTION, SOLUTION INTRAVENOUS; SUBCUTANEOUS at 17:00

## 2025-03-27 RX ADMIN — PREGABALIN 150 MG: 75 CAPSULE ORAL at 08:36

## 2025-03-27 RX ADMIN — HYDROCODONE BITARTRATE AND ACETAMINOPHEN 1 TABLET: 7.5; 325 TABLET ORAL at 17:00

## 2025-03-27 RX ADMIN — METHOCARBAMOL TABLETS 750 MG: 750 TABLET, COATED ORAL at 21:04

## 2025-03-27 RX ADMIN — PREGABALIN 150 MG: 75 CAPSULE ORAL at 21:04

## 2025-03-27 RX ADMIN — SODIUM CHLORIDE 1750 MG: 9 INJECTION, SOLUTION INTRAVENOUS at 21:04

## 2025-03-27 RX ADMIN — METHOCARBAMOL TABLETS 750 MG: 750 TABLET, COATED ORAL at 08:36

## 2025-03-27 RX ADMIN — HYDROCODONE BITARTRATE AND ACETAMINOPHEN 1 TABLET: 7.5; 325 TABLET ORAL at 21:04

## 2025-03-27 RX ADMIN — DULOXETINE 60 MG: 60 CAPSULE, DELAYED RELEASE ORAL at 08:36

## 2025-03-27 RX ADMIN — INSULIN LISPRO 4 UNITS: 100 INJECTION, SOLUTION INTRAVENOUS; SUBCUTANEOUS at 21:04

## 2025-03-27 RX ADMIN — TRIAMCINOLONE ACETONIDE 1 APPLICATION: 1 CREAM TOPICAL at 15:10

## 2025-03-27 RX ADMIN — VANCOMYCIN HYDROCHLORIDE 125 MG: 125 CAPSULE ORAL at 13:03

## 2025-03-27 RX ADMIN — ATORVASTATIN CALCIUM 20 MG: 20 TABLET, FILM COATED ORAL at 21:04

## 2025-03-27 RX ADMIN — HYDROCODONE BITARTRATE AND ACETAMINOPHEN 1 TABLET: 7.5; 325 TABLET ORAL at 08:36

## 2025-03-27 RX ADMIN — VANCOMYCIN HYDROCHLORIDE 125 MG: 125 CAPSULE ORAL at 17:00

## 2025-03-27 RX ADMIN — TRIAMCINOLONE ACETONIDE 1 APPLICATION: 1 CREAM TOPICAL at 21:05

## 2025-03-27 RX ADMIN — METOPROLOL SUCCINATE 50 MG: 50 TABLET, EXTENDED RELEASE ORAL at 08:36

## 2025-03-27 RX ADMIN — MORPHINE SULFATE 4 MG: 2 INJECTION, SOLUTION INTRAMUSCULAR; INTRAVENOUS at 04:07

## 2025-03-27 RX ADMIN — PETROLATUM 1 APPLICATION: 420 OINTMENT TOPICAL at 08:37

## 2025-03-27 RX ADMIN — METHOCARBAMOL TABLETS 750 MG: 750 TABLET, COATED ORAL at 15:10

## 2025-03-27 RX ADMIN — LIDOCAINE 2 PATCH: 4 PATCH TOPICAL at 08:37

## 2025-03-27 RX ADMIN — VANCOMYCIN HYDROCHLORIDE 1500 MG: 10 INJECTION, POWDER, LYOPHILIZED, FOR SOLUTION INTRAVENOUS at 08:41

## 2025-03-27 NOTE — PROGRESS NOTES
[facility] Clinical Pharmacy Services: C. Difficile Medication Changes       Pharmacy has been consulted to look over Arnold Ramírez's profile to check patient's medications for changes due to C. Difficile diagnosis per Dr. López's request.       Current C. Diff Regimen:     Assessment/Plan/Changes       1. Proton pump inhibitor: none    2. Antiperistalic agents or stool softeners/laxatives: discontinued       Thank you for allowing me to participate in your patient's care.  Please call pharmacy with any questions or concerns.     Cinthya Arias, PharmD  Clinical Pharmacist

## 2025-03-27 NOTE — CONSULTS
Infectious Diseases Progress Note    Lizette López MD     [facility]  [place]: 2 days  Patient Identification:  Name: Arnold Ramírez  Age: 65 y.o.  Sex: male  :  1960  MRN: [id number]         Primary Care Physician: Anand Alford MD        Subjective: Still having left hip discomfort feels better.  His major concern is diarrhea.  But denies any fever and chills.  Interval History: Admission history and physical performed as cross cover for internal medicine service on 3/24/2025.    Objective:    Scheduled Meds:amLODIPine, 10 mg, Oral, Daily  atorvastatin, 20 mg, Oral, Nightly  cefTRIAXone, 2,000 mg, Intravenous, Q24H  DULoxetine, 60 mg, Oral, Daily  insulin glargine, 50 Units, Subcutaneous, Nightly  insulin lispro, 2-9 Units, Subcutaneous, 4x Daily AC & at Bedtime  Lidocaine, 2 patch, Transdermal, Q24H  losartan, 100 mg, Oral, Daily  metoprolol succinate XL, 50 mg, Oral, Daily  Petrolatum, 1 Application, Topical, Q12H  pregabalin, 150 mg, Oral, BID  sodium chloride, 10 mL, Intravenous, Q12H  sodium chloride, 10 mL, Intravenous, Q12H  vancomycin, 1,500 mg, Intravenous, Q12H      Continuous Infusions:Pharmacy to dose vancomycin,         Vital signs in last 24 hours:  Temp:  [97.7 °F (36.5 °C)-98.4 °F (36.9 °C)] 98.4 °F (36.9 °C)  Heart Rate:  [75-86] 86  Resp:  [16-18] 16  BP: (133-160)/(71-81) 154/81    Intake/Output:    Intake/Output Summary (Last 24 hours) at 3/26/2025 2251  Last data filed at 3/26/2025 1900  Gross per 24 hour   Intake 840 ml   Output --   Net 840 ml       Exam:  /81 (BP Location: Right arm, Patient Position: Lying)   Pulse 86   Temp 98.4 °F (36.9 °C) (Oral)   Resp 16   Wt (!) 164 kg (361 lb 1.8 oz)   SpO2 100%   BMI 43.08 kg/m²   Patient is examined using the personal protective equipment as per guidelines from infection control for this particular patient as enacted.  Hand washing was performed before and after patient interaction.  General Appearance:    Alert,  cooperative, no distress, AAOx3                          Head:    Normocephalic, without obvious abnormality, atraumatic                           Eyes:    PERRL, conjunctivae/corneas clear, EOM's intact, both eyes                         Throat:   Lips, tongue, gums normal; oral mucosa pink and moist                           Neck:   Supple, symmetrical, trachea midline, no JVD                         Lungs:    Clear to auscultation bilaterally, respirations unlabored                 Chest Wall:    No tenderness or deformity                          Heart:  S1-S2 regular                  Abdomen:   Obese soft nontender                 Extremities: Improve discomfort in his left hip, decreased erythema of the lower extremities with no open wound.                  Neurologic: Alert and oriented x 3       Data Review:    I reviewed the patient's new clinical results.  Results from last 7 days   Lab Units 03/26/25  0459 03/25/25  0706 03/24/25  0400 03/23/25  0425 03/22/25  0807 03/21/25  0700 03/20/25  1443   WBC 10*3/mm3 14.03* 9.68 9.66 11.94* 17.00* 29.99* 35.18*   HEMOGLOBIN g/dL 13.3 12.9* 13.7 14.5 14.0 14.8 14.8   PLATELETS 10*3/mm3 299 196 234 317 271 300 297     Results from last 7 days   Lab Units 03/26/25  0459 03/25/25  0915 03/24/25  0400 03/23/25  0425 03/22/25  0807 03/21/25  0700 03/20/25  1038   SODIUM mmol/L 133* 132* 134* 137 135* 132* 130*   POTASSIUM mmol/L 4.3 4.6 4.1 4.2 4.2 4.2 4.6   CHLORIDE mmol/L 100 102 102 102 101 96* 97*   CO2 mmol/L 22.0 20.0* 20.8* 22.1 24.5 21.7* 19.3*   BUN mg/dL 17 20 25* 29* 31* 29* 26*   CREATININE mg/dL 0.82 0.87 0.97 1.01 0.99 1.25 1.41*   CALCIUM mg/dL 8.4* 8.5* 8.9 9.1 8.9 9.1 9.8   GLUCOSE mg/dL 188* 204* 185* 149* 170* 319* 290*     Microbiology Results (last 10 days)       Procedure Component Value - Date/Time    Clostridioides difficile Toxin - Stool, Per Rectum [[id number]]  (Abnormal) Collected: 03/26/25 1940    Lab Status: Final result Specimen: Stool  from Per Rectum Updated: 03/26/25 2043    Narrative:      The following orders were created for panel order Clostridioides difficile Toxin - Stool, Per Rectum.  Procedure                               Abnormality         Status                     ---------                               -----------         ------                     Clostridioides difficile...[[id number]]  Abnormal            Final result                 Please view results for these tests on the individual orders.    Clostridioides difficile Toxin, PCR - Stool, Per Rectum [[id number]]  (Abnormal) Collected: 03/26/25 1940    Lab Status: Final result Specimen: Stool from Per Rectum Updated: 03/26/25 2043     Toxigenic C. difficile by PCR Positive    Narrative:      DNA from a toxigenic strain of C.difficile has been detected. Antigen testing for the presence of free C.difficile toxin is currently in progress, to help determine the clinical significance of this PCR result.     Body Fluid Culture - Aspirate, Hip, Left [[id number]] Collected: 03/25/25 1205    Lab Status: Preliminary result Specimen: Aspirate from Hip, Left Updated: 03/26/25 0849     Body Fluid Culture No growth     Gram Stain Occasional WBCs seen      No organisms seen    Blood Culture - Blood, Arm, Right [[id number]]  (Normal) Collected: 03/23/25 0847    Lab Status: Preliminary result Specimen: Blood from Arm, Right Updated: 03/26/25 0900     Blood Culture No growth at 3 days    Blood Culture - Blood, Hand, Right [[id number]]  (Normal) Collected: 03/23/25 0847    Lab Status: Preliminary result Specimen: Blood from Hand, Right Updated: 03/26/25 0900     Blood Culture No growth at 3 days    MRSA Screen, PCR (Inpatient) - Swab, Nares [[id number]]  (Abnormal) Collected: 03/20/25 1750    Lab Status: Final result Specimen: Swab from Nares Updated: 03/20/25 2105     MRSA PCR MRSA Detected    Narrative:      The negative predictive value of this diagnostic test is high and should only be used to  consider de-escalating anti-MRSA therapy. A positive result may indicate colonization with MRSA and must be correlated clinically.    Blood Culture - Blood, Arm, Right [[id number]]  (Abnormal)  (Susceptibility) Collected: 03/20/25 1530    Lab Status: Final result Specimen: Blood from Arm, Right Updated: 03/25/25 0636     Blood Culture Streptococcus intermedius     Isolated from Aerobic and Anaerobic Bottles     Gram Stain Anaerobic Bottle Gram positive cocci in pairs and chains      Aerobic Bottle Gram positive cocci in pairs and chains    Susceptibility        Streptococcus intermedius      Not Specified      Ceftriaxone Susceptible      Penicillin G Susceptible      Vancomycin Susceptible                           Blood Culture ID, PCR - Blood, Arm, Right [[id number]]  (Abnormal) Collected: 03/20/25 1530    Lab Status: Final result Specimen: Blood from Arm, Right Updated: 03/22/25 1030     BCID, PCR Streptococcus spp, not A, B, or pneumoniae. Identification by BCID2 PCR.     BOTTLE TYPE Anaerobic Bottle    COVID-19 and FLU A/B PCR, 1 HR TAT - Swab, Nasopharynx [[id number]]  (Normal) Collected: 03/20/25 1435    Lab Status: Final result Specimen: Swab from Nasopharynx Updated: 03/20/25 1515     COVID19 Not Detected     Influenza A PCR Not Detected     Influenza B PCR Not Detected    Narrative:      Fact sheet for providers: https://www.fda.gov/media/067121/download    Fact sheet for patients: https://www.fda.gov/media/828823/download    Test performed by PCR.    Blood Culture - Blood, Arm, Left [[id number]]  (Abnormal) Collected: 03/20/25 1435    Lab Status: Final result Specimen: Blood from Arm, Left Updated: 03/25/25 0636     Blood Culture Streptococcus intermedius     Isolated from Aerobic and Anaerobic Bottles     Gram Stain Anaerobic Bottle Gram positive cocci in pairs and chains      Aerobic Bottle Gram positive cocci in pairs and chains    Narrative:      Refer to previous blood culture collected on 03/20/2025  1530 for MICs          FL Guided Aspiration Joint  Result Date: 3/25/2025  Technically successful left hip joint aspiration as described above.   Procedure performed by JAX [place]. By electronically signing this report, I, the supervising radiologist, attest that I was not present for the procedure(s) but agree with the final edited report.  This report was finalized on 3/25/2025 3:51 PM by Dr. Jonnathan Ramírez M.D on Workstation: [id number]      XR Hip With or Without Pelvis 2 - 3 View Left  Result Date: 3/25/2025   As described.    This report was finalized on 3/25/2025 12:56 PM by Dr. Fidencio Zaman M.D on Workstation: [id number]      XR Hip With or Without Pelvis 2 - 3 View Right  Result Date: 3/25/2025   As described.    This report was finalized on 3/25/2025 12:56 PM by Dr. Fidencio Zaman M.D on Workstation: [id number]      MRI Lumbar Spine Without Contrast  Result Date: 3/24/2025  1.Severely limited study due to motion degradation despite attempts at optimal imaging. 2.Evidence for abnormal edema within the paraspinal soft tissues along the right posterior lateral margin of the L3-L5 spinal levels. There is suggestion of more focally pronounced fluid signal in this region. The findings appear to be centered around the level of the right L4/5 posterior facet. The findings may indicate inflammation/infection and this region. The changes may be related to right posterior facet septic arthropathy with adjacent soft tissue cellulitis. The more focally pronounced fluid signal focus may indicate developing abscess in this region measuring 2 cm. Again the assessment is markedly limited. 3.No evidence for discitis/osteomyelitis. No evidence for abscess involving the central canal. 4.Degenerative changes are seen throughout the mid to lower lumbar spine. Electronically Signed: Rony Coronado MD  3/24/2025 2:34 PM EDT  Workstation ID: [id number]    MRI Thoracic Spine Without Contrast  Result Date:  3/24/2025  1.No evidence for discitis/osteomyelitis. No evidence for abscess.. Electronically Signed: Rony Coronado MD  3/24/2025 2:23 PM EDT  Workstation ID: [id number]    CT Lumbar Spine With & Without Contrast  Result Date: 3/22/2025  Impression: 1.No acute fracture or subluxation is identified within the lumbar spine. 2.Lumbar spondylosis. Neural foraminal narrowing is present at L3-L4, L4-L5, and L5-S1. Possible spinal canal stenosis at L3-L4 and L4-L5. 3.No suspicious contrast enhancement is identified. 4.Please note that this is an insensitive test to detect early signs of discitis/osteomyelitis. If there is suspicion for discitis/osteomyelitis, recommend further evaluation with MRI with and without IV contrast which could also better characterized degenerative changes of the spine. Electronically Signed: Deshaun Sneed MD  3/22/2025 2:37 PM EDT  Workstation ID: [id number]    XR Foot 3+ View Left  Result Date: 3/20/2025  Impression: 1.Soft tissue swelling without definite or acute osseous abnormality. 2.Progression of ankle and foot arthritic changes compared to 2020 x-rays. Electronically Signed: Roxann Avery  3/20/2025 7:19 PM EDT  Workstation ID: [id number]    XR Ankle 3+ View Left  Result Date: 3/20/2025  Impression: 1.Soft tissue swelling without definite or acute osseous abnormality. 2.Progression of ankle and foot arthritic changes compared to 2020 x-rays. Electronically Signed: Roxann Avery  3/20/2025 7:19 PM EDT  Workstation ID: [id number]    XR Chest 2 View  Result Date: 3/20/2025  No change from the previous study with no evidence for acute cardiopulmonary process. Electronically Signed: Rony Coronado MD  3/20/2025 3:29 PM EDT  Workstation ID: [id number]    CT Abdomen Pelvis Without Contrast  Result Date: 3/20/2025  1.No evidence for significant nephrolithiasis. There is no evidence for obstructive uropathy. 2.No evidence for acute abnormality throughout the abdomen or pelvis on this noncontrast exam.  Electronically Signed: Rony Coronado MD  3/20/2025 11:24 AM EDT  Workstation ID: [id number]          Assessment:    Streptococcal bacteremia    Acute back pain    Facet arthritis of lumbar region    Obesity    Dyslipidemia    HTN (hypertension)    MRSA colonization    Type 2 diabetes mellitus, with long-term current use of insulin    Hyponatremia    Diabetic peripheral neuropathy  C. difficile colonization  Recommendation/discussion:   He seems to have clinically improved but his persistent left hip pain is concerning.  It could very well be radicular discomfort from lumbar spine process.  Partial positive C. difficile assay is also complicating the issue.  The likely sources of his strep intermedius bacteremia:  is probably his lower extremity lesions and feet with probable lumbar spine involvement though at present does not have surgically amendable lesions as per neurosurgery service.  Versus  Odontogenic source.  Patient had cleaning of his teeth done 6 months ago and he says that his daughter and son-in-law have dental background.  Patient would need out of hospital follow-up with his primary care provider to address his skin lesions due to diabetic neuropathy involving his feet and make sure that soft tissue infections and wound infections are identified and treated to prevent future episode of bacteremic sepsis as well as follow-up with his dentist to work him up for any dental pathology that could put him at future risk of strep bacteremia.  An opinion from orthopedic surgery service is not unreasonable about his persistent left hip pain not only to make sure that occult pathology of the left hip is checked out but also if will provide an avenue for him to have long-term follow-up with orthopedic surgery service.  [place] treatment for invasive strep intermedius infection is beta-lactam class of antibiotics if it is penicillin sensitive.  In this situation of MRSA colonization and presence of C. difficile  colonization and the fact that he would require long-term antibiotic therapy with so many comorbidities I think using vancomycin over ceftriaxone would be beneficial.  Vancomycin can cause increased risk of renal dysfunction but beta-lactam class of antibiotics in this situation can exacerbate C. difficile infection.  Would recommend 6 weeks of IV vancomycin to address both his strep intermedius bacteremic sepsis with possible lumbar spine involvement as well as MRSA colonization and minimization of flare of C. difficile.  Because of the fact that patient has received ceftriaxone and beta-lactam class of antibiotic therapy would recommend initiating 10-day course of oral vancomycin.  Will DC ceftriaxone.  Following orders are written for case management:  Please arrange for 42 days of IV vancomycin to be dosed by pharmacy to achieve a trough level of 15-20 or area under the curve of 400-600.  Start of the treatment would be 3/25/2025 and end of the treatment would be 5/6/2025.  Please check weekly CBC CMP and CRP and call abnormal results to Dr. López med [phone] for abnormal results.  Please educate patient to call Dr. López at [phone] on a weekly basis to go over review of system to monitor antibiotic toxicity and effectiveness of treatment.  Please arrange out of hospital follow-up with his primary care provider, neurosurgery service and podiatry service as well as dental service to address his medical issues including future source of his strep bacteremia.  Diagnosis: Strep intermedius bacteremia with probable lumbar spine infection with facet arthritis and MRSA colonization with C. difficile colonization.    Lizette López MD  3/26/2025  22:51 EDT    Parts of this note may be an electronic transcription/translation of spoken language to printed text using the Dragon dictation system.

## 2025-03-27 NOTE — CASE MANAGEMENT/SOCIAL WORK
Discharge Planning Assessment  [facility]     Patient Name: Arnold Ramírez  MRN: [id number]  Today's Date: 3/27/2025    Admit Date: 3/24/2025    Plan: Plans discharge home with assist of spouse, [facility] and [facility].   Discharge Needs Assessment       Row Name 03/27/25 1532       Living Environment    People in Home spouse    Name(s) of People in Home Cinthia Ramírez/spouse [phone]    Current Living Arrangements home    Potentially Unsafe Housing Conditions none    In the past 12 months has the electric, gas, oil, or water company threatened to shut off services in your home? No    Primary Care Provided by self    Family Caregiver if Needed spouse    Family Caregiver Names Cinthia Ramírez/spouse [phone]    Quality of Family Relationships helpful;involved;supportive    Able to Return to Prior Arrangements yes       Resource/Environmental Concerns    Resource/Environmental Concerns none    Transportation Concerns none       Transportation Needs    In the past 12 months, has lack of transportation kept you from medical appointments or from getting medications? no    In the past 12 months, has lack of transportation kept you from meetings, work, or from getting things needed for daily living? No       Food Insecurity    Within the past 12 months, you worried that your food would run out before you got the money to buy more. Never true    Within the past 12 months, the food you bought just didn't last and you didn't have money to get more. Never true       Transition Planning    Patient/Family Anticipates Transition to home with help/services;home with family    Patient/Family Anticipated Services at Transition home health care    Transportation Anticipated family or friend will provide       Discharge Needs Assessment    Readmission Within the Last 30 Days planned readmission    Equipment Currently Used at Home bipap;oxygen    Concerns to be Addressed discharge planning    Anticipated  Changes Related to Illness none    Equipment Needed After Discharge medication pump    Discharge Facility/Level of Care Needs home with home health    Provided Post Acute Provider List? Yes    Post Acute Provider List Home Health    Provided Post Acute Provider Quality & Resource List? Yes    Post Acute Provider Quality and Resource List Home Health    Delivered To Patient;Support Person    Support Person Cinthia Ramírez/spouse [phone]    Method of Delivery In person    Patient's Choice of Community Agency(s) [facility] and [facility].                   Discharge Plan       Row Name 03/27/25 1326       Plan    Plan Plans discharge home with assist of spouse, [facility] and [facility].    Patient/Family in Agreement with Plan yes    Plan Comments Spoke with patient and spouse at bedside to complete initial assessment. Confirmed information on facsheet. PCP: Anand Alford MD and Pharmacy: [facility] ([place], [place]). Patient lives with spouse in 1.5 story house with one KIM. Bedroom and bathroom on main level. Denies difficulty affording medications or transportation concerns. Patient is IADLs and mobility. Uses biPap and O2 per [facility]. States he's never used HH/SNF in the past. Patient will need Vancomycin IV x42 days (end date 5/6/25). Will need weekly CBC CMP and CRP and call abnormal results to Dr. López ([phone]). Patient to f/u with PCP, [facility], podiatry and dental outpatient. Will need PICC line placed prior to discharge. Plans discharge home with assist of spouse, [facility] and [facility]. Referrals placed and await call back regarding acceptance/denial. Family to transport. Continue to follow....[facility]                  Continued Care and Services - Admitted Since 3/24/2025       Dialysis/Infusion       Service Provider Request Status Services Address Phone Fax Patient Preferred    [facility] Pending - Request Sent -- [address] [place] [address] [phone] --              Home Medical Care       Service Provider Request Status Services Address Phone Fax Patient Preferred    [facility] Pending - Request Sent -- [address], [address], [place] [address] [phone] --                  Expected Discharge Date and Time       Expected Discharge Date Expected Discharge Time    Mar 28, 2025            Demographic Summary    No documentation.                  Functional Status    No documentation.                  Psychosocial    No documentation.                  Abuse/Neglect    No documentation.                  Legal    No documentation.                  Substance Abuse    No documentation.                  Patient Forms    No documentation.                     Luba Naylor RN

## 2025-03-27 NOTE — DISCHARGE PLACEMENT REQUEST
"Oneyda Ramírez (65 y.o. Male)       Date of Birth   1960    Social Security Number       Address   [address] [place] [address]    Home Phone   [phone]    MRN   [id number]       [place]   None    Marital Status                               Admission Date   3/24/2025    Admission Type   Urgent    Admitting Provider   Lizette López MD    Attending Provider   Elder Hernandez MD    Department, Room/Bed   [address], P598/1       Discharge Date       Discharge Disposition       Discharge Destination                                 Attending Provider: Elder Hernandez MD    Allergies: Naproxen, Sulfamethoxazole-trimethoprim, Etodolac, Gabapentin, Zonisamide    Isolation: Contact, Spore   Infection: MRSA (03/20/25), C.difficile (03/26/25)   Code Status: CPR    Ht: 195 cm (76.77\")   Wt: 164 kg (361 lb 1.8 oz)    Admission Cmt: None   Principal Problem: Streptococcal bacteremia [R78.81,B95.5]                   Active Insurance as of 3/24/2025       Primary Coverage       Payor Plan Insurance Group Employer/Plan Group    HU[employer] MEDICARE REPLACEMENT HU[employer] MEDICARE ADVANTAGE PPO [id number]       Payor Plan Address Payor Plan Phone Number Payor Plan Fax Number Effective Dates    [address] [address] [phone]  1/1/2024 - None Entered    [facility] [address]         Subscriber Name Subscriber Birth Date Member ID       ONEYDA RAMÍREZ 1960 [id number]                     Emergency Contacts        (Rel.) Home Phone Work Phone Mobile Phone    ANA RAMÍREZ (Spouse) -- -- [phone]                "

## 2025-03-27 NOTE — PLAN OF CARE
Goal Outcome Evaluation:  Plan of Care Reviewed With: patient        Progress: improving     Patient pain is controlled  with Norco 7.5/325mg only and rested throughout shift. He refused to wear BiPAP or supplemental O2 therapy even after being educated.

## 2025-03-27 NOTE — THERAPY EVALUATION
Patient Name: Arnold Ramírez  : 1960    MRN: [id number]                              Today's Date: 3/27/2025       Admit Date: 3/24/2025    Visit Dx: No diagnosis found.  Patient Active Problem List   Diagnosis    Streptococcal bacteremia    Acute back pain    Facet arthritis of lumbar region    Obesity    Dyslipidemia    HTN (hypertension)    MRSA colonization    Type 2 diabetes mellitus, with long-term current use of insulin    Hyponatremia    Diabetic peripheral neuropathy    Clostridium difficile diarrhea     Past Medical History:   Diagnosis Date    Diabetes mellitus     Hyperlipidemia     Hypertension      History reviewed. No pertinent surgical history.   General Information       Row Name 25 1000          Physical Therapy Time and Intention    Document Type evaluation  -MG (r) ZT (t) MG (c)     Mode of Treatment individual therapy;physical therapy  -MG (r) ZT (t) MG (c)       Row Name 25 1000          General Information    Patient Profile Reviewed yes  -MG (r) ZT (t) MG (c)     Prior Level of Function independent:  -MG (r) ZT (t) MG (c)     Existing Precautions/Restrictions fall  -MG (r) ZT (t) MG (c)     Barriers to Rehab none identified  -MG (r) ZT (t) MG (c)       Row Name 25 1000          Living Environment    Current Living Arrangements home  -MG (r) ZT (t) MG (c)     People in Home spouse  -MG (r) ZT (t) MG (c)       Row Name 25 1000          Stairs Within Home, Primary    Stairs Comment, Within Home, Primary Pt reports having stairs to get upstairs but doesn't use them.  -MG (r) ZT (t) MG (c)       Row Name 25 1000          Cognition    Orientation Status (Cognition) oriented x 4  -MG (r) ZT (t) MG (c)       Row Name 25 1000          Safety Issues/Impairments Affecting Functional Mobility    Impairments Affecting Function (Mobility) balance;endurance/activity tolerance;strength;range of motion (ROM);pain  -MG (r) ZT (t) MG (c)     Comment, Safety  Issues/Impairments (Mobility) Gait belt and non-skid socks donned for safety.  -MG (r) ZT (t) MG (c)               User Key  (r) = Recorded By, (t) = Taken By, (c) = Cosigned By      Initials Name Provider Type    MG Brigitte Jackson, PT Physical Therapist    ZT Vamsi Farooq, PT Student PT Student                   Mobility       Row Name 03/27/25 1001          Bed Mobility    Bed Mobility supine-sit  -MG (r) ZT (t) MG (c)     Supine-Sit [place] (Bed Mobility) standby assist  -MG (r) ZT (t) MG (c)     Assistive Device (Bed Mobility) head of bed elevated;bed rails  -MG (r) ZT (t) MG (c)     Comment, (Bed Mobility) Deflated mattress to achieve EOB.  -MG (r) ZT (t) MG (c)       Row Name 03/27/25 1001          Sit-Stand Transfer    Sit-Stand [place] (Transfers) contact guard  -MG (r) ZT (t) MG (c)     Assistive Device (Sit-Stand Transfers) walker, front-wheeled  -MG (r) ZT (t) MG (c)     Comment, (Sit-Stand Transfer) Pt unsure how he would do with mobility this AM d/t pain so Rwx used for STS /ambulation.  -MG (r) ZT (t) MG (c)       Row Name 03/27/25 1001          Gait/Stairs (Locomotion)    [place] Level (Gait) contact guard  -MG (r) ZT (t) MG (c)     Assistive Device (Gait) walker, front-wheeled  -MG (r) ZT (t) MG (c)     Patient was able to Ambulate yes  -MG (r) ZT (t) MG (c)     Distance in Feet (Gait) 20  -MG (r) ZT (t) MG (c)     Deviations/Abnormal Patterns (Gait) weight shifting decreased;gait speed decreased;stride length decreased;mary decreased  -MG (r) ZT (t) MG (c)     Left Sided Gait Deviations heel strike decreased  L foot ER  -MG (r) ZT (t) MG (c)     Comment, (Gait/Stairs) --  -MG (r) ZT (t) MG (c)               User Key  (r) = Recorded By, (t) = Taken By, (c) = Cosigned By      Initials Name Provider Type    MG Brigitte Jackson, PT Physical Therapist    ZT Vamsi Farooq PT Student PT Student                   Obj/Interventions       Row Name 03/27/25 1005          Range of Motion  Comprehensive    General Range of Motion bilateral lower extremity ROM WFL  -MG (r) ZT (t) MG (c)     Comment, General Range of Motion RUE WFL, LUE 60% impaired.  -MG (r) ZT (t) MG (c)       Row Name 03/27/25 1005          Strength Comprehensive (MMT)    Comment, General Manual Muscle Testing (MMT) Assessment RLE 5/5 strength, LLE 3/5 strength possibly d/t pain.  -MG (r) ZT (t) MG (c)       Row Name 03/27/25 1005          Balance    Balance Assessment sitting static balance;sitting dynamic balance;standing static balance;standing dynamic balance  -MG (r) ZT (t) MG (c)     Static Sitting Balance standby assist  -MG (r) ZT (t) MG (c)     Dynamic Sitting Balance standby assist  -MG (r) ZT (t) MG (c)     Position, Sitting Balance sitting in chair;sitting edge of bed  -MG (r) ZT (t) MG (c)     Static Standing Balance contact guard  -MG (r) ZT (t) MG (c)     Dynamic Standing Balance contact guard  -MG (r) ZT (t) MG (c)     Position/Device Used, Standing Balance walker, rolling  -MG (r) ZT (t) MG (c)     Comment, Balance Pt had no LOB with ambulation.  -MG (r) ZT (t) MG (c)               User Key  (r) = Recorded By, (t) = Taken By, (c) = Cosigned By      Initials Name Provider Type    MG Brigitte Jackson, PT Physical Therapist    ZT Vamsi Farooq, PT Student PT Student                   Goals/Plan       Row Name 03/27/25 1103          Bed Mobility Goal 1 (PT)    Activity/Assistive Device (Bed Mobility Goal 1, PT) bed mobility activities, all  -MG (r) ZT (t) MG (c)     [place] Level/Cues Needed (Bed Mobility Goal 1, PT) independent  -MG (r) ZT (t) MG (c)     Time Frame (Bed Mobility Goal 1, PT) long term goal (LTG);1 week  -MG (r) ZT (t) MG (c)       Row Name 03/27/25 1103          Transfer Goal 1 (PT)    Activity/Assistive Device (Transfer Goal 1, PT) sit-to-stand/stand-to-sit  -MG (r) ZT (t) MG (c)     [place] Level/Cues Needed (Transfer Goal 1, PT) independent  -MG (r) ZT (t) MG (c)     Time Frame (Transfer  Goal 1, PT) long term goal (LTG);1 week  -MG (r) ZT (t) MG (c)       Row Name 03/27/25 1103          Gait Training Goal 1 (PT)    Activity/Assistive Device (Gait Training Goal 1, PT) gait (walking locomotion)  -MG (r) ZT (t) MG (c)     [place] Level (Gait Training Goal 1, PT) standby assist  -MG (r) ZT (t) MG (c)     Distance (Gait Training Goal 1, PT) 100 feet  -MG (r) ZT (t) MG (c)     Time Frame (Gait Training Goal 1, PT) long term goal (LTG);1 week  -MG (r) ZT (t) MG (c)               User Key  (r) = Recorded By, (t) = Taken By, (c) = Cosigned By      Initials Name Provider Type    MG Brigitte Jackson, PT Physical Therapist    ZT Vamsi Farooq, PT Student PT Student                   Clinical Impression       Row Name 03/27/25 1007          Pain    Pretreatment Pain Rating 4/10  -MG (r) ZT (t) MG (c)     Posttreatment Pain Rating 4/10  -MG (r) ZT (t) MG (c)     Pain Location back;hip  ant. hip/thigh  -MG (r) ZT (t) MG (c)     Pain Management Interventions exercise or physical activity utilized;positioning techniques utilized;cold applied  -MG (r) ZT (t) MG (c)       Row Name 03/27/25 1007          Plan of Care Review    Plan of Care Reviewed With patient  -MG (r) ZT (t) MG (c)     Outcome Evaluation Pt is a 64 y/o M who presents to [facility] with L flank pain. Pt agreeable to PT evaluation this AM with c/o 4/10 burning pain in his L hip/thigh region. Pt lives at home with his spouse and reports having stairs to get upstairs but doesn't use them. At baseline, pt is (I) with ADL's and functional mobility with no use of AD. Performed bed mobility with SBA and STS using Rwx with CGA. Ambulated 20 ft using Rwx and CGA with no LOB and cues for navigation. Encouraged pt to stay active with mobility and pt in agreement. Pt would further benefit from skilled IP PT to address strength and mobility deficits. Anticipate d/c to home with assist and possible OP PT when medically cleared. Will continue to follow.  -MG (r) ZT  (t) MG (c)       Row Name 03/27/25 1007          Therapy Assessment/Plan (PT)    Rehab Potential (PT) fair  -MG (r) ZT (t) MG (c)     Criteria for Skilled Interventions Met (PT) yes;meets criteria;skilled treatment is necessary  -MG (r) ZT (t) MG (c)     Therapy Frequency (PT) 3 times/wk  -MG (r) ZT (t) MG (c)       Row Name 03/27/25 1007          Positioning and Restraints    Pre-Treatment Position in bed  -MG (r) ZT (t) MG (c)     Post Treatment Position chair  -MG (r) ZT (t) MG (c)     In Chair notified nsg;reclined;call light within reach;encouraged to call for assist;legs elevated;L heel elevated  RN okay with chair alarm being off.  -MG (r) ZT (t) MG (c)               User Key  (r) = Recorded By, (t) = Taken By, (c) = Cosigned By      Initials Name Provider Type    MG Brigitte Jackson, PT Physical Therapist    ZT aVmsi Farooq, PT Student PT Student                   Outcome Measures       Row Name 03/27/25 1104 03/27/25 0600       How much help from another person do you currently need...    Turning from your back to your side while in flat bed without using bedrails? 4  -MG (r) ZT (t) MG (c) 4  -NB    Moving from lying on back to sitting on the side of a flat bed without bedrails? 4  -MG (r) ZT (t) MG (c) 4  -NB    Moving to and from a bed to a chair (including a wheelchair)? 3  -MG (r) ZT (t) MG (c) 4  -NB    Standing up from a chair using your arms (e.g., wheelchair, bedside chair)? 4  -MG (r) ZT (t) MG (c) 4  -NB    Climbing 3-5 steps with a railing? 2  -MG (r) ZT (t) MG (c) 4  -NB    To walk in hospital room? 3  -MG (r) ZT (t) MG (c) 4  -NB    AM-PAC 6 Clicks Score (PT) 20  -MG (r) ZT (t) 24  -NB    Highest Level of Mobility Goal 6 --> Walk 10 steps or more  -MG (r) ZT (t) 8 --> Walked 250 feet or more  -NB      Row Name 03/27/25 1106          Functional Assessment    Outcome Measure Options AM-PAC 6 Clicks Basic Mobility (PT)  -MG (r) ZT (t) MG (c)               User Key  (r) = Recorded By, (t) = Taken  By, (c) = Cosigned By      Initials Name Provider Type    MG Brigitte Jackson, PT Physical Therapist    Faustina Baker, RN Registered Nurse    ZT Vamsi Farooq, PT Student PT Student                                 Physical Therapy Education       Title: PT OT SLP Therapies (In Progress)       Topic: Physical Therapy (In Progress)       Point: Mobility training (Done)       Learning Progress Summary            Patient Acceptance, E, DU by ZT at 3/27/2025 1104                      Point: Home exercise program (Not Started)       Learner Progress:  Not documented in this visit.              Point: Body mechanics (Done)       Learning Progress Summary            Patient Acceptance, E, DU by ZT at 3/27/2025 1104                      Point: Precautions (Done)       Learning Progress Summary            Patient Acceptance, E, DU by ZT at 3/27/2025 1104                                      User Key       Initials Effective Dates Name Provider Type Discipline     01/29/25 -  Vamsi Farooq, PT Student PT Student PT                  PT Recommendation and Plan     Outcome Evaluation: Pt is a 64 y/o M who presents to [facility] with L flank pain. Pt agreeable to PT evaluation this AM with c/o 4/10 burning pain in his L hip/thigh region. Pt lives at home with his spouse and reports having stairs to get upstairs but doesn't use them. At baseline, pt is (I) with ADL's and functional mobility with no use of AD. Performed bed mobility with SBA and STS using Rwx with CGA. Ambulated 20 ft using Rwx and CGA with no LOB and cues for navigation. Encouraged pt to stay active with mobility and pt in agreement. Pt would further benefit from skilled IP PT to address strength and mobility deficits. Anticipate d/c to home with assist and possible OP PT when medically cleared. Will continue to follow.     Time Calculation:         PT Charges       Row Name 03/27/25 1106             Time Calculation    Start Time 0906  -MG (r) ZT (t) MG (c)       Stop Time 0927  -MG (r) ZT (t) MG (c)      Time Calculation (min) 21 min  -MG (r) ZT (t)      PT Received On 03/27/25  -MG (r) ZT (t) MG (c)      PT - Next Appointment 03/29/25  -MG (r) ZT (t) MG (c)      PT Goal Re-Cert Due Date 04/03/25  -MG (r) ZT (t) MG (c)         Time Calculation- PT    Total Timed Code Minutes- PT 10 minute(s)  -MG                User Key  (r) = Recorded By, (t) = Taken By, (c) = Cosigned By      Initials Name Provider Type    Brigitte Gee, PT Physical Therapist    Vamsi Cheek, PT Student PT Student                      PT G-Codes  Outcome Measure Options: AM-PAC 6 Clicks Basic Mobility (PT)  AM-PAC 6 Clicks Score (PT): 20  AM-PAC 6 Clicks Score (OT): 16  PT Discharge Summary  Anticipated Discharge Disposition (PT): home with assist, home with outpatient therapy services    Vamsi Farooq, PT Student  3/27/2025

## 2025-03-27 NOTE — PLAN OF CARE
Goal Outcome Evaluation:  Plan of Care Reviewed With: patient           Outcome Evaluation: Pt is a 66 y/o M who presents to [facility] with L flank pain. Pt agreeable to PT evaluation this AM with c/o 4/10 burning pain in his L hip/thigh region. Pt lives at home with his spouse and reports having stairs to get upstairs but doesn't use them. At baseline, pt is (I) with ADL's and functional mobility with no use of AD. Performed bed mobility with SBA and STS using Rwx with CGA. Ambulated 20 ft using Rwx and CGA with no LOB and cues for navigation. Encouraged pt to stay active with mobility and pt in agreement. Pt would further benefit from skilled IP PT to address strength and mobility deficits. Anticipate d/c to home with assist and possible OP PT when medically cleared. Will continue to follow.    Anticipated Discharge Disposition (PT): home with assist, home with outpatient therapy services

## 2025-03-27 NOTE — PROGRESS NOTES
Name: Arnold Ramírez ADMIT: 3/24/2025   : 1960  PCP: Anand Alford MD    MRN: [id number] LOS: 3 days   AGE/SEX: 65 y.o. male  ROOM: [facility]     Subjective   Subjective   C/o pain in left hip mainly. No F/C/NS. No SOA or CP. No N/V/abd pain. No more diarrhea today. Voiding well. Tolerating diet. Up ad gianluca in room.       Objective   Objective   Vital Signs  Temp:  [97.3 °F (36.3 °C)-98.4 °F (36.9 °C)] 97.9 °F (36.6 °C)  Heart Rate:  [73-86] 85  Resp:  [16-18] 18  BP: (133-171)/(71-88) 171/88  SpO2:  [95 %-100 %] 98 %  on   ;   Device (Oxygen Therapy): room air  Body mass index is 43.08 kg/m².    Physical Exam  Vitals and nursing note reviewed. Exam conducted with a chaperone present (Wife).   Constitutional:       General: He is not in acute distress.     Appearance: He is obese. He is ill-appearing (chronically). He is not toxic-appearing or diaphoretic.   HENT:      Head: Normocephalic.      Nose: Nose normal.      Mouth/Throat:      Mouth: Mucous membranes are moist.      Pharynx: Oropharynx is clear.   Eyes:      General: No scleral icterus.        Right eye: No discharge.         Left eye: No discharge.      Extraocular Movements: Extraocular movements intact.      Conjunctiva/sclera: Conjunctivae normal.   Cardiovascular:      Rate and Rhythm: Normal rate and regular rhythm.      Pulses: Normal pulses.   Pulmonary:      Effort: Pulmonary effort is normal. No respiratory distress.      Breath sounds: Normal breath sounds. No wheezing or rales.   Abdominal:      General: Bowel sounds are normal. There is no distension.      Palpations: Abdomen is soft.      Tenderness: There is no abdominal tenderness.   Musculoskeletal:         General: Swelling (trace in BLEs) present.      Cervical back: Neck supple.   Skin:     General: Skin is warm and dry.      Capillary Refill: Capillary refill takes less than 2 seconds.      Coloration: Skin is not jaundiced.      Findings: Lesion (excoriated lesions to  BLEs--L>R) present.      Comments: Rash on back    Neurological:      General: No focal deficit present.      Mental Status: He is alert. Mental status is at baseline.   Psychiatric:         Mood and Affect: Mood normal.         Behavior: Behavior normal.         Thought Content: Thought content normal.       Results Review     I reviewed the patient's new clinical results.  Results from last 7 days   Lab Units 03/27/25  0555 03/26/25  0459 03/25/25  0706 03/24/25  0400   WBC 10*3/mm3 10.95* 14.03* 9.68 9.66   HEMOGLOBIN g/dL 12.9* 13.3 12.9* 13.7   PLATELETS 10*3/mm3 323 299 196 234     Results from last 7 days   Lab Units 03/27/25  0555 03/26/25  0459 03/25/25  0915 03/24/25  0400   SODIUM mmol/L 135* 133* 132* 134*   POTASSIUM mmol/L 4.0 4.3 4.6 4.1   CHLORIDE mmol/L 100 100 102 102   CO2 mmol/L 24.4 22.0 20.0* 20.8*   BUN mg/dL 13 17 20 25*   CREATININE mg/dL 0.99 0.82 0.87 0.97   GLUCOSE mg/dL 168* 188* 204* 185*   EGFR mL/min/1.73 85.6 98.7 97.0 87.7     Results from last 7 days   Lab Units 03/27/25  0555 03/26/25  0459 03/25/25  0915 03/21/25  0700   ALBUMIN g/dL 3.0* 3.0* 3.0* 3.9   BILIRUBIN mg/dL 0.3 0.4 0.5 0.6   ALK PHOS U/L 105 116 92 84   AST (SGOT) U/L 15 16 23 22   ALT (SGPT) U/L 16 21 21 24     Results from last 7 days   Lab Units 03/27/25  0555 03/26/25  0459 03/25/25  0915 03/24/25  0400 03/23/25  0425 03/22/25  0807 03/21/25  0700   CALCIUM mg/dL 8.5* 8.4* 8.5* 8.9 9.1 8.9 9.1   ALBUMIN g/dL 3.0* 3.0* 3.0*  --   --   --  3.9   MAGNESIUM mg/dL 1.9 2.0  --   --  2.1 2.1 1.9   PHOSPHORUS mg/dL 2.7 2.9  --   --   --   --   --      Results from last 7 days   Lab Units 03/20/25  1606 03/20/25  1435   PROCALCITONIN ng/mL 8.02*  --    LACTATE mmol/L  --  3.0*     Glucose   Date/Time Value Ref Range Status   03/27/2025 0812 156 (H) 70 - 130 mg/dL Final   03/26/2025 1942 214 (H) 70 - 130 mg/dL Final   03/26/2025 1611 240 (H) 70 - 130 mg/dL Final   03/26/2025 1101 240 (H) 70 - 130 mg/dL Final   03/26/2025  0700 176 (H) 70 - 130 mg/dL Final   03/25/2025 1955 308 (H) 70 - 130 mg/dL Final   03/25/2025 1600 280 (H) 70 - 130 mg/dL Final       No radiology results for the last day      I have personally reviewed all medications:  Scheduled Medications  amLODIPine, 10 mg, Oral, Daily  atorvastatin, 20 mg, Oral, Nightly  DULoxetine, 60 mg, Oral, Daily  insulin glargine, 50 Units, Subcutaneous, Nightly  insulin lispro, 2-9 Units, Subcutaneous, 4x Daily AC & at Bedtime  Lidocaine, 2 patch, Transdermal, Q24H  losartan, 100 mg, Oral, Daily  metoprolol succinate XL, 50 mg, Oral, Daily  Petrolatum, 1 Application, Topical, Q12H  pregabalin, 150 mg, Oral, BID  sodium chloride, 10 mL, Intravenous, Q12H  sodium chloride, 10 mL, Intravenous, Q12H  vancomycin, 125 mg, Oral, Q6H  vancomycin, 1,750 mg, Intravenous, Q12H    Infusions  Pharmacy Consult,   Pharmacy to dose vancomycin,     Diet  Diet: Regular/House, Diabetic, Cardiac; Healthy Heart (2-3 Na+); Consistent Carbohydrate; Fluid Consistency: Thin (IDDSI 0)    I have personally reviewed:  [x]  Laboratory   [x]  Microbiology   []  Radiology   [x]  EKG/Telemetry  []  Cardiology/Vascular   []  Pathology    []  Records       Assessment/Plan     Active Hospital Problems    Diagnosis  POA    **Streptococcal bacteremia [R78.81, B95.5]  Yes    Clostridium difficile diarrhea [A04.72]  No    Type 2 diabetes mellitus, with long-term current use of insulin [E11.9, Z79.4]  Not Applicable    Hyponatremia [E87.1]  Yes    Diabetic peripheral neuropathy [E11.42]  Yes    Acute back pain [M54.9]  Yes    Facet arthritis of lumbar region [M47.816]  Yes    Obesity [E66.9]  Yes    Dyslipidemia [E78.5]  Yes    HTN (hypertension) [I10]  Yes    MRSA colonization [Z22.322]  Not Applicable      Resolved Hospital Problems   No resolved problems to display.       66yo gentleman with HTN, HLD, morbid obesity, MARGO, DM2 with DPN, and MRSA colonization, who was admitted to [facility] with left flank/low back/hip  pain and found to have Strep intermedius bacteremia. MRI suggested possible lumbar spine abscess (though study was severely limited due to motion degradation) and arrangements were made to transfer pt to [facility] so he could be seen by Neurosurgery.    Strep bacteremia  Continue IV Vanc through 5/6 per ID recs  Repeat blood cultures NGTD  Echo neg for vegetation  Afebrile  WBC up to 14 yesterday--suspect due to CDiff (see below)  PICC line orderd    Left low back pain  Left hip pain  [facility] has seen and reviewed MRI L-spine--they do not feel any spine infection is present, suggest repeat MRI in 6 weeks  S/p left hip aspiration 3/25, fluid culture NGTD and no organisms seen on gram stain  ID following  Have asked Ortho to see as hip does not appear to be infected but is quite painful    MRSA colonization  On IV Vanc per ID  No MRSA in blood cultures    CDiff diarrhea  On po Vanc per ID and Rocephin stopped    Dermatitis NOS  Only on back, itchy  Looks like contact dermatitis from bed linens  Topical steroid ordered    DM2 with DPN  Sugars better today  A1c 11, does not follow any kind of diabetic diet at home, at home he requires Tresiba 150 units BID (!?)  Continue Lantus at 50 units QPM here and SSI, adjust as necessary  Continue Cymbalta and Lyrica    MARGO  Continue BiPAP if pt will wear (refused last night)    Morbid obesity  Complicating all aspects of care    HTN  BPs acceptable if a bit robust at times  Continue amlodipine, losartan, and metoprolol    HLD  Continue statin    Pseudohyponatremia  Na+ fine when corrected for hyperglycemia      SCDs for DVT prophylaxis.  Full code.  Discussed with patient, wife, and RN. D/w Dr. López.  Anticipate discharge home in 1-2 days.  Expected Discharge Date: 3/28/2025; Expected Discharge Time:       Elder Hernandez MD  [facility]  03/27/25  12:10 EDT

## 2025-03-27 NOTE — PROGRESS NOTES
[facility] Clinical Pharmacy Services: Vancomycin Monitoring Note    Arnold Ramírez is a 65 y.o. male who is on day 4 of stop date 5/5/25 of pharmacy to dose vancomycin for Bone and/or Joint Infection.    Previous Vancomycin Dose:    1500 mg IV every  12  hours (showing low AUC of 391) Will need to increase dose  Updated Cultures and Sensitivities:   Blood Culture Streptococcus intermedius Critical      Results from last 7 days   Lab Units 03/27/25  0818 03/22/25  0807 03/22/25  0107 03/21/25  0700   VANCOMYCIN RM mcg/mL  --   --   --  9.03   VANCOMYCIN TR mcg/mL 9.20 10.70  --   --    VANCOMYCIN PK mcg/mL  --   --  18.50*  --      Vitals/Labs  Ht:  ; Wt: (!) 164 kg (361 lb 1.8 oz)   Temp Readings from Last 1 Encounters:   03/27/25 97.9 °F (36.6 °C) (Oral)     Estimated Creatinine Clearance: 128.5 mL/min (by C-G formula based on SCr of 0.99 mg/dL).       Results from last 7 days   Lab Units 03/27/25  0555 03/26/25  0459 03/25/25  0915 03/25/25  0706   CREATININE mg/dL 0.99 0.82 0.87  --    WBC 10*3/mm3 10.95* 14.03*  --  9.68     Assessment/Plan    Chage Vancomycin Dose: 1750 mg IV every 12 hours (1st dose at 2100 tonight-already received 1500 mg this am); provides a predicted  mg/L.hr   Next Level Date and Time: Next week if patient is still here  We will continue to monitor patient changes and renal function     Thank you for involving pharmacy in this patient's care. Please contact pharmacy with any questions or concerns.       Clif Mathews [facility]  Clinical Pharmacist

## 2025-03-27 NOTE — TELEPHONE ENCOUNTER
MRI order placed.    ----- Message from Patricia Casanova sent at 3/25/2025  2:32 PM EDT -----  Regarding: Hospital follow-up  Patient needs follow-up 6 weeks with MRI lumbar spine with and without contrast.  Patient states he needs open MRI and may do best at [facility].  If he does [facility] make sure they know he had recent imaging at [facility].  He can follow-up with Avani or Dr. Garcia  ----- Message -----  From: Patricia Casanova APRN  Sent: 3/25/2025   2:32 PM EDT  To: Estee Ozuna MA  Subject: Hospital follow-up                               Patient needs follow-up 6 weeks with MRI lumbar spine with and without contrast.  Patient states he needs open MRI and may do best at [facility].  If he does [facility] make sure they know he had recent imaging at [facility].

## 2025-03-28 VITALS
BODY MASS INDEX: 43.08 KG/M2 | TEMPERATURE: 97.9 F | HEART RATE: 73 BPM | DIASTOLIC BLOOD PRESSURE: 80 MMHG | SYSTOLIC BLOOD PRESSURE: 141 MMHG | WEIGHT: 315 LBS | OXYGEN SATURATION: 97 % | RESPIRATION RATE: 20 BRPM

## 2025-03-28 LAB
ALBUMIN SERPL-MCNC: 3 G/DL (ref 3.5–5.2)
ALBUMIN/GLOB SERPL: 0.8 G/DL
ALP SERPL-CCNC: 104 U/L (ref 39–117)
ALT SERPL W P-5'-P-CCNC: 17 U/L (ref 1–41)
ANION GAP SERPL CALCULATED.3IONS-SCNC: 12 MMOL/L (ref 5–15)
AST SERPL-CCNC: 15 U/L (ref 1–40)
BACTERIA SPEC AEROBE CULT: NORMAL
BACTERIA SPEC AEROBE CULT: NORMAL
BASOPHILS # BLD AUTO: 0.04 10*3/MM3 (ref 0–0.2)
BASOPHILS NFR BLD AUTO: 0.4 % (ref 0–1.5)
BILIRUB SERPL-MCNC: 0.3 MG/DL (ref 0–1.2)
BUN SERPL-MCNC: 12 MG/DL (ref 8–23)
BUN/CREAT SERPL: 14.3 (ref 7–25)
CALCIUM SPEC-SCNC: 8.7 MG/DL (ref 8.6–10.5)
CHLORIDE SERPL-SCNC: 100 MMOL/L (ref 98–107)
CO2 SERPL-SCNC: 24 MMOL/L (ref 22–29)
CREAT SERPL-MCNC: 0.84 MG/DL (ref 0.76–1.27)
DEPRECATED RDW RBC AUTO: 41.7 FL (ref 37–54)
EGFRCR SERPLBLD CKD-EPI 2021: 98 ML/MIN/1.73
EOSINOPHIL # BLD AUTO: 0.32 10*3/MM3 (ref 0–0.4)
EOSINOPHIL NFR BLD AUTO: 3.1 % (ref 0.3–6.2)
ERYTHROCYTE [DISTWIDTH] IN BLOOD BY AUTOMATED COUNT: 14 % (ref 12.3–15.4)
GLOBULIN UR ELPH-MCNC: 3.7 GM/DL
GLUCOSE BLDC GLUCOMTR-MCNC: 120 MG/DL (ref 70–130)
GLUCOSE BLDC GLUCOMTR-MCNC: 234 MG/DL (ref 70–130)
GLUCOSE BLDC GLUCOMTR-MCNC: 326 MG/DL (ref 70–130)
GLUCOSE BLDC GLUCOMTR-MCNC: 451 MG/DL (ref 70–130)
GLUCOSE SERPL-MCNC: 104 MG/DL (ref 65–99)
HCT VFR BLD AUTO: 39.2 % (ref 37.5–51)
HGB BLD-MCNC: 12.8 G/DL (ref 13–17.7)
IMM GRANULOCYTES # BLD AUTO: 0.12 10*3/MM3 (ref 0–0.05)
IMM GRANULOCYTES NFR BLD AUTO: 1.2 % (ref 0–0.5)
LYMPHOCYTES # BLD AUTO: 1.27 10*3/MM3 (ref 0.7–3.1)
LYMPHOCYTES NFR BLD AUTO: 12.2 % (ref 19.6–45.3)
MAGNESIUM SERPL-MCNC: 2 MG/DL (ref 1.6–2.4)
MCH RBC QN AUTO: 26.9 PG (ref 26.6–33)
MCHC RBC AUTO-ENTMCNC: 32.7 G/DL (ref 31.5–35.7)
MCV RBC AUTO: 82.4 FL (ref 79–97)
MONOCYTES # BLD AUTO: 1.29 10*3/MM3 (ref 0.1–0.9)
MONOCYTES NFR BLD AUTO: 12.4 % (ref 5–12)
NEUTROPHILS NFR BLD AUTO: 7.37 10*3/MM3 (ref 1.7–7)
NEUTROPHILS NFR BLD AUTO: 70.7 % (ref 42.7–76)
NRBC BLD AUTO-RTO: 0 /100 WBC (ref 0–0.2)
PHOSPHATE SERPL-MCNC: 3.3 MG/DL (ref 2.5–4.5)
PLATELET # BLD AUTO: 350 10*3/MM3 (ref 140–450)
PMV BLD AUTO: 9.3 FL (ref 6–12)
POTASSIUM SERPL-SCNC: 3.9 MMOL/L (ref 3.5–5.2)
PROT SERPL-MCNC: 6.7 G/DL (ref 6–8.5)
RBC # BLD AUTO: 4.76 10*6/MM3 (ref 4.14–5.8)
SODIUM SERPL-SCNC: 136 MMOL/L (ref 136–145)
WBC NRBC COR # BLD AUTO: 10.41 10*3/MM3 (ref 3.4–10.8)

## 2025-03-28 PROCEDURE — 25810000003 SODIUM CHLORIDE 0.9 % SOLUTION: Performed by: INTERNAL MEDICINE

## 2025-03-28 PROCEDURE — 85025 COMPLETE CBC W/AUTO DIFF WBC: CPT | Performed by: HOSPITALIST

## 2025-03-28 PROCEDURE — 25010000002 VANCOMYCIN 10 G RECONSTITUTED SOLUTION: Performed by: INTERNAL MEDICINE

## 2025-03-28 PROCEDURE — 83735 ASSAY OF MAGNESIUM: CPT | Performed by: HOSPITALIST

## 2025-03-28 PROCEDURE — C1751 CATH, INF, PER/CENT/MIDLINE: HCPCS

## 2025-03-28 PROCEDURE — 84100 ASSAY OF PHOSPHORUS: CPT | Performed by: HOSPITALIST

## 2025-03-28 PROCEDURE — 82948 REAGENT STRIP/BLOOD GLUCOSE: CPT

## 2025-03-28 PROCEDURE — 80053 COMPREHEN METABOLIC PANEL: CPT | Performed by: HOSPITALIST

## 2025-03-28 PROCEDURE — 63710000001 INSULIN LISPRO (HUMAN) PER 5 UNITS: Performed by: HOSPITALIST

## 2025-03-28 RX ORDER — SODIUM CHLORIDE 9 MG/ML
40 INJECTION, SOLUTION INTRAVENOUS AS NEEDED
Status: DISCONTINUED | OUTPATIENT
Start: 2025-03-28 | End: 2025-03-28 | Stop reason: HOSPADM

## 2025-03-28 RX ORDER — AVOBENZONE, HOMOSALATE, OCTISALATE, OCTOCRYLENE 30; 40; 45; 26 MG/ML; MG/ML; MG/ML; MG/ML
CREAM TOPICAL
Qty: 100 EACH | Refills: 12 | Status: SHIPPED | OUTPATIENT
Start: 2025-03-28

## 2025-03-28 RX ORDER — METHOCARBAMOL 750 MG/1
750 TABLET, FILM COATED ORAL EVERY 6 HOURS PRN
Qty: 15 TABLET | Refills: 0 | Status: SHIPPED | OUTPATIENT
Start: 2025-03-28

## 2025-03-28 RX ORDER — VANCOMYCIN HYDROCHLORIDE 125 MG/1
125 CAPSULE ORAL EVERY 6 HOURS SCHEDULED
Qty: 34 CAPSULE | Refills: 0 | Status: SHIPPED | OUTPATIENT
Start: 2025-03-28 | End: 2025-04-06

## 2025-03-28 RX ORDER — SODIUM CHLORIDE 0.9 % (FLUSH) 0.9 %
20 SYRINGE (ML) INJECTION AS NEEDED
OUTPATIENT
Start: 2025-03-28

## 2025-03-28 RX ORDER — SODIUM CHLORIDE 0.9 % (FLUSH) 0.9 %
10 SYRINGE (ML) INJECTION EVERY 12 HOURS SCHEDULED
OUTPATIENT
Start: 2025-03-28

## 2025-03-28 RX ORDER — ACYCLOVIR 400 MG/1
1 TABLET ORAL
Qty: 3 EACH | Refills: 0 | Status: SHIPPED | OUTPATIENT
Start: 2025-03-28

## 2025-03-28 RX ORDER — SODIUM CHLORIDE 0.9 % (FLUSH) 0.9 %
10 SYRINGE (ML) INJECTION EVERY 12 HOURS SCHEDULED
Status: DISCONTINUED | OUTPATIENT
Start: 2025-03-28 | End: 2025-03-28 | Stop reason: HOSPADM

## 2025-03-28 RX ORDER — SODIUM CHLORIDE 0.9 % (FLUSH) 0.9 %
10 SYRINGE (ML) INJECTION AS NEEDED
OUTPATIENT
Start: 2025-03-28

## 2025-03-28 RX ORDER — BLOOD-GLUCOSE METER
EACH MISCELLANEOUS
Qty: 1 KIT | Refills: 0 | Status: SHIPPED | OUTPATIENT
Start: 2025-03-28

## 2025-03-28 RX ORDER — LIDOCAINE 4 G/G
2 PATCH TOPICAL
Qty: 15 PATCH | Refills: 0 | Status: SHIPPED | OUTPATIENT
Start: 2025-03-28

## 2025-03-28 RX ORDER — HYDROCODONE BITARTRATE AND ACETAMINOPHEN 7.5; 325 MG/1; MG/1
1 TABLET ORAL EVERY 4 HOURS PRN
Qty: 15 TABLET | Refills: 0 | Status: SHIPPED | OUTPATIENT
Start: 2025-03-28

## 2025-03-28 RX ORDER — TRIAMCINOLONE ACETONIDE 1 MG/G
1 CREAM TOPICAL EVERY 12 HOURS SCHEDULED
Start: 2025-03-28

## 2025-03-28 RX ORDER — SODIUM CHLORIDE 9 MG/ML
40 INJECTION, SOLUTION INTRAVENOUS AS NEEDED
OUTPATIENT
Start: 2025-03-28

## 2025-03-28 RX ORDER — SODIUM CHLORIDE 0.9 % (FLUSH) 0.9 %
20 SYRINGE (ML) INJECTION AS NEEDED
Status: DISCONTINUED | OUTPATIENT
Start: 2025-03-28 | End: 2025-03-28 | Stop reason: HOSPADM

## 2025-03-28 RX ORDER — SODIUM CHLORIDE 0.9 % (FLUSH) 0.9 %
10 SYRINGE (ML) INJECTION AS NEEDED
Status: DISCONTINUED | OUTPATIENT
Start: 2025-03-28 | End: 2025-03-28 | Stop reason: HOSPADM

## 2025-03-28 RX ADMIN — LIDOCAINE 2 PATCH: 4 PATCH TOPICAL at 08:25

## 2025-03-28 RX ADMIN — METHOCARBAMOL TABLETS 750 MG: 750 TABLET, COATED ORAL at 16:48

## 2025-03-28 RX ADMIN — SODIUM CHLORIDE 1750 MG: 9 INJECTION, SOLUTION INTRAVENOUS at 17:10

## 2025-03-28 RX ADMIN — HYDROCODONE BITARTRATE AND ACETAMINOPHEN 1 TABLET: 7.5; 325 TABLET ORAL at 12:36

## 2025-03-28 RX ADMIN — PETROLATUM 1 APPLICATION: 420 OINTMENT TOPICAL at 08:26

## 2025-03-28 RX ADMIN — DULOXETINE 60 MG: 60 CAPSULE, DELAYED RELEASE ORAL at 08:25

## 2025-03-28 RX ADMIN — SODIUM CHLORIDE 1750 MG: 9 INJECTION, SOLUTION INTRAVENOUS at 08:17

## 2025-03-28 RX ADMIN — VANCOMYCIN HYDROCHLORIDE 125 MG: 125 CAPSULE ORAL at 05:52

## 2025-03-28 RX ADMIN — LOSARTAN POTASSIUM 100 MG: 50 TABLET, FILM COATED ORAL at 08:25

## 2025-03-28 RX ADMIN — HYDROCODONE BITARTRATE AND ACETAMINOPHEN 1 TABLET: 7.5; 325 TABLET ORAL at 08:26

## 2025-03-28 RX ADMIN — VANCOMYCIN HYDROCHLORIDE 125 MG: 125 CAPSULE ORAL at 12:36

## 2025-03-28 RX ADMIN — METOPROLOL SUCCINATE 50 MG: 50 TABLET, EXTENDED RELEASE ORAL at 08:25

## 2025-03-28 RX ADMIN — METHOCARBAMOL TABLETS 750 MG: 750 TABLET, COATED ORAL at 08:25

## 2025-03-28 RX ADMIN — AMLODIPINE BESYLATE 10 MG: 10 TABLET ORAL at 08:25

## 2025-03-28 RX ADMIN — Medication 10 ML: at 08:26

## 2025-03-28 RX ADMIN — VANCOMYCIN HYDROCHLORIDE 125 MG: 125 CAPSULE ORAL at 17:06

## 2025-03-28 RX ADMIN — HYDROCODONE BITARTRATE AND ACETAMINOPHEN 1 TABLET: 7.5; 325 TABLET ORAL at 01:04

## 2025-03-28 RX ADMIN — INSULIN LISPRO 7 UNITS: 100 INJECTION, SOLUTION INTRAVENOUS; SUBCUTANEOUS at 12:36

## 2025-03-28 RX ADMIN — HYDROCODONE BITARTRATE AND ACETAMINOPHEN 1 TABLET: 7.5; 325 TABLET ORAL at 16:48

## 2025-03-28 RX ADMIN — INSULIN LISPRO 4 UNITS: 100 INJECTION, SOLUTION INTRAVENOUS; SUBCUTANEOUS at 17:06

## 2025-03-28 RX ADMIN — Medication 10 ML: at 10:05

## 2025-03-28 RX ADMIN — PREGABALIN 150 MG: 75 CAPSULE ORAL at 08:25

## 2025-03-28 RX ADMIN — VANCOMYCIN HYDROCHLORIDE 125 MG: 125 CAPSULE ORAL at 00:03

## 2025-03-28 RX ADMIN — TRIAMCINOLONE ACETONIDE 1 APPLICATION: 1 CREAM TOPICAL at 08:27

## 2025-03-28 NOTE — PLAN OF CARE
Goal Outcome Evaluation:  Plan of Care Reviewed With: patient, spouse        Progress: improving     No new events during this shift. Patient seen by Dr Hernandez and Dr López and ok to d/c home with HH/IV Infusions, SOCO PICC line inserted, HH IV Infusion set up to start tomorrow, 2100 Vanco dose will be given early before discharge, ok to give this dose early per Dr López. No tele.

## 2025-03-28 NOTE — CONSULTS
Infectious Diseases Progress Note    Lizette López MD     [facility]  Los: 4 days  Patient Identification:  Name: Arnold Ramírez  Age: 65 y.o.  Sex: male  :  1960  MRN: [id number]         Primary Care Physician: Anand Alford MD    Delayed charting or clinical activity performed on 3/27/2025.    Subjective: Continues to improve.  Decreased pain and discomfort in his hip.  Had lots of questions about PICC line for which he has signed the consent and awaiting his placement.  Interval History: Admission history and physical performed as cross cover for internal medicine service on 3/24/2025.    Objective:    Scheduled Meds:amLODIPine, 10 mg, Oral, Daily  atorvastatin, 20 mg, Oral, Nightly  DULoxetine, 60 mg, Oral, Daily  insulin glargine, 50 Units, Subcutaneous, Nightly  insulin lispro, 2-9 Units, Subcutaneous, 4x Daily AC & at Bedtime  Lidocaine, 2 patch, Transdermal, Q24H  losartan, 100 mg, Oral, Daily  metoprolol succinate XL, 50 mg, Oral, Daily  Petrolatum, 1 Application, Topical, Q12H  pregabalin, 150 mg, Oral, BID  sodium chloride, 10 mL, Intravenous, Q12H  sodium chloride, 10 mL, Intravenous, Q12H  triamcinolone, 1 Application, Topical, Q12H  vancomycin, 125 mg, Oral, Q6H  vancomycin, 1,750 mg, Intravenous, Q12H      Continuous Infusions:Pharmacy Consult,   Pharmacy to dose vancomycin,         Vital signs in last 24 hours:  Temp:  [97.9 °F (36.6 °C)-98.8 °F (37.1 °C)] 98.1 °F (36.7 °C)  Heart Rate:  [74-85] 81  Resp:  [18] 18  BP: (118-171)/(67-88) 144/73    Intake/Output:    Intake/Output Summary (Last 24 hours) at 3/28/2025 0652  Last data filed at 3/27/2025 0841  Gross per 24 hour   Intake 500 ml   Output --   Net 500 ml       Exam:  /73 (BP Location: Right arm, Patient Position: Lying)   Pulse 81   Temp 98.1 °F (36.7 °C) (Oral)   Resp 18   Wt (!) 164 kg (361 lb 1.8 oz)   SpO2 99%   BMI 43.08 kg/m²   Patient is examined using the personal protective equipment as per  guidelines from infection control for this particular patient as enacted.  Hand washing was performed before and after patient interaction.  General Appearance:    Alert, cooperative, no distress, AAOx3                          Head:    Normocephalic, without obvious abnormality, atraumatic                           Eyes:    PERRL, conjunctivae/corneas clear, EOM's intact, both eyes                         Throat:   Lips, tongue, gums normal; oral mucosa pink and moist                           Neck:   Supple, symmetrical, trachea midline, no JVD                         Lungs:    Clear to auscultation bilaterally, respirations unlabored                 Chest Wall:    No tenderness or deformity                          Heart:  S1-S2 regular                  Abdomen:   Obese soft nontender                 Extremities: Improve discomfort in his left hip, decreased erythema of the lower extremities with no open wound.                  Neurologic: Alert and oriented x 3       Data Review:    I reviewed the patient's new clinical results.  Results from last 7 days   Lab Units  03/27/25  0555 03/26/25  0459 03/25/25  0706 03/24/25  0400 03/23/25  0425 03/22/25  0807   WBC 10*3/mm3  10.95* 14.03* 9.68 9.66 11.94* 17.00*   HEMOGLOBIN g/dL  12.9* 13.3 12.9* 13.7 14.5 14.0   PLATELETS 10*3/mm3  323 299 196 234 317 271     Results from last 7 days   Lab Units 03/27/25  0555 03/26/25  0459 03/25/25  0915 03/24/25  0400 03/23/25  0425 03/22/25  0807 03/21/25  0700   SODIUM mmol/L 135* 133* 132* 134* 137 135* 132*   POTASSIUM mmol/L 4.0 4.3 4.6 4.1 4.2 4.2 4.2   CHLORIDE mmol/L 100 100 102 102 102 101 96*   CO2 mmol/L 24.4 22.0 20.0* 20.8* 22.1 24.5 21.7*   BUN mg/dL 13 17 20 25* 29* 31* 29*   CREATININE mg/dL 0.99 0.82 0.87 0.97 1.01 0.99 1.25   CALCIUM mg/dL 8.5* 8.4* 8.5* 8.9 9.1 8.9 9.1   GLUCOSE mg/dL 168* 188* 204* 185* 149* 170* 319*     Microbiology Results (last 10 days)       Procedure Component Value - Date/Time     Clostridioides difficile Toxin - Stool, Per Rectum [[id number]]  (Abnormal) Collected: 03/26/25 1940    Lab Status: Final result Specimen: Stool from Per Rectum Updated: 03/26/25 2043    Narrative:      The following orders were created for panel order Clostridioides difficile Toxin - Stool, Per Rectum.  Procedure                               Abnormality         Status                     ---------                               -----------         ------                     Clostridioides difficile...[[id number]]  Abnormal            Final result                 Please view results for these tests on the individual orders.    Clostridioides difficile Toxin, PCR - Stool, Per Rectum [[id number]]  (Abnormal) Collected: 03/26/25 1940    Lab Status: Final result Specimen: Stool from Per Rectum Updated: 03/26/25 2043     Toxigenic C. difficile by PCR Positive    Narrative:      DNA from a toxigenic strain of C.difficile has been detected. Antigen testing for the presence of free C.difficile toxin is currently in progress, to help determine the clinical significance of this PCR result.     Clostridioides difficile toxin Ag, Reflex - Stool, Per Rectum [[id number]]  (Abnormal) Collected: 03/26/25 1940    Lab Status: Final result Specimen: Stool from Per Rectum Updated: 03/26/25 2345     C.diff Toxin Ag Positive    Narrative:      DNA from a toxigenic strain of C.difficile was detected, along with the presence of free toxin. These results are suggestive of C.difficile infection.    Body Fluid Culture - Aspirate, Hip, Left [[id number]] Collected: 03/25/25 1205    Lab Status: Preliminary result Specimen: Aspirate from Hip, Left Updated: 03/28/25 0631     Body Fluid Culture No growth at 3 days     Gram Stain Occasional WBCs seen      No organisms seen    Blood Culture - Blood, Arm, Right [[id number]]  (Normal) Collected: 03/23/25 0847    Lab Status: Preliminary result Specimen: Blood from Arm, Right Updated: 03/27/25 0901     Blood  Culture No growth at 4 days    Blood Culture - Blood, Hand, Right [[id number]]  (Normal) Collected: 03/23/25 0847    Lab Status: Preliminary result Specimen: Blood from Hand, Right Updated: 03/27/25 0901     Blood Culture No growth at 4 days    MRSA Screen, PCR (Inpatient) - Swab, Nares [[id number]]  (Abnormal) Collected: 03/20/25 1750    Lab Status: Final result Specimen: Swab from Nares Updated: 03/20/25 2105     MRSA PCR MRSA Detected    Narrative:      The negative predictive value of this diagnostic test is high and should only be used to consider de-escalating anti-MRSA therapy. A positive result may indicate colonization with MRSA and must be correlated clinically.    Blood Culture - Blood, Arm, Right [[id number]]  (Abnormal)  (Susceptibility) Collected: 03/20/25 1530    Lab Status: Final result Specimen: Blood from Arm, Right Updated: 03/25/25 0636     Blood Culture Streptococcus intermedius     Isolated from Aerobic and Anaerobic Bottles     Gram Stain Anaerobic Bottle Gram positive cocci in pairs and chains      Aerobic Bottle Gram positive cocci in pairs and chains    Susceptibility        Streptococcus intermedius      Not Specified      Ceftriaxone Susceptible      Penicillin G Susceptible      Vancomycin Susceptible                           Blood Culture ID, PCR - Blood, Arm, Right [[id number]]  (Abnormal) Collected: 03/20/25 1530    Lab Status: Final result Specimen: Blood from Arm, Right Updated: 03/22/25 1030     BCID, PCR Streptococcus spp, not A, B, or pneumoniae. Identification by BCID2 PCR.     BOTTLE TYPE Anaerobic Bottle    COVID-19 and FLU A/B PCR, 1 HR TAT - Swab, Nasopharynx [[id number]]  (Normal) Collected: 03/20/25 1435    Lab Status: Final result Specimen: Swab from Nasopharynx Updated: 03/20/25 1515     COVID19 Not Detected     Influenza A PCR Not Detected     Influenza B PCR Not Detected    Narrative:      Fact sheet for providers: https://www.fda.gov/media/898204/download    Fact sheet  for patients: https://www.fda.gov/media/736015/download    Test performed by PCR.    Blood Culture - Blood, Arm, Left [[id number]]  (Abnormal) Collected: 03/20/25 1435    Lab Status: Final result Specimen: Blood from Arm, Left Updated: 03/25/25 0636     Blood Culture Streptococcus intermedius     Isolated from Aerobic and Anaerobic Bottles     Gram Stain Anaerobic Bottle Gram positive cocci in pairs and chains      Aerobic Bottle Gram positive cocci in pairs and chains    Narrative:      Refer to previous blood culture collected on 03/20/2025 1530 for MICs          FL Guided Aspiration Joint  Result Date: 3/25/2025  Technically successful left hip joint aspiration as described above.   Procedure performed by [facility] [place]. By electronically signing this report, I, the supervising radiologist, attest that I was not present for the procedure(s) but agree with the final edited report.  This report was finalized on 3/25/2025 3:51 PM by Dr. Jonnathan Ramírez M.D on Workstation: [id number]      XR Hip With or Without Pelvis 2 - 3 View Left  Result Date: 3/25/2025   As described.    This report was finalized on 3/25/2025 12:56 PM by Dr. Fidencio Zaman M.D on Workstation: [id number]      XR Hip With or Without Pelvis 2 - 3 View Right  Result Date: 3/25/2025   As described.    This report was finalized on 3/25/2025 12:56 PM by Dr. Fidencio Zaman M.D on Workstation: [id number]      MRI Lumbar Spine Without Contrast  Result Date: 3/24/2025  1.Severely limited study due to motion degradation despite attempts at optimal imaging. 2.Evidence for abnormal edema within the paraspinal soft tissues along the right posterior lateral margin of the L3-L5 spinal levels. There is suggestion of more focally pronounced fluid signal in this region. The findings appear to be centered around the level of the right L4/5 posterior facet. The findings may indicate inflammation/infection and this region. The changes may be related to right  posterior facet septic arthropathy with adjacent soft tissue cellulitis. The more focally pronounced fluid signal focus may indicate developing abscess in this region measuring 2 cm. Again the assessment is markedly limited. 3.No evidence for discitis/osteomyelitis. No evidence for abscess involving the central canal. 4.Degenerative changes are seen throughout the mid to lower lumbar spine. Electronically Signed: Rony Coronado MD  3/24/2025 2:34 PM EDT  Workstation ID: [id number]    MRI Thoracic Spine Without Contrast  Result Date: 3/24/2025  1.No evidence for discitis/osteomyelitis. No evidence for abscess.. Electronically Signed: Rony Coronado MD  3/24/2025 2:23 PM EDT  Workstation ID: [id number]    CT Lumbar Spine With & Without Contrast  Result Date: 3/22/2025  Impression: 1.No acute fracture or subluxation is identified within the lumbar spine. 2.Lumbar spondylosis. Neural foraminal narrowing is present at L3-L4, L4-L5, and L5-S1. Possible spinal canal stenosis at L3-L4 and L4-L5. 3.No suspicious contrast enhancement is identified. 4.Please note that this is an insensitive test to detect early signs of discitis/osteomyelitis. If there is suspicion for discitis/osteomyelitis, recommend further evaluation with MRI with and without IV contrast which could also better characterized degenerative changes of the spine. Electronically Signed: Deshaun Sneed MD  3/22/2025 2:37 PM EDT  Workstation ID: [id number]    XR Foot 3+ View Left  Result Date: 3/20/2025  Impression: 1.Soft tissue swelling without definite or acute osseous abnormality. 2.Progression of ankle and foot arthritic changes compared to 2020 x-rays. Electronically Signed: Roxann Avery  3/20/2025 7:19 PM EDT  Workstation ID: [id number]    XR Ankle 3+ View Left  Result Date: 3/20/2025  Impression: 1.Soft tissue swelling without definite or acute osseous abnormality. 2.Progression of ankle and foot arthritic changes compared to 2020 x-rays. Electronically  Signed: Roxann Gena  3/20/2025 7:19 PM EDT  Workstation ID: [id number]    XR Chest 2 View  Result Date: 3/20/2025  No change from the previous study with no evidence for acute cardiopulmonary process. Electronically Signed: Rony Coronado MD  3/20/2025 3:29 PM EDT  Workstation ID: [id number]    CT Abdomen Pelvis Without Contrast  Result Date: 3/20/2025  1.No evidence for significant nephrolithiasis. There is no evidence for obstructive uropathy. 2.No evidence for acute abnormality throughout the abdomen or pelvis on this noncontrast exam. Electronically Signed: Rony Coronado MD  3/20/2025 11:24 AM EDT  Workstation ID: [id number]          Assessment:    Streptococcal bacteremia    Acute back pain    Facet arthritis of lumbar region    Obesity    Dyslipidemia    HTN (hypertension)    MRSA colonization    Type 2 diabetes mellitus, with long-term current use of insulin    Hyponatremia    Diabetic peripheral neuropathy    Clostridium difficile diarrhea  C. difficile difficile colitis  Recommendation/discussion:   He seems to have clinically improved but his persistent left hip pain is concerning.  It could very well be radicular discomfort from lumbar spine process.  C. difficile colitis.  The likely sources of his strep intermedius bacteremia:  is probably his lower extremity lesions and feet with probable lumbar spine involvement though at present does not have surgically amendable lesions as per neurosurgery service.  Versus  Odontogenic source.  Patient had cleaning of his teeth done 6 months ago and he says that his daughter and son-in-law have dental background.  Patient would need out of hospital follow-up with his primary care provider to address his skin lesions due to diabetic neuropathy involving his feet and make sure that soft tissue infections and wound infections are identified and treated to prevent future episode of bacteremic sepsis as well as follow-up with his dentist to work him up for any dental  pathology that could put him at future risk of strep bacteremia.  An opinion from orthopedic surgery service is not unreasonable about his persistent left hip pain not only to make sure that occult pathology of the left hip is checked out but also if will provide an avenue for him to have long-term follow-up with orthopedic surgery service.  [place] treatment for invasive strep intermedius infection is beta-lactam class of antibiotics if it is penicillin sensitive.  In this situation of MRSA colonization and presence of C. difficile infection and the fact that he would require long-term antibiotic therapy with so many comorbidities I think using vancomycin over ceftriaxone would be beneficial.  Vancomycin can cause increased risk of renal dysfunction but beta-lactam class of antibiotics in this situation can exacerbate C. difficile infection.  Would recommend 6 weeks of IV vancomycin to address both his strep intermedius bacteremic sepsis with possible lumbar spine involvement as well as MRSA colonization and minimization of flare of C. difficile.  Because of the fact that patient has received ceftriaxone and beta-lactam class of antibiotic therapy would recommend initiating 10-day course of oral vancomycin.  Following orders are written for case management:  Please arrange for 42 days of IV vancomycin to be dosed by pharmacy to achieve a trough level of 15-20 or area under the curve of 400-600.  Start of the treatment would be 3/25/2025 and end of the treatment would be 5/6/2025.  Please check weekly CBC CMP and CRP and call abnormal results to Dr. López [facility] [phone] for abnormal results.  Please educate patient to call Dr. López at [phone] on a weekly basis to go over review of system to monitor antibiotic toxicity and effectiveness of treatment.  Please arrange out of hospital follow-up with his primary care provider, neurosurgery service and podiatry service as well as dental service to address his medical  issues including future source of his strep bacteremia.  Diagnosis: Strep intermedius bacteremia with probable lumbar spine infection with facet arthritis and MRSA colonization with C. difficile colonization.    Lizette López MD  3/27/2025  1752-hour.    Parts of this note may be an electronic transcription/translation of spoken language to printed text using the Dragon dictation system.

## 2025-03-28 NOTE — DISCHARGE SUMMARY
Patient Name: Arnold Ramírez  : 1960  MRN: [id number]    Date of Admission: 3/24/2025  Date of Discharge:  3/28/2025  Primary Care Physician: Anand Alford MD      Chief Complaint:   No chief complaint on file.      Discharge Diagnoses     Active Hospital Problems    Diagnosis  POA    **Streptococcal bacteremia [R78.81, B95.5]  Yes    Clostridium difficile diarrhea [A04.72]  No    Type 2 diabetes mellitus, with long-term current use of insulin [E11.9, Z79.4]  Not Applicable    Hyponatremia [E87.1]  Yes    Diabetic peripheral neuropathy [E11.42]  Yes    Acute back pain [M54.9]  Yes    Facet arthritis of lumbar region [M47.816]  Yes    Obesity [E66.9]  Yes    Dyslipidemia [E78.5]  Yes    HTN (hypertension) [I10]  Yes    MRSA colonization [Z22.322]  Not Applicable      Resolved Hospital Problems   No resolved problems to display.        Hospital Course     Very pleasant 66yo gentleman with HTN, HLD, morbid obesity, MARGO, DM2 with DPN, and MRSA colonization, who was admitted to [facility] with left flank/low back/hip pain and found to have Strep intermedius bacteremia. MRI suggested possible lumbar spine abscess (though study was severely limited due to motion degradation) and arrangements were made to transfer pt to [facility] so he could be seen by Neurosurgery. Please see below for details of admission by problem:      Strep bacteremia  Suspect source was wounds on legs  Continue IV Vanc through  per ID recs  Repeat blood cultures NGTD  Echo neg for vegetation  Afebrile  WBC was up to 14 but now normalized--suspect due to CDiff (see below)  PICC line ordered  Will need home health to draw weekly CBC, CMP, and CRP and call abnormal results to Dr. López at [phone]   F/u with ID in 6 weeks  F/u with PCP at next available appt for attention to leg wounds     Left low back pain  Left hip pain  [facility] saw and reviewed MRI L-spine--they do not feel any spine infection is present, suggested outpt f/u  and repeat MRI in 6 weeks  S/p left hip aspiration 3/25, fluid culture NGTD and no organisms seen on gram stain  Left hip pain much improved  F/u with PCP     MRSA colonization  On IV Vanc per ID  No MRSA in blood cultures     CDiff diarrhea  On po Vanc per ID and Rocephin stopped  Diarrhea much improved     Dermatitis NOS  Only on back, itchy  Looks like contact dermatitis from bed linens  Topical steroid ordered     DM2 with DPN  Sugars better again today  A1c 11, does not follow any kind of diabetic diet at home, at home he requires Tresiba 150 units BID (!?)  Covered with Lantus at 50 units QPM here and SSI, he will start 50 units BID when he gets home and resume his Metformin, can up-titrate long-acting insulin as needed at home  Continued Cymbalta and Lyrica     MARGO  Continue BiPAP if pt will wear     Morbid obesity  Complicating all aspects of care     HTN  BPs acceptable if a bit robust at times  Continued amlodipine, losartan, and metoprolol     HLD  Continued statin     Pseudohyponatremia  Na+ fine when corrected for hyperglycemia        SCDs for DVT prophylaxis while here.  Full code confirmed.  Discussed with patient and RN.  Anticipate discharge home later today after PICC line placed.     Day of Discharge     Subjective:  Pain in left hip much improved today. No F/C/NS. No SOA or CP. No N/V/abd pain. No more diarrhea today. Voiding well. Tolerating diet. Up ad gianluca in room. Very eager to go home.    Physical Exam:  Temp:  [97.9 °F (36.6 °C)-98.8 °F (37.1 °C)] 98.1 °F (36.7 °C)  Heart Rate:  [74-85] 81  Resp:  [18] 18  BP: (118-150)/(67-86) 144/73  Body mass index is 43.08 kg/m².  Physical Exam  Vitals and nursing note reviewed.  Constitutional:       General: He is not in acute distress.     Appearance: He is obese. He is ill-appearing (chronically). He is not toxic-appearing or diaphoretic.   Cardiovascular:      Rate and Rhythm: Normal rate and regular rhythm.      Pulses: Normal pulses.   Pulmonary:       Effort: Pulmonary effort is normal. No respiratory distress.      Breath sounds: Normal breath sounds. No wheezing or rales.   Abdominal:      General: Bowel sounds are normal. There is no distension.      Palpations: Abdomen is soft.      Tenderness: There is no abdominal tenderness.   Musculoskeletal:         General: Swelling (trace in BLEs) present.      Cervical back: Neck supple.   Skin:     General: Skin is warm and dry.      Capillary Refill: Capillary refill takes less than 2 seconds.      Coloration: Skin is not jaundiced.      Findings: Lesion (excoriated lesions to BLEs--L>R) present.      Comments: Rash on back    Neurological:      General: No focal deficit present.      Mental Status: He is alert. Mental status is at baseline.   Psychiatric:         Mood and Affect: Mood normal.         Behavior: Behavior normal.         Thought Content: Thought content normal.      Consultants     Consult Orders (all) (From admission, onward)       Start     Ordered    03/27/25 0731  IV TEAM - Consult for PICC Line (IV Team to Determine Number of Lumens)  Once        Provider:  (Not yet assigned)    03/27/25 0730    03/27/25 0719  Inpatient Orthopedic Surgery Consult  Once        Specialty:  Orthopedic Surgery  Provider:  Triston Lazaro MD    03/27/25 0719    03/26/25 2346  Inpatient Case Management  Consult  Once        Comments: · Please arrange for 42 days of IV vancomycin to be dosed by pharmacy to achieve a trough level of 15-20 or area under the curve of 400-600.  Start of the treatment would be 3/25/2025 and end of the treatment would be 5/6/2025.  · Please check weekly CBC CMP and CRP and call abnormal results to Dr. López [facility] [phone] for abnormal results.  · Please educate patient to call Dr. López at [phone] on a weekly basis to go over review of system to monitor antibiotic toxicity and effectiveness of treatment.  · Please arrange out of hospital follow-up with his primary care  provider, neurosurgery service and podiatry service as well as dental service to address his medical issues including future source of his strep bacteremia.  · Diagnosis: Strep intermedius bacteremia with probable lumbar spine infection with facet arthritis and MRSA colonization with C. difficile colonization.   Provider:  (Not yet assigned)    03/26/25 2345    03/26/25 1202  Inpatient Infectious Diseases Consult  Once        Specialty:  Infectious Diseases  Provider:  Lizette López MD    03/26/25 1201    03/25/25 0054  Inpatient Case Management  Consult  Once        Provider:  (Not yet assigned)    03/25/25 0054    03/24/25 1923  Inpatient Neurosurgery Consult  Once        Specialty:  Neurosurgery  Provider:  Jose Alejandro Caraballo MD    03/24/25 1925    03/24/25 1923  Inpatient Infectious Diseases Consult  Once,   Status:  Canceled        Specialty:  Infectious Diseases  Provider:  Jaleesa Pryor MD    03/24/25 1925                  Procedures     * Surgery not found *    Imaging Results (All)       Procedure Component Value Units Date/Time    FL Guided Aspiration Joint [[id number]] Collected: 03/25/25 1438     Updated: 03/25/25 1554    Narrative:      LEFT HIP JOINT ASPIRATION FLUOROSCOPICALLY GUIDED, 3/25/2025     HISTORY:  65-year-old male with left hip pain. Bacteremia.     CONSENT:  Procedure, risks and alternatives were discussed in detail with the  patient, including the low risk of allergy and infection; patient  questions were answered, and informed consent was obtained. Timeout was  observed in the procedure room for patient identification and procedure  site verification, and the site was marked by me.     PROCEDURE:  *  Reference Air Kerma: 342 mGy     Using sterile technique, 1% lidocaine local anesthetic and fluoroscopic  guidance, a 22-gauge spinal needle was placed into the left hip joint  capsule without difficulty. Aspiration of a trace volume of clear thick  viscous material and small  amount of blood tinged material. Specimen was  mixed with sterile saline and sent to laboratory as requested by the  ordering provider.          Impression:      Technically successful left hip joint aspiration as described above.        Procedure performed by JAX Cleveland.  By electronically signing this report, I, the supervising radiologist,  attest that I was not present for the procedure(s) but agree with the  final edited report.     This report was finalized on 3/25/2025 3:51 PM by Dr. Jonnathan Ramírez M.D on Workstation: [id number]       XR Hip With or Without Pelvis 2 - 3 View Left [[id number]] Collected: 03/25/25 1252     Updated: 03/25/25 1259    Narrative:      XR HIP W OR WO PELVIS 2-3 VIEW LEFT-, XR HIP W OR WO PELVIS 2-3 VIEW  RIGHT-     INDICATIONS: Pain     TECHNIQUE: Frontal views of the pelvis, 2 views of each hip     COMPARISON: None available     FINDINGS:     No acute fracture, erosion, or dislocation is identified. The hip joint  spaces appear preserved. Degenerative changes are seen in the partly  included lumbar spine. Follow-up/further evaluation can be obtained as  indications persist.       Impression:         As described.           This report was finalized on 3/25/2025 12:56 PM by Dr. Fidencio Zaman M.D on Workstation: [id number]       XR Hip With or Without Pelvis 2 - 3 View Right [[id number]] Collected: 03/25/25 1252     Updated: 03/25/25 1259    Narrative:      XR HIP W OR WO PELVIS 2-3 VIEW LEFT-, XR HIP W OR WO PELVIS 2-3 VIEW  RIGHT-     INDICATIONS: Pain     TECHNIQUE: Frontal views of the pelvis, 2 views of each hip     COMPARISON: None available     FINDINGS:     No acute fracture, erosion, or dislocation is identified. The hip joint  spaces appear preserved. Degenerative changes are seen in the partly  included lumbar spine. Follow-up/further evaluation can be obtained as  indications persist.       Impression:         As described.           This report was finalized  on 3/25/2025 12:56 PM by Dr. Fidencio Zaman M.D on Workstation: [id number]               Results for orders placed during the hospital encounter of 03/20/25    Adult Transthoracic Echo Complete W/ Cont if Necessary Per Protocol    Interpretation Summary    Left ventricular systolic function is normal. Calculated left ventricular EF = 62.1%    The left ventricular cavity is mildly dilated.    Left ventricular diastolic function was normal.    Moderate dilation of the aortic root is present. The aortic root measures 4.7 cm. Mild dilation of the ascending aorta is present 3.7 cm.    Borderline dilation of the aortic root is present. Aortic root = 3.7 cm borderline dilation of the ascending aorta is present. Ascending aorta = 3.7 cm    Pertinent Labs     Results from last 7 days   Lab Units 03/28/25  0544 03/27/25  0555 03/26/25 0459 03/25/25  0706   WBC 10*3/mm3 10.41 10.95* 14.03* 9.68   HEMOGLOBIN g/dL 12.8* 12.9* 13.3 12.9*   PLATELETS 10*3/mm3 350 323 299 196     Results from last 7 days   Lab Units 03/28/25  0544 03/27/25  0555 03/26/25 0459 03/25/25  0915   SODIUM mmol/L 136 135* 133* 132*   POTASSIUM mmol/L 3.9 4.0 4.3 4.6   CHLORIDE mmol/L 100 100 100 102   CO2 mmol/L 24.0 24.4 22.0 20.0*   BUN mg/dL 12 13 17 20   CREATININE mg/dL 0.84 0.99 0.82 0.87   GLUCOSE mg/dL 104* 168* 188* 204*   EGFR mL/min/1.73 98.0 85.6 98.7 97.0     Results from last 7 days   Lab Units 03/28/25  0544 03/27/25  0555 03/26/25 0459 03/25/25  0915   ALBUMIN g/dL 3.0* 3.0* 3.0* 3.0*   BILIRUBIN mg/dL 0.3 0.3 0.4 0.5   ALK PHOS U/L 104 105 116 92   AST (SGOT) U/L 15 15 16 23   ALT (SGPT) U/L 17 16 21 21     Results from last 7 days   Lab Units 03/28/25  0544 03/27/25  0555 03/26/25  0459 03/25/25  0915 03/24/25  0400 03/23/25  0425   CALCIUM mg/dL 8.7 8.5* 8.4* 8.5*   < > 9.1   ALBUMIN g/dL 3.0* 3.0* 3.0* 3.0*  --   --    MAGNESIUM mg/dL 2.0 1.9 2.0  --   --  2.1   PHOSPHORUS mg/dL 3.3 2.7 2.9  --   --   --     < > = values in this  "interval not displayed.               Invalid input(s): \"LDLCALC\"  Results from last 7 days   Lab Units 03/25/25  1205 03/23/25  0847   BLOODCX   --  No growth at 4 days  No growth at 4 days   BODYFLDCX  No growth at 3 days  --          Test Results Pending at Discharge     Pending Results       None              Discharge Details        Discharge Medications        New Medications        Instructions Start Date   HYDROcodone-acetaminophen 7.5-325 MG per tablet  Commonly known as: NORCO  Replaces: HYDROcodone-acetaminophen 5-325 MG per tablet   1 tablet, Oral, Every 4 Hours PRN      Lidocaine 4 %   2 patches, Transdermal, Every 24 Hours Scheduled, Remove & Discard patch within 12 hours or as directed by MD      methocarbamol 750 MG tablet  Commonly known as: ROBAXIN   750 mg, Oral, Every 6 Hours PRN      PHARMACY TO DOSE VANCOMYCIN   Not Applicable, Continuous PRN      triamcinolone 0.1 % cream  Commonly known as: KENALOG   1 Application, Topical, Every 12 Hours Scheduled      vancomycin 125 MG capsule  Commonly known as: VANCOCIN   125 mg, Oral, Every 6 Hours Scheduled      vancomycin   1,750 mg, Intravenous, Every 12 Hours             Continue These Medications        Instructions Start Date   amLODIPine 10 MG tablet  Commonly known as: NORVASC   1 tablet, Daily      atorvastatin 20 MG tablet  Commonly known as: LIPITOR   1 tablet, Nightly      DULoxetine 60 MG capsule  Commonly known as: CYMBALTA   1 capsule, Daily      Insulin Degludec 200 UNIT/ML solution pen-injector pen injection  Commonly known as: TRESIBA FLEXTOUCH   150 Units, 2 Times Daily      losartan 100 MG tablet  Commonly known as: COZAAR   1 tablet, Daily      metFORMIN  MG 24 hr tablet  Commonly known as: GLUCOPHAGE-XR   500 mg, 2 Times Daily      metoprolol succinate XL 50 MG 24 hr tablet  Commonly known as: TOPROL-XL   1 tablet, Daily      pregabalin 150 MG capsule  Commonly known as: LYRICA   150 mg, 2 Times Daily      Testosterone " "Cypionate 200 MG/ML injection  Commonly known as: DEPOTESTOTERONE CYPIONATE   1 mL, Every 14 Days             Stop These Medications      furosemide 20 MG tablet  Commonly known as: LASIX     HYDROcodone-acetaminophen 5-325 MG per tablet  Commonly known as: NORCO  Replaced by: HYDROcodone-acetaminophen 7.5-325 MG per tablet     spironolactone 25 MG tablet  Commonly known as: ALDACTONE              Allergies   Allergen Reactions    Naproxen Anaphylaxis    Sulfamethoxazole-Trimethoprim Shortness Of Breath and Swelling    Etodolac Other (See Comments)     stomach irritation  Annotation - 15Oct2013: STOMACH      Gabapentin Dizziness     Annotation - 02Oct2019: \"Felt Drunk\"    Zonisamide Other (See Comments) and Myalgia     Drowsy, Fatigue         Discharge Disposition:  Home-Health Care Svc      Discharge Diet:  Diet Order   Procedures    Diet: Regular/House, Diabetic, Cardiac; Healthy Heart (2-3 Na+); Consistent Carbohydrate; Fluid Consistency: Thin (IDDSI 0)       Discharge Activity:   As tolerated    CODE STATUS:    Code Status and Medical Interventions: CPR (Attempt to Resuscitate); Full Support   Ordered at: 03/24/25 1925     Code Status (Patient has no pulse and is not breathing):    CPR (Attempt to Resuscitate)     Medical Interventions (Patient has pulse or is breathing):    Full Support       No future appointments.  Additional Instructions for the Follow-ups that You Need to Schedule       Ambulatory Referral to Home Health   As directed      Face to Face Visit Date: 3/28/2025   Follow-up provider for Plan of Care?: I treated the patient in an acute care facility and will not continue treatment after discharge.   Follow-up provider: RICO DORADO [[id number]]   Reason/Clinical Findings: strep bacteremia, DM2, back pain, CDiff   Describe mobility limitations that make leaving home difficult: requires the assistance of another to leave the home   Nursing/Therapeutic Services Requested: Skilled Nursing Physical " Therapy   Skilled nursing orders: Pain management Medication education Cardiopulmonary assessments Infusion therapy Vascular access care/instruction   Select type: PICC   PT orders: Therapeutic exercise Strengthening   Frequency: 1 Week 1        Discharge Follow-up with PCP   As directed       Currently Documented PCP:    Anand Alford MD    PCP Phone Number:    [phone]     Follow Up Details: Dr. Alford (PCP) at next available appt        Discharge Follow-up with Specialty: Dentist; 1 Month   As directed      Specialty: Dentist   Follow Up: 1 Month        Discharge Follow-up with Specified Provider: Dr. López (ID); 6 Weeks   As directed      To: Dr. López (ID)   Follow Up: 6 Weeks        Discharge Follow-up with Specified Provider: Dr. Garcia ([facility]); 6 Weeks   As directed      To: Dr. Garcia ([facility])   Follow Up: 6 Weeks               Follow-up Information       Anand Alford MD .    Specialty: Family Medicine  Why: Dr. Alford (PCP) at next available appt  Contact information:  [address] [place] [id number]  [phone]                             Additional Instructions for the Follow-ups that You Need to Schedule       Ambulatory Referral to Home Health   As directed      Face to Face Visit Date: 3/28/2025   Follow-up provider for Plan of Care?: I treated the patient in an acute care facility and will not continue treatment after discharge.   Follow-up provider: ANAND ALFORD [[id number]]   Reason/Clinical Findings: strep bacteremia, DM2, back pain, CDiff   Describe mobility limitations that make leaving home difficult: requires the assistance of another to leave the home   Nursing/Therapeutic Services Requested: Skilled Nursing Physical Therapy   Skilled nursing orders: Pain management Medication education Cardiopulmonary assessments Infusion therapy Vascular access care/instruction   Select type: PICC   PT orders: Therapeutic exercise Strengthening   Frequency: 1 Week 1        Discharge Follow-up with PCP   As  directed       Currently Documented PCP:    Anand Alford MD    PCP Phone Number:    [phone]     Follow Up Details: Dr. Alford (PCP) at next available appt        Discharge Follow-up with Specialty: Dentist; 1 Month   As directed      Specialty: Dentist   Follow Up: 1 Month        Discharge Follow-up with Specified Provider: Dr. López (ID); 6 Weeks   As directed      To: Dr. López (ID)   Follow Up: 6 Weeks        Discharge Follow-up with Specified Provider: Dr. Garcia ([facility]); 6 Weeks   As directed      To: Dr. Garcia ([facility])   Follow Up: 6 Weeks            Time Spent on Discharge:  Greater than 30 minutes      Elder Hernandez MD  [facility]  03/28/25  08:16 EDT

## 2025-03-28 NOTE — PLAN OF CARE
Problem: Adult Inpatient Plan of Care  Goal: Plan of Care Review  Outcome: Progressing  Goal: Patient-Specific Goal (Individualized)  Outcome: Progressing  Goal: Absence of Hospital-Acquired Illness or Injury  Outcome: Progressing  Intervention: Identify and Manage Fall Risk  Recent Flowsheet Documentation  Taken 3/28/2025 0417 by Jesus Gross RN  Safety Promotion/Fall Prevention: safety round/check completed  Taken 3/28/2025 0219 by Jesus Gross RN  Safety Promotion/Fall Prevention: safety round/check completed  Taken 3/28/2025 0018 by Jesus Gross RN  Safety Promotion/Fall Prevention: safety round/check completed  Taken 3/27/2025 2239 by Jesus Gross RN  Safety Promotion/Fall Prevention: safety round/check completed  Taken 3/27/2025 2047 by Jesus Gross RN  Safety Promotion/Fall Prevention:   activity supervised   clutter free environment maintained   fall prevention program maintained   lighting adjusted   nonskid shoes/slippers when out of bed   safety round/check completed  Intervention: Prevent Skin Injury  Recent Flowsheet Documentation  Taken 3/28/2025 0417 by Jesus Gross RN  Body Position: position changed independently  Taken 3/28/2025 0219 by Jesus Gross RN  Body Position: position changed independently  Taken 3/28/2025 0018 by Jesus Gross RN  Body Position: position changed independently  Taken 3/27/2025 2239 by Jesus Gross RN  Body Position: position changed independently  Taken 3/27/2025 2047 by Jesus Gross RN  Body Position: position changed independently  Skin Protection: incontinence pads utilized  Intervention: Prevent and Manage VTE (Venous Thromboembolism) Risk  Recent Flowsheet Documentation  Taken 3/28/2025 0018 by Jesus Gross RN  VTE Prevention/Management:   bilateral   SCDs (sequential compression devices) off   patient refused intervention  Taken 3/27/2025 2047 by Jesus Gross RN  VTE Prevention/Management:   bilateral   SCDs (sequential  compression devices) off   patient refused intervention  Intervention: Prevent Infection  Recent Flowsheet Documentation  Taken 3/27/2025 2047 by Jesus Gross RN  Infection Prevention: hand hygiene promoted  Goal: Optimal Comfort and Wellbeing  Outcome: Progressing  Intervention: Monitor Pain and Promote Comfort  Recent Flowsheet Documentation  Taken 3/28/2025 0104 by Jesus Gross RN  Pain Management Interventions: pain medication given  Taken 3/27/2025 2047 by Jesus Gross RN  Pain Management Interventions:   care clustered   pillow support provided   position adjusted  Intervention: Provide Person-Centered Care  Recent Flowsheet Documentation  Taken 3/28/2025 0018 by Jesus Gross RN  Trust Relationship/Rapport:   care explained   choices provided   questions answered   questions encouraged   thoughts/feelings acknowledged  Goal: Readiness for Transition of Care  Outcome: Progressing     Problem: Pain Acute  Goal: Optimal Pain Control and Function  Outcome: Progressing  Intervention: Optimize Psychosocial Wellbeing  Recent Flowsheet Documentation  Taken 3/28/2025 0018 by Jesus Gross RN  Diversional Activities: television  Taken 3/27/2025 2047 by Jesus Gross RN  Diversional Activities: television  Intervention: Develop Pain Management Plan  Recent Flowsheet Documentation  Taken 3/28/2025 0104 by Jesus Gross RN  Pain Management Interventions: pain medication given  Taken 3/27/2025 2047 by Jesus Gross RN  Pain Management Interventions:   care clustered   pillow support provided   position adjusted  Intervention: Prevent or Manage Pain  Recent Flowsheet Documentation  Taken 3/28/2025 0417 by Jesus Gross RN  Medication Review/Management: medications reviewed  Taken 3/27/2025 2047 by Jesus Gross RN  Medication Review/Management: medications reviewed     Problem: Infection  Goal: Absence of Infection Signs and Symptoms  Outcome: Progressing  Intervention: Prevent or Manage  Infection  Recent Flowsheet Documentation  Taken 3/28/2025 0417 by Jesus Gross RN  Isolation Precautions:   contact   precautions maintained  Taken 3/28/2025 0219 by Jesus Gross RN  Isolation Precautions:   contact   precautions maintained  Taken 3/28/2025 0018 by Jesus Gross RN  Isolation Precautions:   contact   precautions maintained  Taken 3/27/2025 2239 by Jesus Gross RN  Isolation Precautions:   contact   precautions maintained  Taken 3/27/2025 2047 by Jesus Gross RN  Isolation Precautions:   contact   precautions maintained     Problem: Fall Injury Risk  Goal: Absence of Fall and Fall-Related Injury  Outcome: Progressing  Intervention: Identify and Manage Contributors  Recent Flowsheet Documentation  Taken 3/28/2025 0417 by Jesus Gross RN  Medication Review/Management: medications reviewed  Taken 3/27/2025 2047 by Jesus Gross RN  Medication Review/Management: medications reviewed  Intervention: Promote Injury-Free Environment  Recent Flowsheet Documentation  Taken 3/28/2025 0417 by Jesus Gross RN  Safety Promotion/Fall Prevention: safety round/check completed  Taken 3/28/2025 0219 by Jesus Gross RN  Safety Promotion/Fall Prevention: safety round/check completed  Taken 3/28/2025 0018 by Jesus Gross RN  Safety Promotion/Fall Prevention: safety round/check completed  Taken 3/27/2025 2239 by Jesus Gross RN  Safety Promotion/Fall Prevention: safety round/check completed  Taken 3/27/2025 2047 by Jesus Gross RN  Safety Promotion/Fall Prevention:   activity supervised   clutter free environment maintained   fall prevention program maintained   lighting adjusted   nonskid shoes/slippers when out of bed   safety round/check completed   Goal Outcome Evaluation:   Plan of Care Reviewed with: patient  Progress: no change

## 2025-03-28 NOTE — CASE MANAGEMENT/SOCIAL WORK
Continued Stay Note  [facility]     Patient Name: Arnold Ramírez  MRN: [id number]  Today's Date: 3/28/2025    Admit Date: 3/24/2025    Plan: Plans [facility].   Discharge Plan       Row Name 03/28/25 1122       Plan    Plan Plans discharge home [facility].    Patient/Family in Agreement with Plan yes    Plan Comments Spoke with [facility] . States able to accept. Call placed to [facility] to notify of discharge today. Will see patient at bedside. Plans [facility].Family to transport.....[facility]                   Discharge Codes    No documentation.                 Expected Discharge Date and Time       Expected Discharge Date Expected Discharge Time    Mar 28, 2025               Luba Naylor RN

## 2025-03-28 NOTE — PROGRESS NOTES
[facility] Clinical Pharmacy Services: Vancomycin Monitoring Note    Arnold Ramírez is a 65 y.o. male who is on day 5 of stop date 5/5/25 of pharmacy to dose vancomycin for Bone and/or Joint Infection.    Previous Vancomycin Dose:    1750 mg IV every  12  hours   Updated Cultures and Sensitivities:   Blood Culture Streptococcus intermedius Critical      Results from last 7 days   Lab Units 03/27/25  0818 03/22/25  0807 03/22/25  0107   VANCOMYCIN TR mcg/mL 9.20 10.70  --    VANCOMYCIN PK mcg/mL  --   --  18.50*     Vitals/Labs  Ht:  ; Wt: (!) 164 kg (361 lb 1.8 oz)   Temp Readings from Last 1 Encounters:   03/27/25 97.9 °F (36.6 °C) (Oral)     Estimated Creatinine Clearance: 151.5 mL/min (by C-G formula based on SCr of 0.84 mg/dL).       Results from last 7 days   Lab Units 03/28/25  0544 03/27/25  0555 03/26/25  0459   CREATININE mg/dL 0.84 0.99 0.82   WBC 10*3/mm3 10.41 10.95* 14.03*     Assessment/Plan    Vancomycin Dose: 1750 mg IV every 12 hours; provides a predicted  mg/L.hr   Next Level Date and Time: Next week if patient is still here  We will continue to monitor patient changes and renal function     Thank you for involving pharmacy in this patient's care. Please contact pharmacy with any questions or concerns.       Clif Mathews [facility]  Clinical Pharmacist

## 2025-03-28 NOTE — PROGRESS NOTES
Infectious Diseases Progress Note    Lizette López MD     [facility]  Los: 4 days  Patient Identification:  Name: Arnold Ramírez  Age: 65 y.o.  Sex: male  :  1960  MRN: [id number]         Primary Care Physician: Anand Alford MD        Subjective: Overall feeling better.  Explained to him about the rationale of antibiotic therapy given his C. difficile infection, MRSA colonization and significant worsening of C. difficile and missing superimposed MRSA infection with once a day cefazolin versus current regimen of IV vancomycin and oral vancomycin as explained in the discussion.  Patient was able to secure funding for out of hospital antibiotic therapy with the assistance of his friend and is ready to be discharged.  He had his PICC line placed.  Interval History: Admission history and physical performed as cross cover for internal medicine service on 3/24/2025.    Objective:    Scheduled Meds:amLODIPine, 10 mg, Oral, Daily  atorvastatin, 20 mg, Oral, Nightly  DULoxetine, 60 mg, Oral, Daily  insulin glargine, 50 Units, Subcutaneous, Nightly  insulin lispro, 2-9 Units, Subcutaneous, 4x Daily AC & at Bedtime  Lidocaine, 2 patch, Transdermal, Q24H  losartan, 100 mg, Oral, Daily  metoprolol succinate XL, 50 mg, Oral, Daily  Petrolatum, 1 Application, Topical, Q12H  pregabalin, 150 mg, Oral, BID  sodium chloride, 10 mL, Intravenous, Q12H  sodium chloride, 10 mL, Intravenous, Q12H  sodium chloride, 10 mL, Intravenous, Q12H  triamcinolone, 1 Application, Topical, Q12H  vancomycin, 125 mg, Oral, Q6H  vancomycin, 1,750 mg, Intravenous, Q12H      Continuous Infusions:Pharmacy Consult,   Pharmacy to dose vancomycin,         Vital signs in last 24 hours:  Temp:  [97.9 °F (36.6 °C)-98.4 °F (36.9 °C)] 98.1 °F (36.7 °C)  Heart Rate:  [73-81] 73  Resp:  [18] 18  BP: (118-151)/(68-91) 151/91    Intake/Output:    Intake/Output Summary (Last 24 hours) at 3/28/2025 0719  Last data filed at 3/28/2025 8514  Gross  per 24 hour   Intake 500 ml   Output --   Net 500 ml       Exam:  /91 (BP Location: Right arm, Patient Position: Lying)   Pulse 73   Temp 98.1 °F (36.7 °C) (Oral)   Resp 18   Wt (!) 164 kg (361 lb 1.8 oz)   SpO2 96%   BMI 43.08 kg/m²   Patient is examined using the personal protective equipment as per guidelines from infection control for this particular patient as enacted.  Hand washing was performed before and after patient interaction.  General Appearance:    Alert, cooperative, no distress, AAOx3                          Head:    Normocephalic, without obvious abnormality, atraumatic                           Eyes:    PERRL, conjunctivae/corneas clear, EOM's intact, both eyes                         Throat:   Lips, tongue, gums normal; oral mucosa pink and moist                           Neck:   Supple, symmetrical, trachea midline, no JVD                         Lungs:    Clear to auscultation bilaterally, respirations unlabored                 Chest Wall:    No tenderness or deformity                          Heart:  S1-S2 regular                  Abdomen:   Obese soft nontender                 Extremities: Improve discomfort in his left hip, decreased erythema of the lower extremities with no open wound.                  Neurologic: Alert and oriented x 3       Data Review:    I reviewed the patient's new clinical results.  Results from last 7 days   Lab Units 03/28/25  0544 03/27/25  0555 03/26/25  0459 03/25/25  0706 03/24/25  0400 03/23/25  0425 03/22/25  0807   WBC 10*3/mm3 10.41 10.95* 14.03* 9.68 9.66 11.94* 17.00*   HEMOGLOBIN g/dL 12.8* 12.9* 13.3 12.9* 13.7 14.5 14.0   PLATELETS 10*3/mm3 350 323 299 196 234 317 271     Results from last 7 days   Lab Units 03/28/25  0544 03/27/25  0555 03/26/25  0459 03/25/25  0915 03/24/25  0400 03/23/25  0425 03/22/25  0807   SODIUM mmol/L 136 135* 133* 132* 134* 137 135*   POTASSIUM mmol/L 3.9 4.0 4.3 4.6 4.1 4.2 4.2   CHLORIDE mmol/L 100 100 100 102  102 102 101   CO2 mmol/L 24.0 24.4 22.0 20.0* 20.8* 22.1 24.5   BUN mg/dL 12 13 17 20 25* 29* 31*   CREATININE mg/dL 0.84 0.99 0.82 0.87 0.97 1.01 0.99   CALCIUM mg/dL 8.7 8.5* 8.4* 8.5* 8.9 9.1 8.9   GLUCOSE mg/dL 104* 168* 188* 204* 185* 149* 170*     Microbiology Results (last 10 days)       Procedure Component Value - Date/Time    Clostridioides difficile Toxin - Stool, Per Rectum [[id number]]  (Abnormal) Collected: 03/26/25 1940    Lab Status: Final result Specimen: Stool from Per Rectum Updated: 03/26/25 2043    Narrative:      The following orders were created for panel order Clostridioides difficile Toxin - Stool, Per Rectum.  Procedure                               Abnormality         Status                     ---------                               -----------         ------                     Clostridioides difficile...[[id number]]  Abnormal            Final result                 Please view results for these tests on the individual orders.    Clostridioides difficile Toxin, PCR - Stool, Per Rectum [[id number]]  (Abnormal) Collected: 03/26/25 1940    Lab Status: Final result Specimen: Stool from Per Rectum Updated: 03/26/25 2043     Toxigenic C. difficile by PCR Positive    Narrative:      DNA from a toxigenic strain of C.difficile has been detected. Antigen testing for the presence of free C.difficile toxin is currently in progress, to help determine the clinical significance of this PCR result.     Clostridioides difficile toxin Ag, Reflex - Stool, Per Rectum [[id number]]  (Abnormal) Collected: 03/26/25 1940    Lab Status: Final result Specimen: Stool from Per Rectum Updated: 03/26/25 2345     C.diff Toxin Ag Positive    Narrative:      DNA from a toxigenic strain of C.difficile was detected, along with the presence of free toxin. These results are suggestive of C.difficile infection.    Body Fluid Culture - Aspirate, Hip, Left [[id number]] Collected: 03/25/25 1205    Lab Status: Preliminary result  Specimen: Aspirate from Hip, Left Updated: 03/28/25 0631     Body Fluid Culture No growth at 3 days     Gram Stain Occasional WBCs seen      No organisms seen    Blood Culture - Blood, Arm, Right [[id number]]  (Normal) Collected: 03/23/25 0847    Lab Status: Final result Specimen: Blood from Arm, Right Updated: 03/28/25 0900     Blood Culture No growth at 5 days    Blood Culture - Blood, Hand, Right [[id number]]  (Normal) Collected: 03/23/25 0847    Lab Status: Final result Specimen: Blood from Hand, Right Updated: 03/28/25 0900     Blood Culture No growth at 5 days    MRSA Screen, PCR (Inpatient) - Swab, Nares [[id number]]  (Abnormal) Collected: 03/20/25 1750    Lab Status: Final result Specimen: Swab from Nares Updated: 03/20/25 2105     MRSA PCR MRSA Detected    Narrative:      The negative predictive value of this diagnostic test is high and should only be used to consider de-escalating anti-MRSA therapy. A positive result may indicate colonization with MRSA and must be correlated clinically.    Blood Culture - Blood, Arm, Right [[id number]]  (Abnormal)  (Susceptibility) Collected: 03/20/25 1530    Lab Status: Final result Specimen: Blood from Arm, Right Updated: 03/25/25 0636     Blood Culture Streptococcus intermedius     Isolated from Aerobic and Anaerobic Bottles     Gram Stain Anaerobic Bottle Gram positive cocci in pairs and chains      Aerobic Bottle Gram positive cocci in pairs and chains    Susceptibility        Streptococcus intermedius      Not Specified      Ceftriaxone Susceptible      Penicillin G Susceptible      Vancomycin Susceptible                           Blood Culture ID, PCR - Blood, Arm, Right [[id number]]  (Abnormal) Collected: 03/20/25 1530    Lab Status: Final result Specimen: Blood from Arm, Right Updated: 03/22/25 1030     BCID, PCR Streptococcus spp, not A, B, or pneumoniae. Identification by BCID2 PCR.     BOTTLE TYPE Anaerobic Bottle    COVID-19 and FLU A/B PCR, 1 HR TAT - Swab,  Nasopharynx [[id number]]  (Normal) Collected: 03/20/25 1435    Lab Status: Final result Specimen: Swab from Nasopharynx Updated: 03/20/25 1515     COVID19 Not Detected     Influenza A PCR Not Detected     Influenza B PCR Not Detected    Narrative:      Fact sheet for providers: https://www.fda.gov/media/296500/download    Fact sheet for patients: https://www.fda.gov/media/829598/download    Test performed by PCR.    Blood Culture - Blood, Arm, Left [[id number]]  (Abnormal) Collected: 03/20/25 1435    Lab Status: Final result Specimen: Blood from Arm, Left Updated: 03/25/25 0636     Blood Culture Streptococcus intermedius     Isolated from Aerobic and Anaerobic Bottles     Gram Stain Anaerobic Bottle Gram positive cocci in pairs and chains      Aerobic Bottle Gram positive cocci in pairs and chains    Narrative:      Refer to previous blood culture collected on 03/20/2025 1530 for MICs          FL Guided Aspiration Joint  Result Date: 3/25/2025  Technically successful left hip joint aspiration as described above.   Procedure performed by JAX [place]. By electronically signing this report, I, the supervising radiologist, attest that I was not present for the procedure(s) but agree with the final edited report.  This report was finalized on 3/25/2025 3:51 PM by Dr. Jonnathan Ramírez M.D on Workstation: [id number]      XR Hip With or Without Pelvis 2 - 3 View Left  Result Date: 3/25/2025   As described.    This report was finalized on 3/25/2025 12:56 PM by Dr. Fidencio Zaman M.D on Workstation: [id number]      XR Hip With or Without Pelvis 2 - 3 View Right  Result Date: 3/25/2025   As described.    This report was finalized on 3/25/2025 12:56 PM by Dr. Fidencio Zaman M.D on Workstation: [id number]      MRI Lumbar Spine Without Contrast  Result Date: 3/24/2025  1.Severely limited study due to motion degradation despite attempts at optimal imaging. 2.Evidence for abnormal edema within the paraspinal soft tissues  along the right posterior lateral margin of the L3-L5 spinal levels. There is suggestion of more focally pronounced fluid signal in this region. The findings appear to be centered around the level of the right L4/5 posterior facet. The findings may indicate inflammation/infection and this region. The changes may be related to right posterior facet septic arthropathy with adjacent soft tissue cellulitis. The more focally pronounced fluid signal focus may indicate developing abscess in this region measuring 2 cm. Again the assessment is markedly limited. 3.No evidence for discitis/osteomyelitis. No evidence for abscess involving the central canal. 4.Degenerative changes are seen throughout the mid to lower lumbar spine. Electronically Signed: Rony Coronado MD  3/24/2025 2:34 PM EDT  Workstation ID: [id number]    MRI Thoracic Spine Without Contrast  Result Date: 3/24/2025  1.No evidence for discitis/osteomyelitis. No evidence for abscess.. Electronically Signed: Rony Coronado MD  3/24/2025 2:23 PM EDT  Workstation ID: [id number]    CT Lumbar Spine With & Without Contrast  Result Date: 3/22/2025  Impression: 1.No acute fracture or subluxation is identified within the lumbar spine. 2.Lumbar spondylosis. Neural foraminal narrowing is present at L3-L4, L4-L5, and L5-S1. Possible spinal canal stenosis at L3-L4 and L4-L5. 3.No suspicious contrast enhancement is identified. 4.Please note that this is an insensitive test to detect early signs of discitis/osteomyelitis. If there is suspicion for discitis/osteomyelitis, recommend further evaluation with MRI with and without IV contrast which could also better characterized degenerative changes of the spine. Electronically Signed: Deshaun Sneed MD  3/22/2025 2:37 PM EDT  Workstation ID: [id number]    XR Foot 3+ View Left  Result Date: 3/20/2025  Impression: 1.Soft tissue swelling without definite or acute osseous abnormality. 2.Progression of ankle and foot arthritic changes  compared to 2020 x-rays. Electronically Signed: Roxann Calvinley  3/20/2025 7:19 PM EDT  Workstation ID: [id number]    XR Ankle 3+ View Left  Result Date: 3/20/2025  Impression: 1.Soft tissue swelling without definite or acute osseous abnormality. 2.Progression of ankle and foot arthritic changes compared to 2020 x-rays. Electronically Signed: Roxann Avery  3/20/2025 7:19 PM EDT  Workstation ID: [id number]    XR Chest 2 View  Result Date: 3/20/2025  No change from the previous study with no evidence for acute cardiopulmonary process. Electronically Signed: Rony Coronado MD  3/20/2025 3:29 PM EDT  Workstation ID: [id number]    CT Abdomen Pelvis Without Contrast  Result Date: 3/20/2025  1.No evidence for significant nephrolithiasis. There is no evidence for obstructive uropathy. 2.No evidence for acute abnormality throughout the abdomen or pelvis on this noncontrast exam. Electronically Signed: Rony Coronado MD  3/20/2025 11:24 AM EDT  Workstation ID: [id number]          Assessment:    Streptococcal bacteremia    Acute back pain    Facet arthritis of lumbar region    Obesity    Dyslipidemia    HTN (hypertension)    MRSA colonization    Type 2 diabetes mellitus, with long-term current use of insulin    Hyponatremia    Diabetic peripheral neuropathy    Clostridium difficile diarrhea  C. difficile colonization  Recommendation/discussion:   He seems to have clinically improved but his persistent left hip pain is concerning.  It could very well be radicular discomfort from lumbar spine process.  C. difficile colitis.  The likely sources of his strep intermedius bacteremia:  is probably his lower extremity lesions and feet with probable lumbar spine involvement though at present does not have surgically amendable lesions as per neurosurgery service.  Versus  Odontogenic source.  Patient had cleaning of his teeth done 6 months ago and he says that his daughter and son-in-law have dental background.  Patient would need out of  hospital follow-up with his primary care provider to address his skin lesions due to diabetic neuropathy involving his feet and make sure that soft tissue infections and wound infections are identified and treated to prevent future episode of bacteremic sepsis as well as follow-up with his dentist to work him up for any dental pathology that could put him at future risk of strep bacteremia.  An opinion from orthopedic surgery service is not unreasonable about his persistent left hip pain not only to make sure that occult pathology of the left hip is checked out but also if will provide an avenue for him to have long-term follow-up with orthopedic surgery service.  Glenoma treatment for invasive strep intermedius infection is beta-lactam class of antibiotics if it is penicillin sensitive.  In this situation of MRSA colonization and presence of C. difficile infection and the fact that he would require long-term antibiotic therapy with so many comorbidities I think using vancomycin over ceftriaxone would be beneficial.  Vancomycin can cause increased risk of renal dysfunction but beta-lactam class of antibiotics in this situation can exacerbate C. difficile infection.  Would recommend 6 weeks of IV vancomycin to address both his strep intermedius bacteremic sepsis with possible lumbar spine involvement as well as MRSA colonization and minimization of flare of C. difficile.  Because of the fact that patient has received ceftriaxone and beta-lactam class of antibiotic therapy would recommend initiating 10-day course of oral vancomycin.  Following orders are written for case management:  Please arrange for 42 days of IV vancomycin to be dosed by pharmacy to achieve a trough level of 15-20 or area under the curve of 400-600.  Start of the treatment would be 3/25/2025 and end of the treatment would be 5/6/2025.  Please check weekly CBC CMP and CRP and call abnormal results to Dr. López [facility] [phone] for abnormal  results.  Please educate patient to call Dr. López at [phone] on a weekly basis to go over review of system to monitor antibiotic toxicity and effectiveness of treatment.  Please arrange out of hospital follow-up with his primary care provider, neurosurgery service and podiatry service as well as dental service to address his medical issues including future source of his strep bacteremia.  Diagnosis: Strep intermedius bacteremia with probable lumbar spine infection with facet arthritis and MRSA colonization with C. difficile colonization.    Lizette López MD  3/28/2025  14:13 EDT    Parts of this note may be an electronic transcription/translation of spoken language to printed text using the Dragon dictation system.

## 2025-03-28 NOTE — SIGNIFICANT NOTE
"   03/28/25 1001   PICC Single Lumen 03/28/25 Right Basilic   Placement date: If unknown, DO NOT use \"Add Comment\" note/Placement time: If unknown, DO NOT use \"Add Comment\" note: 03/28/25 1000   Hand Hygiene Completed: Yes  Size (Fr): 4  Description (optional): Lot#[id number]  EXP  Length (cm): 50 cm  Orientation:...   Site Assessment Clean;Dry;Intact   #1 Lumen Status Flushed;Normal saline locked;Blood return noted;Capped   Length alexys (cm) 50 cm   Line Care Connections checked and tightened   Extremity Circumference (cm) 36 cm   Dressing Type Border Dressing;Securing device;Antimicrobial dressing/disc   Dressing Status Clean;Dry;Intact   Dressing Intervention New dressing   Dressing Change Due 04/04/25   Indication/Daily Review of Necessity long-term IV access >7 days     Sharp Count Correct 3 needles, 2 guidewires, and scalpel    "

## 2025-03-28 NOTE — CASE MANAGEMENT/SOCIAL WORK
Case Management Discharge Note      Final Note: Home with assist of spouse, [facility] and [facility] per private auto    Provided Post Acute Provider List?: Yes  Post Acute Provider List: Home Health  Provided Post Acute Provider Quality & Resource List?: Yes  Post Acute Provider Quality and Resource List: Home Health  Delivered To: Patient, Support Person  Support Person: Cinthia Ramírez/spouse [phone]  Method of Delivery: In person    Selected Continued Care - Admitted Since 3/24/2025       Destination    No services have been selected for the patient.                Durable Medical Equipment    No services have been selected for the patient.                Dialysis/Infusion Coordination complete.      Service Provider Services Address Phone Fax Patient Preferred    [facility] [address] [address] [address] [phone] --              Home Medical Care Coordination complete.      Service Provider Services Address Phone Fax Patient Preferred    [facility] [address] [facility] [id number] [phone] --              Therapy    No services have been selected for the patient.                Community Resources    No services have been selected for the patient.                Community & DME    No services have been selected for the patient.                    Transportation Services  Private: Car    Final Discharge Disposition Code: 06 - home with home health care

## 2025-03-29 ENCOUNTER — READMISSION MANAGEMENT (OUTPATIENT)
Dept: CALL CENTER | Facility: HOSPITAL | Age: 65
End: 2025-03-29
Payer: MEDICARE

## 2025-03-29 NOTE — OUTREACH NOTE
Prep Survey      Flowsheet Row Responses   [facility] facility patient discharged from? [facility]   Is LACE score < 7 ? No   Eligibility Readm Mgmt   Discharge diagnosis Streptococcal bacteremia (   Does the patient have one of the following disease processes/diagnoses(primary or secondary)? Other   Does the patient have Home health ordered? Yes   What is the Home health agency?  [facility] [place]   Is there a DME ordered? No   Prep survey completed? Yes            TUTU CORTEZ - Registered Nurse

## 2025-03-30 LAB
BACTERIA FLD CULT: NORMAL
GRAM STN SPEC: NORMAL
GRAM STN SPEC: NORMAL

## 2025-04-02 ENCOUNTER — LAB REQUISITION (OUTPATIENT)
Dept: LAB | Facility: HOSPITAL | Age: 65
End: 2025-04-02
Payer: MEDICARE

## 2025-04-02 DIAGNOSIS — A04.72 ENTEROCOLITIS DUE TO CLOSTRIDIUM DIFFICILE, NOT SPECIFIED AS RECURRENT: ICD-10-CM

## 2025-04-02 LAB
ALBUMIN SERPL-MCNC: 3.8 G/DL (ref 3.5–5.2)
ALBUMIN/GLOB SERPL: 1 G/DL
ALP SERPL-CCNC: 96 U/L (ref 39–117)
ALT SERPL W P-5'-P-CCNC: 17 U/L (ref 1–41)
ANION GAP SERPL CALCULATED.3IONS-SCNC: 11.4 MMOL/L (ref 5–15)
AST SERPL-CCNC: 19 U/L (ref 1–40)
BASOPHILS # BLD AUTO: 0.03 10*3/MM3 (ref 0–0.2)
BASOPHILS NFR BLD AUTO: 0.3 % (ref 0–1.5)
BILIRUB SERPL-MCNC: 0.3 MG/DL (ref 0–1.2)
BUN SERPL-MCNC: 18 MG/DL (ref 8–23)
BUN/CREAT SERPL: 16.7 (ref 7–25)
CALCIUM SPEC-SCNC: 9.4 MG/DL (ref 8.6–10.5)
CHLORIDE SERPL-SCNC: 96 MMOL/L (ref 98–107)
CO2 SERPL-SCNC: 25.6 MMOL/L (ref 22–29)
CREAT SERPL-MCNC: 1.08 MG/DL (ref 0.76–1.27)
CRP SERPL-MCNC: 6.51 MG/DL (ref 0–0.5)
DEPRECATED RDW RBC AUTO: 42.7 FL (ref 37–54)
EGFRCR SERPLBLD CKD-EPI 2021: 77.1 ML/MIN/1.73
EOSINOPHIL # BLD AUTO: 0.41 10*3/MM3 (ref 0–0.4)
EOSINOPHIL NFR BLD AUTO: 4 % (ref 0.3–6.2)
ERYTHROCYTE [DISTWIDTH] IN BLOOD BY AUTOMATED COUNT: 14.6 % (ref 12.3–15.4)
GLOBULIN UR ELPH-MCNC: 3.9 GM/DL
GLUCOSE SERPL-MCNC: 216 MG/DL (ref 65–99)
HCT VFR BLD AUTO: 38.3 % (ref 37.5–51)
HGB BLD-MCNC: 12.5 G/DL (ref 13–17.7)
IMM GRANULOCYTES # BLD AUTO: 0.04 10*3/MM3 (ref 0–0.05)
IMM GRANULOCYTES NFR BLD AUTO: 0.4 % (ref 0–0.5)
LYMPHOCYTES # BLD AUTO: 1.28 10*3/MM3 (ref 0.7–3.1)
LYMPHOCYTES NFR BLD AUTO: 12.3 % (ref 19.6–45.3)
MCH RBC QN AUTO: 26.7 PG (ref 26.6–33)
MCHC RBC AUTO-ENTMCNC: 32.6 G/DL (ref 31.5–35.7)
MCV RBC AUTO: 81.7 FL (ref 79–97)
MONOCYTES # BLD AUTO: 0.96 10*3/MM3 (ref 0.1–0.9)
MONOCYTES NFR BLD AUTO: 9.3 % (ref 5–12)
NEUTROPHILS NFR BLD AUTO: 7.65 10*3/MM3 (ref 1.7–7)
NEUTROPHILS NFR BLD AUTO: 73.7 % (ref 42.7–76)
NRBC BLD AUTO-RTO: 0 /100 WBC (ref 0–0.2)
PLATELET # BLD AUTO: 353 10*3/MM3 (ref 140–450)
PMV BLD AUTO: 10.3 FL (ref 6–12)
POTASSIUM SERPL-SCNC: 4.5 MMOL/L (ref 3.5–5.2)
PROT SERPL-MCNC: 7.7 G/DL (ref 6–8.5)
RBC # BLD AUTO: 4.69 10*6/MM3 (ref 4.14–5.8)
SODIUM SERPL-SCNC: 133 MMOL/L (ref 136–145)
VANCOMYCIN TROUGH SERPL-MCNC: 14.7 MCG/ML (ref 5–20)
WBC NRBC COR # BLD AUTO: 10.37 10*3/MM3 (ref 3.4–10.8)

## 2025-04-02 PROCEDURE — 80202 ASSAY OF VANCOMYCIN: CPT | Performed by: INTERNAL MEDICINE

## 2025-04-02 PROCEDURE — 86140 C-REACTIVE PROTEIN: CPT | Performed by: INTERNAL MEDICINE

## 2025-04-02 PROCEDURE — 85025 COMPLETE CBC W/AUTO DIFF WBC: CPT | Performed by: INTERNAL MEDICINE

## 2025-04-02 PROCEDURE — 80053 COMPREHEN METABOLIC PANEL: CPT | Performed by: INTERNAL MEDICINE

## 2025-04-03 ENCOUNTER — TELEPHONE (OUTPATIENT)
Dept: NEUROSURGERY | Facility: CLINIC | Age: 65
End: 2025-04-03

## 2025-04-03 NOTE — TELEPHONE ENCOUNTER
Caller:ANA     Relationship to patient: WIFE    Best call back number: [phone]    Chief complaint: LUMBAR HOSPITAL FOLLOW UP    Type of visit: HOSPITAL FOLLOW UP    Requested date: 6 WKS AFTER MRI      If rescheduling, when is the original appointment:      Additional notes:PATIENTS WIFE ANA CALLED TO SCHEDULE PATIENTS 6 WKS FOLLOW UP APPT AFTER REPEAT MRI LUMBAR - TE FROM  03.27.25 STATES TO FOLLOW UP WITH EITHER SUDHAKAR OR DR FUNG - PLEASE ADVISE WHO HE NEEDS TO BE SCHEDULED WITH     PATIENTS WIFE IS GOING TO SCHEDULE MRI [facility]     PATIENT WAS SEEN BY SUDHAKAR IN  ER 03.25.25    THANK YOU

## 2025-04-04 ENCOUNTER — READMISSION MANAGEMENT (OUTPATIENT)
Dept: CALL CENTER | Facility: HOSPITAL | Age: 65
End: 2025-04-04
Payer: MEDICARE

## 2025-04-04 NOTE — OUTREACH NOTE
Medical Week 1 Survey      Flowsheet Row Responses   [facility] patient discharged from? [place]   Does the patient have one of the following disease processes/diagnoses(primary or secondary)? Other   Week 1 attempt successful? No   Unsuccessful attempts Attempt 1            Suki SANDERSON - Registered Nurse

## 2025-04-09 ENCOUNTER — READMISSION MANAGEMENT (OUTPATIENT)
Dept: CALL CENTER | Facility: HOSPITAL | Age: 65
End: 2025-04-09
Payer: MEDICARE

## 2025-04-09 ENCOUNTER — LAB REQUISITION (OUTPATIENT)
Dept: LAB | Facility: HOSPITAL | Age: 65
End: 2025-04-09
Payer: MEDICARE

## 2025-04-09 DIAGNOSIS — A04.72 ENTEROCOLITIS DUE TO CLOSTRIDIUM DIFFICILE, NOT SPECIFIED AS RECURRENT: ICD-10-CM

## 2025-04-09 LAB
ALBUMIN SERPL-MCNC: 3.5 G/DL (ref 3.5–5.2)
ALBUMIN/GLOB SERPL: 0.9 G/DL
ALP SERPL-CCNC: 79 U/L (ref 39–117)
ALT SERPL W P-5'-P-CCNC: 26 U/L (ref 1–41)
ANION GAP SERPL CALCULATED.3IONS-SCNC: 12.5 MMOL/L (ref 5–15)
AST SERPL-CCNC: 34 U/L (ref 1–40)
BASOPHILS # BLD AUTO: 0.03 10*3/MM3 (ref 0–0.2)
BASOPHILS NFR BLD AUTO: 0.5 % (ref 0–1.5)
BILIRUB SERPL-MCNC: 0.3 MG/DL (ref 0–1.2)
BUN SERPL-MCNC: 20 MG/DL (ref 8–23)
BUN/CREAT SERPL: 17.1 (ref 7–25)
CALCIUM SPEC-SCNC: 9.1 MG/DL (ref 8.6–10.5)
CHLORIDE SERPL-SCNC: 96 MMOL/L (ref 98–107)
CO2 SERPL-SCNC: 22.5 MMOL/L (ref 22–29)
CREAT SERPL-MCNC: 1.17 MG/DL (ref 0.76–1.27)
CRP SERPL-MCNC: 7.57 MG/DL (ref 0–0.5)
DEPRECATED RDW RBC AUTO: 43.1 FL (ref 37–54)
EGFRCR SERPLBLD CKD-EPI 2021: 70 ML/MIN/1.73
EOSINOPHIL # BLD AUTO: 0.27 10*3/MM3 (ref 0–0.4)
EOSINOPHIL NFR BLD AUTO: 4.3 % (ref 0.3–6.2)
ERYTHROCYTE [DISTWIDTH] IN BLOOD BY AUTOMATED COUNT: 14.6 % (ref 12.3–15.4)
GLOBULIN UR ELPH-MCNC: 3.8 GM/DL
GLUCOSE SERPL-MCNC: 199 MG/DL (ref 65–99)
HCT VFR BLD AUTO: 41 % (ref 37.5–51)
HGB BLD-MCNC: 13.7 G/DL (ref 13–17.7)
IMM GRANULOCYTES # BLD AUTO: 0.03 10*3/MM3 (ref 0–0.05)
IMM GRANULOCYTES NFR BLD AUTO: 0.5 % (ref 0–0.5)
LYMPHOCYTES # BLD AUTO: 0.87 10*3/MM3 (ref 0.7–3.1)
LYMPHOCYTES NFR BLD AUTO: 13.9 % (ref 19.6–45.3)
MCH RBC QN AUTO: 27 PG (ref 26.6–33)
MCHC RBC AUTO-ENTMCNC: 33.4 G/DL (ref 31.5–35.7)
MCV RBC AUTO: 80.7 FL (ref 79–97)
MONOCYTES # BLD AUTO: 0.82 10*3/MM3 (ref 0.1–0.9)
MONOCYTES NFR BLD AUTO: 13.1 % (ref 5–12)
NEUTROPHILS NFR BLD AUTO: 4.24 10*3/MM3 (ref 1.7–7)
NEUTROPHILS NFR BLD AUTO: 67.7 % (ref 42.7–76)
NRBC BLD AUTO-RTO: 0 /100 WBC (ref 0–0.2)
PLATELET # BLD AUTO: 290 10*3/MM3 (ref 140–450)
PMV BLD AUTO: 9.7 FL (ref 6–12)
POTASSIUM SERPL-SCNC: 4.6 MMOL/L (ref 3.5–5.2)
PROT SERPL-MCNC: 7.3 G/DL (ref 6–8.5)
RBC # BLD AUTO: 5.08 10*6/MM3 (ref 4.14–5.8)
SODIUM SERPL-SCNC: 131 MMOL/L (ref 136–145)
VANCOMYCIN TROUGH SERPL-MCNC: 28.9 MCG/ML (ref 5–20)
WBC NRBC COR # BLD AUTO: 6.26 10*3/MM3 (ref 3.4–10.8)

## 2025-04-09 PROCEDURE — 86140 C-REACTIVE PROTEIN: CPT | Performed by: DENTIST

## 2025-04-09 PROCEDURE — 80053 COMPREHEN METABOLIC PANEL: CPT | Performed by: DENTIST

## 2025-04-09 PROCEDURE — 85025 COMPLETE CBC W/AUTO DIFF WBC: CPT | Performed by: DENTIST

## 2025-04-09 PROCEDURE — 80202 ASSAY OF VANCOMYCIN: CPT | Performed by: DENTIST

## 2025-04-09 NOTE — OUTREACH NOTE
Medical Week 1 Survey      Flowsheet Row Responses   [facility] patient discharged from? [place]   Does the patient have one of the following disease processes/diagnoses(primary or secondary)? Other   Week 1 attempt successful? Yes   Call start time 1148   Call end time 1152   List who call center can speak with pt   Medication alerts for this patient PICC Line IV abx.   Meds reviewed with patient/caregiver? Yes   Is the patient having any side effects they believe may be caused by any medication additions or changes? No   Does the patient have all medications ordered at discharge? Yes   Is the patient taking all medications as directed (includes completed medication regime)? Yes   Comments regarding appointments Pt has had tele-health visit with pcp.   Does the patient have a primary care provider?  Yes   Has the patient kept scheduled appointments due by today? Yes   Has home health visited the patient within 72 hours of discharge? Yes   Psychosocial issues? No   Did the patient receive a copy of their discharge instructions? Yes   Nursing interventions Reviewed instructions with patient   What is the patient's perception of their health status since discharge? Improving   Is the patient/caregiver able to teach back signs and symptoms related to disease process for when to call PCP? Yes   Is the patient/caregiver able to teach back signs and symptoms related to disease process for when to call 911? Yes   Is the patient/caregiver able to teach back the hierarchy of who to call/visit for symptoms/problems? PCP, Specialist, Home health nurse, Urgent Care, ED, 911 Yes   Week 1 call completed? Yes   Wrap up additional comments Pt reports improving slowly. Pt continues IV abx for approx. 5-6 weeks. HH is visiting.   Call end time 1152            Suki SANDERSON - Registered Nurse

## 2025-04-16 ENCOUNTER — LAB REQUISITION (OUTPATIENT)
Dept: LAB | Facility: HOSPITAL | Age: 65
End: 2025-04-16
Payer: MEDICARE

## 2025-04-16 DIAGNOSIS — A04.72 ENTEROCOLITIS DUE TO CLOSTRIDIUM DIFFICILE, NOT SPECIFIED AS RECURRENT: ICD-10-CM

## 2025-04-16 LAB
ALBUMIN SERPL-MCNC: 3.5 G/DL (ref 3.5–5.2)
ALBUMIN/GLOB SERPL: 1.1 G/DL
ALP SERPL-CCNC: 78 U/L (ref 39–117)
ALT SERPL W P-5'-P-CCNC: 40 U/L (ref 1–41)
ANION GAP SERPL CALCULATED.3IONS-SCNC: 12.9 MMOL/L (ref 5–15)
AST SERPL-CCNC: 29 U/L (ref 1–40)
BASOPHILS # BLD AUTO: 0.03 10*3/MM3 (ref 0–0.2)
BASOPHILS NFR BLD AUTO: 0.4 % (ref 0–1.5)
BILIRUB SERPL-MCNC: 0.4 MG/DL (ref 0–1.2)
BUN SERPL-MCNC: 18 MG/DL (ref 8–23)
BUN/CREAT SERPL: 12.5 (ref 7–25)
CALCIUM SPEC-SCNC: 8.8 MG/DL (ref 8.6–10.5)
CHLORIDE SERPL-SCNC: 100 MMOL/L (ref 98–107)
CO2 SERPL-SCNC: 23.1 MMOL/L (ref 22–29)
CREAT SERPL-MCNC: 1.44 MG/DL (ref 0.76–1.27)
CRP SERPL-MCNC: 9.7 MG/DL (ref 0–0.5)
DEPRECATED RDW RBC AUTO: 45.1 FL (ref 37–54)
EGFRCR SERPLBLD CKD-EPI 2021: 54.6 ML/MIN/1.73
EOSINOPHIL # BLD AUTO: 0.54 10*3/MM3 (ref 0–0.4)
EOSINOPHIL NFR BLD AUTO: 6.3 % (ref 0.3–6.2)
ERYTHROCYTE [DISTWIDTH] IN BLOOD BY AUTOMATED COUNT: 15.2 % (ref 12.3–15.4)
GLOBULIN UR ELPH-MCNC: 3.3 GM/DL
GLUCOSE SERPL-MCNC: 121 MG/DL (ref 65–99)
HCT VFR BLD AUTO: 39.2 % (ref 37.5–51)
HGB BLD-MCNC: 12.8 G/DL (ref 13–17.7)
IMM GRANULOCYTES # BLD AUTO: 0.09 10*3/MM3 (ref 0–0.05)
IMM GRANULOCYTES NFR BLD AUTO: 1.1 % (ref 0–0.5)
LYMPHOCYTES # BLD AUTO: 0.98 10*3/MM3 (ref 0.7–3.1)
LYMPHOCYTES NFR BLD AUTO: 11.4 % (ref 19.6–45.3)
MCH RBC QN AUTO: 26.6 PG (ref 26.6–33)
MCHC RBC AUTO-ENTMCNC: 32.7 G/DL (ref 31.5–35.7)
MCV RBC AUTO: 81.5 FL (ref 79–97)
MONOCYTES # BLD AUTO: 1.19 10*3/MM3 (ref 0.1–0.9)
MONOCYTES NFR BLD AUTO: 13.9 % (ref 5–12)
NEUTROPHILS NFR BLD AUTO: 5.74 10*3/MM3 (ref 1.7–7)
NEUTROPHILS NFR BLD AUTO: 66.9 % (ref 42.7–76)
NRBC BLD AUTO-RTO: 0 /100 WBC (ref 0–0.2)
PLATELET # BLD AUTO: 275 10*3/MM3 (ref 140–450)
PMV BLD AUTO: 10.1 FL (ref 6–12)
POTASSIUM SERPL-SCNC: 4.1 MMOL/L (ref 3.5–5.2)
PROT SERPL-MCNC: 6.8 G/DL (ref 6–8.5)
RBC # BLD AUTO: 4.81 10*6/MM3 (ref 4.14–5.8)
SODIUM SERPL-SCNC: 136 MMOL/L (ref 136–145)
VANCOMYCIN TROUGH SERPL-MCNC: 35.5 MCG/ML (ref 5–20)
WBC NRBC COR # BLD AUTO: 8.57 10*3/MM3 (ref 3.4–10.8)

## 2025-04-16 PROCEDURE — 85025 COMPLETE CBC W/AUTO DIFF WBC: CPT | Performed by: FAMILY MEDICINE

## 2025-04-16 PROCEDURE — 86140 C-REACTIVE PROTEIN: CPT | Performed by: FAMILY MEDICINE

## 2025-04-16 PROCEDURE — 80202 ASSAY OF VANCOMYCIN: CPT | Performed by: FAMILY MEDICINE

## 2025-04-16 PROCEDURE — 80053 COMPREHEN METABOLIC PANEL: CPT | Performed by: FAMILY MEDICINE

## 2025-04-18 ENCOUNTER — READMISSION MANAGEMENT (OUTPATIENT)
Dept: CALL CENTER | Facility: HOSPITAL | Age: 65
End: 2025-04-18
Payer: MEDICARE

## 2025-04-18 NOTE — OUTREACH NOTE
Medical Week 2 Survey      Flowsheet Row Responses   [facility] patient discharged from? [place]   Does the patient have one of the following disease processes/diagnoses(primary or secondary)? Other   Week 2 attempt successful? Yes   Call start time 1504   Discharge diagnosis Streptococcal bacteremia (   Call end time 1511   Meds reviewed with patient/caregiver? Yes   Is the patient having any side effects they believe may be caused by any medication additions or changes? No   Does the patient have all medications ordered at discharge? Yes   Is the patient taking all medications as directed (includes completed medication regime)? Yes   Does the patient have a primary care provider?  Yes   Does the patient have an appointment with their PCP within 7 days of discharge? Yes   Has the patient kept scheduled appointments due by today? Yes  [PCP telehealth visit]   What is the Home health agency?  [facility]   Has home health visited the patient within 72 hours of discharge? Yes   Psychosocial issues? No   Did the patient receive a copy of their discharge instructions? Yes   Nursing interventions Reviewed instructions with patient   What is the patient's perception of their health status since discharge? Improving   Is the patient/caregiver able to teach back signs and symptoms related to disease process for when to call PCP? Yes   Is the patient/caregiver able to teach back signs and symptoms related to disease process for when to call 911? Yes   Is the patient/caregiver able to teach back the hierarchy of who to call/visit for symptoms/problems? PCP, Specialist, Home health nurse, Urgent Care, ED, 911 Yes   If the patient is a current smoker, are they able to teach back resources for cessation? Not a smoker   Week 2 Call Completed? Yes   Call end time 1511            Evi HE - Registered Nurse

## 2025-04-21 ENCOUNTER — HOSPITAL ENCOUNTER (EMERGENCY)
Facility: HOSPITAL | Age: 65
Discharge: ANOTHER HOSPITAL | DRG: 606 | End: 2025-04-21
Attending: STUDENT IN AN ORGANIZED HEALTH CARE EDUCATION/TRAINING PROGRAM | Admitting: STUDENT IN AN ORGANIZED HEALTH CARE EDUCATION/TRAINING PROGRAM
Payer: MEDICARE

## 2025-04-21 ENCOUNTER — READMISSION MANAGEMENT (OUTPATIENT)
Dept: CALL CENTER | Facility: HOSPITAL | Age: 65
End: 2025-04-21
Payer: MEDICARE

## 2025-04-21 ENCOUNTER — HOSPITAL ENCOUNTER (INPATIENT)
Facility: HOSPITAL | Age: 65
LOS: 10 days | Discharge: HOME HEALTH | End: 2025-05-03
Attending: INTERNAL MEDICINE | Admitting: INTERNAL MEDICINE
Payer: MEDICARE

## 2025-04-21 VITALS
HEART RATE: 94 BPM | WEIGHT: 315 LBS | BODY MASS INDEX: 37.19 KG/M2 | DIASTOLIC BLOOD PRESSURE: 74 MMHG | RESPIRATION RATE: 18 BRPM | TEMPERATURE: 98.6 F | HEIGHT: 77 IN | OXYGEN SATURATION: 95 % | SYSTOLIC BLOOD PRESSURE: 136 MMHG

## 2025-04-21 DIAGNOSIS — T36.8X5A VANCOMYCIN FLUSHING SYNDROME: Primary | ICD-10-CM

## 2025-04-21 DIAGNOSIS — L27.0 VANCOMYCIN FLUSHING SYNDROME: ICD-10-CM

## 2025-04-21 DIAGNOSIS — R78.81 STREPTOCOCCAL BACTEREMIA: ICD-10-CM

## 2025-04-21 DIAGNOSIS — T36.8X5A VANCOMYCIN FLUSHING SYNDROME: ICD-10-CM

## 2025-04-21 DIAGNOSIS — I10 PRIMARY HYPERTENSION: ICD-10-CM

## 2025-04-21 DIAGNOSIS — B95.5 STREPTOCOCCAL BACTEREMIA: ICD-10-CM

## 2025-04-21 DIAGNOSIS — N17.9 AKI (ACUTE KIDNEY INJURY): Primary | ICD-10-CM

## 2025-04-21 DIAGNOSIS — L27.0 VANCOMYCIN FLUSHING SYNDROME: Primary | ICD-10-CM

## 2025-04-21 LAB
ALBUMIN SERPL-MCNC: 3.3 G/DL (ref 3.5–5.2)
ALBUMIN/GLOB SERPL: 1.1 G/DL
ALP SERPL-CCNC: 131 U/L (ref 39–117)
ALT SERPL W P-5'-P-CCNC: 33 U/L (ref 1–41)
ANION GAP SERPL CALCULATED.3IONS-SCNC: 10.7 MMOL/L (ref 5–15)
APTT PPP: 30.5 SECONDS (ref 24.3–38.1)
AST SERPL-CCNC: 34 U/L (ref 1–40)
BASOPHILS # BLD AUTO: 0.01 10*3/MM3 (ref 0–0.2)
BASOPHILS NFR BLD AUTO: 0.1 % (ref 0–1.5)
BILIRUB SERPL-MCNC: 0.5 MG/DL (ref 0–1.2)
BUN SERPL-MCNC: 13 MG/DL (ref 8–23)
BUN/CREAT SERPL: 7.5 (ref 7–25)
CALCIUM SPEC-SCNC: 8.7 MG/DL (ref 8.6–10.5)
CHLORIDE SERPL-SCNC: 102 MMOL/L (ref 98–107)
CO2 SERPL-SCNC: 24.3 MMOL/L (ref 22–29)
CREAT SERPL-MCNC: 1.74 MG/DL (ref 0.76–1.27)
DEPRECATED RDW RBC AUTO: 46.4 FL (ref 37–54)
EGFRCR SERPLBLD CKD-EPI 2021: 43.5 ML/MIN/1.73
EOSINOPHIL # BLD AUTO: 0.73 10*3/MM3 (ref 0–0.4)
EOSINOPHIL NFR BLD AUTO: 6.6 % (ref 0.3–6.2)
ERYTHROCYTE [DISTWIDTH] IN BLOOD BY AUTOMATED COUNT: 15.4 % (ref 12.3–15.4)
GLOBULIN UR ELPH-MCNC: 3 GM/DL
GLUCOSE BLDC GLUCOMTR-MCNC: 223 MG/DL (ref 70–130)
GLUCOSE SERPL-MCNC: 179 MG/DL (ref 65–99)
HCT VFR BLD AUTO: 40.9 % (ref 37.5–51)
HGB BLD-MCNC: 12.8 G/DL (ref 13–17.7)
IMM GRANULOCYTES # BLD AUTO: 0.12 10*3/MM3 (ref 0–0.05)
IMM GRANULOCYTES NFR BLD AUTO: 1.1 % (ref 0–0.5)
INR PPP: 1.16 (ref 0.9–1.1)
LYMPHOCYTES # BLD AUTO: 0.52 10*3/MM3 (ref 0.7–3.1)
LYMPHOCYTES NFR BLD AUTO: 4.7 % (ref 19.6–45.3)
MCH RBC QN AUTO: 26 PG (ref 26.6–33)
MCHC RBC AUTO-ENTMCNC: 31.3 G/DL (ref 31.5–35.7)
MCV RBC AUTO: 83.1 FL (ref 79–97)
MONOCYTES # BLD AUTO: 1.11 10*3/MM3 (ref 0.1–0.9)
MONOCYTES NFR BLD AUTO: 10 % (ref 5–12)
NEUTROPHILS NFR BLD AUTO: 77.5 % (ref 42.7–76)
NEUTROPHILS NFR BLD AUTO: 8.63 10*3/MM3 (ref 1.7–7)
PLATELET # BLD AUTO: 291 10*3/MM3 (ref 140–450)
PMV BLD AUTO: 9.4 FL (ref 6–12)
POTASSIUM SERPL-SCNC: 5.2 MMOL/L (ref 3.5–5.2)
PROT SERPL-MCNC: 6.3 G/DL (ref 6–8.5)
PROTHROMBIN TIME: 15.1 SECONDS (ref 12.1–15)
RBC # BLD AUTO: 4.92 10*6/MM3 (ref 4.14–5.8)
SODIUM SERPL-SCNC: 137 MMOL/L (ref 136–145)
VANCOMYCIN SERPL-MCNC: 21.6 MCG/ML (ref 5–40)
WBC NRBC COR # BLD AUTO: 11.12 10*3/MM3 (ref 3.4–10.8)

## 2025-04-21 PROCEDURE — 85730 THROMBOPLASTIN TIME PARTIAL: CPT | Performed by: PHYSICIAN ASSISTANT

## 2025-04-21 PROCEDURE — 82948 REAGENT STRIP/BLOOD GLUCOSE: CPT

## 2025-04-21 PROCEDURE — 99285 EMERGENCY DEPT VISIT HI MDM: CPT | Performed by: STUDENT IN AN ORGANIZED HEALTH CARE EDUCATION/TRAINING PROGRAM

## 2025-04-21 PROCEDURE — 80202 ASSAY OF VANCOMYCIN: CPT | Performed by: PHYSICIAN ASSISTANT

## 2025-04-21 PROCEDURE — 80053 COMPREHEN METABOLIC PANEL: CPT | Performed by: PHYSICIAN ASSISTANT

## 2025-04-21 PROCEDURE — 63710000001 DIPHENHYDRAMINE PER 50 MG: Performed by: NURSE PRACTITIONER

## 2025-04-21 PROCEDURE — 85610 PROTHROMBIN TIME: CPT | Performed by: PHYSICIAN ASSISTANT

## 2025-04-21 PROCEDURE — 96374 THER/PROPH/DIAG INJ IV PUSH: CPT

## 2025-04-21 PROCEDURE — 63710000001 INSULIN LISPRO (HUMAN) PER 5 UNITS: Performed by: NURSE PRACTITIONER

## 2025-04-21 PROCEDURE — G0378 HOSPITAL OBSERVATION PER HR: HCPCS

## 2025-04-21 PROCEDURE — 85025 COMPLETE CBC W/AUTO DIFF WBC: CPT | Performed by: PHYSICIAN ASSISTANT

## 2025-04-21 PROCEDURE — 25010000002 DIPHENHYDRAMINE PER 50 MG: Performed by: PHYSICIAN ASSISTANT

## 2025-04-21 RX ORDER — ACETAMINOPHEN 160 MG/5ML
650 SOLUTION ORAL EVERY 4 HOURS PRN
Status: DISCONTINUED | OUTPATIENT
Start: 2025-04-21 | End: 2025-05-03 | Stop reason: HOSPADM

## 2025-04-21 RX ORDER — AMOXICILLIN 250 MG
2 CAPSULE ORAL 2 TIMES DAILY PRN
Status: DISCONTINUED | OUTPATIENT
Start: 2025-04-21 | End: 2025-05-03 | Stop reason: HOSPADM

## 2025-04-21 RX ORDER — DIPHENHYDRAMINE HCL 25 MG
25 CAPSULE ORAL EVERY 6 HOURS PRN
Status: DISCONTINUED | OUTPATIENT
Start: 2025-04-21 | End: 2025-04-24

## 2025-04-21 RX ORDER — IBUPROFEN 600 MG/1
1 TABLET ORAL
Status: DISCONTINUED | OUTPATIENT
Start: 2025-04-21 | End: 2025-05-03 | Stop reason: HOSPADM

## 2025-04-21 RX ORDER — ONDANSETRON 4 MG/1
4 TABLET, ORALLY DISINTEGRATING ORAL EVERY 6 HOURS PRN
Status: DISCONTINUED | OUTPATIENT
Start: 2025-04-21 | End: 2025-05-03 | Stop reason: HOSPADM

## 2025-04-21 RX ORDER — DEXTROSE MONOHYDRATE 25 G/50ML
25 INJECTION, SOLUTION INTRAVENOUS
Status: DISCONTINUED | OUTPATIENT
Start: 2025-04-21 | End: 2025-05-03 | Stop reason: HOSPADM

## 2025-04-21 RX ORDER — BISACODYL 5 MG/1
5 TABLET, DELAYED RELEASE ORAL DAILY PRN
Status: DISCONTINUED | OUTPATIENT
Start: 2025-04-21 | End: 2025-05-03 | Stop reason: HOSPADM

## 2025-04-21 RX ORDER — ACETAMINOPHEN 325 MG/1
650 TABLET ORAL EVERY 4 HOURS PRN
Status: DISCONTINUED | OUTPATIENT
Start: 2025-04-21 | End: 2025-05-03 | Stop reason: HOSPADM

## 2025-04-21 RX ORDER — INSULIN LISPRO 100 [IU]/ML
2-9 INJECTION, SOLUTION INTRAVENOUS; SUBCUTANEOUS
Status: DISCONTINUED | OUTPATIENT
Start: 2025-04-21 | End: 2025-05-03 | Stop reason: HOSPADM

## 2025-04-21 RX ORDER — FAMOTIDINE 10 MG/ML
20 INJECTION, SOLUTION INTRAVENOUS EVERY 12 HOURS SCHEDULED
Status: DISCONTINUED | OUTPATIENT
Start: 2025-04-21 | End: 2025-04-27

## 2025-04-21 RX ORDER — NITROGLYCERIN 0.4 MG/1
0.4 TABLET SUBLINGUAL
Status: DISCONTINUED | OUTPATIENT
Start: 2025-04-21 | End: 2025-05-03 | Stop reason: HOSPADM

## 2025-04-21 RX ORDER — POLYETHYLENE GLYCOL 3350 17 G/17G
17 POWDER, FOR SOLUTION ORAL DAILY PRN
Status: DISCONTINUED | OUTPATIENT
Start: 2025-04-21 | End: 2025-05-03 | Stop reason: HOSPADM

## 2025-04-21 RX ORDER — SODIUM CHLORIDE 0.9 % (FLUSH) 0.9 %
10 SYRINGE (ML) INJECTION AS NEEDED
Status: DISCONTINUED | OUTPATIENT
Start: 2025-04-21 | End: 2025-05-03 | Stop reason: HOSPADM

## 2025-04-21 RX ORDER — DIPHENHYDRAMINE HYDROCHLORIDE 50 MG/ML
50 INJECTION, SOLUTION INTRAMUSCULAR; INTRAVENOUS ONCE
Status: COMPLETED | OUTPATIENT
Start: 2025-04-21 | End: 2025-04-21

## 2025-04-21 RX ORDER — ACETAMINOPHEN 650 MG/1
650 SUPPOSITORY RECTAL EVERY 4 HOURS PRN
Status: DISCONTINUED | OUTPATIENT
Start: 2025-04-21 | End: 2025-05-03 | Stop reason: HOSPADM

## 2025-04-21 RX ORDER — ALUMINA, MAGNESIA, AND SIMETHICONE 2400; 2400; 240 MG/30ML; MG/30ML; MG/30ML
15 SUSPENSION ORAL EVERY 6 HOURS PRN
Status: DISCONTINUED | OUTPATIENT
Start: 2025-04-21 | End: 2025-05-03 | Stop reason: HOSPADM

## 2025-04-21 RX ORDER — SODIUM CHLORIDE 9 MG/ML
100 INJECTION, SOLUTION INTRAVENOUS CONTINUOUS
Status: DISCONTINUED | OUTPATIENT
Start: 2025-04-21 | End: 2025-04-22

## 2025-04-21 RX ORDER — BISACODYL 10 MG
10 SUPPOSITORY, RECTAL RECTAL DAILY PRN
Status: DISCONTINUED | OUTPATIENT
Start: 2025-04-21 | End: 2025-05-03 | Stop reason: HOSPADM

## 2025-04-21 RX ORDER — ONDANSETRON 2 MG/ML
4 INJECTION INTRAMUSCULAR; INTRAVENOUS EVERY 6 HOURS PRN
Status: DISCONTINUED | OUTPATIENT
Start: 2025-04-21 | End: 2025-05-03 | Stop reason: HOSPADM

## 2025-04-21 RX ORDER — NICOTINE POLACRILEX 4 MG
15 LOZENGE BUCCAL
Status: DISCONTINUED | OUTPATIENT
Start: 2025-04-21 | End: 2025-05-03 | Stop reason: HOSPADM

## 2025-04-21 RX ADMIN — DIPHENHYDRAMINE HYDROCHLORIDE 50 MG: 50 INJECTION, SOLUTION INTRAMUSCULAR; INTRAVENOUS at 13:24

## 2025-04-21 RX ADMIN — INSULIN LISPRO 2 UNITS: 100 INJECTION, SOLUTION INTRAVENOUS; SUBCUTANEOUS at 21:21

## 2025-04-21 RX ADMIN — DIPHENHYDRAMINE HYDROCHLORIDE 25 MG: 25 CAPSULE ORAL at 21:19

## 2025-04-21 NOTE — ED NOTES
Still waiting bed placement at [facility]. ER  on the phone to follow up with transfer center.

## 2025-04-21 NOTE — ED PROVIDER NOTES
"Subjective   History of Present Illness  Patient is a 65-year-old gentleman is currently on of vancomycin for spinal abscess.  Per notes patient got spinal abscess for cellulitis.  He had been seen at this facility about a month ago and was transferred to [facility].  He has been home for about 3 weeks doing Vanco at home.  Says it takes about 2 hours to run the bite.  He did have some issues with kidney function and so was told not to take a couple doses but then resumed it this week.  On Friday he started feeling the beginnings of redness which is gradually gotten worse and climaxed today.  He home health came in to check his Vanco levels and sent him to the ER.  He is not having chest pain or abdominal pain.  He has not had contact with any new substances that could cause the rash.      Review of Systems   Constitutional: Negative.    HENT: Negative.     Respiratory: Negative.     Cardiovascular: Negative.    Musculoskeletal:  Negative for back pain.   Skin:  Positive for color change and rash.   Psychiatric/Behavioral: Negative.     All other systems reviewed and are negative.      Past Medical History:   Diagnosis Date    Diabetes mellitus     Hyperlipidemia     Hypertension        Allergies   Allergen Reactions    Naproxen Anaphylaxis    Nsaids Anaphylaxis    Sulfamethoxazole-Trimethoprim Shortness Of Breath and Swelling    Etodolac Other (See Comments)     stomach irritation  Annotation - [id number]: STOMACH      Gabapentin Dizziness     Annotation - [id number]: \"Felt Drunk\"    Zonisamide Other (See Comments) and Myalgia     Drowsy, Fatigue         No past surgical history on file.    No family history on file.    Social History     Socioeconomic History    Marital status:    Tobacco Use    Smoking status: Never    Smokeless tobacco: Never   Vaping Use    Vaping status: Never Used   Substance and Sexual Activity    Alcohol use: Not Currently    Drug use: Defer    Sexual activity: Defer "           Objective   Physical Exam  Vitals and nursing note reviewed.   Constitutional:       Appearance: He is obese.   Eyes:      Conjunctiva/sclera: Conjunctivae normal.   Cardiovascular:      Rate and Rhythm: Normal rate and regular rhythm.      Pulses: Normal pulses.      Heart sounds: Normal heart sounds.   Pulmonary:      Effort: Pulmonary effort is normal.      Breath sounds: Normal breath sounds.   Abdominal:      Tenderness: There is no right CVA tenderness or left CVA tenderness.   Musculoskeletal:      Cervical back: Normal range of motion and neck supple.      Right lower leg: Edema present.      Left lower leg: Edema present.   Skin:     General: Skin is warm and dry.      Findings: Erythema present.      Comments: Diffuse redness over most of body, spares Len orbital area.  Lower extremities are more blotchy with  deeper red areas, much of it blanches but the deeper red does not   Neurological:      General: No focal deficit present.      Mental Status: He is alert.   Psychiatric:         Mood and Affect: Mood normal.         Behavior: Behavior normal.         Procedures           ED Course                                                       Medical Decision Making  Patient presents with a red rash with with concern for vancomycin infusion syndrome.  He does not know any other new chemicals or foods that he could have come in contact with.  He has not pet any animals.  Very likely this is from the vancomycin.  He does have slight elevation in his Vanco level which is 21.  His kidney function is slightly worse than 5 days ago with GFR of 43 where it was 54 then.  Creatinine 1.74 and it was 1.4 then.  He has no complaints except the rash.  He says is not itchy just very uncomfortable.  Per up-to-date he was given 50 mg of Benadryl.  The rash appears to be slightly lessened in degree of color but not by much.  He is not hypotensive.  I said he denies chest or back pain.  Most likely intermediate  reaction.  He has been stable here and I spoke with Dr. Pagan to see if we could admit patient at this facility.  We have concerned since he has a spinal abscess and he will be changing off his antibiotics.  Patient is agreeable to go to [facility] and I spoke with Dr. Charles who accepts patient.  Patient is adamant about driving themselves.  He says he does not want the ambulance bill.  I have told him as long as he is not developing chest pain abdomen pain or any breathing issues or swallowing difficulties in his lungs his blood pressure is maintaining that I will allow him to drive straight to [facility].  His wife will drive him and I told them both they need to go straight there and they will not make any stops.    --------------------            04/21/25               1401     --------------------   BP:       140/76     Pulse:      80       Resp:       18       Temp:                SpO2:      96%      --------------------    Labs Reviewed  COMPREHENSIVE METABOLIC PANEL - Abnormal; Notable for the following components:     Glucose                       179 (*)                Creatinine                    1.74 (*)               Albumin                       3.3 (*)                Alkaline Phosphatase          131 (*)                eGFR                          43.5 (*)            All other components within normal limits         Narrative: GFR Categories in Chronic Kidney Disease (CKD)                                      GFR Category          GFR (mL/min/1.73)    Interpretation                  G1                     90 or greater         Normal or high (1)                  G2                      60-89                Mild decrease (1)                  G3a                   45-59                Mild to moderate decrease                  G3b                   30-44                Moderate to severe decrease                  G4                    15-29                Severe  decrease                  G5                    14 or less           Kidney failure                                          (1)In the absence of evidence of kidney disease, neither GFR category G1 or G2 fulfill the criteria for CKD.                                    eGFR calculation 2021 CKD-EPI creatinine equation, which does not include race as a factor  PROTIME-INR - Abnormal; Notable for the following components:     Protime                       15.1 (*)               INR                           1.16 (*)            All other components within normal limits         Narrative: Therapeutic Ranges for INR: 2.0-3.0 (PT 20-30)                                              2.5-3.5 (PT 25-34)  CBC WITH AUTO DIFFERENTIAL - Abnormal; Notable for the following components:     WBC                           11.12 (*)               Hemoglobin                    12.8 (*)               MCH                           26.0 (*)               MCHC                          31.3 (*)               Neutrophil %                  77.5 (*)               Lymphocyte %                  4.7 (*)                Eosinophil %                  6.6 (*)                Immature Grans %              1.1 (*)                Neutrophils, Absolute         8.63 (*)               Lymphocytes, Absolute         0.52 (*)               Monocytes, Absolute           1.11 (*)               Eosinophils, Absolute         0.73 (*)               Immature Grans, Absolute      0.12 (*)            All other components within normal limits  APTT - Normal         Narrative: PTT = The equivalent PTT values for the therapeutic range of heparin levels at 0.1 to 0.7 U/ml are 53 to 110 seconds.                    VANCOMYCIN, RANDOM - Normal         Narrative: Therapeutic Ranges for Vancomycin                                    Vancomycin Random   5.0-40.0 mcg/mL                  Vancomycin Trough   5.0-20.0 mcg/mL                  Vancomycin Peak     20.0-40.0 mcg/mL  CBC  AND DIFFERENTIAL      Problems Addressed:  Vancomycin flushing syndrome: complicated acute illness or injury    Amount and/or Complexity of Data Reviewed  Labs: ordered.    Risk  Prescription drug management.        Final diagnoses:   Vancomycin flushing syndrome       ED Disposition  ED Disposition       ED Disposition   Transfer to Another Facility     Condition   --    Comment   --               No follow-up provider specified.       Medication List      No changes were made to your prescriptions during this visit.            Yessi Gamez PA-C  04/21/25 [id number]       Yessi Gamez PA-C  04/21/25 [id number]

## 2025-04-21 NOTE — ED NOTES
Nursing report ED to floor  Arnold Ramírez  65 y.o.  male    HPI :  HPI  Stated Reason for Visit: .  History Obtained From: patient, family    Chief Complaint  Chief Complaint   Patient presents with    Rash     Home vancomycin infusions x weeks after hospital admission for sepsis; rash to all extremities, torso; redness to face; last vanc infusion yesterday        Admitting doctor:   No admitting provider for patient encounter.    Admitting diagnosis:   The encounter diagnosis was Vancomycin flushing syndrome.    Code status:   Current Code Status       Date Active Code Status Order ID Comments User Context       Prior            Allergies:   Naproxen, Nsaids, Sulfamethoxazole-trimethoprim, Etodolac, Gabapentin, and Zonisamide    Isolation:   No active isolations    Intake and Output  No intake or output data in the 24 hours ending 04/21/25 1617    Weight:       04/21/25  1229   Weight: (!) 157 kg (346 lb)       Most recent vitals:   Vitals:    04/21/25 1501 04/21/25 1502 04/21/25 1601 04/21/25 1604   BP: 135/80  144/80    BP Location:       Patient Position:       Pulse: 82   86   Resp:       Temp:       TempSrc:       SpO2:  95%  96%   Weight:       Height:           Active LDAs/IV Access:   Lines, Drains & Airways       Active LDAs       Name Placement date Placement time Site Days    PICC Single Lumen 03/28/25 Right Basilic 03/28/25  1000  Basilic  24    Peripheral IV 04/21/25 1317 20 G Left Antecubital 04/21/25  1317  Antecubital  less than 1                    Labs (abnormal labs have a star):   Labs Reviewed   COMPREHENSIVE METABOLIC PANEL - Abnormal; Notable for the following components:       Result Value    Glucose 179 (*)     Creatinine 1.74 (*)     Albumin 3.3 (*)     Alkaline Phosphatase 131 (*)     eGFR 43.5 (*)     All other components within normal limits    Narrative:     GFR Categories in Chronic Kidney Disease (CKD)      GFR Category          GFR (mL/min/1.73)    Interpretation  G1                      90 or greater         Normal or high (1)  G2                      60-89                Mild decrease (1)  G3a                   45-59                Mild to moderate decrease  G3b                   30-44                Moderate to severe decrease  G4                    15-29                Severe decrease  G5                    14 or less           Kidney failure          (1)In the absence of evidence of kidney disease, neither GFR category G1 or G2 fulfill the criteria for CKD.    eGFR calculation 2021 CKD-EPI creatinine equation, which does not include race as a factor   PROTIME-INR - Abnormal; Notable for the following components:    Protime 15.1 (*)     INR 1.16 (*)     All other components within normal limits    Narrative:     Therapeutic Ranges for INR: 2.0-3.0 (PT 20-30)                              2.5-3.5 (PT 25-34)   CBC WITH AUTO DIFFERENTIAL - Abnormal; Notable for the following components:    WBC 11.12 (*)     Hemoglobin 12.8 (*)     MCH 26.0 (*)     MCHC 31.3 (*)     Neutrophil % 77.5 (*)     Lymphocyte % 4.7 (*)     Eosinophil % 6.6 (*)     Immature Grans % 1.1 (*)     Neutrophils, Absolute 8.63 (*)     Lymphocytes, Absolute 0.52 (*)     Monocytes, Absolute 1.11 (*)     Eosinophils, Absolute 0.73 (*)     Immature Grans, Absolute 0.12 (*)     All other components within normal limits   APTT - Normal    Narrative:     PTT = The equivalent PTT values for the therapeutic range of heparin levels at 0.1 to 0.7 U/ml are 53 to 110 seconds.     VANCOMYCIN, RANDOM - Normal    Narrative:     Therapeutic Ranges for Vancomycin    Vancomycin Random   5.0-40.0 mcg/mL  Vancomycin Trough   5.0-20.0 mcg/mL  Vancomycin Peak     20.0-40.0 mcg/mL   CBC AND DIFFERENTIAL    Narrative:     The following orders were created for panel order CBC & Differential.  Procedure                               Abnormality         Status                     ---------                               -----------         ------                      CBC Auto Differential[[id number]]        Abnormal            Final result                 Please view results for these tests on the individual orders.       EKG:   No orders to display       Meds given in ED:   Medications   diphenhydrAMINE (BENADRYL) injection 50 mg (50 mg Intravenous Given 4/21/25 1324)       Imaging results:  No radiology results for the last day    Ambulatory status:   - assist x 1    Social issues:   Social History     Socioeconomic History    Marital status:    Tobacco Use    Smoking status: Never    Smokeless tobacco: Never   Vaping Use    Vaping status: Never Used   Substance and Sexual Activity    Alcohol use: Not Currently    Drug use: Defer    Sexual activity: Defer       Peripheral Neurovascular  Peripheral Neurovascular (Adult)  Peripheral Neurovascular WDL: WDL    Neuro Cognitive  Neuro Cognitive (Adult)  Cognitive/Neuro/Behavioral WDL: WDL    Learning  Learning Assessment  Learning Readiness and Ability: no barriers identified  Education Provided  Person Taught: patient, spouse  Teaching Method: verbal instruction  Teaching Focus: symptom/problem overview  Education Outcome Evaluation: verbalizes understanding    Respiratory  Respiratory  Airway WDL: WDL  Respiratory WDL  Respiratory WDL: WDL    Abdominal Pain       Pain Assessments  Pain (Adult)  (0-10) Pain Rating: Rest: 0    NIH Stroke Scale       Nika Delgado RN  04/21/25 16:17 EDT

## 2025-04-22 ENCOUNTER — APPOINTMENT (OUTPATIENT)
Dept: ULTRASOUND IMAGING | Facility: HOSPITAL | Age: 65
End: 2025-04-22
Payer: MEDICARE

## 2025-04-22 ENCOUNTER — APPOINTMENT (OUTPATIENT)
Dept: GENERAL RADIOLOGY | Facility: HOSPITAL | Age: 65
End: 2025-04-22
Payer: MEDICARE

## 2025-04-22 PROBLEM — T36.8X5A VANCOMYCIN FLUSHING SYNDROME: Status: ACTIVE | Noted: 2025-04-22

## 2025-04-22 PROBLEM — L27.0 VANCOMYCIN FLUSHING SYNDROME: Status: ACTIVE | Noted: 2025-04-22

## 2025-04-22 PROBLEM — N17.9 AKI (ACUTE KIDNEY INJURY): Status: ACTIVE | Noted: 2025-04-22

## 2025-04-22 LAB
ALBUMIN SERPL-MCNC: 3.2 G/DL (ref 3.5–5.2)
ALBUMIN/GLOB SERPL: 1.1 G/DL
ALP SERPL-CCNC: 153 U/L (ref 39–117)
ALT SERPL W P-5'-P-CCNC: 29 U/L (ref 1–41)
AMORPH URATE CRY URNS QL MICRO: PRESENT /HPF
ANION GAP SERPL CALCULATED.3IONS-SCNC: 9 MMOL/L (ref 5–15)
AST SERPL-CCNC: 21 U/L (ref 1–40)
BACTERIA UR QL AUTO: ABNORMAL /HPF
BILIRUB SERPL-MCNC: 0.5 MG/DL (ref 0–1.2)
BILIRUB UR QL STRIP: NEGATIVE
BUN SERPL-MCNC: 14 MG/DL (ref 8–23)
BUN/CREAT SERPL: 7.4 (ref 7–25)
C3 SERPL-MCNC: 157 MG/DL (ref 82–167)
C4 SERPL-MCNC: 24 MG/DL (ref 14–44)
CALCIUM SPEC-SCNC: 8.5 MG/DL (ref 8.6–10.5)
CHLORIDE SERPL-SCNC: 101 MMOL/L (ref 98–107)
CK SERPL-CCNC: 42 U/L (ref 20–200)
CLARITY UR: ABNORMAL
CO2 SERPL-SCNC: 25 MMOL/L (ref 22–29)
COLOR UR: ABNORMAL
CREAT SERPL-MCNC: 1.88 MG/DL (ref 0.76–1.27)
CREAT UR-MCNC: 330.9 MG/DL
DEPRECATED RDW RBC AUTO: 41 FL (ref 37–54)
EGFRCR SERPLBLD CKD-EPI 2021: 39.6 ML/MIN/1.73
EOSINOPHIL SPEC QL MICRO: 0 % EOS/100 CELLS (ref 0–0)
ERYTHROCYTE [DISTWIDTH] IN BLOOD BY AUTOMATED COUNT: 14.4 % (ref 12.3–15.4)
GLOBULIN UR ELPH-MCNC: 3 GM/DL
GLUCOSE BLDC GLUCOMTR-MCNC: 102 MG/DL (ref 70–130)
GLUCOSE BLDC GLUCOMTR-MCNC: 158 MG/DL (ref 70–130)
GLUCOSE BLDC GLUCOMTR-MCNC: 175 MG/DL (ref 70–130)
GLUCOSE BLDC GLUCOMTR-MCNC: 191 MG/DL (ref 70–130)
GLUCOSE SERPL-MCNC: 123 MG/DL (ref 65–99)
GLUCOSE UR STRIP-MCNC: NEGATIVE MG/DL
GRAN CASTS URNS QL MICRO: PRESENT /LPF
HBA1C MFR BLD: 10.2 % (ref 4.8–5.6)
HCT VFR BLD AUTO: 38.9 % (ref 37.5–51)
HGB BLD-MCNC: 12.7 G/DL (ref 13–17.7)
HGB UR QL STRIP.AUTO: NEGATIVE
HYALINE CASTS UR QL AUTO: ABNORMAL /LPF
KETONES UR QL STRIP: ABNORMAL
LEUKOCYTE ESTERASE UR QL STRIP.AUTO: ABNORMAL
MAGNESIUM SERPL-MCNC: 1.7 MG/DL (ref 1.6–2.4)
MCH RBC QN AUTO: 25.9 PG (ref 26.6–33)
MCHC RBC AUTO-ENTMCNC: 32.6 G/DL (ref 31.5–35.7)
MCV RBC AUTO: 79.4 FL (ref 79–97)
NITRITE UR QL STRIP: NEGATIVE
OSMOLALITY UR: 560 MOSM/KG
PH UR STRIP.AUTO: 5.5 [PH] (ref 5–8)
PLATELET # BLD AUTO: 299 10*3/MM3 (ref 140–450)
PMV BLD AUTO: 9.5 FL (ref 6–12)
POTASSIUM SERPL-SCNC: 4.4 MMOL/L (ref 3.5–5.2)
PROT SERPL-MCNC: 6.2 G/DL (ref 6–8.5)
PROT UR QL STRIP: ABNORMAL
RBC # BLD AUTO: 4.9 10*6/MM3 (ref 4.14–5.8)
RBC # UR STRIP: ABNORMAL /HPF
REF LAB TEST METHOD: ABNORMAL
SODIUM SERPL-SCNC: 135 MMOL/L (ref 136–145)
SODIUM UR-SCNC: 40 MMOL/L
SP GR UR STRIP: 1.02 (ref 1–1.03)
SQUAMOUS #/AREA URNS HPF: ABNORMAL /HPF
URATE SERPL-MCNC: 5.7 MG/DL (ref 3.4–7)
UROBILINOGEN UR QL STRIP: ABNORMAL
WBC # UR STRIP: ABNORMAL /HPF
WBC NRBC COR # BLD AUTO: 10.09 10*3/MM3 (ref 3.4–10.8)

## 2025-04-22 PROCEDURE — 80053 COMPREHEN METABOLIC PANEL: CPT | Performed by: NURSE PRACTITIONER

## 2025-04-22 PROCEDURE — 82948 REAGENT STRIP/BLOOD GLUCOSE: CPT

## 2025-04-22 PROCEDURE — 25810000003 SODIUM CHLORIDE 0.9 % SOLUTION: Performed by: NURSE PRACTITIONER

## 2025-04-22 PROCEDURE — 25810000003 SODIUM CHLORIDE 0.9 % SOLUTION: Performed by: INTERNAL MEDICINE

## 2025-04-22 PROCEDURE — 83935 ASSAY OF URINE OSMOLALITY: CPT | Performed by: INTERNAL MEDICINE

## 2025-04-22 PROCEDURE — 63710000001 INSULIN LISPRO (HUMAN) PER 5 UNITS: Performed by: NURSE PRACTITIONER

## 2025-04-22 PROCEDURE — 25010000002 FAMOTIDINE 10 MG/ML SOLUTION: Performed by: NURSE PRACTITIONER

## 2025-04-22 PROCEDURE — G0378 HOSPITAL OBSERVATION PER HR: HCPCS

## 2025-04-22 PROCEDURE — 82550 ASSAY OF CK (CPK): CPT | Performed by: INTERNAL MEDICINE

## 2025-04-22 PROCEDURE — 25010000002 ONDANSETRON PER 1 MG: Performed by: NURSE PRACTITIONER

## 2025-04-22 PROCEDURE — 81001 URINALYSIS AUTO W/SCOPE: CPT | Performed by: INTERNAL MEDICINE

## 2025-04-22 PROCEDURE — 82570 ASSAY OF URINE CREATININE: CPT | Performed by: INTERNAL MEDICINE

## 2025-04-22 PROCEDURE — 25010000002 CEFTRIAXONE PER 250 MG: Performed by: INTERNAL MEDICINE

## 2025-04-22 PROCEDURE — 63710000001 DIPHENHYDRAMINE PER 50 MG: Performed by: NURSE PRACTITIONER

## 2025-04-22 PROCEDURE — 83036 HEMOGLOBIN GLYCOSYLATED A1C: CPT | Performed by: INTERNAL MEDICINE

## 2025-04-22 PROCEDURE — 86160 COMPLEMENT ANTIGEN: CPT | Performed by: INTERNAL MEDICINE

## 2025-04-22 PROCEDURE — 87205 SMEAR GRAM STAIN: CPT | Performed by: INTERNAL MEDICINE

## 2025-04-22 PROCEDURE — 25010000002 DIPHENHYDRAMINE PER 50 MG: Performed by: INTERNAL MEDICINE

## 2025-04-22 PROCEDURE — 83735 ASSAY OF MAGNESIUM: CPT | Performed by: INTERNAL MEDICINE

## 2025-04-22 PROCEDURE — 76775 US EXAM ABDO BACK WALL LIM: CPT

## 2025-04-22 PROCEDURE — 84550 ASSAY OF BLOOD/URIC ACID: CPT | Performed by: INTERNAL MEDICINE

## 2025-04-22 PROCEDURE — 84300 ASSAY OF URINE SODIUM: CPT | Performed by: INTERNAL MEDICINE

## 2025-04-22 PROCEDURE — 63710000001 INSULIN GLARGINE PER 5 UNITS: Performed by: NURSE PRACTITIONER

## 2025-04-22 PROCEDURE — 71045 X-RAY EXAM CHEST 1 VIEW: CPT

## 2025-04-22 PROCEDURE — 85027 COMPLETE CBC AUTOMATED: CPT | Performed by: NURSE PRACTITIONER

## 2025-04-22 RX ORDER — DIPHENHYDRAMINE HYDROCHLORIDE 50 MG/ML
25 INJECTION, SOLUTION INTRAMUSCULAR; INTRAVENOUS EVERY 8 HOURS
Status: COMPLETED | OUTPATIENT
Start: 2025-04-22 | End: 2025-04-23

## 2025-04-22 RX ORDER — HYDROCODONE BITARTRATE AND ACETAMINOPHEN 7.5; 325 MG/1; MG/1
1 TABLET ORAL EVERY 4 HOURS PRN
Refills: 0 | Status: DISCONTINUED | OUTPATIENT
Start: 2025-04-22 | End: 2025-05-03 | Stop reason: HOSPADM

## 2025-04-22 RX ORDER — AMLODIPINE BESYLATE 10 MG/1
10 TABLET ORAL DAILY
Status: DISCONTINUED | OUTPATIENT
Start: 2025-04-22 | End: 2025-04-26

## 2025-04-22 RX ORDER — SODIUM CHLORIDE 9 MG/ML
40 INJECTION, SOLUTION INTRAVENOUS AS NEEDED
Status: DISCONTINUED | OUTPATIENT
Start: 2025-04-22 | End: 2025-05-03 | Stop reason: HOSPADM

## 2025-04-22 RX ORDER — SODIUM CHLORIDE 0.9 % (FLUSH) 0.9 %
10 SYRINGE (ML) INJECTION EVERY 12 HOURS SCHEDULED
Status: DISCONTINUED | OUTPATIENT
Start: 2025-04-22 | End: 2025-05-03 | Stop reason: HOSPADM

## 2025-04-22 RX ORDER — LOSARTAN POTASSIUM 100 MG/1
100 TABLET ORAL DAILY
Status: DISCONTINUED | OUTPATIENT
Start: 2025-04-22 | End: 2025-04-22

## 2025-04-22 RX ORDER — ATORVASTATIN CALCIUM 20 MG/1
20 TABLET, FILM COATED ORAL NIGHTLY
Status: DISCONTINUED | OUTPATIENT
Start: 2025-04-22 | End: 2025-05-03 | Stop reason: HOSPADM

## 2025-04-22 RX ORDER — SODIUM CHLORIDE 9 MG/ML
100 INJECTION, SOLUTION INTRAVENOUS CONTINUOUS
Status: DISCONTINUED | OUTPATIENT
Start: 2025-04-22 | End: 2025-04-22

## 2025-04-22 RX ORDER — SODIUM CHLORIDE 0.9 % (FLUSH) 0.9 %
20 SYRINGE (ML) INJECTION AS NEEDED
Status: DISCONTINUED | OUTPATIENT
Start: 2025-04-22 | End: 2025-05-03 | Stop reason: HOSPADM

## 2025-04-22 RX ORDER — METOPROLOL SUCCINATE 25 MG/1
50 TABLET, EXTENDED RELEASE ORAL DAILY
Status: DISCONTINUED | OUTPATIENT
Start: 2025-04-22 | End: 2025-04-29

## 2025-04-22 RX ORDER — DULOXETIN HYDROCHLORIDE 30 MG/1
60 CAPSULE, DELAYED RELEASE ORAL DAILY
Status: DISCONTINUED | OUTPATIENT
Start: 2025-04-22 | End: 2025-05-03 | Stop reason: HOSPADM

## 2025-04-22 RX ORDER — PREGABALIN 75 MG/1
150 CAPSULE ORAL 2 TIMES DAILY
Status: DISCONTINUED | OUTPATIENT
Start: 2025-04-22 | End: 2025-05-03 | Stop reason: HOSPADM

## 2025-04-22 RX ORDER — SODIUM CHLORIDE 0.9 % (FLUSH) 0.9 %
10 SYRINGE (ML) INJECTION AS NEEDED
Status: DISCONTINUED | OUTPATIENT
Start: 2025-04-22 | End: 2025-05-03 | Stop reason: HOSPADM

## 2025-04-22 RX ORDER — METHOCARBAMOL 500 MG/1
750 TABLET, FILM COATED ORAL EVERY 6 HOURS PRN
Status: DISCONTINUED | OUTPATIENT
Start: 2025-04-22 | End: 2025-05-03 | Stop reason: HOSPADM

## 2025-04-22 RX ADMIN — CEFTRIAXONE 2000 MG: 2 INJECTION, POWDER, FOR SOLUTION INTRAMUSCULAR; INTRAVENOUS at 21:11

## 2025-04-22 RX ADMIN — INSULIN LISPRO 2 UNITS: 100 INJECTION, SOLUTION INTRAVENOUS; SUBCUTANEOUS at 12:33

## 2025-04-22 RX ADMIN — ONDANSETRON 4 MG: 2 INJECTION, SOLUTION INTRAMUSCULAR; INTRAVENOUS at 09:28

## 2025-04-22 RX ADMIN — FAMOTIDINE 20 MG: 10 INJECTION INTRAVENOUS at 21:11

## 2025-04-22 RX ADMIN — DULOXETINE 60 MG: 30 CAPSULE, DELAYED RELEASE ORAL at 09:04

## 2025-04-22 RX ADMIN — INSULIN LISPRO 2 UNITS: 100 INJECTION, SOLUTION INTRAVENOUS; SUBCUTANEOUS at 21:12

## 2025-04-22 RX ADMIN — Medication 10 ML: at 21:12

## 2025-04-22 RX ADMIN — INSULIN GLARGINE 75 UNITS: 100 INJECTION, SOLUTION SUBCUTANEOUS at 21:12

## 2025-04-22 RX ADMIN — FAMOTIDINE 20 MG: 10 INJECTION INTRAVENOUS at 02:50

## 2025-04-22 RX ADMIN — PREGABALIN 150 MG: 75 CAPSULE ORAL at 21:11

## 2025-04-22 RX ADMIN — PREGABALIN 150 MG: 75 CAPSULE ORAL at 09:04

## 2025-04-22 RX ADMIN — SODIUM CHLORIDE 100 ML/HR: 9 INJECTION, SOLUTION INTRAVENOUS at 13:20

## 2025-04-22 RX ADMIN — ATORVASTATIN CALCIUM 20 MG: 20 TABLET, FILM COATED ORAL at 01:57

## 2025-04-22 RX ADMIN — METOPROLOL SUCCINATE 50 MG: 25 TABLET, EXTENDED RELEASE ORAL at 09:04

## 2025-04-22 RX ADMIN — AMLODIPINE BESYLATE 10 MG: 10 TABLET ORAL at 09:04

## 2025-04-22 RX ADMIN — ATORVASTATIN CALCIUM 20 MG: 20 TABLET, FILM COATED ORAL at 21:24

## 2025-04-22 RX ADMIN — DIPHENHYDRAMINE HYDROCHLORIDE 25 MG: 50 INJECTION, SOLUTION INTRAMUSCULAR; INTRAVENOUS at 13:20

## 2025-04-22 RX ADMIN — Medication 10 ML: at 09:22

## 2025-04-22 RX ADMIN — DIPHENHYDRAMINE HYDROCHLORIDE 25 MG: 50 INJECTION, SOLUTION INTRAMUSCULAR; INTRAVENOUS at 21:11

## 2025-04-22 RX ADMIN — FAMOTIDINE 20 MG: 10 INJECTION INTRAVENOUS at 09:04

## 2025-04-22 RX ADMIN — SODIUM CHLORIDE 100 ML/HR: 9 INJECTION, SOLUTION INTRAVENOUS at 02:50

## 2025-04-22 RX ADMIN — INSULIN GLARGINE 75 UNITS: 100 INJECTION, SOLUTION SUBCUTANEOUS at 01:58

## 2025-04-22 RX ADMIN — DIPHENHYDRAMINE HYDROCHLORIDE 25 MG: 25 CAPSULE ORAL at 17:31

## 2025-04-22 NOTE — H&P
Patient Name:  Arnold Ramírez  YOB: 1960  MRN:  [id number]  Admit Date:  4/21/2025  Patient Care Team:  Anand Alford MD as PCP - General  Anand Alford MD as PCP - Family Medicine      Subjective   History Present Illness     Chief complaint: rash    History of Present Illness  Mr. Ramírez is a 65 y.o. non-smoker with a history of insulin-dependent type 2 diabetes, HTN, dyslipidemia, peripheral neuropathy, obesity, HLD, and recent spinal abscess that presents to [facility] from [facility] secondary to vancomycin flushing syndrome.  Patient was recently diagnosed with a spinal abscess in which he was placed on home vancomycin that has been ongoing for the past 3 weeks.  He reports that he stopped his infusions for 2 days secondary to elevation in kidney function.  When he started the infusions back, he noticed a redness over his entire body.  Over the past 3 days, the rash has worsened.  He denies any pruritus.  Since his antibiotics will need to be changed, he has been transferred to our facility for further evaluation. He was given 50 mg of Benadryl prior to transfer.    Review of Systems   Constitutional:  Negative for chills and fever.   HENT:  Negative for congestion and rhinorrhea.    Eyes:  Negative for photophobia and visual disturbance.   Respiratory:  Negative for cough and shortness of breath.    Cardiovascular:  Negative for chest pain and palpitations.   Gastrointestinal:  Negative for constipation, diarrhea, nausea and vomiting.   Endocrine: Negative for cold intolerance and heat intolerance.   Genitourinary:  Negative for difficulty urinating and dysuria.   Musculoskeletal:  Negative for gait problem and joint swelling.   Skin:  Positive for color change and rash. Negative for wound.   Neurological:  Negative for dizziness, light-headedness and headaches.   Psychiatric/Behavioral:  Negative for sleep disturbance and suicidal ideas.         Personal  History     Past Medical History:   Diagnosis Date    Diabetes mellitus     Hyperlipidemia     Hypertension      History reviewed. No pertinent surgical history.  History reviewed. No pertinent family history.  Social History     Tobacco Use    Smoking status: Never    Smokeless tobacco: Never   Vaping Use    Vaping status: Never Used   Substance Use Topics    Alcohol use: Not Currently    Drug use: Defer     Current Facility-Administered Medications on File Prior to Encounter   Medication Dose Route Frequency Provider Last Rate Last Admin    [COMPLETED] diphenhydrAMINE (BENADRYL) injection 50 mg  50 mg Intravenous Once Yessi Gamez PA-C   50 mg at 04/21/25 1324     Current Outpatient Medications on File Prior to Encounter   Medication Sig Dispense Refill    amLODIPine (NORVASC) 10 MG tablet Take 1 tablet by mouth Daily.      atorvastatin (LIPITOR) 20 MG tablet Take 1 tablet by mouth Every Night.      DULoxetine (CYMBALTA) 60 MG capsule Take 1 capsule by mouth Daily.      Insulin Degludec (TRESIBA FLEXTOUCH) 200 UNIT/ML solution pen-injector pen injection Inject 150 Units under the skin into the appropriate area as directed 2 (Two) Times a Day.      losartan (COZAAR) 100 MG tablet Take 1 tablet by mouth Daily.      metFORMIN ER (GLUCOPHAGE-XR) 500 MG 24 hr tablet Take 1 tablet by mouth 2 (Two) Times a Day.      methocarbamol (ROBAXIN) 750 MG tablet Take 1 tablet by mouth Every 6 (Six) Hours As Needed for Muscle Spasms (low back pain). 15 tablet 0    metoprolol succinate XL (TOPROL-XL) 50 MG 24 hr tablet Take 1 tablet by mouth Daily.      pregabalin (LYRICA) 150 MG capsule Take 1 capsule by mouth 2 (Two) Times a Day.      Testosterone Cypionate (DEPOTESTOTERONE CYPIONATE) 200 MG/ML injection Inject 1 mL into the appropriate muscle as directed by prescriber Every 14 (Fourteen) Days.      triamcinolone (KENALOG) 0.1 % cream Apply 1 Application topically to the appropriate area as directed Every 12 (Twelve) Hours.       "vancomycin 1750 mg/500 mL 0.9% NS IVPB ([facility]) Infuse 500 mL into a venous catheter Every 12 (Twelve) Hours for 78 doses. Indications: Bacteria in the Blood, Bone and/or Joint Infection      Vancomycin HCl-Dextrose (PHARMACY TO DOSE VANCOMYCIN) Use Continuous As Needed (treatment) for up to 38 days. Indications: Bone and/or Joint Infection, MRSA colinization with septic lumbar facet arthritis      Continuous Glucose Sensor (Dexcom G7 Sensor) misc Use 1 each Every 10 (Ten) Days. Change sensors every 10 days 3 each 0    glucose blood test strip Use to test blood glucose up to four times daily as needed. Formulary Compliance Approval. Diagnosis: Type 2 Diabetes - Insulin Dependent 100 each 12    Blood Glucose Monitoring Suppl (Accu-Chek Guide Me) w/Device kit Use to test blood glucose up to four times daily as needed. Formulary Compliance Approval. Diagnosis: Type 2 Diabetes - Insulin Dependent 1 kit 0    HYDROcodone-acetaminophen (NORCO) 7.5-325 MG per tablet Take 1 tablet by mouth Every 4 (Four) Hours As Needed for Severe Pain. 15 tablet 0    Lancets misc Use to test blood glucose up to four times daily as needed. Formulary Compliance Approval. Diagnosis: Type 2 Diabetes - Insulin Dependent 100 each 12    Lidocaine 4 % Place 2 patches on the skin as directed by provider Daily. Remove & Discard patch within 12 hours or as directed by MD 15 patch 0     Allergies   Allergen Reactions    Naproxen Anaphylaxis    Nsaids Anaphylaxis    Sulfamethoxazole-Trimethoprim Shortness Of Breath and Swelling    Etodolac Other (See Comments)     stomach irritation  Annotation - [id number]: STOMACH      Gabapentin Dizziness     Annotation - [id number]: \"Felt Drunk\"    Zonisamide Other (See Comments) and Myalgia     Drowsy, Fatigue         Objective    Objective     Vital Signs  Temp:  [97.8 °F (36.6 °C)-99.5 °F (37.5 °C)] 98.8 °F (37.1 °C)  Heart Rate:  [80-94] 88  Resp:  [18] 18  BP: (131-152)/(64-80) 146/76  SpO2:  [95 %-97 %] 96 %  on   " ;   Device (Oxygen Therapy): room air  Body mass index is 40.48 kg/m².    Physical Exam  Vitals and nursing note reviewed.   Constitutional:       General: He is not in acute distress.     Appearance: He is well-developed. He is obese. He is not toxic-appearing.   HENT:      Head: Normocephalic and atraumatic.   Eyes:      General: No scleral icterus.        Right eye: No discharge.         Left eye: No discharge.      Conjunctiva/sclera: Conjunctivae normal.   Neck:      Vascular: No JVD.   Cardiovascular:      Rate and Rhythm: Normal rate and regular rhythm.      Heart sounds: Normal heart sounds. No murmur heard.     No friction rub. No gallop.   Pulmonary:      Effort: Pulmonary effort is normal. No respiratory distress.      Breath sounds: Normal breath sounds. No wheezing or rales.   Abdominal:      General: Bowel sounds are normal. There is no distension.      Palpations: Abdomen is soft.      Tenderness: There is no abdominal tenderness. There is no guarding.   Musculoskeletal:         General: No tenderness or deformity. Normal range of motion.      Cervical back: Normal range of motion and neck supple.   Skin:     General: Skin is warm and dry.      Capillary Refill: Capillary refill takes less than 2 seconds.      Comments: Redness noted over entire body, blotchy in his lower extremity   Neurological:      Mental Status: He is alert and oriented to person, place, and time.   Psychiatric:         Behavior: Behavior normal.     Results Review:  I reviewed the patient's new clinical results.    Lab Results (last 24 hours)       Procedure Component Value Units Date/Time    CBC & Differential [[id number]]  (Abnormal) Collected: 04/21/25 1313    Specimen: Blood Updated: 04/21/25 1323    Narrative:      The following orders were created for panel order CBC & Differential.  Procedure                               Abnormality         Status                     ---------                               -----------          ------                     CBC Auto Differential[[id number]]        Abnormal            Final result                 Please view results for these tests on the individual orders.    Comprehensive Metabolic Panel [[id number]]  (Abnormal) Collected: 04/21/25 1313    Specimen: Blood Updated: 04/21/25 1339     Glucose 179 mg/dL      BUN 13 mg/dL      Creatinine 1.74 mg/dL      Sodium 137 mmol/L      Potassium 5.2 mmol/L      Chloride 102 mmol/L      CO2 24.3 mmol/L      Calcium 8.7 mg/dL      Total Protein 6.3 g/dL      Albumin 3.3 g/dL      ALT (SGPT) 33 U/L      AST (SGOT) 34 U/L      Alkaline Phosphatase 131 U/L      Total Bilirubin 0.5 mg/dL      Globulin 3.0 gm/dL      A/G Ratio 1.1 g/dL      BUN/Creatinine Ratio 7.5     Anion Gap 10.7 mmol/L      eGFR 43.5 mL/min/1.73     Narrative:      GFR Categories in Chronic Kidney Disease (CKD)      GFR Category          GFR (mL/min/1.73)    Interpretation  G1                     90 or greater         Normal or high (1)  G2                      60-89                Mild decrease (1)  G3a                   45-59                Mild to moderate decrease  G3b                   30-44                Moderate to severe decrease  G4                    15-29                Severe decrease  G5                    14 or less           Kidney failure          (1)In the absence of evidence of kidney disease, neither GFR category G1 or G2 fulfill the criteria for CKD.    eGFR calculation 2021 CKD-EPI creatinine equation, which does not include race as a factor    Protime-INR [[id number]]  (Abnormal) Collected: 04/21/25 1313    Specimen: Blood Updated: 04/21/25 1335     Protime 15.1 Seconds      INR 1.16    Narrative:      Therapeutic Ranges for INR: 2.0-3.0 (PT 20-30)                              2.5-3.5 (PT 25-34)    aPTT [[id number]]  (Normal) Collected: 04/21/25 1313    Specimen: Blood Updated: 04/21/25 1335     PTT 30.5 seconds     Narrative:      PTT = The equivalent PTT values for  the therapeutic range of heparin levels at 0.1 to 0.7 U/ml are 53 to 110 seconds.      Vancomycin, Random [[id number]]  (Normal) Collected: 04/21/25 1313    Specimen: Blood Updated: 04/21/25 1339     Vancomycin Random 21.60 mcg/mL     Narrative:      Therapeutic Ranges for Vancomycin    Vancomycin Random   5.0-40.0 mcg/mL  Vancomycin Trough   5.0-20.0 mcg/mL  Vancomycin Peak     20.0-40.0 mcg/mL    CBC Auto Differential [[id number]]  (Abnormal) Collected: 04/21/25 1313    Specimen: Blood Updated: 04/21/25 1323     WBC 11.12 10*3/mm3      RBC 4.92 10*6/mm3      Hemoglobin 12.8 g/dL      Hematocrit 40.9 %      MCV 83.1 fL      MCH 26.0 pg      MCHC 31.3 g/dL      RDW 15.4 %      RDW-SD 46.4 fl      MPV 9.4 fL      Platelets 291 10*3/mm3      Neutrophil % 77.5 %      Lymphocyte % 4.7 %      Monocyte % 10.0 %      Eosinophil % 6.6 %      Basophil % 0.1 %      Immature Grans % 1.1 %      Neutrophils, Absolute 8.63 10*3/mm3      Lymphocytes, Absolute 0.52 10*3/mm3      Monocytes, Absolute 1.11 10*3/mm3      Eosinophils, Absolute 0.73 10*3/mm3      Basophils, Absolute 0.01 10*3/mm3      Immature Grans, Absolute 0.12 10*3/mm3     POC Glucose Once [[id number]]  (Abnormal) Collected: 04/21/25 2120    Specimen: Blood Updated: 04/21/25 2121     Glucose 223 mg/dL             Imaging Results (Last 24 Hours)       Procedure Component Value Units Date/Time    XR Chest 1 View [[id number]] Collected: 04/22/25 0132     Updated: 04/22/25 0136    Narrative:      CXR ONE VIEW      HISTORY: To verify PICC line placement     COMPARISON: 3/20/2025     TECHNIQUE: single portable AP       Impression:         The heart size is within normal limits.     The lungs are normally aerated. There is no pleural effusion or  pneumothorax.     Right arm PICC line present, distal tip in the mid to upper SVC.     This report was finalized on 4/22/2025 1:33 AM by Dr. Harry Shields M.D on Workstation: [id number]               Results for orders placed  "during the hospital encounter of 03/20/25    Adult Transthoracic Echo Complete W/ Cont if Necessary Per Protocol    Interpretation Summary    Left ventricular systolic function is normal. Calculated left ventricular EF = 62.1%    The left ventricular cavity is mildly dilated.    Left ventricular diastolic function was normal.    Moderate dilation of the aortic root is present. The aortic root measures 4.7 cm. Mild dilation of the ascending aorta is present 3.7 cm.    Borderline dilation of the aortic root is present. Aortic root = 3.7 cm borderline dilation of the ascending aorta is present. Ascending aorta = 3.7 cm      No orders to display        Assessment/Plan     Active Hospital Problems    Diagnosis  POA    **Vancomycin flushing syndrome [L27.0, T36.8X5A]  Yes    ROCKY (acute kidney injury) [N17.9]  Yes    Type 2 diabetes mellitus, with long-term current use of insulin [E11.9, Z79.4]  Not Applicable    Diabetic peripheral neuropathy [E11.42]  Yes    Dyslipidemia [E78.5]  Yes    HTN (hypertension) [I10]  Yes    Obesity [E66.9]  Yes    Streptococcal bacteremia [R78.81, B95.5]  Yes    MRSA colonization [Z22.322]  Not Applicable      Resolved Hospital Problems   No resolved problems to display.       Mr. Ramírez is a 65 y.o. non-smoker with a history of insulin-dependent type 2 diabetes, HTN, dyslipidemia, peripheral neuropathy, obesity, HLD, and recent spinal abscess who presents with vancomycin flushing syndrome.    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  -Continue home pain regimen    ROCKY   -Creat 1.74 today.  Baseline serum creatinine 1.08-1.17.  Suspect secondary to vancomycin  -IV hydration overnight  -Avoid nephrotoxins  -Trend BMP    Type 2 diabetes mellitus  Diabetic peripheral neuropathy  -Last HgA1c 11 on 03/20/2025. Currently on Tresiba 150 mg twice daily. Will continue basal " insulin twice daily but will cut in half  -Accu-Cheks 4 times a day with meals and at bedtime  -Moderate dose correctional factor with hypoglycemic protocol  -Hold oral diabetic medication  -Continue Lyrica    Essential hypertension  -Stable, resume amlodipine and metoprolol succinate.  Will hold losartan    HLD  -Resume atorvastatin    I discussed the patient's findings and my recommendations with patient.    VTE Prophylaxis - SCDs.  Code Status - Full code.       JAX Curry  [facility]  04/22/25  02:00 EDT

## 2025-04-22 NOTE — PLAN OF CARE
Goal Outcome Evaluation:  Plan of Care Reviewed With: patient, spouse           Outcome Evaluation: IVF DC'd per Nephro.  Benadryl IV started scheduled, gives pt relief and takes the edge off per pt.  US Renal ordered, results pending.  Pt is Up at gianluca in the room.  RA while awake but may need oxygen while sleeping.  Still waiting for ID to see pt to adress ABX alternative.  Urine sent as ordered.  A&Ox4, VSS, NSR.

## 2025-04-22 NOTE — PLAN OF CARE
Goal Outcome Evaluation:      Pt direct admit to the floor from [facility]. AOx4. Up ad gianluca. VSS. On 2L O2 NC while asleep. NSR on telemetry. Pt denies itching. PICC line placement verified and changed dressing. On IVF. ID consulted. Plan of care ongoing.

## 2025-04-22 NOTE — CASE MANAGEMENT/SOCIAL WORK
Discharge Planning Assessment  [facility]     Patient Name: Arnold Ramírez  MRN: [id number]  Today's Date: 4/22/2025    Admit Date: 4/21/2025    Plan: home with spouse; current with [facility]   Discharge Needs Assessment       Row Name 04/22/25 0921       Living Environment    People in Home spouse    Current Living Arrangements home    Potentially Unsafe Housing Conditions none    Primary Care Provided by self    Provides Primary Care For no one    Family Caregiver if Needed spouse    Quality of Family Relationships involved;helpful    Able to Return to Prior Arrangements yes       Resource/Environmental Concerns    Resource/Environmental Concerns none       Transition Planning    Patient/Family Anticipates Transition to home with family    Patient/Family Anticipated Services at Transition home health care    Transportation Anticipated family or friend will provide       Discharge Needs Assessment    Current Outpatient/Agency/Support Group homecare agency    Equipment Currently Used at Home walker, standard;cane, straight;bipap    Concerns to be Addressed discharge planning    Equipment Needed After Discharge none    Outpatient/Agency/Support Group Needs homecare agency    Discharge Facility/Level of Care Needs home with home health    Provided Post Acute Provider List? Refused    Refused Provider List Comment declined; current [facility] and doesn't want to change                   Discharge Plan       Row Name 04/22/25 0921       Plan    Plan home with spouse; current with [facility]    Plan Comments Spoke with pt bedside. Confirmed facesheet correct. Pt reports he lives with his wife. He is IADLs still drives but hasn't in over 6 weeks. He has cane and walker to ambulate and Bipap at home. Pt reports he is current with [facility] and has no SNF history. His PCP is Dr. Alford and uses [facility] in [place] no issues. Pt plans home with wife at d/c and . CCP to follow.                   Continued Care and Services - Admitted Since 4/21/2025       Home Medical Care       Service Provider Request Status Services Address Phone Fax Patient Preferred    [facility] Pending - Request Sent -- [address] [place] [id number] [phone] --                  Selected Continued Care - Prior Encounters Includes continued care and service providers with selected services from prior encounters from 1/21/2025 to 4/22/2025      Discharged on 3/28/2025 Admission date: 3/24/2025 - Discharge disposition: Home-Health Care Svc      Dialysis/Infusion       Service Provider Services Address Phone Fax Patient Preferred    [facility] [id number] [address] [address] [phone] --              Home Medical Care       Service Provider Services Address Phone Fax Patient Preferred    [facility] [address] [facility] [address] [phone] --                             Demographic Summary    No documentation.                  Functional Status    No documentation.                  Psychosocial    No documentation.                  Abuse/Neglect    No documentation.                  Legal    No documentation.                  Substance Abuse    No documentation.                  Patient Forms    No documentation.                     SOMMER Mason

## 2025-04-22 NOTE — CONSULTS
Referring Provider: Narendra  Reason for Consultation: ROCKY    Subjective     Chief complaint No chief complaint on file.      History of present illness:  64 yo Male with DM2, peripheral neuropathy, Lumbar spine infection . Hospitalized 3/24/25-3/28/25 for above.  Started on IV Vancomycin for Strep intermedius bacteremia and  in  po Vanc for 10 day course for C.dif and MRSA colonization .  Previously normal renal function.  Admitted with flushing Rash, itching, Altered mental status thought due to Vancomycin.    Creatinine had risen to   1.4 on 4/16/25, so Vanc DC for 3 days. On re-introduction, developed symptoms above .   ROS: Itching, confluent rash with some petechiae lower extremities abdome.  Face flushing.  Chills and sweating at home  Normal appetite.  Bowels moving.  Urinating normally.  No dysuria.  No hematuria.  Urine dark gold.  No headache.  Not short of breath.  Other than edema from the rash no true peripheral edema.  No difficulty swallowing.  Blood sugars at home almost all under 150.  Following low-carb diet.  Past Medical History:   Diagnosis Date    Diabetes mellitus     Hyperlipidemia     Hypertension      History reviewed. No pertinent surgical history.  History reviewed. No pertinent family history.    Social History     Tobacco Use    Smoking status: Never    Smokeless tobacco: Never   Vaping Use    Vaping status: Never Used   Substance Use Topics    Alcohol use: Not Currently    Drug use: Defer     Medications Prior to Admission   Medication Sig Dispense Refill Last Dose/Taking    amLODIPine (NORVASC) 10 MG tablet Take 1 tablet by mouth Daily.   4/21/2025    atorvastatin (LIPITOR) 20 MG tablet Take 1 tablet by mouth Every Night.   4/20/2025    DULoxetine (CYMBALTA) 60 MG capsule Take 1 capsule by mouth Daily.   4/21/2025    Insulin Degludec (TRESIBA FLEXTOUCH) 200 UNIT/ML solution pen-injector pen injection Inject 150 Units under the skin into the appropriate area as directed 2 (Two) Times  a Day.   4/21/2025    losartan (COZAAR) 100 MG tablet Take 1 tablet by mouth Daily.   4/21/2025    metFORMIN ER (GLUCOPHAGE-XR) 500 MG 24 hr tablet Take 1 tablet by mouth 2 (Two) Times a Day.   4/21/2025    methocarbamol (ROBAXIN) 750 MG tablet Take 1 tablet by mouth Every 6 (Six) Hours As Needed for Muscle Spasms (low back pain). 15 tablet 0 Past Month    metoprolol succinate XL (TOPROL-XL) 50 MG 24 hr tablet Take 1 tablet by mouth Daily.   4/21/2025    pregabalin (LYRICA) 150 MG capsule Take 1 capsule by mouth 2 (Two) Times a Day.   4/21/2025    Testosterone Cypionate (DEPOTESTOTERONE CYPIONATE) 200 MG/ML injection Inject 1 mL into the appropriate muscle as directed by prescriber Every 14 (Fourteen) Days.   Past Month    triamcinolone (KENALOG) 0.1 % cream Apply 1 Application topically to the appropriate area as directed Every 12 (Twelve) Hours.   4/20/2025    vancomycin 1750 mg/500 mL 0.9% NS IVPB ([facility]) Infuse 500 mL into a venous catheter Every 12 (Twelve) Hours for 78 doses. Indications: Bacteria in the Blood, Bone and/or Joint Infection   4/20/2025    Vancomycin HCl-Dextrose (PHARMACY TO DOSE VANCOMYCIN) Use Continuous As Needed (treatment) for up to 38 days. Indications: Bone and/or Joint Infection, MRSA colinization with septic lumbar facet arthritis   Past Month    Continuous Glucose Sensor (Dexcom G7 Sensor) misc Use 1 each Every 10 (Ten) Days. Change sensors every 10 days 3 each 0     glucose blood test strip Use to test blood glucose up to four times daily as needed. Formulary Compliance Approval. Diagnosis: Type 2 Diabetes - Insulin Dependent 100 each 12     Blood Glucose Monitoring Suppl (Accu-Chek Guide Me) w/Device kit Use to test blood glucose up to four times daily as needed. Formulary Compliance Approval. Diagnosis: Type 2 Diabetes - Insulin Dependent 1 kit 0     HYDROcodone-acetaminophen (NORCO) 7.5-325 MG per tablet Take 1 tablet by mouth Every 4 (Four) Hours As Needed for Severe Pain. 15 tablet  "0     Lancets misc Use to test blood glucose up to four times daily as needed. Formulary Compliance Approval. Diagnosis: Type 2 Diabetes - Insulin Dependent 100 each 12     Lidocaine 4 % Place 2 patches on the skin as directed by provider Daily. Remove & Discard patch within 12 hours or as directed by MD 15 patch 0 More than a month     Allergies:  Naproxen, Nsaids, Sulfamethoxazole-trimethoprim, Etodolac, Gabapentin, and Zonisamide    Review of Systems  14 points review of system was performed and it was negative other than what noted above in the HPI    Objective     Vital Signs  Temp:  [97.9 °F (36.6 °C)-99.5 °F (37.5 °C)] 97.9 °F (36.6 °C)  Heart Rate:  [82-94] 87  Resp:  [18] 18  BP: (135-152)/(74-98) 151/98    Flowsheet Rows      Flowsheet Row First Filed Value   Admission Height 195.6 cm (77\") Documented at 04/21/2025 1900   Admission Weight 155 kg (341 lb 6.4 oz) Documented at 04/21/2025 1900             I/O this shift:  In: 600 [P.O.:600]  Out: 250 [Urine:250]  I/O last 3 completed shifts:  In: 416 [P.O.:416]  Out: -     Intake/Output Summary (Last 24 hours) at 4/22/2025 1407  Last data filed at 4/22/2025 1330  Gross per 24 hour   Intake 1016 ml   Output 250 ml   Net 766 ml       Physical Exam:  General Appearance: alert, oriented x 3, no acute distress, intense facial flushing.  Morbidly obese.  Skin: Confluent rash over chest arms back abdomen and legs.  Some petechial/purpuric element to the rash on the feet similar to leukocytoclastic vasculitis.  HEENT: pupils round and reactive to light, oral mucosa normal, nonicteric sclera  Neck: supple, no JVD, trachea midline  Lungs: Clear to auscultation bilaterally.  Unlabored on room air.  Heart: RRR, normal S1 and S2, no S3, no rub  Abdomen: soft, nontender, positive bowel sounds.  No body wall edema.  : no palpable bladder  Extremities: 1+ upper extremity edema bilaterally 1+ lower extremity edema bilaterally.  Neuro: normal speech and mental " status    Results Review:  Results for orders placed or performed during the hospital encounter of 04/21/25   POC Glucose Once    Collection Time: 04/21/25  9:20 PM    Specimen: Blood   Result Value Ref Range    Glucose 223 (H) 70 - 130 mg/dL   Comprehensive Metabolic Panel    Collection Time: 04/22/25  5:37 AM    Specimen: Blood   Result Value Ref Range    Glucose 123 (H) 65 - 99 mg/dL    BUN 14 8 - 23 mg/dL    Creatinine 1.88 (H) 0.76 - 1.27 mg/dL    Sodium 135 (L) 136 - 145 mmol/L    Potassium 4.4 3.5 - 5.2 mmol/L    Chloride 101 98 - 107 mmol/L    CO2 25.0 22.0 - 29.0 mmol/L    Calcium 8.5 (L) 8.6 - 10.5 mg/dL    Total Protein 6.2 6.0 - 8.5 g/dL    Albumin 3.2 (L) 3.5 - 5.2 g/dL    ALT (SGPT) 29 1 - 41 U/L    AST (SGOT) 21 1 - 40 U/L    Alkaline Phosphatase 153 (H) 39 - 117 U/L    Total Bilirubin 0.5 0.0 - 1.2 mg/dL    Globulin 3.0 gm/dL    A/G Ratio 1.1 g/dL    BUN/Creatinine Ratio 7.4 7.0 - 25.0    Anion Gap 9.0 5.0 - 15.0 mmol/L    eGFR 39.6 (L) >60.0 mL/min/1.73   CBC (No Diff)    Collection Time: 04/22/25  5:37 AM    Specimen: Blood   Result Value Ref Range    WBC 10.09 3.40 - 10.80 10*3/mm3    RBC 4.90 4.14 - 5.80 10*6/mm3    Hemoglobin 12.7 (L) 13.0 - 17.7 g/dL    Hematocrit 38.9 37.5 - 51.0 %    MCV 79.4 79.0 - 97.0 fL    MCH 25.9 (L) 26.6 - 33.0 pg    MCHC 32.6 31.5 - 35.7 g/dL    RDW 14.4 12.3 - 15.4 %    RDW-SD 41.0 37.0 - 54.0 fl    MPV 9.5 6.0 - 12.0 fL    Platelets 299 140 - 450 10*3/mm3   Uric Acid    Collection Time: 04/22/25  5:37 AM    Specimen: Blood   Result Value Ref Range    Uric Acid 5.7 3.4 - 7.0 mg/dL   POC Glucose Once    Collection Time: 04/22/25  6:22 AM    Specimen: Blood   Result Value Ref Range    Glucose 102 70 - 130 mg/dL   POC Glucose Once    Collection Time: 04/22/25 11:32 AM    Specimen: Blood   Result Value Ref Range    Glucose 175 (H) 70 - 130 mg/dL   Creatinine Urine Random (kidney function) GFR component - Urine, Clean Catch    Collection Time: 04/22/25  1:14 PM     Specimen: Urine, Clean Catch   Result Value Ref Range    Creatinine, Urine 330.9 mg/dL   Sodium, Urine, Random - Urine, Clean Catch    Collection Time: 04/22/25  1:14 PM    Specimen: Urine, Clean Catch   Result Value Ref Range    Sodium, Urine 40 mmol/L     Imaging Results (Last 72 Hours)       Procedure Component Value Units Date/Time    XR Chest 1 View [[id number]] Collected: 04/22/25 0132     Updated: 04/22/25 0136    Narrative:      CXR ONE VIEW      HISTORY: To verify PICC line placement     COMPARISON: 3/20/2025     TECHNIQUE: single portable AP       Impression:         The heart size is within normal limits.     The lungs are normally aerated. There is no pleural effusion or  pneumothorax.     Right arm PICC line present, distal tip in the mid to upper SVC.     This report was finalized on 4/22/2025 1:33 AM by Dr. Harry Shields M.D on Workstation: [id number]                 amLODIPine, 10 mg, Oral, Daily  atorvastatin, 20 mg, Oral, Nightly  diphenhydrAMINE, 25 mg, Intravenous, Q8H  DULoxetine, 60 mg, Oral, Daily  famotidine, 20 mg, Intravenous, Q12H  insulin glargine, 75 Units, Subcutaneous, Q12H  insulin lispro, 2-9 Units, Subcutaneous, 4x Daily AC & at Bedtime  metoprolol succinate XL, 50 mg, Oral, Daily  pregabalin, 150 mg, Oral, BID  sodium chloride, 10 mL, Intravenous, Q12H      sodium chloride, 100 mL/hr, Last Rate: 100 mL/hr (04/22/25 1320)        Assessment & Plan   ROCKY, normal baseline creatinine. Likely due to Vancomycin toxicity, possible AIN with peripheral eosinophilia.  Check complements to assess for GN related to infection.  Impaired autoregulation due to ARB, now on hold . No obstructive symptoms .  No current indication for steroids unless renal function gets much worse.  Given his lumbar spine infection, strep bacteremia, MRSA colonization and prior C. difficile would be hesitant to add steroids and immunosuppress him further.  He looks euvolemic by exam and already has edema so I am  going to stop the IV fluids for now.  He is drinking fine.  Allergic reaction to vancomycin.  On IV Benadryl.  Streptococcal bacteremia, lumbar spine infection with possible abscess .  C dif diarrhea, MRSA colonization.  Completed po vancomycin 10 day course .  DM2 with peripheral neuropathy. on Metformin at home . Now on hold.   Morbid obesity complicating care.   Hypertension. Not optimally controlled.  On norvasc and metoprolol. ARB on hold .    Plan:  Check complement levels and UA  Urine for eosinophils  Renal US  4.  DC IV fluids.  Encourage p.o. fluids.  5.  Discussed with patient and wife at bedside.    I discussed the patient's findings and my recommendations with the patient .    Sujata Kilpatrick MD  04/22/25  14:07 EDT    Please note that portions of this note were completed with a voice recognition program.

## 2025-04-22 NOTE — DISCHARGE PLACEMENT REQUEST
"Arnold Ramírez (65 y.o. Male)       Date of Birth   1960    Social Security Number       Address   [address] [facility] [place] [address]    Home Phone   [phone]    MRN   [id number]       Jehovah's witness   None    Marital Status                               Admission Date   4/21/2025    Admission Type   Urgent    Admitting Provider   Abby Mackey MD    Attending Provider   Abby Mackey MD    Department, Room/Bed   [address], [address]       Discharge Date       Discharge Disposition       Discharge Destination                                 Attending Provider: Abby Mackey MD    Allergies: Naproxen, Nsaids, Sulfamethoxazole-trimethoprim, Etodolac, Gabapentin, Zonisamide    Isolation: Spore, Contact   Infection: MRSA (03/20/25), C.difficile (03/26/25)   Code Status: CPR    Ht: 195.6 cm (77\")   Wt: 155 kg (341 lb 6.4 oz)    Admission Cmt: None   Principal Problem: Vancomycin flushing syndrome [L27.0,T36.8X5A]                   Active Insurance as of 4/21/2025       Primary Coverage       Payor Plan Insurance Group Employer/Plan Group    [employer] MEDICARE REPLACEMENT [employer] MEDICARE ADVANTAGE PPO [id number]       Payor Plan Address Payor Plan Phone Number Payor Plan Fax Number Effective Dates    [address] [address] [phone]  1/1/2024 - None Entered    [facility] [id number]         Subscriber Name Subscriber Birth Date Member ID       ARNOLD RAMÍREZ 1960 [id number]                     Emergency Contacts        (Rel.) Home Phone Work Phone Mobile Phone    ANA RAMÍREZ (Spouse) -- -- [phone]          "

## 2025-04-22 NOTE — OUTREACH NOTE
Medical Week 3 Survey      Flowsheet Row Responses   [facility] patient discharged from? [place]   Does the patient have one of the following disease processes/diagnoses(primary or secondary)? Other   Week 3 attempt successful? No   Unsuccessful attempts Attempt 1   Revoke Readmitted            Radha HE - Registered Nurse

## 2025-04-23 LAB
ALBUMIN SERPL-MCNC: 3 G/DL (ref 3.5–5.2)
ANION GAP SERPL CALCULATED.3IONS-SCNC: 8.4 MMOL/L (ref 5–15)
BASOPHILS # BLD AUTO: 0.02 10*3/MM3 (ref 0–0.2)
BASOPHILS NFR BLD AUTO: 0.2 % (ref 0–1.5)
BUN SERPL-MCNC: 21 MG/DL (ref 8–23)
BUN/CREAT SERPL: 10.2 (ref 7–25)
CALCIUM SPEC-SCNC: 8.3 MG/DL (ref 8.6–10.5)
CHLORIDE SERPL-SCNC: 101 MMOL/L (ref 98–107)
CO2 SERPL-SCNC: 21.6 MMOL/L (ref 22–29)
CREAT SERPL-MCNC: 2.06 MG/DL (ref 0.76–1.27)
DEPRECATED RDW RBC AUTO: 43.7 FL (ref 37–54)
EGFRCR SERPLBLD CKD-EPI 2021: 35.5 ML/MIN/1.73
EOSINOPHIL # BLD AUTO: 0.7 10*3/MM3 (ref 0–0.4)
EOSINOPHIL NFR BLD AUTO: 5.6 % (ref 0.3–6.2)
ERYTHROCYTE [DISTWIDTH] IN BLOOD BY AUTOMATED COUNT: 15.1 % (ref 12.3–15.4)
GLUCOSE BLDC GLUCOMTR-MCNC: 108 MG/DL (ref 70–130)
GLUCOSE BLDC GLUCOMTR-MCNC: 110 MG/DL (ref 70–130)
GLUCOSE BLDC GLUCOMTR-MCNC: 194 MG/DL (ref 70–130)
GLUCOSE BLDC GLUCOMTR-MCNC: 263 MG/DL (ref 70–130)
GLUCOSE BLDC GLUCOMTR-MCNC: 66 MG/DL (ref 70–130)
GLUCOSE BLDC GLUCOMTR-MCNC: 70 MG/DL (ref 70–130)
GLUCOSE BLDC GLUCOMTR-MCNC: 94 MG/DL (ref 70–130)
GLUCOSE SERPL-MCNC: 108 MG/DL (ref 65–99)
HCT VFR BLD AUTO: 42.1 % (ref 37.5–51)
HGB BLD-MCNC: 13.6 G/DL (ref 13–17.7)
IMM GRANULOCYTES # BLD AUTO: 0.15 10*3/MM3 (ref 0–0.05)
IMM GRANULOCYTES NFR BLD AUTO: 1.2 % (ref 0–0.5)
LYMPHOCYTES # BLD AUTO: 0.88 10*3/MM3 (ref 0.7–3.1)
LYMPHOCYTES NFR BLD AUTO: 7.1 % (ref 19.6–45.3)
MAGNESIUM SERPL-MCNC: 1.8 MG/DL (ref 1.6–2.4)
MCH RBC QN AUTO: 26.2 PG (ref 26.6–33)
MCHC RBC AUTO-ENTMCNC: 32.3 G/DL (ref 31.5–35.7)
MCV RBC AUTO: 81.1 FL (ref 79–97)
MONOCYTES # BLD AUTO: 1.49 10*3/MM3 (ref 0.1–0.9)
MONOCYTES NFR BLD AUTO: 12 % (ref 5–12)
NEUTROPHILS NFR BLD AUTO: 73.9 % (ref 42.7–76)
NEUTROPHILS NFR BLD AUTO: 9.19 10*3/MM3 (ref 1.7–7)
NRBC BLD AUTO-RTO: 0 /100 WBC (ref 0–0.2)
PHOSPHATE SERPL-MCNC: 3.5 MG/DL (ref 2.5–4.5)
PLATELET # BLD AUTO: 284 10*3/MM3 (ref 140–450)
PMV BLD AUTO: 9.6 FL (ref 6–12)
POTASSIUM SERPL-SCNC: 4.2 MMOL/L (ref 3.5–5.2)
RBC # BLD AUTO: 5.19 10*6/MM3 (ref 4.14–5.8)
SODIUM SERPL-SCNC: 131 MMOL/L (ref 136–145)
URATE SERPL-MCNC: 6.2 MG/DL (ref 3.4–7)
WBC NRBC COR # BLD AUTO: 12.43 10*3/MM3 (ref 3.4–10.8)

## 2025-04-23 PROCEDURE — 25010000002 FAMOTIDINE 10 MG/ML SOLUTION: Performed by: NURSE PRACTITIONER

## 2025-04-23 PROCEDURE — 85025 COMPLETE CBC W/AUTO DIFF WBC: CPT | Performed by: INTERNAL MEDICINE

## 2025-04-23 PROCEDURE — 25010000002 DIPHENHYDRAMINE PER 50 MG: Performed by: INTERNAL MEDICINE

## 2025-04-23 PROCEDURE — 63710000001 INSULIN LISPRO (HUMAN) PER 5 UNITS: Performed by: NURSE PRACTITIONER

## 2025-04-23 PROCEDURE — 84550 ASSAY OF BLOOD/URIC ACID: CPT | Performed by: INTERNAL MEDICINE

## 2025-04-23 PROCEDURE — 80069 RENAL FUNCTION PANEL: CPT | Performed by: INTERNAL MEDICINE

## 2025-04-23 PROCEDURE — 87040 BLOOD CULTURE FOR BACTERIA: CPT | Performed by: INTERNAL MEDICINE

## 2025-04-23 PROCEDURE — 63710000001 DIPHENHYDRAMINE PER 50 MG: Performed by: NURSE PRACTITIONER

## 2025-04-23 PROCEDURE — 25010000002 CEFTRIAXONE PER 250 MG: Performed by: INTERNAL MEDICINE

## 2025-04-23 PROCEDURE — 82948 REAGENT STRIP/BLOOD GLUCOSE: CPT

## 2025-04-23 PROCEDURE — 63710000001 INSULIN GLARGINE PER 5 UNITS: Performed by: NURSE PRACTITIONER

## 2025-04-23 PROCEDURE — 83735 ASSAY OF MAGNESIUM: CPT | Performed by: INTERNAL MEDICINE

## 2025-04-23 RX ADMIN — FAMOTIDINE 20 MG: 10 INJECTION INTRAVENOUS at 08:36

## 2025-04-23 RX ADMIN — INSULIN GLARGINE 75 UNITS: 100 INJECTION, SOLUTION SUBCUTANEOUS at 08:35

## 2025-04-23 RX ADMIN — DIPHENHYDRAMINE HYDROCHLORIDE 25 MG: 50 INJECTION, SOLUTION INTRAMUSCULAR; INTRAVENOUS at 06:09

## 2025-04-23 RX ADMIN — PREGABALIN 150 MG: 75 CAPSULE ORAL at 08:35

## 2025-04-23 RX ADMIN — Medication 10 ML: at 08:36

## 2025-04-23 RX ADMIN — DIPHENHYDRAMINE HYDROCHLORIDE 25 MG: 25 CAPSULE ORAL at 15:47

## 2025-04-23 RX ADMIN — ATORVASTATIN CALCIUM 20 MG: 20 TABLET, FILM COATED ORAL at 20:50

## 2025-04-23 RX ADMIN — DULOXETINE 60 MG: 30 CAPSULE, DELAYED RELEASE ORAL at 08:36

## 2025-04-23 RX ADMIN — INSULIN LISPRO 6 UNITS: 100 INJECTION, SOLUTION INTRAVENOUS; SUBCUTANEOUS at 17:29

## 2025-04-23 RX ADMIN — METOPROLOL SUCCINATE 50 MG: 25 TABLET, EXTENDED RELEASE ORAL at 08:36

## 2025-04-23 RX ADMIN — CEFTRIAXONE 2000 MG: 2 INJECTION, POWDER, FOR SOLUTION INTRAMUSCULAR; INTRAVENOUS at 20:49

## 2025-04-23 RX ADMIN — Medication 10 ML: at 21:30

## 2025-04-23 RX ADMIN — AMLODIPINE BESYLATE 10 MG: 10 TABLET ORAL at 08:36

## 2025-04-23 RX ADMIN — FAMOTIDINE 20 MG: 10 INJECTION INTRAVENOUS at 20:49

## 2025-04-23 RX ADMIN — PREGABALIN 150 MG: 75 CAPSULE ORAL at 20:49

## 2025-04-23 RX ADMIN — INSULIN GLARGINE 75 UNITS: 100 INJECTION, SOLUTION SUBCUTANEOUS at 20:49

## 2025-04-23 NOTE — PLAN OF CARE
Goal Outcome Evaluation:               Pt resting in bed quietly. Denies pain. C/o itching and chills at times but no fever. Ambulating independently. Room air while awake. 2 L while asleep.

## 2025-04-23 NOTE — CONSULTS
Heart CONSULT NOTE    Infectious Diseases - Lizette Cali MD  [facility]       Patient Identification:  Name: Arnold Ramírez  Age: 65 y.o.  Sex: male  :  1960  MRN: [id number]             Date of Consultation: 2025      Primary Care Physician: Anand Alford MD                               Requesting Physician: JAX Beck  Reason for Consultation: Recent spinal abscess with vancomycin induced reaction and renal insufficiency.    History of presenting illness: Patient is a 65-year-old male with past medical history remarkable for poorly controlled diabetes, peripheral neuropathy, hypertension, dyslipidemia, morbid obesity, sleep apnea on CPAP while hospitalized at [facility] for worsening back pain and was noted to have significant leukocytosis.  Patient was worked up and was found to have paraspinal abscess and facet septic arthritis.  Blood culture did come back positive for gram-positive cocci and MRSA screen come back positive.  Patient was treated with Rocephin and vancomycin and transferred to our facility from [facility] on 3/24/2025.  Blood cultures were finalized as intermedius.  Patient was seen by infectious disease and neurosurgery service and medical management was recommended.  Because of the MRSA colonization and stool test positive for C. difficile IV vancomycin over beta-lactam class of antibiotics were recommended after carefully weighing in various options as detailed in the last note and recommendation section on 3/28/2025.  Patient was treated with oral vancomycin for his C. difficile infection after discontinuation of ceftriaxone.  Plan was to continue IV vancomycin for 42 days for paraspinal abscess and septic facet arthritis.  End of the treatment for his IV antibiotic is noted to be 2025.  Patient had a PICC line placed and was discharged and over the course of last 4 weeks his back pain is significantly improved and he is feeling  better.  Last week I was given a call about his vancomycin level being 35 and recommendation was to hold vancomycin and check his serum creatinine level which has gone up to 1.44.  Plan was to repeat vancomycin level on 4/21/2025 as well as serum creatinine level but patient started showing evidence of rash all over resulting in him coming to the emergency room.  Patient was noted to have serum creatinine of 1.88.  Vancomycin is appropriately held and his random level was 21.6.  Patient denies any diarrhea or abdominal pain.  Back pain is significantly better.  Patient denies any fever and chills.  Patient is significant itching all over including his torso and legs.    Impression: 65-year-old male with  1-systemic vancomycin delayed hypersensitivity reaction with skin rash and renal insufficiency  2-acute kidney injury probably secondary to combination of factors including vancomycin  3-history of MRSA colonization  4-history of C. difficile diarrhea with colonization  5-history of strep intermedius bacteremia sepsis with paraspinal abscess and facet septic arthritis clinically improved with resolution of symptoms on course to finish his antibiotic treatment on 5/6/2025  6-poorly controlled diabetes  7-morbid obesity  8-hypertension  9-other diagnoses per primary team.    Recommendations/Discussions:  Patient is a difficult situation.  Patient is clinically improved from sepsis standpoint in terms of septic arthritis and paraspinal abscess of the spine on IV vancomycin which was chosen to address not only penicillin/beta-lactam sensitive strep intermedius but also to avoid complications of beta-lactam antibiotic therapy in the setting of active C. difficile diarrhea and colonization and also the fact that patient has documented MRSA colonization.  Given the fact that he is not tolerating vancomycin and has significant skin eruption and renal insufficiency which was a concern at the time of creation of antibiotic  regimen on 3/28/2025, going forward ongoing use of vancomycin is not an option.  To complete the treatment of his septic arthritis of his facet joints in the back due to combination of strep intermedius bacteremia we will switch him to IV Rocephin with end of treatment will be 5/6/2025.  Monitor closely for recurrence of C. difficile infection.  Overall concept of care and difficulties due to presence of MRSA colonization and C. difficile colonization and complications of antibiotic therapy that can exacerbate these conditions discussed with the patient.  Supportive care and management of acute renal insufficiency-per primary team.  Thank you very much for letting me be the part of your patient care please see above impression and recommendations          Past Medical History:  Past Medical History:   Diagnosis Date    Diabetes mellitus     Hyperlipidemia     Hypertension      Past Surgical History:  History reviewed. No pertinent surgical history.   Home Meds:  Medications Prior to Admission   Medication Sig Dispense Refill Last Dose/Taking    amLODIPine (NORVASC) 10 MG tablet Take 1 tablet by mouth Daily.   4/21/2025    atorvastatin (LIPITOR) 20 MG tablet Take 1 tablet by mouth Every Night.   4/20/2025    DULoxetine (CYMBALTA) 60 MG capsule Take 1 capsule by mouth Daily.   4/21/2025    Insulin Degludec (TRESIBA FLEXTOUCH) 200 UNIT/ML solution pen-injector pen injection Inject 150 Units under the skin into the appropriate area as directed 2 (Two) Times a Day.   4/21/2025    losartan (COZAAR) 100 MG tablet Take 1 tablet by mouth Daily.   4/21/2025    metFORMIN ER (GLUCOPHAGE-XR) 500 MG 24 hr tablet Take 1 tablet by mouth 2 (Two) Times a Day.   4/21/2025    methocarbamol (ROBAXIN) 750 MG tablet Take 1 tablet by mouth Every 6 (Six) Hours As Needed for Muscle Spasms (low back pain). 15 tablet 0 Past Month    metoprolol succinate XL (TOPROL-XL) 50 MG 24 hr tablet Take 1 tablet by mouth Daily.   4/21/2025    pregabalin  (LYRICA) 150 MG capsule Take 1 capsule by mouth 2 (Two) Times a Day.   4/21/2025    Testosterone Cypionate (DEPOTESTOTERONE CYPIONATE) 200 MG/ML injection Inject 1 mL into the appropriate muscle as directed by prescriber Every 14 (Fourteen) Days.   Past Month    triamcinolone (KENALOG) 0.1 % cream Apply 1 Application topically to the appropriate area as directed Every 12 (Twelve) Hours.   4/20/2025    Continuous Glucose Sensor (Dexcom G7 Sensor) misc Use 1 each Every 10 (Ten) Days. Change sensors every 10 days 3 each 0     glucose blood test strip Use to test blood glucose up to four times daily as needed. Formulary Compliance Approval. Diagnosis: Type 2 Diabetes - Insulin Dependent 100 each 12     Blood Glucose Monitoring Suppl (Accu-Chek Guide Me) w/Device kit Use to test blood glucose up to four times daily as needed. Formulary Compliance Approval. Diagnosis: Type 2 Diabetes - Insulin Dependent 1 kit 0     HYDROcodone-acetaminophen (NORCO) 7.5-325 MG per tablet Take 1 tablet by mouth Every 4 (Four) Hours As Needed for Severe Pain. 15 tablet 0     Lancets misc Use to test blood glucose up to four times daily as needed. Formulary Compliance Approval. Diagnosis: Type 2 Diabetes - Insulin Dependent 100 each 12     Lidocaine 4 % Place 2 patches on the skin as directed by provider Daily. Remove & Discard patch within 12 hours or as directed by MD 15 patch 0 More than a month     Current Meds:     Current Facility-Administered Medications:     acetaminophen (TYLENOL) tablet 650 mg, 650 mg, Oral, Q4H PRN **OR** acetaminophen (TYLENOL) 160 MG/5ML oral solution 650 mg, 650 mg, Oral, Q4H PRN **OR** acetaminophen (TYLENOL) suppository 650 mg, 650 mg, Rectal, Q4H PRN, Gabriel Cueto, JAX    aluminum-magnesium hydroxide-simethicone (MAALOX MAX) 400-400-40 MG/5ML suspension 15 mL, 15 mL, Oral, Q6H PRN, Gabrile Cueto, JAX    amLODIPine (NORVASC) tablet 10 mg, 10 mg, Oral, Daily, Gabriel Cueto APRN, 10 mg at  04/22/25 0904    atorvastatin (LIPITOR) tablet 20 mg, 20 mg, Oral, Nightly, Gabriel Cueto APRN, 20 mg at 04/22/25 0157    sennosides-docusate (PERICOLACE) 8.6-50 MG per tablet 2 tablet, 2 tablet, Oral, BID PRN **AND** polyethylene glycol (MIRALAX) packet 17 g, 17 g, Oral, Daily PRN **AND** bisacodyl (DULCOLAX) EC tablet 5 mg, 5 mg, Oral, Daily PRN **AND** bisacodyl (DULCOLAX) suppository 10 mg, 10 mg, Rectal, Daily PRN, Gabriel Cueto APRN    dextrose (D50W) (25 g/50 mL) IV injection 25 g, 25 g, Intravenous, Q15 Min PRN, Gabriel Cueto APRN    dextrose (GLUTOSE) oral gel 15 g, 15 g, Oral, Q15 Min PRN, Gabriel Cueto APRN    diphenhydrAMINE (BENADRYL) capsule 25 mg, 25 mg, Oral, Q6H PRN, Gabriel Cueto APRN, 25 mg at 04/22/25 1731    diphenhydrAMINE (BENADRYL) injection 25 mg, 25 mg, Intravenous, Q8H, Abby Mackey MD, 25 mg at 04/22/25 1320    DULoxetine (CYMBALTA) DR capsule 60 mg, 60 mg, Oral, Daily, Gabriel Cueto APRN, 60 mg at 04/22/25 0904    famotidine (PEPCID) injection 20 mg, 20 mg, Intravenous, Q12H, Gabriel Cueto APRN, 20 mg at 04/22/25 0904    glucagon (GLUCAGEN) injection 1 mg, 1 mg, Intramuscular, Q15 Min PRN, Gabriel Cueto APRN    HYDROcodone-acetaminophen (NORCO) 7.5-325 MG per tablet 1 tablet, 1 tablet, Oral, Q4H PRN, Gabriel Cueto APRN    insulin glargine (LANTUS, SEMGLEE) injection 75 Units, 75 Units, Subcutaneous, Q12H, Gabriel Cueto APRN, 75 Units at 04/22/25 0158    insulin lispro (HUMALOG/ADMELOG) injection 2-9 Units, 2-9 Units, Subcutaneous, 4x Daily AC & at Bedtime, Gabriel Cueto APRN, 2 Units at 04/22/25 1233    melatonin tablet 5 mg, 5 mg, Oral, Nightly PRN, Gabriel Cueto APRN    methocarbamol (ROBAXIN) tablet 750 mg, 750 mg, Oral, Q6H PRN, Gabriel Cueto APRN    metoprolol succinate XL (TOPROL-XL) 24 hr tablet 50 mg, 50 mg, Oral, Daily, Gabriel Cueto APRN, 50 mg at 04/22/25 0904     "nitroglycerin (NITROSTAT) SL tablet 0.4 mg, 0.4 mg, Sublingual, Q5 Min PRN, Gabriel Cueto APRN    ondansetron ODT (ZOFRAN-ODT) disintegrating tablet 4 mg, 4 mg, Oral, Q6H PRN **OR** ondansetron (ZOFRAN) injection 4 mg, 4 mg, Intravenous, Q6H PRN, Gabriel Cueto APRN, 4 mg at 04/22/25 0928    pregabalin (LYRICA) capsule 150 mg, 150 mg, Oral, BID, Gabriel Cueto APRN, 150 mg at 04/22/25 0904    sodium chloride 0.9 % flush 10 mL, 10 mL, Intravenous, PRN, Gabriel Cueto APRN    sodium chloride 0.9 % flush 10 mL, 10 mL, Intravenous, Q12H, Abby Mackey MD, 10 mL at 04/22/25 0922    sodium chloride 0.9 % flush 10 mL, 10 mL, Intravenous, PRNNarendra Samer H, MD    sodium chloride 0.9 % flush 20 mL, 20 mL, Intravenous, PRNarendra FINN Samer H, MD    sodium chloride 0.9 % infusion 40 mL, 40 mL, Intravenous, Narendra MARTINEZ Samer H, MD  Allergies:  Allergies   Allergen Reactions    Naproxen Anaphylaxis    Nsaids Anaphylaxis    Sulfamethoxazole-Trimethoprim Shortness Of Breath and Swelling    Vancomycin Hives    Etodolac Other (See Comments)     stomach irritation  Annotation - [id number]: STOMACH      Gabapentin Dizziness     Annotation - [id number]: \"Felt Drunk\"    Zonisamide Other (See Comments) and Myalgia     Drowsy, Fatigue       Social History:   Social History     Tobacco Use    Smoking status: Never    Smokeless tobacco: Never   Substance Use Topics    Alcohol use: Not Currently      Family History:  History reviewed. No pertinent family history.       Review of Systems  See history of present illness and past medical history.     Vitals:   /64 (BP Location: Left arm, Patient Position: Lying)   Pulse 87   Temp 98.2 °F (36.8 °C) (Oral)   Resp 18   Ht 195.6 cm (77\")   Wt (!) 155 kg (341 lb 6.4 oz)   SpO2 96%   BMI 40.48 kg/m²   I/O:   Intake/Output Summary (Last 24 hours) at 4/22/2025 2042  Last data filed at 4/22/2025 1730  Gross per 24 hour   Intake 1056 ml   Output 250 ml   Net " 806 ml     Exam:  Patient is examined using the personal protective equipment as per guidelines from infection control for this particular patient as enacted.  Hand washing was performed before and after patient interaction.  General Appearance:    Alert, cooperative, no distress, appears stated age   Head:    Normocephalic, without obvious abnormality, atraumatic   Eyes:    PERRL, conjunctivae/corneas clear, EOM's intact, both eyes   Ears:    Normal external ear canals, both ears   Nose:   Nares normal, septum midline, mucosa normal, no drainage    or sinus tenderness   Throat:   Lips, tongue, gums normal; oral mucosa pink and moist   Neck:   Supple   Back:     Symmetric, no curvature, ROM normal, no CVA tenderness   Lungs:     Clear to auscultation bilaterally, respirations unlabored   Chest Wall:    No tenderness or deformity    Heart:    Regular rate and rhythm,   Abdomen:   Obese soft nontender   Extremities: Bilateral lower extremity edema with rash.   Pulses:   Pulses palpable in all extremities; symmetric all extremities   Skin: Significant rash involving the lower extremities and torso.   Neurologic: Alert and oriented x 3       Data Review:    I reviewed the patient's new clinical results.  Results from last 7 days   Lab Units 04/22/25  0537 04/21/25  1313 04/16/25  1230   WBC 10*3/mm3 10.09 11.12* 8.57   HEMOGLOBIN g/dL 12.7* 12.8* 12.8*   PLATELETS 10*3/mm3 299 291 275     Results from last 7 days   Lab Units 04/22/25  0537 04/21/25  1313 04/16/25  1230   SODIUM mmol/L 135* 137 136   POTASSIUM mmol/L 4.4 5.2 4.1   CHLORIDE mmol/L 101 102 100   CO2 mmol/L 25.0 24.3 23.1   BUN mg/dL 14 13 18   CREATININE mg/dL 1.88* 1.74* 1.44*   CALCIUM mg/dL 8.5* 8.7 8.8   GLUCOSE mg/dL 123* 179* 121*     Microbiology Results (last 10 days)       Procedure Component Value - Date/Time    Eosinophil Smear - Urine, Urine, Clean Catch [[id number]]  (Normal) Collected: 04/22/25 1314    Lab Status: Final result Specimen: Urine,  Clean Catch Updated: 04/22/25 1544     Eosinophil Smear 0 % EOS/100 Cells               Assessment:  Active Hospital Problems    Diagnosis  POA    **Vancomycin flushing syndrome [L27.0, T36.8X5A]  Yes    ROCKY (acute kidney injury) [N17.9]  Yes    Type 2 diabetes mellitus, with long-term current use of insulin [E11.9, Z79.4]  Not Applicable    Diabetic peripheral neuropathy [E11.42]  Yes    Dyslipidemia [E78.5]  Yes    HTN (hypertension) [I10]  Yes    Obesity [E66.9]  Yes    Streptococcal bacteremia [R78.81, B95.5]  Yes    MRSA colonization [Z22.322]  Not Applicable      Resolved Hospital Problems   No resolved problems to display.         Plan:  See above  Lizette López MD   4/22/2025  20:42 EDT    Parts of this note may be an electronic transcription/translation of spoken language to printed text using the Dragon dictation system.

## 2025-04-23 NOTE — PLAN OF CARE
Goal Outcome Evaluation:           Progress: no change  Outcome Evaluation: Pt AOx4. On RA-2L when asleep. NSR-ST on monitor. HR up to 130s w/ activity. Monitoring I/Os and daily weight. Pt feeling very weak today and walking to bathroom takes all his energy. Cdiff precautions added but no BM at this time. Blood cultures repeated. Continues having rigors and chills throughout the day- no fevers noted. Rash continues- prn benadryl per MAR. ACHS. Pt not in acute distress. Plan of care ongoing

## 2025-04-23 NOTE — PROGRESS NOTES
[facility] Progress Note      Patient Name: Arnold Ramírez  : 1960  MRN: [id number]  Primary Care Physician:  Anand Alford MD  Date of admission: 2025    Subjective     Interval History:   Follow-up acute kidney injury.  Feels very rough.  Urine output not recorded.  Not weighed.  Hungry.  Having rigors chills and sweats soaking the linens.  Was having these at home.  Rash remains pruritic.  Review of Systems:   As noted above    Objective     Vitals:   Temp:  [97.7 °F (36.5 °C)-98.7 °F (37.1 °C)] 97.7 °F (36.5 °C)  Heart Rate:  [86-91] 86  Resp:  [18] 18  BP: (122-151)/(64-98) 122/77    Intake/Output Summary (Last 24 hours) at 2025 0819  Last data filed at 2025 2111  Gross per 24 hour   Intake 1080 ml   Output 250 ml   Net 830 ml       Physical Exam:    General Appearance: Looks very fatigued.  Face very flushed.  Rash unchanged.  Skin: Confluent rash over his trunk arms legs with some petechial purpuric areas.  HEENT: oral mucosa dry, nonicteric sclera  Neck: supple, no JVD  Lungs: Clear to auscultation bilaterally.  Unlabored on room air  Heart: RRR, normal S1 and S2.    Abdomen: soft, nontender, nondistended. +bs  : no palpable bladder  Extremities: 1+ upper and lower extremity edema  Neuro: normal speech and mental status     Scheduled Meds:     amLODIPine, 10 mg, Oral, Daily  atorvastatin, 20 mg, Oral, Nightly  cefTRIAXone, 2,000 mg, Intravenous, Q24H  DULoxetine, 60 mg, Oral, Daily  famotidine, 20 mg, Intravenous, Q12H  insulin glargine, 75 Units, Subcutaneous, Q12H  insulin lispro, 2-9 Units, Subcutaneous, 4x Daily AC & at Bedtime  metoprolol succinate XL, 50 mg, Oral, Daily  pregabalin, 150 mg, Oral, BID  sodium chloride, 10 mL, Intravenous, Q12H      IV Meds:        Results Reviewed:   I have personally reviewed the results from the time of this admission to 2025 08:19 EDT     Results from last 7 days   Lab Units 25  0609 25  0537  04/21/25  1313 04/16/25  1230   SODIUM mmol/L 131* 135* 137 136   POTASSIUM mmol/L 4.2 4.4 5.2 4.1   CHLORIDE mmol/L 101 101 102 100   CO2 mmol/L 21.6* 25.0 24.3 23.1   BUN mg/dL 21 14 13 18   CREATININE mg/dL 2.06* 1.88* 1.74* 1.44*   CALCIUM mg/dL 8.3* 8.5* 8.7 8.8   BILIRUBIN mg/dL  --  0.5 0.5 0.4   ALK PHOS U/L  --  153* 131* 78   ALT (SGPT) U/L  --  29 33 40   AST (SGOT) U/L  --  21 34 29   GLUCOSE mg/dL 108* 123* 179* 121*       Estimated Creatinine Clearance: 59.7 mL/min (A) (by C-G formula based on SCr of 2.06 mg/dL (H)).    Results from last 7 days   Lab Units 04/23/25  0609 04/22/25  1438   MAGNESIUM mg/dL 1.8 1.7   PHOSPHORUS mg/dL 3.5  --        Results from last 7 days   Lab Units 04/22/25  0537   URIC ACID mg/dL 5.7       Results from last 7 days   Lab Units 04/23/25  0609 04/22/25  0537 04/21/25  1313 04/16/25  1230   WBC 10*3/mm3 12.43* 10.09 11.12* 8.57   HEMOGLOBIN g/dL 13.6 12.7* 12.8* 12.8*   PLATELETS 10*3/mm3 284 299 291 275       Results from last 7 days   Lab Units 04/21/25  1313   INR  1.16*       Assessment / Plan     ASSESSMENT:  Acute kidney injury related to delayed hypersensitivity reaction to the vancomycin in the setting of ARB.  Nonoliguric.  Urinalysis with trace ketones and leukocytes and 100 mg/dL protein.  Creatinine up but rate of rise slow.  FeNa 0.17.  Mild hyponatremia.  Urine eos negative.  Non-anion gap metabolic acidosis.  2.  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  Currently on IV Benadryl.  3.  Strep bacteremia with lumbar spinal abscess and facet septic arthritis.  Back pain improved.  Antibiotic course to be completed 5/6/2025 now  on Rocephin.  I am very concerned about his ongoing rigors chills and sweating.  Discussed with Dr. López.  Will initiate infectious evaluation.  History of MRSA colonization as well as C. difficile diarrhea with colonization.  4.  Diabetes mellitus type 2.  Peripheral neuropathy.  5.  Morbid obesity.     PLAN:  Blood cultures,  urine cultures  Stool studies for C. Difficile  Discussed with Dr. López  Discussed with patient.    Thank you for involving us in the care of Arnold Ramírez.  Please feel free to call with any questions.    Sujata Kilpatrick MD  04/23/25  08:19 EDT    [facility]  [phone]    Please note that portions of this note were completed with a voice recognition program.

## 2025-04-23 NOTE — PROGRESS NOTES
Name: Arnold Ramírez ADMIT: 2025   : 1960  PCP: Anand Alford MD    MRN: [id number] LOS: 0 days   AGE/SEX: 65 y.o. male  ROOM: [facility]     Subjective   Endorsing rigors and chills today    Objective   Objective   Vital Signs  Temp:  [97.7 °F (36.5 °C)-98.7 °F (37.1 °C)] 97.7 °F (36.5 °C)  Heart Rate:  [86-91] 86  Resp:  [18] 18  BP: (122-151)/(64-98) 122/77  SpO2:  [94 %-96 %] 95 %  on  Flow (L/min) (Oxygen Therapy):  [2] 2;   Device (Oxygen Therapy): room air  Body mass index is 40.48 kg/m².  Physical Exam  Constitutional:       General: He is in acute distress.   HENT:      Head: Normocephalic and atraumatic.   Cardiovascular:      Rate and Rhythm: Normal rate and regular rhythm.   Pulmonary:      Effort: Pulmonary effort is normal. No respiratory distress.   Abdominal:      General: There is no distension.      Palpations: Abdomen is soft.      Tenderness: There is no abdominal tenderness.   Skin:     Comments: Diffuse redness throughout body   Neurological:      General: No focal deficit present.      Mental Status: He is alert and oriented to person, place, and time.         Results Review     I reviewed the patient's new clinical results.  Results from last 7 days   Lab Units 25  0609 25  0537 25  1313 25  1230   WBC 10*3/mm3 12.43* 10.09 11.12* 8.57   HEMOGLOBIN g/dL 13.6 12.7* 12.8* 12.8*   PLATELETS 10*3/mm3 284 299 291 275     Results from last 7 days   Lab Units 25  0609 25  0537 25  1313 25  1230   SODIUM mmol/L 131* 135* 137 136   POTASSIUM mmol/L 4.2 4.4 5.2 4.1   CHLORIDE mmol/L 101 101 102 100   CO2 mmol/L 21.6* 25.0 24.3 23.1   BUN mg/dL 21 14 13 18   CREATININE mg/dL 2.06* 1.88* 1.74* 1.44*   GLUCOSE mg/dL 108* 123* 179* 121*   Estimated Creatinine Clearance: 59.7 mL/min (A) (by C-G formula based on SCr of 2.06 mg/dL (H)).  Results from last 7 days   Lab Units 25  0609 25  0537 25  1313 25  1230   ALBUMIN  g/dL 3.0* 3.2* 3.3* 3.5   BILIRUBIN mg/dL  --  0.5 0.5 0.4   ALK PHOS U/L  --  153* 131* 78   AST (SGOT) U/L  --  21 34 29   ALT (SGPT) U/L  --  29 33 40     Results from last 7 days   Lab Units 04/23/25  0609 04/22/25  1438 04/22/25  0537 04/21/25  1313 04/16/25  1230   CALCIUM mg/dL 8.3*  --  8.5* 8.7 8.8   ALBUMIN g/dL 3.0*  --  3.2* 3.3* 3.5   MAGNESIUM mg/dL 1.8 1.7  --   --   --    PHOSPHORUS mg/dL 3.5  --   --   --   --        COVID19   Date Value Ref Range Status   03/20/2025 Not Detected Not Detected - Ref. Range Final     Hemoglobin A1C   Date/Time Value Ref Range Status   04/22/2025 0537 10.20 (H) 4.80 - 5.60 % Final     Glucose   Date/Time Value Ref Range Status   04/23/2025 1040 108 70 - 130 mg/dL Final   04/23/2025 0614 94 70 - 130 mg/dL Final   04/23/2025 0508 70 70 - 130 mg/dL Final   04/22/2025 2023 191 (H) 70 - 130 mg/dL Final   04/22/2025 1639 158 (H) 70 - 130 mg/dL Final   04/22/2025 1132 175 (H) 70 - 130 mg/dL Final   04/22/2025 0622 102 70 - 130 mg/dL Final     Results for orders placed or performed during the hospital encounter of 03/20/25   Blood Culture - Blood, Hand, Right    Specimen: Hand, Right; Blood   Result Value Ref Range    Blood Culture No growth at 5 days          US Renal Bilateral  Narrative: RENAL ULTRASOUND     HISTORY: Acute kidney failure     COMPARISON: None     TECHNIQUE: Grayscale, color Doppler images of the kidneys and bladder  were obtained.     FINDINGS:  Grayscale and color Doppler images of the kidneys and bladder were  obtained.     The right kidney measures 12.8 cm in length.     The left kidney measures 14.4 cm in length.     The kidneys demonstrate normal echogenicity and cortical thickness.  There is no hydronephrosis. There are no sonographically visualized  solid contour deforming renal masses or renal calculi.     The bladder is underdistended and cannot be evaluated.     Visualized portions of the aorta and IVC are normal in caliber and  appearance.      Impression: 1.  No hydronephrosis. Bladder is underdistended and cannot be  evaluated.                    This report was finalized on 4/22/2025 10:56 PM by Dr. Joseluis Dunlap M.D on Workstation: [id number]     XR Chest 1 View  Narrative: CXR ONE VIEW      HISTORY: To verify PICC line placement     COMPARISON: 3/20/2025     TECHNIQUE: single portable AP     Impression:    The heart size is within normal limits.     The lungs are normally aerated. There is no pleural effusion or  pneumothorax.     Right arm PICC line present, distal tip in the mid to upper SVC.     This report was finalized on 4/22/2025 1:33 AM by Dr. Harry Shields M.D on Workstation: [id number]       Scheduled Medications  amLODIPine, 10 mg, Oral, Daily  atorvastatin, 20 mg, Oral, Nightly  cefTRIAXone, 2,000 mg, Intravenous, Q24H  DULoxetine, 60 mg, Oral, Daily  famotidine, 20 mg, Intravenous, Q12H  insulin glargine, 75 Units, Subcutaneous, Q12H  insulin lispro, 2-9 Units, Subcutaneous, 4x Daily AC & at Bedtime  metoprolol succinate XL, 50 mg, Oral, Daily  pregabalin, 150 mg, Oral, BID  sodium chloride, 10 mL, Intravenous, Q12H    Infusions   Diet  Diet: Diabetic; Consistent Carbohydrate; Fluid Consistency: Thin (IDDSI 0)       Assessment/Plan     Active Hospital Problems    Diagnosis  POA    **Vancomycin flushing syndrome [L27.0, T36.8X5A]  Yes    ROCKY (acute kidney injury) [N17.9]  Yes    Type 2 diabetes mellitus, with long-term current use of insulin [E11.9, Z79.4]  Not Applicable    Diabetic peripheral neuropathy [E11.42]  Yes    Dyslipidemia [E78.5]  Yes    HTN (hypertension) [I10]  Yes    Obesity [E66.9]  Yes    Streptococcal bacteremia [R78.81, B95.5]  Yes    MRSA colonization [Z22.322]  Not Applicable      Resolved Hospital Problems   No resolved problems to display.       65 y.o. male admitted with Vancomycin flushing syndrome.    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 Diff ordered    acute kidney injury  Nephrology following, creatinine is currently uptrending.  Thought to be due to delayed hypersensitivity reaction due to vancomycin  Losartan held    Hypertension  Hyperlipidemia  On amlodipine, metoprolol, statin      Full code.  Discussed with patient.        Sumit Cortez MD  [facility]  04/23/25  11:24 EDT        "

## 2025-04-24 LAB
ALBUMIN SERPL-MCNC: 2.8 G/DL (ref 3.5–5.2)
ALP SERPL-CCNC: 127 U/L (ref 39–117)
ALT SERPL W P-5'-P-CCNC: 28 U/L (ref 1–41)
ANION GAP SERPL CALCULATED.3IONS-SCNC: 12.6 MMOL/L (ref 5–15)
AST SERPL-CCNC: 31 U/L (ref 1–40)
BASOPHILS # BLD AUTO: 0.03 10*3/MM3 (ref 0–0.2)
BASOPHILS NFR BLD AUTO: 0.2 % (ref 0–1.5)
BILIRUB CONJ SERPL-MCNC: <0.1 MG/DL (ref 0–0.3)
BILIRUB INDIRECT SERPL-MCNC: ABNORMAL MG/DL
BILIRUB SERPL-MCNC: 0.4 MG/DL (ref 0–1.2)
BUN SERPL-MCNC: 28 MG/DL (ref 8–23)
BUN/CREAT SERPL: 11.4 (ref 7–25)
C DIFF TOX GENS STL QL NAA+PROBE: NEGATIVE
CALCIUM SPEC-SCNC: 8 MG/DL (ref 8.6–10.5)
CHLORIDE SERPL-SCNC: 99 MMOL/L (ref 98–107)
CO2 SERPL-SCNC: 19.4 MMOL/L (ref 22–29)
CREAT SERPL-MCNC: 2.46 MG/DL (ref 0.76–1.27)
DEPRECATED RDW RBC AUTO: 41.3 FL (ref 37–54)
EGFRCR SERPLBLD CKD-EPI 2021: 28.7 ML/MIN/1.73
EOSINOPHIL # BLD AUTO: 1.34 10*3/MM3 (ref 0–0.4)
EOSINOPHIL NFR BLD AUTO: 9.7 % (ref 0.3–6.2)
ERYTHROCYTE [DISTWIDTH] IN BLOOD BY AUTOMATED COUNT: 14.5 % (ref 12.3–15.4)
GLUCOSE BLDC GLUCOMTR-MCNC: 103 MG/DL (ref 70–130)
GLUCOSE BLDC GLUCOMTR-MCNC: 143 MG/DL (ref 70–130)
GLUCOSE BLDC GLUCOMTR-MCNC: 57 MG/DL (ref 70–130)
GLUCOSE BLDC GLUCOMTR-MCNC: 60 MG/DL (ref 70–130)
GLUCOSE BLDC GLUCOMTR-MCNC: 82 MG/DL (ref 70–130)
GLUCOSE BLDC GLUCOMTR-MCNC: 84 MG/DL (ref 70–130)
GLUCOSE BLDC GLUCOMTR-MCNC: 97 MG/DL (ref 70–130)
GLUCOSE SERPL-MCNC: 159 MG/DL (ref 65–99)
HCT VFR BLD AUTO: 40.7 % (ref 37.5–51)
HGB BLD-MCNC: 13.5 G/DL (ref 13–17.7)
IMM GRANULOCYTES # BLD AUTO: 0.11 10*3/MM3 (ref 0–0.05)
IMM GRANULOCYTES NFR BLD AUTO: 0.8 % (ref 0–0.5)
LYMPHOCYTES # BLD AUTO: 1.25 10*3/MM3 (ref 0.7–3.1)
LYMPHOCYTES NFR BLD AUTO: 9 % (ref 19.6–45.3)
MAGNESIUM SERPL-MCNC: 1.7 MG/DL (ref 1.6–2.4)
MCH RBC QN AUTO: 26.2 PG (ref 26.6–33)
MCHC RBC AUTO-ENTMCNC: 33.2 G/DL (ref 31.5–35.7)
MCV RBC AUTO: 79 FL (ref 79–97)
MONOCYTES # BLD AUTO: 1.64 10*3/MM3 (ref 0.1–0.9)
MONOCYTES NFR BLD AUTO: 11.9 % (ref 5–12)
NEUTROPHILS NFR BLD AUTO: 68.4 % (ref 42.7–76)
NEUTROPHILS NFR BLD AUTO: 9.45 10*3/MM3 (ref 1.7–7)
NRBC BLD AUTO-RTO: 0 /100 WBC (ref 0–0.2)
PHOSPHATE SERPL-MCNC: 4 MG/DL (ref 2.5–4.5)
PLATELET # BLD AUTO: 248 10*3/MM3 (ref 140–450)
PMV BLD AUTO: 9.7 FL (ref 6–12)
POTASSIUM SERPL-SCNC: 4.8 MMOL/L (ref 3.5–5.2)
PROT SERPL-MCNC: 5.8 G/DL (ref 6–8.5)
RBC # BLD AUTO: 5.15 10*6/MM3 (ref 4.14–5.8)
SODIUM SERPL-SCNC: 131 MMOL/L (ref 136–145)
WBC NRBC COR # BLD AUTO: 13.82 10*3/MM3 (ref 3.4–10.8)

## 2025-04-24 PROCEDURE — 84100 ASSAY OF PHOSPHORUS: CPT | Performed by: INTERNAL MEDICINE

## 2025-04-24 PROCEDURE — 63710000001 DIPHENHYDRAMINE PER 50 MG: Performed by: NURSE PRACTITIONER

## 2025-04-24 PROCEDURE — 25010000002 CEFTRIAXONE PER 250 MG: Performed by: INTERNAL MEDICINE

## 2025-04-24 PROCEDURE — 93010 ELECTROCARDIOGRAM REPORT: CPT | Performed by: INTERNAL MEDICINE

## 2025-04-24 PROCEDURE — 80048 BASIC METABOLIC PNL TOTAL CA: CPT | Performed by: INTERNAL MEDICINE

## 2025-04-24 PROCEDURE — 83735 ASSAY OF MAGNESIUM: CPT | Performed by: INTERNAL MEDICINE

## 2025-04-24 PROCEDURE — 25010000002 DIPHENHYDRAMINE PER 50 MG: Performed by: HOSPITALIST

## 2025-04-24 PROCEDURE — 82948 REAGENT STRIP/BLOOD GLUCOSE: CPT

## 2025-04-24 PROCEDURE — 25010000002 FAMOTIDINE 10 MG/ML SOLUTION: Performed by: NURSE PRACTITIONER

## 2025-04-24 PROCEDURE — 85025 COMPLETE CBC W/AUTO DIFF WBC: CPT | Performed by: INTERNAL MEDICINE

## 2025-04-24 PROCEDURE — 63710000001 INSULIN GLARGINE PER 5 UNITS: Performed by: NURSE PRACTITIONER

## 2025-04-24 PROCEDURE — 80076 HEPATIC FUNCTION PANEL: CPT | Performed by: INTERNAL MEDICINE

## 2025-04-24 PROCEDURE — 87493 C DIFF AMPLIFIED PROBE: CPT | Performed by: INTERNAL MEDICINE

## 2025-04-24 PROCEDURE — 93005 ELECTROCARDIOGRAM TRACING: CPT | Performed by: HOSPITALIST

## 2025-04-24 RX ORDER — DIPHENHYDRAMINE HYDROCHLORIDE 50 MG/ML
25 INJECTION, SOLUTION INTRAMUSCULAR; INTRAVENOUS EVERY 6 HOURS SCHEDULED
Status: DISCONTINUED | OUTPATIENT
Start: 2025-04-24 | End: 2025-04-25

## 2025-04-24 RX ADMIN — ATORVASTATIN CALCIUM 20 MG: 20 TABLET, FILM COATED ORAL at 21:16

## 2025-04-24 RX ADMIN — PREGABALIN 150 MG: 75 CAPSULE ORAL at 21:16

## 2025-04-24 RX ADMIN — DULOXETINE 60 MG: 30 CAPSULE, DELAYED RELEASE ORAL at 09:13

## 2025-04-24 RX ADMIN — Medication 10 ML: at 09:13

## 2025-04-24 RX ADMIN — ACETAMINOPHEN 650 MG: 325 TABLET, FILM COATED ORAL at 21:15

## 2025-04-24 RX ADMIN — CEFTRIAXONE 2000 MG: 2 INJECTION, POWDER, FOR SOLUTION INTRAMUSCULAR; INTRAVENOUS at 21:16

## 2025-04-24 RX ADMIN — DIPHENHYDRAMINE HYDROCHLORIDE 25 MG: 25 CAPSULE ORAL at 03:48

## 2025-04-24 RX ADMIN — DIPHENHYDRAMINE HYDROCHLORIDE 25 MG: 50 INJECTION, SOLUTION INTRAMUSCULAR; INTRAVENOUS at 21:16

## 2025-04-24 RX ADMIN — DIPHENHYDRAMINE HYDROCHLORIDE 10 ML: 25 SOLUTION ORAL at 16:42

## 2025-04-24 RX ADMIN — INSULIN GLARGINE 75 UNITS: 100 INJECTION, SOLUTION SUBCUTANEOUS at 09:13

## 2025-04-24 RX ADMIN — METOPROLOL SUCCINATE 50 MG: 25 TABLET, EXTENDED RELEASE ORAL at 09:13

## 2025-04-24 RX ADMIN — PREGABALIN 150 MG: 75 CAPSULE ORAL at 09:13

## 2025-04-24 RX ADMIN — FAMOTIDINE 20 MG: 10 INJECTION INTRAVENOUS at 09:13

## 2025-04-24 RX ADMIN — DIPHENHYDRAMINE HYDROCHLORIDE 25 MG: 25 CAPSULE ORAL at 14:50

## 2025-04-24 RX ADMIN — AMLODIPINE BESYLATE 10 MG: 10 TABLET ORAL at 09:13

## 2025-04-24 RX ADMIN — FAMOTIDINE 20 MG: 10 INJECTION INTRAVENOUS at 21:16

## 2025-04-24 NOTE — CONSULTS
"CONSULT NOTE    Patient Identification:  Arnold Ramírez  65 y.o.  male  1960  [id number]            Requesting physician: Hospitalist    Reason for Consultation: MARGO inpatient management    CC:     History of Present Illness:  Very pleasant 65-year-old gentleman known history of MARGO on BiPAP.  He tells me he follows Dr. Fuentes in [place].  Patient admitted with vancomycin flushing syndrome \"red man\" syndrome with underlying history of pneumococcal bacteremia.  He tells me he is religiously compliant with his BiPAP.  Currently he does not have his home BiPAP at his bedside.  [place] live greater than 60 miles from the hospital and request hospital BiPAP to be set up.  He is currently requiring oxygen between 1.5 L.  He reports occasional shortness of breath with activity but denies any chest pain cough or purulent sputum.  Currently on nasal cannula oxygen on 1.5 L.  Review of Systems  As above rest is negative  Past Medical History:  Past Medical History:   Diagnosis Date    Diabetes mellitus     Hyperlipidemia     Hypertension        Past Surgical History:  History reviewed. No pertinent surgical history.     Home Meds:  Medications Prior to Admission   Medication Sig Dispense Refill Last Dose/Taking    amLODIPine (NORVASC) 10 MG tablet Take 1 tablet by mouth Daily.   4/21/2025    atorvastatin (LIPITOR) 20 MG tablet Take 1 tablet by mouth Every Night.   4/20/2025    DULoxetine (CYMBALTA) 60 MG capsule Take 1 capsule by mouth Daily.   4/21/2025    Insulin Degludec (TRESIBA FLEXTOUCH) 200 UNIT/ML solution pen-injector pen injection Inject 150 Units under the skin into the appropriate area as directed 2 (Two) Times a Day.   4/21/2025    losartan (COZAAR) 100 MG tablet Take 1 tablet by mouth Daily.   4/21/2025    metFORMIN ER (GLUCOPHAGE-XR) 500 MG 24 hr tablet Take 1 tablet by mouth 2 (Two) Times a Day.   4/21/2025    methocarbamol (ROBAXIN) 750 MG tablet Take 1 tablet by mouth Every 6 (Six) Hours As Needed for " "Muscle Spasms (low back pain). 15 tablet 0 Past Month    metoprolol succinate XL (TOPROL-XL) 50 MG 24 hr tablet Take 1 tablet by mouth Daily.   4/21/2025    pregabalin (LYRICA) 150 MG capsule Take 1 capsule by mouth 2 (Two) Times a Day.   4/21/2025    Testosterone Cypionate (DEPOTESTOTERONE CYPIONATE) 200 MG/ML injection Inject 1 mL into the appropriate muscle as directed by prescriber Every 14 (Fourteen) Days.   Past Month    triamcinolone (KENALOG) 0.1 % cream Apply 1 Application topically to the appropriate area as directed Every 12 (Twelve) Hours.   4/20/2025    Continuous Glucose Sensor (Dexcom G7 Sensor) misc Use 1 each Every 10 (Ten) Days. Change sensors every 10 days 3 each 0     glucose blood test strip Use to test blood glucose up to four times daily as needed. Formulary Compliance Approval. Diagnosis: Type 2 Diabetes - Insulin Dependent 100 each 12     Blood Glucose Monitoring Suppl (Accu-Chek Guide Me) w/Device kit Use to test blood glucose up to four times daily as needed. Formulary Compliance Approval. Diagnosis: Type 2 Diabetes - Insulin Dependent 1 kit 0     HYDROcodone-acetaminophen (NORCO) 7.5-325 MG per tablet Take 1 tablet by mouth Every 4 (Four) Hours As Needed for Severe Pain. 15 tablet 0     Lancets misc Use to test blood glucose up to four times daily as needed. Formulary Compliance Approval. Diagnosis: Type 2 Diabetes - Insulin Dependent 100 each 12     Lidocaine 4 % Place 2 patches on the skin as directed by provider Daily. Remove & Discard patch within 12 hours or as directed by MD 15 patch 0 More than a month       Allergies:  Allergies   Allergen Reactions    Naproxen Anaphylaxis    Nsaids Anaphylaxis    Sulfamethoxazole-Trimethoprim Shortness Of Breath and Swelling    Vancomycin Hives    Etodolac Other (See Comments)     stomach irritation  Annotation - [id number]: STOMACH      Gabapentin Dizziness     Annotation - 02Oct2019: \"Felt Drunk\"    Zonisamide Other (See Comments) and Myalgia    " " Drowsy, Fatigue         Social History:   Social History     Socioeconomic History    Marital status:    Tobacco Use    Smoking status: Never    Smokeless tobacco: Never   Vaping Use    Vaping status: Never Used   Substance and Sexual Activity    Alcohol use: Not Currently    Drug use: Defer    Sexual activity: Defer       Family History:  History reviewed. No pertinent family history.    Physical Exam:  /79 (BP Location: Left arm, Patient Position: Lying)   Pulse 112   Temp 98.1 °F (36.7 °C) (Oral)   Resp 18   Ht 195.6 cm (77\")   Wt (!) 159 kg (350 lb 1.5 oz)   SpO2 100%   BMI 41.51 kg/m²  Body mass index is 41.51 kg/m². 100% (!) 159 kg (350 lb 1.5 oz)  Physical Exam  Patient is examined using the personal protective equipment as per guidelines from infection control for this particular patient as enacted.  Hand hygiene was performed before and after patient interaction.  Well-developed normal body habitus  Eyes normal conjunctivae and pupils reactive to light  ENT Mallampati between 3 and 4 normal nasal exam  Neck midline trachea no thyromegaly  Chest diminished breath sounds with clear  CVS regular rate and rhythm no lower extremity edema  Abdomen soft nontender no hepatosplenomegaly  CNS intact normal sensory exam  Skin  diffuse erythematous rash generalized  Psych oriented to time place and person normal memory  Musculoskeletal no cyanosis no clubbing normal range of motion    LABS:  Lab Results   Component Value Date    CALCIUM 8.0 (L) 04/24/2025    PHOS 4.0 04/24/2025     Results from last 7 days   Lab Units 04/24/25  0944 04/23/25  0609 04/22/25  1438 04/22/25  0537 04/21/25  1313   MAGNESIUM mg/dL 1.7 1.8 1.7  --   --    SODIUM mmol/L 131* 131*  --  135* 137   POTASSIUM mmol/L 4.8 4.2  --  4.4 5.2   CHLORIDE mmol/L 99 101  --  101 102   CO2 mmol/L 19.4* 21.6*  --  25.0 24.3   BUN mg/dL 28* 21  --  14 13   CREATININE mg/dL 2.46* 2.06*  --  1.88* 1.74*   GLUCOSE mg/dL 159* 108*  --  123* " "179*   CALCIUM mg/dL 8.0* 8.3*  --  8.5* 8.7   WBC 10*3/mm3 13.82* 12.43*  --  10.09 11.12*   HEMOGLOBIN g/dL 13.5 13.6  --  12.7* 12.8*   PLATELETS 10*3/mm3 248 284  --  299 291   ALT (SGPT) U/L 28  --   --  29 33   AST (SGOT) U/L 31  --   --  21 34     Lab Results   Component Value Date    CKTOTAL 42 04/22/2025    TROPONINT <0.011 09/14/2015     Results from last 7 days   Lab Units 04/22/25  0537   CK TOTAL U/L 42     Results from last 7 days   Lab Units 04/23/25  1200 04/23/25  1017   BLOODCX  No growth at 24 hours No growth at 24 hours                 Results from last 7 days   Lab Units 04/21/25  1313   INR  1.16*     Results from last 7 days   Lab Units 04/23/25  1200 04/23/25  1017   BLOODCX  No growth at 24 hours No growth at 24 hours     Lab Results   Component Value Date    TSH 1.49 05/11/2020     Estimated Creatinine Clearance: 50.9 mL/min (A) (by C-G formula based on SCr of 2.46 mg/dL (H)).  Results from last 7 days   Lab Units 04/22/25  1314   NITRITE UA  Negative   WBC UA /HPF 0-2   BACTERIA UA /HPF None Seen   SQUAM EPITHEL UA /HPF 3-6*        Imaging: I personally visualized the images of scans/x-rays performed within last 3 days.      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  Streptococcal bacteremia  Acute renal failure  Morbid obesity  Diabetes mellitus      Recommendations:  Home BiPAP would be preferred but since patient unable to bring his home BiPAP I will go ahead and place him on AVAPS to be used as needed and with sleep  AVAPS setting has been entered in the computer  Management of \"red man\" syndrome by hospitalist  Nephrology managing acute renal failure  ID following and managing antibiotics on Rocephin  Discussed plan of care with patient and wife at bedside              Kamaljit Montgomery MD  4/24/2025  17:13 EDT      Much of this encounter note is an electronic transcription/translation of spoken language to printed text using Dragon Software.  "

## 2025-04-24 NOTE — PLAN OF CARE
Goal Outcome Evaluation:  Plan of Care Reviewed With: patient, spouse        Progress: no change  Outcome Evaluation: pt aox4, on 1.5-2L nc (room air at baseline), sr/st on monitor, vss, thrush-like rash noted in back of throat & pt c/o soreness-- orders given per md, 12 lead ordered-- nsr on monitor, meds given per mar, PICC flushed unable to get blood return, pt wears bipap at night & is dyspneic when ambulating-- pulm consulted, I/Os charted per mar, up w/stand-by assist, blood glucose well controlled this shift, wife at bedside

## 2025-04-24 NOTE — PROGRESS NOTES
"  Infectious Diseases Progress Note    Lizette López MD     [facility]  Los: 1 day  Patient Identification:  Name: Arnold Ramírez  Age: 65 y.o.  Sex: male  :  1960  MRN: [id number]         Primary Care Physician: Anand Alford MD        Subjective: Overall feels well except for sweating and chills.  Denies any back pain.  Denies any diarrhea.  Appetite is good.  Still has rash with no worsening.  Concerned about generalized swelling and asking when can he go home.  Interval History: See consultation note.    Objective:    Scheduled Meds:amLODIPine, 10 mg, Oral, Daily  atorvastatin, 20 mg, Oral, Nightly  cefTRIAXone, 2,000 mg, Intravenous, Q24H  DULoxetine, 60 mg, Oral, Daily  famotidine, 20 mg, Intravenous, Q12H  insulin glargine, 75 Units, Subcutaneous, Q12H  insulin lispro, 2-9 Units, Subcutaneous, 4x Daily AC & at Bedtime  metoprolol succinate XL, 50 mg, Oral, Daily  pregabalin, 150 mg, Oral, BID  sodium chloride, 10 mL, Intravenous, Q12H      Continuous Infusions:     Vital signs in last 24 hours:  Temp:  [97.3 °F (36.3 °C)-98.2 °F (36.8 °C)] 97.3 °F (36.3 °C)  Heart Rate:  [] 95  Resp:  [16-18] 16  BP: (116-134)/(53-77) 130/73    Intake/Output:    Intake/Output Summary (Last 24 hours) at 2025 0647  Last data filed at 2025 0405  Gross per 24 hour   Intake 910 ml   Output 300 ml   Net 610 ml       Exam:  /73   Pulse 95   Temp 97.3 °F (36.3 °C) (Oral)   Resp 16   Ht 195.6 cm (77\")   Wt (!) 159 kg (350 lb 1.5 oz)   SpO2 100%   BMI 41.51 kg/m²   Patient is examined using the personal protective equipment as per guidelines from infection control for this particular patient as enacted.  Hand washing was performed before and after patient interaction.  General Appearance:    Alert, cooperative, no distress, AAOx3                          Head:    Normocephalic, without obvious abnormality, atraumatic                           Eyes:    PERRL, conjunctivae/corneas " clear, EOM's intact, both eyes                         Throat:   Lips, tongue, gums normal; oral mucosa pink and moist                           Neck:   Supple, symmetrical, trachea midline, no JVD                         Lungs:    Clear to auscultation bilaterally, respirations unlabored                 Chest Wall:    No tenderness or deformity                          Heart:  1 S2 regular                  Abdomen:   B soft nontender                 Extremities:   Extremities normal, atraumatic, no cyanosis or edema                        Pulses:   Pulses palpable in all extremities                            Skin: Has drug eruption due to delayed hypersensitivity to vancomycin.  Generalized body wall edema noted.                  Neurologic: Alert and oriented x 3       Data Review:    I reviewed the patient's new clinical results.  Results from last 7 days   Lab Units 04/23/25  0609 04/22/25  0537 04/21/25  1313   WBC 10*3/mm3 12.43* 10.09 11.12*   HEMOGLOBIN g/dL 13.6 12.7* 12.8*   PLATELETS 10*3/mm3 284 299 291     Results from last 7 days   Lab Units 04/23/25  0609 04/22/25  0537 04/21/25  1313   SODIUM mmol/L 131* 135* 137   POTASSIUM mmol/L 4.2 4.4 5.2   CHLORIDE mmol/L 101 101 102   CO2 mmol/L 21.6* 25.0 24.3   BUN mg/dL 21 14 13   CREATININE mg/dL 2.06* 1.88* 1.74*   CALCIUM mg/dL 8.3* 8.5* 8.7   GLUCOSE mg/dL 108* 123* 179*     Microbiology Results (last 10 days)       Procedure Component Value - Date/Time    Clostridioides difficile Toxin - Stool, Per Rectum [[id number]]  (Normal) Collected: 04/24/25 0008    Lab Status: Final result Specimen: Stool from Per Rectum Updated: 04/24/25 0204    Narrative:      The following orders were created for panel order Clostridioides difficile Toxin - Stool, Per Rectum.  Procedure                               Abnormality         Status                     ---------                               -----------         ------                     Clostridioides  difficile...[[id number]]  Normal              Final result                 Please view results for these tests on the individual orders.    Clostridioides difficile Toxin, PCR - Stool, Per Rectum [[id number]]  (Normal) Collected: 04/24/25 0008    Lab Status: Final result Specimen: Stool from Per Rectum Updated: 04/24/25 0204     Toxigenic C. difficile by PCR Negative    Narrative:      The result indicates the absence of toxigenic C. difficile from stool specimen.     Eosinophil Smear - Urine, Urine, Clean Catch [[id number]]  (Normal) Collected: 04/22/25 1314    Lab Status: Final result Specimen: Urine, Clean Catch Updated: 04/22/25 1544     Eosinophil Smear 0 % EOS/100 Cells               Assessment:    Vancomycin flushing syndrome    Streptococcal bacteremia    Obesity    Dyslipidemia    HTN (hypertension)    MRSA colonization    Type 2 diabetes mellitus, with long-term current use of insulin    Diabetic peripheral neuropathy    ROCKY (acute kidney injury)  65-year-old male with  1-systemic vancomycin delayed hypersensitivity reaction with skin rash and renal insufficiency  2-acute kidney injury probably secondary to combination of factors including vancomycin  3-history of MRSA colonization  4-history of C. difficile diarrhea with colonization  5-history of strep intermedius bacteremia sepsis with paraspinal abscess and facet septic arthritis clinically improved with resolution of symptoms on course to finish his antibiotic treatment on 5/6/2025  6-poorly controlled diabetes  7-morbid obesity  8-hypertension  9-other diagnoses per primary team.     Recommendations/Discussions:  See my discussion and recommendation on 4/22/2025 4/23/2025 discussed with Dr. Ibrahim.  Continue IV ceftriaxone  Upon blood cultures, stool studies are negative for C. difficile.  Supportive care for renal insufficiency, avoidance of nephrotoxic agents and periodic review of systems identify other areas of secondary seeding due to strep  intermedius bacteremia and possibility of line sepsis as well as recurrence of C. difficile infection in the setting of known MRSA colonization and C. difficile colonization.  Lizette López MD  4/24/2025  06:47 EDT    Parts of this note may be an electronic transcription/translation of spoken language to printed text using the Dragon dictation system.

## 2025-04-24 NOTE — PLAN OF CARE
Goal Outcome Evaluation:  Plan of Care Reviewed With: patient        Progress: no change  Outcome Evaluation: Pt blood sugar dropped twice this shift, URI Sims notified, no new orders, pt given hs snacks with oj and bs went up to , voiding adequately in toilet, pt reprots unable to void in urinal, lg bm this shift, spec sent to lab, O2 2L use as pt does not have his home cpap available, satting %, sr-st [facility], nad. remains red with generalized rash. IV a/b given with no adverse reaction. Benadryl given once for c/o itching, effective .

## 2025-04-24 NOTE — PROGRESS NOTES
[facility] Progress Note      Patient Name: Arnold Ramírez  : 1960  MRN: [id number]  Primary Care Physician:  Anand Alford MD  Date of admission: 2025    Subjective     Interval History:   Follow-up acute kidney injury.  Urine output not recorded.  Patient unable to use urinal, but I have instructed nurse to use container in commode.  Still having chills and sweats.  Had night sweats last night because he was hypoglycemic.  No documented fever.  Bowels soft.  Does not think he is making much urine.  Review of Systems:   As noted above    Objective     Vitals:   Temp:  [97.3 °F (36.3 °C)-98.2 °F (36.8 °C)] 98.1 °F (36.7 °C)  Heart Rate:  [] 85  Resp:  [16-18] 18  BP: (116-134)/(53-76) 127/76  Flow (L/min) (Oxygen Therapy):  [2] 2    Intake/Output Summary (Last 24 hours) at 2025 0738  Last data filed at 2025 0405  Gross per 24 hour   Intake 910 ml   Output 300 ml   Net 610 ml       Physical Exam:    General Appearance: Looks a little better.  Face still very flushed.  Rash unchanged.  Skin: Confluent rash over his trunk arms and legs with some petechial purpuric areas especially on his feet..  HEENT: oral mucosa dry, nonicteric sclera  Neck: supple, no JVD  Lungs: Clear to auscultation bilaterally.  Unlabored on room air  Heart: RRR, normal S1 and S2.    Abdomen: soft, obese nontender, distended. +bs  : no palpable bladder  Extremities: 2-3+ upper and lower extremity edema  Neuro: normal speech and mental status     Scheduled Meds:     amLODIPine, 10 mg, Oral, Daily  atorvastatin, 20 mg, Oral, Nightly  cefTRIAXone, 2,000 mg, Intravenous, Q24H  DULoxetine, 60 mg, Oral, Daily  famotidine, 20 mg, Intravenous, Q12H  insulin glargine, 75 Units, Subcutaneous, Q12H  insulin lispro, 2-9 Units, Subcutaneous, 4x Daily AC & at Bedtime  metoprolol succinate XL, 50 mg, Oral, Daily  pregabalin, 150 mg, Oral, BID  sodium chloride, 10 mL, Intravenous, Q12H      IV Meds:         Results Reviewed:   I have personally reviewed the results from the time of this admission to 4/24/2025 07:38 EDT     Results from last 7 days   Lab Units 04/23/25  0609 04/22/25  0537 04/21/25  1313   SODIUM mmol/L 131* 135* 137   POTASSIUM mmol/L 4.2 4.4 5.2   CHLORIDE mmol/L 101 101 102   CO2 mmol/L 21.6* 25.0 24.3   BUN mg/dL 21 14 13   CREATININE mg/dL 2.06* 1.88* 1.74*   CALCIUM mg/dL 8.3* 8.5* 8.7   BILIRUBIN mg/dL  --  0.5 0.5   ALK PHOS U/L  --  153* 131*   ALT (SGPT) U/L  --  29 33   AST (SGOT) U/L  --  21 34   GLUCOSE mg/dL 108* 123* 179*       Estimated Creatinine Clearance: 60.7 mL/min (A) (by C-G formula based on SCr of 2.06 mg/dL (H)).    Results from last 7 days   Lab Units 04/23/25  0609 04/22/25  1438   MAGNESIUM mg/dL 1.8 1.7   PHOSPHORUS mg/dL 3.5  --        Results from last 7 days   Lab Units 04/23/25  0609 04/22/25  0537   URIC ACID mg/dL 6.2 5.7       Results from last 7 days   Lab Units 04/23/25  0609 04/22/25  0537 04/21/25  1313   WBC 10*3/mm3 12.43* 10.09 11.12*   HEMOGLOBIN g/dL 13.6 12.7* 12.8*   PLATELETS 10*3/mm3 284 299 291       Results from last 7 days   Lab Units 04/21/25  1313   INR  1.16*       Assessment / Plan     ASSESSMENT:  Acute kidney injury related to delayed hypersensitivity reaction to the vancomycin in the setting of ARB.  Urine output unknown.  Urinalysis with trace ketones and leukocytes and 100 mg/dL protein.    FeNa 0.17.    Urine eos negative.  Non-anion gap metabolic acidosis.  Lab unable to draw this morning.  Sending another phlebotomist.  He has significant peripheral edema which may be part of the hypersensitivity inflammatory response to the vancomycin.  Likely intravascularly dry.  Albumin yesterday 3.  Dr. López asks about giving albumin.  Not clear that it is indicated in the situation.  2.  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  Currently on IV Benadryl.  3.  Strep bacteremia with lumbar spinal abscess and facet septic arthritis.  Back  pain improved.  Antibiotic course to be completed 5/6/2025   on Rocephin.  Ongoing rigors chills with sweating.  Blood cultures pending from 4/23/2025.  C. difficile toxin negative.  Discussed with Dr. López.   4.  Diabetes mellitus type 2.  Peripheral neuropathy.  Hypoglycemic last night.  Will address with Dr. Thompson.  5.  Morbid obesity.     PLAN:  Check labs when available  Encourage p.o. fluid  Discussed with Dr. López  Discussed with patient.  5.  He remains very ill.  Thank you for involving us in the care of Arnold Ramírez.  Please feel free to call with any questions.    Sujata Kilpatrick MD  04/24/25  07:38 EDT    [facility]  [phone]    Please note that portions of this note were completed with a voice recognition program.

## 2025-04-24 NOTE — PROGRESS NOTES
"    DAILY PROGRESS NOTE  [facility]    Patient Identification:  Name: Arnold Ramírez  Age: 65 y.o.  Sex: male  :  1960  MRN: [id number]         Primary Care Physician: Anand Alford MD    Subjective:  Interval History: Complains of rash and some mouth pain and sore throat.  He states he is not eating much of anything.  He has received his Lantus at high doses.  No appetite/sore throat.  Denies any confusion fever chest pain or troubles breathing.    Objective: A bit anxious but conversational and pleasant.  Nontoxic in appearance.  Tremor worsens with intention    Scheduled Meds:amLODIPine, 10 mg, Oral, Daily  atorvastatin, 20 mg, Oral, Nightly  cefTRIAXone, 2,000 mg, Intravenous, Q24H  DULoxetine, 60 mg, Oral, Daily  famotidine, 20 mg, Intravenous, Q12H  insulin lispro, 2-9 Units, Subcutaneous, 4x Daily AC & at Bedtime  metoprolol succinate XL, 50 mg, Oral, Daily  pregabalin, 150 mg, Oral, BID  sodium chloride, 10 mL, Intravenous, Q12H      Continuous Infusions:     Vital signs in last 24 hours:  Temp:  [97.3 °F (36.3 °C)-98.1 °F (36.7 °C)] 98.1 °F (36.7 °C)  Heart Rate:  [] 85  Resp:  [16-18] 18  BP: (127-134)/(68-76) 127/76    Intake/Output:    Intake/Output Summary (Last 24 hours) at 2025 1216  Last data filed at 2025 0912  Gross per 24 hour   Intake 910 ml   Output 300 ml   Net 610 ml       Exam:  /76 (BP Location: Left arm, Patient Position: Lying)   Pulse 85   Temp 98.1 °F (36.7 °C) (Oral)   Resp 18   Ht 195.6 cm (77\")   Wt (!) 159 kg (350 lb 1.5 oz)   SpO2 100%   BMI 41.51 kg/m²     General Appearance:    Alert, cooperative, conversational and pleasant with fluent speech, nontoxic, AAOx3, gets a bit flustered when doing things or with intention tremors worsen                          Head:    Normocephalic, without obvious abnormality, atraumatic                           Eyes:    PERRLA, conjunctivae/corneas clear, EOM's intact, both eyes                " "         Throat: I see no involvement of the oropharynx at this time                           neck:   Supple, very large diameter/no rigidity-unable to appreciate JVD                         lungs:    Muffled/clear to auscultation bilaterally, respirations unlabored                 Chest Wall:    No tenderness or deformity                          Heart:    Regular rate and rhythm, S1 and S2 normal                  Abdomen:     Soft, nontender, bowel sounds active                 Extremities: Moving all, diffusely edematous                        Pulses:   Pulses palpable in all extremities                            Skin: Macular confluent rash trunk and extremities                  Neurologic:   CNII-XII intact, nothing focal, tremor worsens with intention       Data Review:  Labs in chart were reviewed.    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  Stool for C. difficile negative for active disease/consistent with colonization   - Complains of sore throat but I see no involvement of oropharynx and will empirically add Kaylynn's Magic while wash   - Mild leukocytosis/afebrile -ID consulted and following        ARF per renal -swelling " noted but p.o. intake poor and likely needs additional IVF.  Nephrology holding for peripheral edema.  I would agree this is likely part of his hypersensitivity inflammatory response as he is most likely dry intravascularly.  No objections of nephrology wishes to use albumin   - Normal LFTs   - Mild hyponatremia   - Acidosis made worse by anorexia   - Appreciate renal input as well      DM2 with hypoglycemia/PN-Lyrica   - Definitely an issue and it secondary to decreased oral intake.   - His doses of basal insulin are quite high but I want to stop them completely and continue with sliding scale over the next 24 hours and see what happens    Morbid obesity complicates all the above-BMI 42    HTN-Norvasc/metoprolol-stable    HLD-statin    Probable underlying MARGO given body habitus/neck diameter        Addendum   -OK for use bipap - consult pulm if needed unless he can bring in his own   -make IV benadryl scheduled [id number] for next 24 hours - c/o sore throat and also ordered Kaylynn's mouthwash  -already on pepcid   -o2 stable - 2L  -concern anxiety contributory - will see if schedule benadryl helps - now c/o chest tightness and will check an EKG but doubtful acute cardiac issue   -avoiding steroids       Joseph Thompson MD  4/24/2025  12:16 EDT

## 2025-04-25 LAB
ALBUMIN SERPL-MCNC: 3 G/DL (ref 3.5–5.2)
ANION GAP SERPL CALCULATED.3IONS-SCNC: 14.7 MMOL/L (ref 5–15)
BUN SERPL-MCNC: 31 MG/DL (ref 8–23)
BUN/CREAT SERPL: 12.1 (ref 7–25)
CALCIUM SPEC-SCNC: 8.2 MG/DL (ref 8.6–10.5)
CHLORIDE SERPL-SCNC: 99 MMOL/L (ref 98–107)
CO2 SERPL-SCNC: 19.3 MMOL/L (ref 22–29)
CREAT SERPL-MCNC: 2.57 MG/DL (ref 0.76–1.27)
DEPRECATED RDW RBC AUTO: 41.7 FL (ref 37–54)
EGFRCR SERPLBLD CKD-EPI 2021: 27.2 ML/MIN/1.73
ERYTHROCYTE [DISTWIDTH] IN BLOOD BY AUTOMATED COUNT: 14.9 % (ref 12.3–15.4)
GLUCOSE BLDC GLUCOMTR-MCNC: 147 MG/DL (ref 70–130)
GLUCOSE BLDC GLUCOMTR-MCNC: 149 MG/DL (ref 70–130)
GLUCOSE BLDC GLUCOMTR-MCNC: 72 MG/DL (ref 70–130)
GLUCOSE BLDC GLUCOMTR-MCNC: 96 MG/DL (ref 70–130)
GLUCOSE SERPL-MCNC: 62 MG/DL (ref 65–99)
HCT VFR BLD AUTO: 44.6 % (ref 37.5–51)
HGB BLD-MCNC: 14.6 G/DL (ref 13–17.7)
MCH RBC QN AUTO: 25.9 PG (ref 26.6–33)
MCHC RBC AUTO-ENTMCNC: 32.7 G/DL (ref 31.5–35.7)
MCV RBC AUTO: 79.2 FL (ref 79–97)
PHOSPHATE SERPL-MCNC: 3.9 MG/DL (ref 2.5–4.5)
PLATELET # BLD AUTO: 325 10*3/MM3 (ref 140–450)
PMV BLD AUTO: 9.3 FL (ref 6–12)
POTASSIUM SERPL-SCNC: 5.2 MMOL/L (ref 3.5–5.2)
QT INTERVAL: 332 MS
QTC INTERVAL: 423 MS
RBC # BLD AUTO: 5.63 10*6/MM3 (ref 4.14–5.8)
SODIUM SERPL-SCNC: 133 MMOL/L (ref 136–145)
WBC NRBC COR # BLD AUTO: 16.04 10*3/MM3 (ref 3.4–10.8)

## 2025-04-25 PROCEDURE — 85027 COMPLETE CBC AUTOMATED: CPT | Performed by: HOSPITALIST

## 2025-04-25 PROCEDURE — 97162 PT EVAL MOD COMPLEX 30 MIN: CPT

## 2025-04-25 PROCEDURE — 94799 UNLISTED PULMONARY SVC/PX: CPT

## 2025-04-25 PROCEDURE — 82948 REAGENT STRIP/BLOOD GLUCOSE: CPT

## 2025-04-25 PROCEDURE — 25010000002 DIPHENHYDRAMINE PER 50 MG: Performed by: HOSPITALIST

## 2025-04-25 PROCEDURE — 94660 CPAP INITIATION&MGMT: CPT

## 2025-04-25 PROCEDURE — 25010000002 BUMETANIDE PER 0.5 MG: Performed by: INTERNAL MEDICINE

## 2025-04-25 PROCEDURE — 63710000001 DIPHENHYDRAMINE PER 50 MG: Performed by: HOSPITALIST

## 2025-04-25 PROCEDURE — 97530 THERAPEUTIC ACTIVITIES: CPT

## 2025-04-25 PROCEDURE — 25010000002 FAMOTIDINE 10 MG/ML SOLUTION: Performed by: NURSE PRACTITIONER

## 2025-04-25 PROCEDURE — 80069 RENAL FUNCTION PANEL: CPT | Performed by: HOSPITALIST

## 2025-04-25 PROCEDURE — 25010000002 CEFTRIAXONE PER 250 MG: Performed by: INTERNAL MEDICINE

## 2025-04-25 RX ORDER — DIPHENHYDRAMINE HYDROCHLORIDE 50 MG/ML
25 INJECTION, SOLUTION INTRAMUSCULAR; INTRAVENOUS EVERY 6 HOURS PRN
Status: DISCONTINUED | OUTPATIENT
Start: 2025-04-25 | End: 2025-05-03 | Stop reason: HOSPADM

## 2025-04-25 RX ORDER — BUMETANIDE 0.25 MG/ML
4 INJECTION, SOLUTION INTRAMUSCULAR; INTRAVENOUS EVERY 8 HOURS
Status: COMPLETED | OUTPATIENT
Start: 2025-04-25 | End: 2025-04-26

## 2025-04-25 RX ORDER — DIPHENHYDRAMINE HCL 25 MG
25 CAPSULE ORAL EVERY 6 HOURS
Status: DISCONTINUED | OUTPATIENT
Start: 2025-04-25 | End: 2025-05-03 | Stop reason: HOSPADM

## 2025-04-25 RX ADMIN — PREGABALIN 150 MG: 75 CAPSULE ORAL at 23:29

## 2025-04-25 RX ADMIN — DIPHENHYDRAMINE HYDROCHLORIDE 10 ML: 25 SOLUTION ORAL at 15:09

## 2025-04-25 RX ADMIN — DULOXETINE 60 MG: 30 CAPSULE, DELAYED RELEASE ORAL at 09:56

## 2025-04-25 RX ADMIN — DIPHENHYDRAMINE HYDROCHLORIDE 25 MG: 25 CAPSULE ORAL at 18:26

## 2025-04-25 RX ADMIN — ATORVASTATIN CALCIUM 20 MG: 20 TABLET, FILM COATED ORAL at 21:38

## 2025-04-25 RX ADMIN — BUMETANIDE 4 MG: 0.25 INJECTION INTRAMUSCULAR; INTRAVENOUS at 11:41

## 2025-04-25 RX ADMIN — Medication 10 ML: at 21:37

## 2025-04-25 RX ADMIN — PREGABALIN 150 MG: 75 CAPSULE ORAL at 09:55

## 2025-04-25 RX ADMIN — FAMOTIDINE 20 MG: 10 INJECTION INTRAVENOUS at 09:57

## 2025-04-25 RX ADMIN — CEFTRIAXONE 2000 MG: 2 INJECTION, POWDER, FOR SOLUTION INTRAMUSCULAR; INTRAVENOUS at 21:37

## 2025-04-25 RX ADMIN — Medication 10 ML: at 05:53

## 2025-04-25 RX ADMIN — DIPHENHYDRAMINE HYDROCHLORIDE 25 MG: 50 INJECTION, SOLUTION INTRAMUSCULAR; INTRAVENOUS at 11:33

## 2025-04-25 RX ADMIN — DIPHENHYDRAMINE HYDROCHLORIDE 25 MG: 50 INJECTION, SOLUTION INTRAMUSCULAR; INTRAVENOUS at 05:52

## 2025-04-25 RX ADMIN — FAMOTIDINE 20 MG: 10 INJECTION INTRAVENOUS at 21:38

## 2025-04-25 RX ADMIN — AMLODIPINE BESYLATE 10 MG: 10 TABLET ORAL at 09:55

## 2025-04-25 RX ADMIN — METOPROLOL SUCCINATE 50 MG: 25 TABLET, EXTENDED RELEASE ORAL at 09:56

## 2025-04-25 RX ADMIN — DIPHENHYDRAMINE HYDROCHLORIDE 10 ML: 25 SOLUTION ORAL at 05:53

## 2025-04-25 RX ADMIN — Medication 10 ML: at 09:57

## 2025-04-25 RX ADMIN — BUMETANIDE 4 MG: 0.25 INJECTION INTRAMUSCULAR; INTRAVENOUS at 21:38

## 2025-04-25 RX ADMIN — DIPHENHYDRAMINE HYDROCHLORIDE 10 ML: 25 SOLUTION ORAL at 18:26

## 2025-04-25 NOTE — PROGRESS NOTES
"[facility] PULMONARY CARE         Dr Mari Sayied   LOS: 2 days   Patient Care Team:  Anand Alford MD as PCP - General  Anand Alford MD as PCP - Family Medicine    Chief Complaint: MARGO on BiPAP with bank flushing/\"red man\" syndrome other issues as listed below    Interval History: Used AVAPS last night.  He tells me his wife may bring his home BiPAP tonight.    REVIEW OF SYSTEMS:   CARDIOVASCULAR: No chest pain, chest pressure or chest discomfort. No palpitations or edema.   RESPIRATORY: No shortness of breath, cough or sputum.   GASTROINTESTINAL: No anorexia, nausea, vomiting or diarrhea. No abdominal pain or blood.   HEMATOLOGIC: No bleeding or bruising.     Ventilator/Non-Invasive Ventilation Settings (From admission, onward)       Start     Ordered    04/25/25 0107  NIPPV-IPPV / BIPAP / CPAP  Until Discontinued        Question Answer Comment   Indication Sleep Apnea    Type AVAPS/PC/PS    Backup Rate 14    Target VT (mL) 550    EPAP/PEEP (cm H2O) 5    Min Pressure (cm H2O) 15    Max Pressure (cm H2O) 30    Titrate Oxygen for SpO2 90% or Greater        04/25/25 0107    04/24/25 1718  NIPPV-IPPV / BIPAP / CPAP  Until Discontinued,   Status:  Canceled        Question Answer Comment   Indication Sleep Apnea    Type AVAPS/PC/PS    Backup Rate 12    Target VT (mL) 500    EPAP/PEEP (cm H2O) 5    Min Pressure (cm H2O) 6    Max Pressure (cm H2O) 15    Titrate Oxygen for SpO2 90% or Greater        04/24/25 1717                      Vital Signs  Temp:  [98 °F (36.7 °C)-98.1 °F (36.7 °C)] 98.1 °F (36.7 °C)  Heart Rate:  [] 105  Resp:  [16-18] 16  BP: (113-124)/(73-91) 119/74    Intake/Output Summary (Last 24 hours) at 4/25/2025 1104  Last data filed at 4/25/2025 0725  Gross per 24 hour   Intake 1970 ml   Output 850 ml   Net 1120 ml     Flowsheet Rows      Flowsheet Row First Filed Value   Admission Height 195.6 cm (77\") Documented at 04/21/2025 1900   Admission Weight 155 kg (341 lb 6.4 oz) Documented at " 04/21/2025 1900              Physical Exam:  Patient is examined using the personal protective equipment as per guidelines from infection control for this particular patient as enacted.  Hand hygiene was performed before and after patient interaction.   General Appearance:    Alert, cooperative, in no acute distress.  Following simple commands  ENT Mallampati between 3 and 4 no nasal congestion  Neck midline trachea, no thyromegaly   Lungs:     Clear to auscultation, respirations regular, even and                  unlabored    Heart:    Regular rhythm and normal rate, normal S1 and S2, no            murmur, no gallop, no rub, no click   Chest Wall:    No abnormalities observed   Abdomen:     Normal bowel sounds, no masses, no organomegaly, soft        nontender, nondistended, no guarding, no rebound                tenderness   Extremities:   Moves all extremities well, no edema, no cyanosis, no             redness  CNS no focal neurological deficits normal sensory exam  Skin diffuse erythema from vancomycin flushing syndrome looks better this morning  Musculoskeletal no cyanosis no clubbing normal range of motion     Results Review:        Results from last 7 days   Lab Units 04/25/25  0747 04/24/25  0944 04/23/25  0609   SODIUM mmol/L 133* 131* 131*   POTASSIUM mmol/L 5.2 4.8 4.2   CHLORIDE mmol/L 99 99 101   CO2 mmol/L 19.3* 19.4* 21.6*   BUN mg/dL 31* 28* 21   CREATININE mg/dL 2.57* 2.46* 2.06*   GLUCOSE mg/dL 62* 159* 108*   CALCIUM mg/dL 8.2* 8.0* 8.3*     Results from last 7 days   Lab Units 04/22/25  0537   CK TOTAL U/L 42     Results from last 7 days   Lab Units 04/25/25  0747 04/24/25  0944 04/23/25  0609   WBC 10*3/mm3 16.04* 13.82* 12.43*   HEMOGLOBIN g/dL 14.6 13.5 13.6   HEMATOCRIT % 44.6 40.7 42.1   PLATELETS 10*3/mm3 325 248 284     Results from last 7 days   Lab Units 04/21/25  1313   INR  1.16*   APTT seconds 30.5         Results from last 7 days   Lab Units 04/24/25  0944   MAGNESIUM mg/dL 1.7       "         I reviewed the patient's new clinical results.  I personally viewed and interpreted the patient's chest x-ray.        Medication Review:   amLODIPine, 10 mg, Oral, Daily  atorvastatin, 20 mg, Oral, Nightly  cefTRIAXone, 2,000 mg, Intravenous, Q24H  diphenhydrAMINE, 25 mg, Intravenous, Q6H  DULoxetine, 60 mg, Oral, Daily  famotidine, 20 mg, Intravenous, Q12H  insulin lispro, 2-9 Units, Subcutaneous, 4x Daily AC & at Bedtime  magic mouthwash oral suspension (mixture) (diphenhydrAMINE HCl - aluminum & magnesium hydroxide-simethicone - lidocaine viscous) solution 55 mL, 10 mL, Swish & Spit, Q6H  metoprolol succinate XL, 50 mg, Oral, Daily  pregabalin, 150 mg, Oral, BID  sodium chloride, 10 mL, Intravenous, Q12H             ASSESSMENT:   Vancomycin flushing syndrome  MARGO on BiPAP  \"red man\" syndrome  Streptococcal bacteremia  Acute renal failure  Morbid obesity  Diabetes mellitus    PLAN:  AVAPS to be continued in the hospital  Patient advised to bring his home BiPAP once available we will transition to home BiPAP  Management of \"red man\" syndrome by hospitalist and infectious diseases  Infectious diseases managing antibiotics  Nothing further to add from pulmonary point of view  We will sign off.  Please call if needed      Kamaljit Montgomery MD  04/25/25  11:04 EDT          "

## 2025-04-25 NOTE — PROGRESS NOTES
[facility] Progress Note      Patient Name: Arnold Ramírez  : 1960  MRN: [id number]  Primary Care Physician:  Anand Alford MD  Date of admission: 2025    Subjective     Interval History:   Follow-up acute kidney injury.       Patient has significant diffuse erythema all over his body.  No recorded urine output adequately.  Denies chest pain, no nausea or vomiting, overall he is feeling poorly  Review of Systems:   As noted above    Objective     Vitals:   Temp:  [98 °F (36.7 °C)-98.1 °F (36.7 °C)] 98.1 °F (36.7 °C)  Heart Rate:  [] 105  Resp:  [16-18] 16  BP: (113-124)/(73-91) 119/74  Flow (L/min) (Oxygen Therapy):  [1-1.5] 1    Intake/Output Summary (Last 24 hours) at 2025 1106  Last data filed at 2025 0725  Gross per 24 hour   Intake 1970 ml   Output 850 ml   Net 1120 ml       Physical Exam:    General Appearance: Awake, alert, chronically ill, morbidly obese, in moderate distress.  Skin: Significant diffuse total body erythema purpuric areas especially on his feet..  HEENT: oral mucosa dry, nonicteric sclera  Neck: No JVD  Lungs: Clear to auscultation bilaterally.  Unlabored on room air  Heart: RRR, normal S1 and S2.    Abdomen: soft, obese nontender, distended. +bs  : no palpable bladder  Extremities: 4+ upper and lower extremity edema  Neuro: normal speech and mental status     Scheduled Meds:     amLODIPine, 10 mg, Oral, Daily  atorvastatin, 20 mg, Oral, Nightly  cefTRIAXone, 2,000 mg, Intravenous, Q24H  diphenhydrAMINE, 25 mg, Intravenous, Q6H  DULoxetine, 60 mg, Oral, Daily  famotidine, 20 mg, Intravenous, Q12H  insulin lispro, 2-9 Units, Subcutaneous, 4x Daily AC & at Bedtime  magic mouthwash oral suspension (mixture) (diphenhydrAMINE HCl - aluminum & magnesium hydroxide-simethicone - lidocaine viscous) solution 55 mL, 10 mL, Swish & Spit, Q6H  metoprolol succinate XL, 50 mg, Oral, Daily  pregabalin, 150 mg, Oral, BID  sodium chloride, 10 mL,  Intravenous, Q12H      IV Meds:        Results Reviewed:   I have personally reviewed the results from the time of this admission to 4/25/2025 11:06 EDT     Results from last 7 days   Lab Units 04/25/25  0747 04/24/25  0944 04/23/25  0609 04/22/25  0537 04/21/25  1313   SODIUM mmol/L 133* 131* 131* 135* 137   POTASSIUM mmol/L 5.2 4.8 4.2 4.4 5.2   CHLORIDE mmol/L 99 99 101 101 102   CO2 mmol/L 19.3* 19.4* 21.6* 25.0 24.3   BUN mg/dL 31* 28* 21 14 13   CREATININE mg/dL 2.57* 2.46* 2.06* 1.88* 1.74*   CALCIUM mg/dL 8.2* 8.0* 8.3* 8.5* 8.7   BILIRUBIN mg/dL  --  0.4  --  0.5 0.5   ALK PHOS U/L  --  127*  --  153* 131*   ALT (SGPT) U/L  --  28  --  29 33   AST (SGOT) U/L  --  31  --  21 34   GLUCOSE mg/dL 62* 159* 108* 123* 179*       Estimated Creatinine Clearance: 48.7 mL/min (A) (by C-G formula based on SCr of 2.57 mg/dL (H)).    Results from last 7 days   Lab Units 04/25/25  0747 04/24/25  0944 04/23/25  0609 04/22/25  1438   MAGNESIUM mg/dL  --  1.7 1.8 1.7   PHOSPHORUS mg/dL 3.9 4.0 3.5  --        Results from last 7 days   Lab Units 04/23/25  0609 04/22/25  0537   URIC ACID mg/dL 6.2 5.7       Results from last 7 days   Lab Units 04/25/25  0747 04/24/25  0944 04/23/25  0609 04/22/25  0537 04/21/25  1313   WBC 10*3/mm3 16.04* 13.82* 12.43* 10.09 11.12*   HEMOGLOBIN g/dL 14.6 13.5 13.6 12.7* 12.8*   PLATELETS 10*3/mm3 325 248 284 299 291       Results from last 7 days   Lab Units 04/21/25  1313   INR  1.16*       Assessment / Plan     ASSESSMENT:  Acute kidney injury related to acute interstitial nephritis related to delayed hypersensitivity reaction to vancomycin and being on ARB and inability to autoregulate.  Creatinine today 2.57 stable, sodium 133 and potassium 5.2.  Patient has significant volume excess .  2.  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    3.  Strep bacteremia with lumbar spinal abscess and facet septic arthritis.  Back pain improved.  Followed by ID  4.  Diabetes mellitus type 2.   Peripheral neuropathy.  Managed by primary team  5.  Morbid obesity.     PLAN:  Will diurese today with IV bumetanide  Surveillance  Monitor for diuretic toxicity    Reviewed the chart and other providers notes, reviewed labs.  I discussed the case with the patient.  Copied text in this note has been reviewed and is accurate as of 04/25/25.     Thank you for involving us in the care of Arnold Ramírez.  Please feel free to call with any questions.    Wander Trinidad MD  04/25/25  11:06 EDT    [facility]  [phone]    Please note that portions of this note were completed with a voice recognition program.

## 2025-04-25 NOTE — CASE MANAGEMENT/SOCIAL WORK
Called and spoke to pt, notified of mammogram results & dr's recommendations. Patient states she is scheduled for breast biopsy next week and then has a f/u scheduled here on 11/03/20.    Continued Stay Note  [facility]     Patient Name: Arnold Ramírez  MRN: [id number]  Today's Date: 4/25/2025    Admit Date: 4/21/2025    Plan: Plan home with spouse and [facility].  JOSUE Jenkins RN   Discharge Plan       Row Name 04/25/25 1148       Plan    Plan Plan home with spouse and [facility].  JOSUE Jenkins RN    Patient/Family in Agreement with Plan yes    Plan Comments Spoke with pt at bedside. Pt states he has Bi PAP for home use.  [phone]  Plan home with spouse and [facility].  JOSUE Jenkins RN                   Discharge Codes    No documentation.                 Expected Discharge Date and Time       Expected Discharge Date Expected Discharge Time    Apr 26, 2025               Marian Jenkins RN

## 2025-04-25 NOTE — PLAN OF CARE
Goal Outcome Evaluation:                 Pt resting in bed quietly. Denies pain. Bipap while asleep. Pt sweating but no fever. Low bs treated with carbohydrates.

## 2025-04-25 NOTE — PROGRESS NOTES
"  Infectious Diseases Progress Note    Lizette López MD     [facility]  [place]: 2 days  Patient Identification:  Name: Arnold Ramírez  Age: 65 y.o.  Sex: male  :  1960  MRN: [id number]         Primary Care Physician: Anand Alford MD        Subjective: Complaining of being anxious shaky and sweaty.  She did have a low blood sugar episode which is corrected.  Denies any fever.  Back pain is improving.  Itching is better.  Interval History: See consultation note.    Objective:    Scheduled Meds:amLODIPine, 10 mg, Oral, Daily  atorvastatin, 20 mg, Oral, Nightly  bumetanide, 4 mg, Intravenous, Q8H  cefTRIAXone, 2,000 mg, Intravenous, Q24H  diphenhydrAMINE, 25 mg, Oral, Q6H  DULoxetine, 60 mg, Oral, Daily  famotidine, 20 mg, Intravenous, Q12H  insulin lispro, 2-9 Units, Subcutaneous, 4x Daily AC & at Bedtime  magic mouthwash oral suspension (mixture) (diphenhydrAMINE HCl - aluminum & magnesium hydroxide-simethicone - lidocaine viscous) solution 55 mL, 10 mL, Swish & Spit, Q6H  metoprolol succinate XL, 50 mg, Oral, Daily  pregabalin, 150 mg, Oral, BID  sodium chloride, 10 mL, Intravenous, Q12H      Continuous Infusions:     Vital signs in last 24 hours:  Temp:  [98 °F (36.7 °C)-98.1 °F (36.7 °C)] 98.1 °F (36.7 °C)  Heart Rate:  [] 105  Resp:  [16-18] 16  BP: (113-124)/(73-91) 119/74    Intake/Output:    Intake/Output Summary (Last 24 hours) at 2025 1150  Last data filed at 2025 1132  Gross per 24 hour   Intake 2310 ml   Output 850 ml   Net 1460 ml       Exam:  /74 (BP Location: Left arm, Patient Position: Lying)   Pulse 105   Temp 98.1 °F (36.7 °C) (Oral)   Resp 16   Ht 195.6 cm (77\")   Wt (!) 159 kg (350 lb 1.5 oz)   SpO2 98%   BMI 41.51 kg/m²   Patient is examined using the personal protective equipment as per guidelines from infection control for this particular patient as enacted.  Hand washing was performed before and after patient interaction.  General Appearance:   "  Alert, cooperative, no distress, AAOx3                          Head:    Normocephalic, without obvious abnormality, atraumatic                           Eyes:    PERRL, conjunctivae/corneas clear, EOM's intact, both eyes                         Throat:   Lips, tongue, gums normal; oral mucosa pink and moist                           Neck:   Supple, symmetrical, trachea midline, no JVD                         Lungs:    Clear to auscultation bilaterally, respirations unlabored                 Chest Wall:    No tenderness or deformity                          Heart:  1 S2 regular                  Abdomen:   B soft nontender                 Extremities:   Extremities normal, atraumatic, no cyanosis or edema                        Pulses:   Pulses palpable in all extremities                            Skin: Has drug eruption due to delayed hypersensitivity to vancomycin.  Generalized body wall edema noted.                  Neurologic: Alert and oriented x 3       Data Review:    I reviewed the patient's new clinical results.  Results from last 7 days   Lab Units 04/25/25  0747 04/24/25  0944 04/23/25  0609 04/22/25  0537 04/21/25  1313   WBC 10*3/mm3 16.04* 13.82* 12.43* 10.09 11.12*   HEMOGLOBIN g/dL 14.6 13.5 13.6 12.7* 12.8*   PLATELETS 10*3/mm3 325 248 284 299 291     Results from last 7 days   Lab Units 04/25/25  0747 04/24/25  0944 04/23/25  0609 04/22/25  0537 04/21/25  1313   SODIUM mmol/L 133* 131* 131* 135* 137   POTASSIUM mmol/L 5.2 4.8 4.2 4.4 5.2   CHLORIDE mmol/L 99 99 101 101 102   CO2 mmol/L 19.3* 19.4* 21.6* 25.0 24.3   BUN mg/dL 31* 28* 21 14 13   CREATININE mg/dL 2.57* 2.46* 2.06* 1.88* 1.74*   CALCIUM mg/dL 8.2* 8.0* 8.3* 8.5* 8.7   GLUCOSE mg/dL 62* 159* 108* 123* 179*     Microbiology Results (last 10 days)       Procedure Component Value - Date/Time    Clostridioides difficile Toxin - Stool, Per Rectum [[id number]]  (Normal) Collected: 04/24/25 0008    Lab Status: Final result Specimen: Stool from  Per Rectum Updated: 04/24/25 0204    Narrative:      The following orders were created for panel order Clostridioides difficile Toxin - Stool, Per Rectum.  Procedure                               Abnormality         Status                     ---------                               -----------         ------                     Clostridioides difficile...[[id number]]  Normal              Final result                 Please view results for these tests on the individual orders.    Clostridioides difficile Toxin, PCR - Stool, Per Rectum [[id number]]  (Normal) Collected: 04/24/25 0008    Lab Status: Final result Specimen: Stool from Per Rectum Updated: 04/24/25 0204     Toxigenic C. difficile by PCR Negative    Narrative:      The result indicates the absence of toxigenic C. difficile from stool specimen.     Blood Culture - Blood, Hand, Right [[id number]]  (Normal) Collected: 04/23/25 1200    Lab Status: Preliminary result Specimen: Blood from Hand, Right Updated: 04/24/25 1230     Blood Culture No growth at 24 hours    Blood Culture - Blood, Hand, Right [[id number]]  (Normal) Collected: 04/23/25 1017    Lab Status: Preliminary result Specimen: Blood from Hand, Right Updated: 04/25/25 1045     Blood Culture No growth at 2 days    Eosinophil Smear - Urine, Urine, Clean Catch [[id number]]  (Normal) Collected: 04/22/25 1314    Lab Status: Final result Specimen: Urine, Clean Catch Updated: 04/22/25 1544     Eosinophil Smear 0 % EOS/100 Cells               Assessment:    Vancomycin flushing syndrome    Streptococcal bacteremia    Obesity    Dyslipidemia    HTN (hypertension)    MRSA colonization    Type 2 diabetes mellitus, with long-term current use of insulin    Diabetic peripheral neuropathy    ROCKY (acute kidney injury)  65-year-old male with  1-systemic vancomycin delayed hypersensitivity reaction with skin rash and renal insufficiency  2-acute kidney injury probably secondary to combination of factors including  vancomycin  3-history of MRSA colonization  4-history of C. difficile diarrhea with colonization  5-history of strep intermedius bacteremia sepsis with paraspinal abscess and facet septic arthritis clinically improved with resolution of symptoms on course to finish his antibiotic treatment on 5/6/2025  6-poorly controlled diabetes  7-morbid obesity  8-hypertension  9-other diagnoses per primary team.     Recommendations/Discussions:  See my discussion and recommendation on 4/22/2025  Continue IV ceftriaxone  Upon blood cultures, stool studies are negative for C. difficile.  Supportive care for renal insufficiency, avoidance of nephrotoxic agents and periodic review of systems identify other areas of secondary seeding due to strep intermedius bacteremia and possibility of line sepsis as well as recurrence of C. difficile infection in the setting of known MRSA colonization and C. difficile colonization.  Lizette López MD  4/25/2025  11:50 EDT    Parts of this note may be an electronic transcription/translation of spoken language to printed text using the Dragon dictation system.

## 2025-04-25 NOTE — THERAPY EVALUATION
Patient Name: Arnold Ramírez  : 1960    MRN: [id number]                              Today's Date: 2025       Admit Date: 2025    Visit Dx: No diagnosis found.  Patient Active Problem List   Diagnosis    Streptococcal bacteremia    Acute back pain    Facet arthritis of lumbar region    Obesity    Dyslipidemia    HTN (hypertension)    MRSA colonization    Type 2 diabetes mellitus, with long-term current use of insulin    Hyponatremia    Diabetic peripheral neuropathy    Clostridium difficile diarrhea    Vancomycin flushing syndrome    ROCKY (acute kidney injury)     Past Medical History:   Diagnosis Date    Diabetes mellitus     Hyperlipidemia     Hypertension      History reviewed. No pertinent surgical history.   General Information       Row Name 25 1334          Physical Therapy Time and Intention    Document Type evaluation  -     Mode of Treatment individual therapy;physical therapy  -       Row Name 25 1334          General Information    Prior Level of Function independent:;gait;transfer;bed mobility  pt reports he uses a walker at home  -     Existing Precautions/Restrictions fall  -     Barriers to Rehab medically complex  -       Row Name 25 133          Living Environment    People in Home spouse  -       Row Name 25 1334          Cognition    Orientation Status (Cognition) oriented x 4  -       Row Name 25 133          Safety Issues/Impairments Affecting Functional Mobility    Impairments Affecting Function (Mobility) balance;endurance/activity tolerance;pain;strength  -               User Key  (r) = Recorded By, (t) = Taken By, (c) = Cosigned By      Initials Name Provider Type     Em Ortiz, PT Physical Therapist                   Mobility       Row Name 25 1332          Bed Mobility    Bed Mobility supine-sit;sit-supine  -     Supine-Sit [place] (Bed Mobility) verbal cues;nonverbal cues (demo/gesture);contact  guard  -     Sit-Supine [place] (Bed Mobility) verbal cues;nonverbal cues (demo/gesture);contact guard  -     Assistive Device (Bed Mobility) bed rails;head of bed elevated  -       Row Name 04/25/25 1335          Sit-Stand Transfer    Sit-Stand [place] (Transfers) verbal cues;nonverbal cues (demo/gesture);minimum assist (75% patient effort)  -     Assistive Device (Sit-Stand Transfers) walker, front-wheeled  -       Row Name 04/25/25 1335          Gait/Stairs (Locomotion)    [place] Level (Gait) verbal cues;nonverbal cues (demo/gesture);minimum assist (75% patient effort)  -     Assistive Device (Gait) walker, front-wheeled  -     Distance in Feet (Gait) 3  several side steps to HOB  -     Deviations/Abnormal Patterns (Gait) mary decreased;gait speed decreased;stride length decreased  -     Bilateral Gait Deviations forward flexed posture  -     Comment, (Gait/Stairs) gait distance limited secondary to fatigue and weakness  -               User Key  (r) = Recorded By, (t) = Taken By, (c) = Cosigned By      Initials Name Provider Type     Em Ortiz, PT Physical Therapist                   Obj/Interventions       Row Name 04/25/25 1338          Range of Motion Comprehensive    General Range of Motion no range of motion deficits identified  -[facility] Name 04/25/25 1338          Strength Comprehensive (MMT)    Comment, General Manual Muscle Testing (MMT) Assessment generalized weakness noted with functional mobility and gait  -       Row Name 04/25/25 1338          Balance    Balance Assessment standing static balance;standing dynamic balance  -     Static Standing Balance verbal cues;non-verbal cues (demo/gesture);contact guard  -     Dynamic Standing Balance verbal cues;non-verbal cues (demo/gesture);minimal assist  -     Position/Device Used, Standing Balance walker, rolling  -               User Key  (r) = Recorded By, (t) = Taken By, (c) = Cosigned  By      Initials Name Provider Type    Em Miner, PT Physical Therapist                   Goals/Plan       Row Name 04/25/25 1343          Bed Mobility Goal 1 (PT)    Activity/Assistive Device (Bed Mobility Goal 1, PT) bed mobility activities, all  -CH     [place] Level/Cues Needed (Bed Mobility Goal 1, PT) supervision required  -CH     Time Frame (Bed Mobility Goal 1, PT) 1 week  -       Row Name 04/25/25 1343          Transfer Goal 1 (PT)    Activity/Assistive Device (Transfer Goal 1, PT) transfers, all;walker, rolling  -CH     [place] Level/Cues Needed (Transfer Goal 1, PT) supervision required  -CH     Time Frame (Transfer Goal 1, PT) 1 week  -       Row Name 04/25/25 1343          Gait Training Goal 1 (PT)    Activity/Assistive Device (Gait Training Goal 1, PT) gait (walking locomotion);walker, rolling  -CH     [place] Level (Gait Training Goal 1, PT) supervision required  -CH     Distance (Gait Training Goal 1, PT) 150  -CH     Time Frame (Gait Training Goal 1, PT) 1 week  -       Row Name 04/25/25 1348          Therapy Assessment/Plan (PT)    Planned Therapy Interventions (PT) balance training;bed mobility training;gait training;home exercise program;patient/family education;strengthening;transfer training  -               User Key  (r) = Recorded By, (t) = Taken By, (c) = Cosigned By      Initials Name Provider Type    Em Miner, PT Physical Therapist                   Clinical Impression       Row Name 04/25/25 1330          Pain    Pre/Posttreatment Pain Comment pt reports genealized, transient discomfort  -       Row Name 04/25/25 4688          Plan of Care Review    Plan of Care Reviewed With patient  -     Outcome Evaluation Pt is a 64 yo M who was admitted with vanomyocin flushing syndrome. Pt with recent hospitalization for sepsis. Pt presents to PT with impaired functional mobility and gait secondary to generalized weakness, impaired balance, and  decreased activity tolerance. Pt may benefit from skilled PT to address strength, mobility, and gait.  -       Row Name 04/25/25 1896          Therapy Assessment/Plan (PT)    Patient/Family Therapy Goals Statement (PT) to return to PLOF  -     Rehab Potential (PT) good  -     Criteria for Skilled Interventions Met (PT) skilled treatment is necessary  -     Therapy Frequency (PT) 5 times/wk  -       Row Name 04/25/25 2594          Positioning and Restraints    Pre-Treatment Position in bed  -     Post Treatment Position bed  -     In Bed supine;call light within reach;encouraged to call for assist;exit alarm on  -               User Key  (r) = Recorded By, (t) = Taken By, (c) = Cosigned By      Initials Name Provider Type    Em Miner PT Physical Therapist                   Outcome Measures       Row Name 04/25/25 1349          How much help from another person do you currently need...    Turning from your back to your side while in flat bed without using bedrails? 3  -CH     Moving from lying on back to sitting on the side of a flat bed without bedrails? 3  -CH     Moving to and from a bed to a chair (including a wheelchair)? 3  -CH     Standing up from a chair using your arms (e.g., wheelchair, bedside chair)? 3  -CH     Climbing 3-5 steps with a railing? 1  -CH     To walk in hospital room? 2  -CH     AM-PAC 6 Clicks Score (PT) 15  -     Highest Level of Mobility Goal 4 --> Transfer to chair/commode  -       Row Name 04/25/25 1346          Functional Assessment    Outcome Measure Options AM-PAC 6 Clicks Basic Mobility (PT)  -               User Key  (r) = Recorded By, (t) = Taken By, (c) = Cosigned By      Initials Name Provider Type    Em Miner PT Physical Therapist                                 Physical Therapy Education       Title: PT OT SLP Therapies (In Progress)       Topic: Physical Therapy (In Progress)       Point: Mobility training (Done)       Learning  Progress Summary            Patient Acceptance, E,TB,D, VU,NR by  at 4/25/2025 1344                      Point: Home exercise program (Not Started)       Learner Progress:  Not documented in this visit.              Point: Body mechanics (Done)       Learning Progress Summary            Patient Acceptance, E,TB,D, VU,NR by  at 4/25/2025 1344                      Point: Precautions (Done)       Learning Progress Summary            Patient Acceptance, E,TB,D, VU,NR by  at 4/25/2025 1344                                      User Key       Initials Effective Dates Name Provider Type [facility] 06/16/21 -  Em Ortiz, PT Physical Therapist PT                  PT Recommendation and Plan  Planned Therapy Interventions (PT): balance training, bed mobility training, gait training, home exercise program, patient/family education, strengthening, transfer training  Outcome Evaluation: Pt is a 64 yo M who was admitted with vanomyocin flushing syndrome. Pt with recent hospitalization for sepsis. Pt presents to PT with impaired functional mobility and gait secondary to generalized weakness, impaired balance, and decreased activity tolerance. Pt may benefit from skilled PT to address strength, mobility, and gait.     Time Calculation:         PT Charges       Row Name 04/25/25 1344             Time Calculation    Start Time 1030  -      Stop Time 1043  -      Time Calculation (min) 13 min  -      PT Received On 04/25/25  -      PT - Next Appointment 04/26/25  -      PT Goal Re-Cert Due Date 05/02/25  -         Time Calculation- PT    Total Timed Code Minutes- PT 8 minute(s)  -         Timed Charges    31017 - PT Therapeutic Activity Minutes 8  -CH         Total Minutes    Timed Charges Total Minutes 8  -CH       Total Minutes 8  -CH                User Key  (r) = Recorded By, (t) = Taken By, (c) = Cosigned By      Initials Name Provider Type     Em Ortiz, PT Physical Therapist                   Therapy Charges for Today       Code Description Service Date Service Provider Modifiers Qty    32410886762  PT THERAPEUTIC ACT EA 15 MIN 4/25/2025 Em Ortiz, PT GP 1    03973144654 HC PT EVAL MOD COMPLEXITY 3 4/25/2025 Em Ortiz, PT GP 1            PT G-Codes  Outcome Measure Options: AM-PAC 6 Clicks Basic Mobility (PT)  AM-PAC 6 Clicks Score (PT): 15  PT Discharge Summary  Anticipated Discharge Disposition (PT): skilled nursing facility    Em Ortiz, PT  4/25/2025

## 2025-04-25 NOTE — PLAN OF CARE
Goal Outcome Evaluation:  Plan of Care Reviewed With: patient           Outcome Evaluation: Pt is a 64 yo M who was admitted with vanomyocin flushing syndrome. Pt with recent hospitalization for sepsis. Pt presents to PT with impaired functional mobility and gait secondary to generalized weakness, impaired balance, and decreased activity tolerance. Pt may benefit from skilled PT to address strength, mobility, and gait.    Anticipated Discharge Disposition (PT): skilled nursing facility

## 2025-04-25 NOTE — PLAN OF CARE
Goal Outcome Evaluation:  Plan of Care Reviewed With: patient        Progress: no change  Outcome Evaluation: Pt resting in bed, calm, gets anxious/ tremoring when reaching for something and attempting to sit up, denies itching at this time, eating 25% but is drinking fluids, bs monitored, has not required insulin coverage, up to toilet and voided 400ml, bladder scan pvr 0ml. generalized edema and redness remains. bumex iv given per nephro orders,  RA at this time satting hi 90's. sob with exertion, recovers easily. wife at bsd.

## 2025-04-25 NOTE — PROGRESS NOTES
"    DAILY PROGRESS NOTE  [facility]    Patient Identification:  Name: Arnold Ramírez  Age: 65 y.o.  Sex: male  :  1960  MRN: [id number]         Primary Care Physician: Anand Alford MD    Subjective:  Interval History: Patient has a difficult time answering simple questions and is not due to cognitive decline.  His anxiety is palpable and he even can make comment on it.    Objective: Clinically quite anxious.  Otherwise oriented.  Family at bedside.  Case discussed with managing RN    Scheduled Meds:amLODIPine, 10 mg, Oral, Daily  atorvastatin, 20 mg, Oral, Nightly  bumetanide, 4 mg, Intravenous, Q8H  cefTRIAXone, 2,000 mg, Intravenous, Q24H  diphenhydrAMINE, 25 mg, Oral, Q6H  DULoxetine, 60 mg, Oral, Daily  famotidine, 20 mg, Intravenous, Q12H  insulin lispro, 2-9 Units, Subcutaneous, 4x Daily AC & at Bedtime  magic mouthwash oral suspension (mixture) (diphenhydrAMINE HCl - aluminum & magnesium hydroxide-simethicone - lidocaine viscous) solution 55 mL, 10 mL, Swish & Spit, Q6H  metoprolol succinate XL, 50 mg, Oral, Daily  pregabalin, 150 mg, Oral, BID  sodium chloride, 10 mL, Intravenous, Q12H      Continuous Infusions:     Vital signs in last 24 hours:  Temp:  [98 °F (36.7 °C)-98.1 °F (36.7 °C)] 98.1 °F (36.7 °C)  Heart Rate:  [] 105  Resp:  [16-18] 16  BP: (113-124)/(73-91) 119/74    Intake/Output:    Intake/Output Summary (Last 24 hours) at 2025 1137  Last data filed at 2025 0725  Gross per 24 hour   Intake 1970 ml   Output 850 ml   Net 1120 ml       Exam:  /74 (BP Location: Left arm, Patient Position: Lying)   Pulse 105   Temp 98.1 °F (36.7 °C) (Oral)   Resp 16   Ht 195.6 cm (77\")   Wt (!) 159 kg (350 lb 1.5 oz)   SpO2 98%   BMI 41.51 kg/m²     General Appearance:    Alert, cooperative, nontoxic, AAOx3                          Head:    Normocephalic, without obvious abnormality, atraumatic                           Eyes:    PERRLA both eyes                  " "       Throat: I still cannot appreciate any significant oropharynx lesion; oral mucosa pink and moist                           Neck: Diameter is massive and must be greater than 20 inches                         Lungs:    Clear to auscultation bilaterally, respirations unlabored                 Chest Wall:    No tenderness or deformity                          Heart:    Regular rate and rhythm, S1 and S2 normal                  Abdomen:     Soft, nontender, bowel sounds active, morbidly obese/BMI 42                 extremities: Diffuse anasarca                        Pulses:   Pulses palpable in all extremities                            Skin: Persistent macular rash becoming confluent from trunk to extremities                  Neurologic:   CNII-XII intact, nothing focal      Data Review:  Labs in chart were reviewed.    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       Plan:    Vancomycin induced \"red man\" syndrome upon treatment of strep bacteremia   - Rocephin per Dr. Mendoza with tentative end date 5/6/2025   - 4/23/2025 BC NGTD.  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  Leukocytosis likely reactive will continue to trend   - IV Pepcid.  Attempted IV Benadryl and will change that to p.o. scheduled with IV needed for anything breakthrough   - Marries mouthwash for reports of oral pain but I cannot see any type of oropharynx involvement      Anxiety -I feel this is a significant thing for this patient and amplifies a lot of his " complaints.  He openly admits to the anxiety as well today.  Avoiding oversedation given MARGO/BiPAP dependent with needs addressed per pulmonology while here since he could not bring his BiPAP from home or have anyone else pregnant for him   - Complaints of chest pain yesterday with negative EKG      Agree with IV Bumex per renal today -needs significant volume control/anasarca   - ARF secondary to hypersensitivity inflammatory response/intravascularly dry   - Mild hyponatremia   - Acidosis made worse by anorexia-no sepsis   - Renal consulted and following -agree with uric with a.m. labs        DM2 with hypoglycemia/PN on Lyrica   - Basal regimens discontinued for now and using just sliding scale   - I am to avoid steroids do not cause hyperglycemia and also think they will cause significant increases in generalized anxiety   - BS low 62    HTN-stable on Norvasc and metoprolol    HLD on statin    SCDs for additional prophylaxis      Joseph Thompson MD  4/25/2025  11:37 EDT

## 2025-04-26 LAB
ALBUMIN SERPL-MCNC: 2.6 G/DL (ref 3.5–5.2)
ALBUMIN/GLOB SERPL: 0.9 G/DL
ALP SERPL-CCNC: 91 U/L (ref 39–117)
ALT SERPL W P-5'-P-CCNC: 17 U/L (ref 1–41)
ANION GAP SERPL CALCULATED.3IONS-SCNC: 12.2 MMOL/L (ref 5–15)
AST SERPL-CCNC: 18 U/L (ref 1–40)
BASOPHILS # BLD MANUAL: 0 10*3/MM3 (ref 0–0.2)
BASOPHILS NFR BLD MANUAL: 0 % (ref 0–1.5)
BILIRUB SERPL-MCNC: 0.5 MG/DL (ref 0–1.2)
BUN SERPL-MCNC: 36 MG/DL (ref 8–23)
BUN/CREAT SERPL: 12.5 (ref 7–25)
CALCIUM SPEC-SCNC: 7.8 MG/DL (ref 8.6–10.5)
CHLORIDE SERPL-SCNC: 96 MMOL/L (ref 98–107)
CO2 SERPL-SCNC: 19.8 MMOL/L (ref 22–29)
CREAT SERPL-MCNC: 2.87 MG/DL (ref 0.76–1.27)
DEPRECATED RDW RBC AUTO: 44.2 FL (ref 37–54)
EGFRCR SERPLBLD CKD-EPI 2021: 23.9 ML/MIN/1.73
EOSINOPHIL # BLD MANUAL: 1.34 10*3/MM3 (ref 0–0.4)
EOSINOPHIL NFR BLD MANUAL: 10.2 % (ref 0.3–6.2)
ERYTHROCYTE [DISTWIDTH] IN BLOOD BY AUTOMATED COUNT: 15.2 % (ref 12.3–15.4)
GLOBULIN UR ELPH-MCNC: 2.9 GM/DL
GLUCOSE BLDC GLUCOMTR-MCNC: 151 MG/DL (ref 70–130)
GLUCOSE BLDC GLUCOMTR-MCNC: 298 MG/DL (ref 70–130)
GLUCOSE BLDC GLUCOMTR-MCNC: 315 MG/DL (ref 70–130)
GLUCOSE BLDC GLUCOMTR-MCNC: 360 MG/DL (ref 70–130)
GLUCOSE SERPL-MCNC: 163 MG/DL (ref 65–99)
HCT VFR BLD AUTO: 41.9 % (ref 37.5–51)
HGB BLD-MCNC: 13.4 G/DL (ref 13–17.7)
LYMPHOCYTES # BLD MANUAL: 1.21 10*3/MM3 (ref 0.7–3.1)
LYMPHOCYTES NFR BLD MANUAL: 7.1 % (ref 5–12)
MAGNESIUM SERPL-MCNC: 1.8 MG/DL (ref 1.6–2.4)
MCH RBC QN AUTO: 25.9 PG (ref 26.6–33)
MCHC RBC AUTO-ENTMCNC: 32 G/DL (ref 31.5–35.7)
MCV RBC AUTO: 81 FL (ref 79–97)
MONOCYTES # BLD: 0.93 10*3/MM3 (ref 0.1–0.9)
NEUTROPHILS # BLD AUTO: 9.65 10*3/MM3 (ref 1.7–7)
NEUTROPHILS NFR BLD MANUAL: 73.5 % (ref 42.7–76)
PHOSPHATE SERPL-MCNC: 3.8 MG/DL (ref 2.5–4.5)
PLAT MORPH BLD: NORMAL
PLATELET # BLD AUTO: 246 10*3/MM3 (ref 140–450)
PMV BLD AUTO: 9.7 FL (ref 6–12)
POTASSIUM SERPL-SCNC: 4.3 MMOL/L (ref 3.5–5.2)
PROT SERPL-MCNC: 5.5 G/DL (ref 6–8.5)
RBC # BLD AUTO: 5.17 10*6/MM3 (ref 4.14–5.8)
RBC MORPH BLD: NORMAL
SMUDGE CELLS BLD QL SMEAR: ABNORMAL
SODIUM SERPL-SCNC: 128 MMOL/L (ref 136–145)
URATE SERPL-MCNC: 9.6 MG/DL (ref 3.4–7)
VARIANT LYMPHS NFR BLD MANUAL: 9.2 % (ref 19.6–45.3)
WBC NRBC COR # BLD AUTO: 13.13 10*3/MM3 (ref 3.4–10.8)

## 2025-04-26 PROCEDURE — 85025 COMPLETE CBC W/AUTO DIFF WBC: CPT | Performed by: INTERNAL MEDICINE

## 2025-04-26 PROCEDURE — 84550 ASSAY OF BLOOD/URIC ACID: CPT | Performed by: INTERNAL MEDICINE

## 2025-04-26 PROCEDURE — 25010000002 CEFTRIAXONE PER 250 MG: Performed by: HOSPITALIST

## 2025-04-26 PROCEDURE — 63710000001 DIPHENHYDRAMINE PER 50 MG: Performed by: HOSPITALIST

## 2025-04-26 PROCEDURE — 25010000002 FAMOTIDINE 10 MG/ML SOLUTION: Performed by: NURSE PRACTITIONER

## 2025-04-26 PROCEDURE — 63710000001 INSULIN LISPRO (HUMAN) PER 5 UNITS: Performed by: NURSE PRACTITIONER

## 2025-04-26 PROCEDURE — 82948 REAGENT STRIP/BLOOD GLUCOSE: CPT

## 2025-04-26 PROCEDURE — 84100 ASSAY OF PHOSPHORUS: CPT | Performed by: INTERNAL MEDICINE

## 2025-04-26 PROCEDURE — 85007 BL SMEAR W/DIFF WBC COUNT: CPT | Performed by: INTERNAL MEDICINE

## 2025-04-26 PROCEDURE — 83735 ASSAY OF MAGNESIUM: CPT | Performed by: INTERNAL MEDICINE

## 2025-04-26 PROCEDURE — 25010000002 BUMETANIDE PER 0.5 MG: Performed by: INTERNAL MEDICINE

## 2025-04-26 PROCEDURE — 80053 COMPREHEN METABOLIC PANEL: CPT | Performed by: INTERNAL MEDICINE

## 2025-04-26 RX ORDER — ECHINACEA PURPUREA EXTRACT 125 MG
2 TABLET ORAL AS NEEDED
Status: DISCONTINUED | OUTPATIENT
Start: 2025-04-26 | End: 2025-05-03 | Stop reason: HOSPADM

## 2025-04-26 RX ORDER — AMLODIPINE BESYLATE 5 MG/1
5 TABLET ORAL DAILY
Status: DISCONTINUED | OUTPATIENT
Start: 2025-04-27 | End: 2025-04-29

## 2025-04-26 RX ORDER — TORSEMIDE 100 MG/1
100 TABLET ORAL DAILY
Status: DISCONTINUED | OUTPATIENT
Start: 2025-04-26 | End: 2025-04-28

## 2025-04-26 RX ADMIN — DIPHENHYDRAMINE HYDROCHLORIDE 10 ML: 25 SOLUTION ORAL at 23:25

## 2025-04-26 RX ADMIN — HYDROCODONE BITARTRATE AND ACETAMINOPHEN 1 TABLET: 7.5; 325 TABLET ORAL at 17:35

## 2025-04-26 RX ADMIN — HYDROCODONE BITARTRATE AND ACETAMINOPHEN 1 TABLET: 7.5; 325 TABLET ORAL at 23:25

## 2025-04-26 RX ADMIN — DIPHENHYDRAMINE HYDROCHLORIDE 10 ML: 25 SOLUTION ORAL at 09:20

## 2025-04-26 RX ADMIN — INSULIN LISPRO 8 UNITS: 100 INJECTION, SOLUTION INTRAVENOUS; SUBCUTANEOUS at 11:54

## 2025-04-26 RX ADMIN — METOPROLOL SUCCINATE 50 MG: 25 TABLET, EXTENDED RELEASE ORAL at 09:20

## 2025-04-26 RX ADMIN — PREGABALIN 150 MG: 75 CAPSULE ORAL at 09:20

## 2025-04-26 RX ADMIN — Medication 10 ML: at 09:20

## 2025-04-26 RX ADMIN — INSULIN LISPRO 7 UNITS: 100 INJECTION, SOLUTION INTRAVENOUS; SUBCUTANEOUS at 17:35

## 2025-04-26 RX ADMIN — FAMOTIDINE 20 MG: 10 INJECTION INTRAVENOUS at 20:11

## 2025-04-26 RX ADMIN — DIPHENHYDRAMINE HYDROCHLORIDE 25 MG: 25 CAPSULE ORAL at 06:07

## 2025-04-26 RX ADMIN — ACETAMINOPHEN 650 MG: 325 TABLET, FILM COATED ORAL at 03:40

## 2025-04-26 RX ADMIN — PREGABALIN 150 MG: 75 CAPSULE ORAL at 20:11

## 2025-04-26 RX ADMIN — METHOCARBAMOL TABLETS 750 MG: 500 TABLET, COATED ORAL at 03:40

## 2025-04-26 RX ADMIN — ATORVASTATIN CALCIUM 20 MG: 20 TABLET, FILM COATED ORAL at 20:11

## 2025-04-26 RX ADMIN — DULOXETINE 60 MG: 30 CAPSULE, DELAYED RELEASE ORAL at 09:20

## 2025-04-26 RX ADMIN — DIPHENHYDRAMINE HYDROCHLORIDE 25 MG: 25 CAPSULE ORAL at 11:54

## 2025-04-26 RX ADMIN — CEFTRIAXONE 2000 MG: 2 INJECTION, POWDER, FOR SOLUTION INTRAMUSCULAR; INTRAVENOUS at 20:11

## 2025-04-26 RX ADMIN — AMLODIPINE BESYLATE 10 MG: 10 TABLET ORAL at 09:20

## 2025-04-26 RX ADMIN — TORSEMIDE 100 MG: 100 TABLET ORAL at 11:54

## 2025-04-26 RX ADMIN — BUMETANIDE 4 MG: 0.25 INJECTION INTRAMUSCULAR; INTRAVENOUS at 06:07

## 2025-04-26 RX ADMIN — Medication 10 ML: at 20:13

## 2025-04-26 RX ADMIN — FAMOTIDINE 20 MG: 10 INJECTION INTRAVENOUS at 09:20

## 2025-04-26 RX ADMIN — DIPHENHYDRAMINE HYDROCHLORIDE 25 MG: 25 CAPSULE ORAL at 17:35

## 2025-04-26 RX ADMIN — INSULIN LISPRO 6 UNITS: 100 INJECTION, SOLUTION INTRAVENOUS; SUBCUTANEOUS at 23:25

## 2025-04-26 NOTE — PROGRESS NOTES
"  Infectious Diseases Progress Note        [facility]  Los: 3 days  Patient Identification:  Name: Arnold Ramírez  Age: 65 y.o.  Sex: male  :  1960  MRN: [id number]         Primary Care Physician: Anand Alford MD        Subjective: Complaining of being anxious shaky and sweaty.  She did have a low blood sugar episode which is corrected.  Denies any fever.  Back pain is improving.  Itching is better.  Interval History: See consultation note.    Objective:    Scheduled Meds:[START ON 2025] amLODIPine, 5 mg, Oral, Daily  atorvastatin, 20 mg, Oral, Nightly  cefTRIAXone, 2,000 mg, Intravenous, Q24H  diphenhydrAMINE, 25 mg, Oral, Q6H  DULoxetine, 60 mg, Oral, Daily  famotidine, 20 mg, Intravenous, Q12H  insulin lispro, 2-9 Units, Subcutaneous, 4x Daily AC & at Bedtime  magic mouthwash oral suspension (mixture) (diphenhydrAMINE HCl - aluminum & magnesium hydroxide-simethicone - lidocaine viscous) solution 55 mL, 10 mL, Swish & Spit, Q6H  metoprolol succinate XL, 50 mg, Oral, Daily  pregabalin, 150 mg, Oral, BID  sodium chloride, 10 mL, Intravenous, Q12H  torsemide, 100 mg, Oral, Daily      Continuous Infusions:     Vital signs in last 24 hours:  Temp:  [97.3 °F (36.3 °C)-99 °F (37.2 °C)] 97.8 °F (36.6 °C)  Heart Rate:  [80-97] 97  Resp:  [18] 18  BP: (116-131)/(63-82) 123/75    Intake/Output:    Intake/Output Summary (Last 24 hours) at 2025 1309  Last data filed at 2025 1200  Gross per 24 hour   Intake 1375 ml   Output 900 ml   Net 475 ml       Exam:  /75 (BP Location: Right arm, Patient Position: Lying)   Pulse 97   Temp 97.8 °F (36.6 °C) (Oral)   Resp 18   Ht 195.6 cm (77\")   Wt (!) 159 kg (350 lb 4.8 oz)   SpO2 98%   BMI 41.54 kg/m²   Patient is examined using the personal protective equipment as per guidelines from infection control for this particular patient as enacted.  Hand washing was performed before and after patient interaction.  General Appearance:    " Alert, cooperative, no distress, AAOx3                          Head:    Normocephalic, without obvious abnormality, atraumatic                           Eyes:    PERRL, conjunctivae/corneas clear, EOM's intact, both eyes                         Throat:   Lips, tongue, gums normal; oral mucosa pink and moist                           Neck:   Supple, symmetrical, trachea midline, no JVD                         Lungs:    Clear to auscultation bilaterally, respirations unlabored                 Chest Wall:    No tenderness or deformity                          Heart:  1 S2 regular                  Abdomen:   B soft nontender                 Extremities:   Extremities normal, atraumatic, no cyanosis or edema                        Pulses:   Pulses palpable in all extremities                            Skin: Has drug eruption due to delayed hypersensitivity to vancomycin.  Generalized body wall edema noted.                  Neurologic: Alert and oriented x 3       Data Review:    I reviewed the patient's new clinical results.  Results from last 7 days   Lab Units 04/26/25  0601 04/25/25  0747 04/24/25  0944 04/23/25  0609 04/22/25  0537 04/21/25  1313   WBC 10*3/mm3 13.13* 16.04* 13.82* 12.43* 10.09 11.12*   HEMOGLOBIN g/dL 13.4 14.6 13.5 13.6 12.7* 12.8*   PLATELETS 10*3/mm3 246 325 248 284 299 291     Results from last 7 days   Lab Units 04/26/25  0601 04/25/25  0747 04/24/25  0944 04/23/25  0609 04/22/25  0537 04/21/25  1313   SODIUM mmol/L 128* 133* 131* 131* 135* 137   POTASSIUM mmol/L 4.3 5.2 4.8 4.2 4.4 5.2   CHLORIDE mmol/L 96* 99 99 101 101 102   CO2 mmol/L 19.8* 19.3* 19.4* 21.6* 25.0 24.3   BUN mg/dL 36* 31* 28* 21 14 13   CREATININE mg/dL 2.87* 2.57* 2.46* 2.06* 1.88* 1.74*   CALCIUM mg/dL 7.8* 8.2* 8.0* 8.3* 8.5* 8.7   GLUCOSE mg/dL 163* 62* 159* 108* 123* 179*     Microbiology Results (last 10 days)       Procedure Component Value - Date/Time    Clostridioides difficile Toxin - Stool, Per Rectum [[id number]]   (Normal) Collected: 04/24/25 0008    Lab Status: Final result Specimen: Stool from Per Rectum Updated: 04/24/25 0204    Narrative:      The following orders were created for panel order Clostridioides difficile Toxin - Stool, Per Rectum.  Procedure                               Abnormality         Status                     ---------                               -----------         ------                     Clostridioides difficile...[[id number]]  Normal              Final result                 Please view results for these tests on the individual orders.    Clostridioides difficile Toxin, PCR - Stool, Per Rectum [[id number]]  (Normal) Collected: 04/24/25 0008    Lab Status: Final result Specimen: Stool from Per Rectum Updated: 04/24/25 0204     Toxigenic C. difficile by PCR Negative    Narrative:      The result indicates the absence of toxigenic C. difficile from stool specimen.     Blood Culture - Blood, Hand, Right [[id number]]  (Normal) Collected: 04/23/25 1200    Lab Status: Preliminary result Specimen: Blood from Hand, Right Updated: 04/26/25 1230     Blood Culture No growth at 3 days    Blood Culture - Blood, Hand, Right [[id number]]  (Normal) Collected: 04/23/25 1017    Lab Status: Preliminary result Specimen: Blood from Hand, Right Updated: 04/26/25 1045     Blood Culture No growth at 3 days    Eosinophil Smear - Urine, Urine, Clean Catch [[id number]]  (Normal) Collected: 04/22/25 1314    Lab Status: Final result Specimen: Urine, Clean Catch Updated: 04/22/25 1544     Eosinophil Smear 0 % EOS/100 Cells               Assessment:    Vancomycin flushing syndrome    Streptococcal bacteremia    Obesity    Dyslipidemia    HTN (hypertension)    MRSA colonization    Type 2 diabetes mellitus, with long-term current use of insulin    Diabetic peripheral neuropathy    ROCKY (acute kidney injury)  65-year-old male with    1-systemic vancomycin delayed hypersensitivity reaction with skin rash and renal  insufficiency  2-acute kidney injury probably secondary to combination of factors including vancomycin  3-history of MRSA colonization  4-history of C. difficile diarrhea with colonization    5-history of strep intermedius bacteremia sepsis with paraspinal abscess and facet septic arthritis clinically improved with resolution of symptoms on course to finish his antibiotic treatment on 5/6/2025    6-poorly controlled diabetes  7-morbid obesity  8-hypertension  9-other diagnoses per primary team.     Recommendations/Discussions:  See discussion and recommendation on 4/22/2025  Continue IV ceftriaxone  Upon blood cultures, stool studies are negative for C. difficile.  Supportive care for renal insufficiency, avoidance of nephrotoxic agents and periodic review of systems identify other areas of secondary seeding due to strep intermedius bacteremia and possibility of line sepsis as well as recurrence of C. difficile infection in the setting of known MRSA colonization and C. difficile colonization.  Jaleesa Pryor MD  4/26/2025  13:09 EDT    Parts of this note may be an electronic transcription/translation of spoken language to printed text using the Dragon dictation system.

## 2025-04-26 NOTE — PROGRESS NOTES
"    DAILY PROGRESS NOTE  [facility]    Patient Identification:  Name: Arnold Ramírez  Age: 65 y.o.  Sex: male  :  1960  MRN: [id number]         Primary Care Physician: Anand Alford MD    Subjective:  Interval History: Feeling a bit better.  A bit more open with anxieties and they seem better today.  He is actually starting to feel little bit better.  Some of his swelling is led to some blistering.  Eating and drinking a bit better and again no lesions have been noted in the oropharynx.  Has been on Amitive for the last couple days.  Counseled compliance with BiPAP at bedside.  Denies fever nausea vomiting    Objective: More relaxed today.  Less jittery.  No family present.  RN at bedside and case discussed    Scheduled Meds:[START ON 2025] amLODIPine, 5 mg, Oral, Daily  atorvastatin, 20 mg, Oral, Nightly  cefTRIAXone, 2,000 mg, Intravenous, Q24H  diphenhydrAMINE, 25 mg, Oral, Q6H  DULoxetine, 60 mg, Oral, Daily  famotidine, 20 mg, Intravenous, Q12H  insulin lispro, 2-9 Units, Subcutaneous, 4x Daily AC & at Bedtime  magic mouthwash oral suspension (mixture) (diphenhydrAMINE HCl - aluminum & magnesium hydroxide-simethicone - lidocaine viscous) solution 55 mL, 10 mL, Swish & Spit, Q6H  metoprolol succinate XL, 50 mg, Oral, Daily  pregabalin, 150 mg, Oral, BID  sodium chloride, 10 mL, Intravenous, Q12H  torsemide, 100 mg, Oral, Daily      Continuous Infusions:     Vital signs in last 24 hours:  Temp:  [97.3 °F (36.3 °C)-99 °F (37.2 °C)] 97.3 °F (36.3 °C)  Heart Rate:  [80-90] 80  Resp:  [18] 18  BP: (116-131)/(63-82) 116/66    Intake/Output:    Intake/Output Summary (Last 24 hours) at 2025 1036  Last data filed at 2025 0845  Gross per 24 hour   Intake 880 ml   Output 800 ml   Net 80 ml       Exam:  /66 (BP Location: Left arm, Patient Position: Lying)   Pulse 80   Temp 97.3 °F (36.3 °C) (Oral)   Resp 18   Ht 195.6 cm (77\")   Wt (!) 159 kg (350 lb 4.8 oz)   SpO2 " "99%   BMI 41.54 kg/m²     General Appearance:    Alert, cooperative, nontoxic, more relaxed, AAOx3                         Throat: No oropharynx involvement; oral mucosa pink and moist                           Neck: Large diameter/unable to appreciate JVD                         Lungs:    Clear to auscultation bilaterally, respirations unlabored                 Chest Wall:    No tenderness or deformity                          Heart:    Regular rate and rhythm, S1 and S2 normal                  Abdomen:     Soft, nontender, bowel sounds active, MO/BMI 42                 extremities: Moving all, 2-3+ edema                        Pulses:   Pulses palpable in all extremities                            Skin: Macular rash becoming more confluent with some blistering but no aspects of cellulitis at this time     Data Review:  Labs in chart were reviewed.    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       Plan:    Vancomycin induced \"red man\" syndrome upon treatment of strep bacteremia              - Rocephin per Dr. Mendoza with tentative end date 5/6/2025              - 4/23/2025 BC NGTD.  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  Leukocytosis likely reactive will continue to trend              - IV Pepcid.  Attempted IV Benadryl and will change that to p.o. scheduled with IV needed for anything breakthrough              - Kaylynn's mouthwash for reports of oral pain but I cannot see any type of oropharynx involvement        Anxiety -I feel " this is a significant thing for this patient and amplifies a lot of his complaints.  He openly admits to the anxiety as well today.  Avoiding oversedation given MARGO/BiPAP dependent with needs addressed per pulmonology while here since he could not bring his BiPAP from home or have anyone else pregnant for him                    Diuretics/decreasing amlodipine per renal today -w/ significant volume control/anasarca              - ARF secondary to hypersensitivity inflammatory response/intravascularly dry              - Mild hyponatremia worsening to 128              - Acidosis made worse by anorexia-no sepsis              - Renal managing fluid balance.  Uric 9.6   - Counseled RN local wound care for blisters           DM2 with hypoglycemia/PN on Lyrica              - Basal regimens discontinued for now and using just sliding scale              - I want to avoid steroids do not cause hyperglycemia and also think they will cause significant increases in generalized anxiety              - BS quite stable in the 100s for today     HTN-stable on Norvasc (decreased) and metoprolol     HLD on statin     SCDs for additional prophylaxis      Disposition: Patient proactive to want a go home but I imagine he is can remain in house multiple more days      Addendum: RN called about further swelling of extremities and I counseled Curlex Ace wrap and elevation as well as to make nephrology aware since they are assisting with fluid balance    Joseph Thompson MD  4/26/2025  10:36 EDT

## 2025-04-26 NOTE — PROGRESS NOTES
[facility] Progress Note      Patient Name: Arnold Ramírez  : 1960  MRN: [id number]  Primary Care Physician:  Anand Alford MD  Date of admission: 2025    Subjective     Interval History:   Follow-up acute kidney injury.     Patient overall feeling little better  Still has diffuse erythema.  Denies any chest pain, nausea or vomiting    Review of Systems:   As noted above    Objective     Vitals:   Temp:  [97.3 °F (36.3 °C)-99 °F (37.2 °C)] 97.3 °F (36.3 °C)  Heart Rate:  [80-90] 80  Resp:  [18] 18  BP: (116-131)/(63-82) 116/66    Intake/Output Summary (Last 24 hours) at 2025 0954  Last data filed at 2025 0845  Gross per 24 hour   Intake 880 ml   Output 800 ml   Net 80 ml       Physical Exam:    General Appearance: Awake, alert, chronically ill, morbidly obese, in moderate distress.  Skin: Significant diffuse total body erythema purpuric areas especially on his feet..  HEENT: oral mucosa dry, nonicteric sclera  Neck: No JVD  Lungs: Clear to auscultation bilaterally.  Unlabored on room air  Heart: RRR, normal S1 and S2.    Abdomen: soft, obese nontender, distended. +bs  : no palpable bladder  Extremities: 4+ upper and lower extremity edema  Neuro: normal speech and mental status     Scheduled Meds:     amLODIPine, 10 mg, Oral, Daily  atorvastatin, 20 mg, Oral, Nightly  cefTRIAXone, 2,000 mg, Intravenous, Q24H  diphenhydrAMINE, 25 mg, Oral, Q6H  DULoxetine, 60 mg, Oral, Daily  famotidine, 20 mg, Intravenous, Q12H  insulin lispro, 2-9 Units, Subcutaneous, 4x Daily AC & at Bedtime  magic mouthwash oral suspension (mixture) (diphenhydrAMINE HCl - aluminum & magnesium hydroxide-simethicone - lidocaine viscous) solution 55 mL, 10 mL, Swish & Spit, Q6H  metoprolol succinate XL, 50 mg, Oral, Daily  pregabalin, 150 mg, Oral, BID  sodium chloride, 10 mL, Intravenous, Q12H      IV Meds:        Results Reviewed:   I have personally reviewed the results from the time of  this admission to 4/26/2025 09:54 EDT     Results from last 7 days   Lab Units 04/26/25  0601 04/25/25  0747 04/24/25  0944 04/23/25  0609 04/22/25  0537   SODIUM mmol/L 128* 133* 131*   < > 135*   POTASSIUM mmol/L 4.3 5.2 4.8   < > 4.4   CHLORIDE mmol/L 96* 99 99   < > 101   CO2 mmol/L 19.8* 19.3* 19.4*   < > 25.0   BUN mg/dL 36* 31* 28*   < > 14   CREATININE mg/dL 2.87* 2.57* 2.46*   < > 1.88*   CALCIUM mg/dL 7.8* 8.2* 8.0*   < > 8.5*   BILIRUBIN mg/dL 0.5  --  0.4  --  0.5   ALK PHOS U/L 91  --  127*  --  153*   ALT (SGPT) U/L 17  --  28  --  29   AST (SGOT) U/L 18  --  31  --  21   GLUCOSE mg/dL 163* 62* 159*   < > 123*    < > = values in this interval not displayed.       Estimated Creatinine Clearance: 43.6 mL/min (A) (by C-G formula based on SCr of 2.87 mg/dL (H)).    Results from last 7 days   Lab Units 04/26/25  0601 04/25/25  0747 04/24/25  0944 04/23/25  0609   MAGNESIUM mg/dL 1.8  --  1.7 1.8   PHOSPHORUS mg/dL 3.8 3.9 4.0 3.5       Results from last 7 days   Lab Units 04/26/25  0601 04/23/25  0609 04/22/25  0537   URIC ACID mg/dL 9.6* 6.2 5.7       Results from last 7 days   Lab Units 04/26/25  0601 04/25/25  0747 04/24/25  0944 04/23/25  0609 04/22/25  0537   WBC 10*3/mm3 13.13* 16.04* 13.82* 12.43* 10.09   HEMOGLOBIN g/dL 13.4 14.6 13.5 13.6 12.7*   PLATELETS 10*3/mm3 246 325 248 284 299       Results from last 7 days   Lab Units 04/21/25  1313   INR  1.16*       Assessment / Plan     ASSESSMENT:  Acute kidney injury related to acute interstitial nephritis related to delayed hypersensitivity reaction to vancomycin and being on ARB and inability to autoregulate.  Creatinine slightly increased to 2.87 this morning with diuresis.  Still have significant edema. mild hyponatremia with sodium 128 with volume excess.  2.  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    3.  Strep bacteremia with lumbar spinal abscess and facet septic arthritis.  Back pain improved.  Followed by ID  4.  Diabetes mellitus  type 2.  Peripheral neuropathy.  Managed by primary team  5.  Morbid obesity.     PLAN:  Status post IV Bumex  Start torsemide  Monitor for diuretic toxicity  Decrease amlodipine dose  Surveillance labs    Reviewed the chart and other providers notes, reviewed labs.  I discussed the case with the patient.  Copied text in this note has been reviewed and is accurate as of 04/26/25.     Thank you for involving us in the care of Arnold Ramírez.  Please feel free to call with any questions.    Marquise Zapata MD  04/26/25  09:54 EDT    [facility]  [phone]    Please note that portions of this note were completed with a voice recognition program.

## 2025-04-26 NOTE — PLAN OF CARE
Goal Outcome Evaluation:   Patient alert follows commands assist x1 top bathroom. Prn pain med and muscle relaxer given. No nausea noted. Iv bumex given. Bipap on at night. Only wore bipap for a couple of hours. No acute distress noted will continue to monitor

## 2025-04-27 LAB
ALBUMIN SERPL-MCNC: 2.8 G/DL (ref 3.5–5.2)
ALBUMIN SERPL-MCNC: 2.8 G/DL (ref 3.5–5.2)
ALBUMIN/GLOB SERPL: 1 G/DL
ALP SERPL-CCNC: 97 U/L (ref 39–117)
ALT SERPL W P-5'-P-CCNC: 18 U/L (ref 1–41)
ANION GAP SERPL CALCULATED.3IONS-SCNC: 14 MMOL/L (ref 5–15)
ANION GAP SERPL CALCULATED.3IONS-SCNC: 14.2 MMOL/L (ref 5–15)
ANISOCYTOSIS BLD QL: ABNORMAL
AST SERPL-CCNC: 18 U/L (ref 1–40)
BASOPHILS # BLD MANUAL: 0 10*3/MM3 (ref 0–0.2)
BASOPHILS NFR BLD MANUAL: 0 % (ref 0–1.5)
BILIRUB SERPL-MCNC: 0.3 MG/DL (ref 0–1.2)
BUN SERPL-MCNC: 35 MG/DL (ref 8–23)
BUN SERPL-MCNC: 36 MG/DL (ref 8–23)
BUN/CREAT SERPL: 13.7 (ref 7–25)
BUN/CREAT SERPL: 14.3 (ref 7–25)
CALCIUM SPEC-SCNC: 7.6 MG/DL (ref 8.6–10.5)
CALCIUM SPEC-SCNC: 7.7 MG/DL (ref 8.6–10.5)
CHLORIDE SERPL-SCNC: 92 MMOL/L (ref 98–107)
CHLORIDE SERPL-SCNC: 95 MMOL/L (ref 98–107)
CO2 SERPL-SCNC: 22.8 MMOL/L (ref 22–29)
CO2 SERPL-SCNC: 23 MMOL/L (ref 22–29)
CREAT SERPL-MCNC: 2.52 MG/DL (ref 0.76–1.27)
CREAT SERPL-MCNC: 2.55 MG/DL (ref 0.76–1.27)
DEPRECATED RDW RBC AUTO: 42.2 FL (ref 37–54)
EGFRCR SERPLBLD CKD-EPI 2021: 27.5 ML/MIN/1.73
EGFRCR SERPLBLD CKD-EPI 2021: 27.9 ML/MIN/1.73
EOSINOPHIL # BLD MANUAL: 1.87 10*3/MM3 (ref 0–0.4)
EOSINOPHIL NFR BLD MANUAL: 14.3 % (ref 0.3–6.2)
ERYTHROCYTE [DISTWIDTH] IN BLOOD BY AUTOMATED COUNT: 14.4 % (ref 12.3–15.4)
GLOBULIN UR ELPH-MCNC: 2.7 GM/DL
GLUCOSE BLDC GLUCOMTR-MCNC: 207 MG/DL (ref 70–130)
GLUCOSE BLDC GLUCOMTR-MCNC: 285 MG/DL (ref 70–130)
GLUCOSE BLDC GLUCOMTR-MCNC: 299 MG/DL (ref 70–130)
GLUCOSE BLDC GLUCOMTR-MCNC: 373 MG/DL (ref 70–130)
GLUCOSE SERPL-MCNC: 218 MG/DL (ref 65–99)
GLUCOSE SERPL-MCNC: 222 MG/DL (ref 65–99)
HCT VFR BLD AUTO: 38.2 % (ref 37.5–51)
HGB BLD-MCNC: 12.2 G/DL (ref 13–17.7)
LYMPHOCYTES # BLD MANUAL: 2 10*3/MM3 (ref 0.7–3.1)
LYMPHOCYTES NFR BLD MANUAL: 6.1 % (ref 5–12)
MCH RBC QN AUTO: 25.6 PG (ref 26.6–33)
MCHC RBC AUTO-ENTMCNC: 31.9 G/DL (ref 31.5–35.7)
MCV RBC AUTO: 80.3 FL (ref 79–97)
MONOCYTES # BLD: 0.8 10*3/MM3 (ref 0.1–0.9)
NEUTROPHILS # BLD AUTO: 8.4 10*3/MM3 (ref 1.7–7)
NEUTROPHILS NFR BLD MANUAL: 64.3 % (ref 42.7–76)
PHOSPHATE SERPL-MCNC: 3.4 MG/DL (ref 2.5–4.5)
PLAT MORPH BLD: NORMAL
PLATELET # BLD AUTO: 276 10*3/MM3 (ref 140–450)
PMV BLD AUTO: 9.7 FL (ref 6–12)
POTASSIUM SERPL-SCNC: 3.5 MMOL/L (ref 3.5–5.2)
POTASSIUM SERPL-SCNC: 3.5 MMOL/L (ref 3.5–5.2)
PROT SERPL-MCNC: 5.5 G/DL (ref 6–8.5)
RBC # BLD AUTO: 4.76 10*6/MM3 (ref 4.14–5.8)
SMUDGE CELLS BLD QL SMEAR: ABNORMAL
SODIUM SERPL-SCNC: 129 MMOL/L (ref 136–145)
SODIUM SERPL-SCNC: 132 MMOL/L (ref 136–145)
URATE SERPL-MCNC: 10.2 MG/DL (ref 3.4–7)
VARIANT LYMPHS NFR BLD MANUAL: 15.3 % (ref 19.6–45.3)
WBC NRBC COR # BLD AUTO: 13.06 10*3/MM3 (ref 3.4–10.8)

## 2025-04-27 PROCEDURE — 63710000001 INSULIN GLARGINE PER 5 UNITS: Performed by: HOSPITALIST

## 2025-04-27 PROCEDURE — 97530 THERAPEUTIC ACTIVITIES: CPT

## 2025-04-27 PROCEDURE — 84550 ASSAY OF BLOOD/URIC ACID: CPT | Performed by: HOSPITALIST

## 2025-04-27 PROCEDURE — 63710000001 DIPHENHYDRAMINE PER 50 MG: Performed by: HOSPITALIST

## 2025-04-27 PROCEDURE — 82948 REAGENT STRIP/BLOOD GLUCOSE: CPT

## 2025-04-27 PROCEDURE — 84100 ASSAY OF PHOSPHORUS: CPT | Performed by: INTERNAL MEDICINE

## 2025-04-27 PROCEDURE — 25010000002 FAMOTIDINE 10 MG/ML SOLUTION: Performed by: NURSE PRACTITIONER

## 2025-04-27 PROCEDURE — 85007 BL SMEAR W/DIFF WBC COUNT: CPT | Performed by: HOSPITALIST

## 2025-04-27 PROCEDURE — 80053 COMPREHEN METABOLIC PANEL: CPT | Performed by: INTERNAL MEDICINE

## 2025-04-27 PROCEDURE — 63710000001 INSULIN LISPRO (HUMAN) PER 5 UNITS: Performed by: NURSE PRACTITIONER

## 2025-04-27 PROCEDURE — 25010000002 CEFTRIAXONE PER 250 MG: Performed by: HOSPITALIST

## 2025-04-27 PROCEDURE — 85025 COMPLETE CBC W/AUTO DIFF WBC: CPT | Performed by: HOSPITALIST

## 2025-04-27 RX ORDER — FAMOTIDINE 20 MG/1
20 TABLET, FILM COATED ORAL
Status: DISCONTINUED | OUTPATIENT
Start: 2025-04-27 | End: 2025-05-03 | Stop reason: HOSPADM

## 2025-04-27 RX ADMIN — INSULIN GLARGINE 25 UNITS: 100 INJECTION, SOLUTION SUBCUTANEOUS at 08:43

## 2025-04-27 RX ADMIN — INSULIN LISPRO 4 UNITS: 100 INJECTION, SOLUTION INTRAVENOUS; SUBCUTANEOUS at 08:43

## 2025-04-27 RX ADMIN — TORSEMIDE 100 MG: 100 TABLET ORAL at 08:43

## 2025-04-27 RX ADMIN — PREGABALIN 150 MG: 75 CAPSULE ORAL at 08:43

## 2025-04-27 RX ADMIN — DIPHENHYDRAMINE HYDROCHLORIDE 10 ML: 25 SOLUTION ORAL at 14:35

## 2025-04-27 RX ADMIN — INSULIN LISPRO 8 UNITS: 100 INJECTION, SOLUTION INTRAVENOUS; SUBCUTANEOUS at 17:04

## 2025-04-27 RX ADMIN — HYDROCODONE BITARTRATE AND ACETAMINOPHEN 1 TABLET: 7.5; 325 TABLET ORAL at 12:00

## 2025-04-27 RX ADMIN — DIPHENHYDRAMINE HYDROCHLORIDE 25 MG: 25 CAPSULE ORAL at 12:00

## 2025-04-27 RX ADMIN — DIPHENHYDRAMINE HYDROCHLORIDE 25 MG: 25 CAPSULE ORAL at 19:19

## 2025-04-27 RX ADMIN — CEFTRIAXONE 2000 MG: 2 INJECTION, POWDER, FOR SOLUTION INTRAMUSCULAR; INTRAVENOUS at 20:39

## 2025-04-27 RX ADMIN — DIPHENHYDRAMINE HYDROCHLORIDE 25 MG: 25 CAPSULE ORAL at 23:45

## 2025-04-27 RX ADMIN — AMLODIPINE BESYLATE 5 MG: 5 TABLET ORAL at 08:43

## 2025-04-27 RX ADMIN — INSULIN LISPRO 6 UNITS: 100 INJECTION, SOLUTION INTRAVENOUS; SUBCUTANEOUS at 22:14

## 2025-04-27 RX ADMIN — DULOXETINE 60 MG: 30 CAPSULE, DELAYED RELEASE ORAL at 08:43

## 2025-04-27 RX ADMIN — PREGABALIN 150 MG: 75 CAPSULE ORAL at 20:39

## 2025-04-27 RX ADMIN — INSULIN GLARGINE 25 UNITS: 100 INJECTION, SOLUTION SUBCUTANEOUS at 22:14

## 2025-04-27 RX ADMIN — FAMOTIDINE 20 MG: 10 INJECTION INTRAVENOUS at 08:43

## 2025-04-27 RX ADMIN — Medication 10 ML: at 20:39

## 2025-04-27 RX ADMIN — METOPROLOL SUCCINATE 50 MG: 25 TABLET, EXTENDED RELEASE ORAL at 08:43

## 2025-04-27 RX ADMIN — ATORVASTATIN CALCIUM 20 MG: 20 TABLET, FILM COATED ORAL at 20:39

## 2025-04-27 RX ADMIN — Medication 10 ML: at 08:44

## 2025-04-27 RX ADMIN — HYDROCODONE BITARTRATE AND ACETAMINOPHEN 1 TABLET: 7.5; 325 TABLET ORAL at 17:04

## 2025-04-27 RX ADMIN — FAMOTIDINE 20 MG: 20 TABLET, FILM COATED ORAL at 17:04

## 2025-04-27 RX ADMIN — INSULIN LISPRO 6 UNITS: 100 INJECTION, SOLUTION INTRAVENOUS; SUBCUTANEOUS at 12:00

## 2025-04-27 RX ADMIN — SALINE NASAL SPRAY 2 SPRAY: 1.5 SOLUTION NASAL at 04:51

## 2025-04-27 RX ADMIN — DIPHENHYDRAMINE HYDROCHLORIDE 10 ML: 25 SOLUTION ORAL at 08:44

## 2025-04-27 RX ADMIN — DIPHENHYDRAMINE HYDROCHLORIDE 10 ML: 25 SOLUTION ORAL at 19:19

## 2025-04-27 RX ADMIN — HYDROCODONE BITARTRATE AND ACETAMINOPHEN 1 TABLET: 7.5; 325 TABLET ORAL at 04:51

## 2025-04-27 NOTE — PROGRESS NOTES
"    DAILY PROGRESS NOTE  [facility]    Patient Identification:  Name: Arnold Ramírez  Age: 65 y.o.  Sex: male  :  1960  MRN: [id number]         Primary Care Physician: Anand Alford MD    Subjective:  Interval History: Feeling better.  Still complains of swelling most in his left hand and upper extremity.  He still using that hand just fine and NVM intact.  No fever no altered mentation no nausea or vomiting.  He is starting to tolerate more p.o. and his blood sugars are starting to elevate.  No chest pain or troubles breathing    Objective: Clinically finally looking a lot better today.  No family present.  RN at bedside    Scheduled Meds:amLODIPine, 5 mg, Oral, Daily  atorvastatin, 20 mg, Oral, Nightly  cefTRIAXone, 2,000 mg, Intravenous, Q24H  diphenhydrAMINE, 25 mg, Oral, Q6H  DULoxetine, 60 mg, Oral, Daily  famotidine, 20 mg, Intravenous, Q12H  insulin glargine, 25 Units, Subcutaneous, Q12H  insulin lispro, 2-9 Units, Subcutaneous, 4x Daily AC & at Bedtime  magic mouthwash oral suspension (mixture) (diphenhydrAMINE HCl - aluminum & magnesium hydroxide-simethicone - lidocaine viscous) solution 55 mL, 10 mL, Swish & Spit, Q6H  metoprolol succinate XL, 50 mg, Oral, Daily  pregabalin, 150 mg, Oral, BID  sodium chloride, 10 mL, Intravenous, Q12H  torsemide, 100 mg, Oral, Daily      Continuous Infusions:     Vital signs in last 24 hours:  Temp:  [97.3 °F (36.3 °C)-98.1 °F (36.7 °C)] 97.7 °F (36.5 °C)  Heart Rate:  [78-97] 78  Resp:  [18] 18  BP: (105-123)/(64-82) 121/82    Intake/Output:    Intake/Output Summary (Last 24 hours) at 2025 1007  Last data filed at 2025  Gross per 24 hour   Intake 1135 ml   Output 200 ml   Net 935 ml       Exam:  /82 (BP Location: Right arm, Patient Position: Lying)   Pulse 78   Temp 97.7 °F (36.5 °C) (Oral)   Resp 18   Ht 195.6 cm (77\")   Wt (!) 158 kg (348 lb 3.2 oz)   SpO2 93%   BMI 41.29 kg/m²     General Appearance:    Alert, " "cooperative, nontoxic and definitely clinically improving, AAOx3                          Head:    Normocephalic, without obvious abnormality, atraumatic                           Eyes:    PERRLA both eyes                         Throat: Palate clear; oral mucosa pink and moist                           Neck:   Supple, neck diameter massive                          Lungs:    Clear to auscultation bilaterally, respirations unlabored                 Chest Wall:    No tenderness or deformity                          Heart:    Regular rate and rhythm, S1 and S2 normal                  Abdomen:     Soft, nontender, bowel sounds active, MO/BMI 42                 extremities: Moving all, overall anasarca is improving but he is having persistence in his upper extremities because he needs to elevate those limbs that he is always keeping them down given past histories with shoulder injuries.  I have counseled the need to keep these elevated above the level of the shoulder.  No cellulitis.  Bullous formation with some that are bursting                        Pulses:   Pulses palpable in all extremities                            Skin: \"red man\" syndrome improving and macular rash becoming more confluent with change towards a purplish hue and desquamation of face.  Overall rash is much better                  Neurologic:   CNII-XII intact, no focal deficits noted     Data Review:  Labs in chart were reviewed.    Assessment:  Active Hospital Problems    Diagnosis  POA    **Vancomycin flushing syndrome [L27.0, T36.8X5A]  Yes    ROCKY (acute kidney injury) [N17.9]  Yes    Type 2 diabetes mellitus, with long-term current use of insulin [E11.9, Z79.4]  Not Applicable    Diabetic peripheral neuropathy [E11.42]  Yes    Dyslipidemia [E78.5]  Yes    HTN (hypertension) [I10]  Yes    Obesity [E66.9]  Yes    Streptococcal bacteremia [R78.81, B95.5]  Yes    MRSA colonization [Z22.322]  Not Applicable      Resolved Hospital Problems   No " "resolved problems to display.       Plan:    Vancomycin induced \"red man\" syndrome upon treatment of strep bacteremia              - Rocephin per Dr. Mendoza with tentative end date 5/6/2025              - 4/23/2025 BC NGTD.  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  Leukocytosis likely reactive               - IV Pepcid changed to p.o.    - Scheduled p.o. Benadryl -helping with rash/pruritus as well as helping with anxiety              - Kaylynn's mouthwash for reports of oral pain         Anxiety -I feel this is a significant thing for this patient and amplifies a lot of his complaints.  He openly admits to the anxiety as well.  Avoiding oversedation given MARGO/BiPAP dependent with needs addressed per pulmonology while here since he could not bring his BiPAP from home or have anyone else pregnant for him.  Also avoiding steroids                    Diuretics/decreasing amlodipine per renal -w/ significant volume control/anasarca              - ARF secondary to hypersensitivity inflammatory response/intravascularly dry              - Mild hyponatremia worsening to 128-129-132              - Acidosis made worse by anorexia and resolving as starting to perk up and tolerate p.o.              - Renal managing fluid balance with torsemide.  Uric 9.6-10.2-probably drawn in intravascular basis              - Counseled RN local wound care for blisters   - Counseled RN in regards to need to elevate extremities limited by chronic shoulder tendinopathy           DM2 with hypoglycemia/PN on Lyrica              - Basal regimens discontinued for now and using just sliding scale              - I want to avoid steroids do not cause hyperglycemia and also think they will cause significant increases in generalized anxiety              - BS stabilizing nicely and starting to increase back up in the 200s and will start to introduce back Lantus at a much lower dose of 25 every 12 (formally on 75 every 12 when I assumed care)     HTN-stable on Norvasc " (decreased) and metoprolol -BP remains stable     HLD on statin     SCDs for additional prophylaxis        Disposition: Patient proactive to want a go home but I imagine he is to remain in house multiple more days          Joseph Thompson MD  4/27/2025  10:07 EDT

## 2025-04-27 NOTE — THERAPY TREATMENT NOTE
Patient Name: Arnold Ramírez  : 1960    MRN: [id number]                              Today's Date: 2025       Admit Date: 2025    Visit Dx: No diagnosis found.  Patient Active Problem List   Diagnosis    Streptococcal bacteremia    Acute back pain    Facet arthritis of lumbar region    Obesity    Dyslipidemia    HTN (hypertension)    MRSA colonization    Type 2 diabetes mellitus, with long-term current use of insulin    Hyponatremia    Diabetic peripheral neuropathy    Clostridium difficile diarrhea    Vancomycin flushing syndrome    ROCKY (acute kidney injury)     Past Medical History:   Diagnosis Date    Diabetes mellitus     Hyperlipidemia     Hypertension      History reviewed. No pertinent surgical history.   General Information       Row Name 25 1515          Physical Therapy Time and Intention    Document Type therapy note (daily note)  -DB     Mode of Treatment individual therapy;physical therapy  -DB       Row Name 25 1515          General Information    Patient Profile Reviewed yes  -DB               User Key  (r) = Recorded By, (t) = Taken By, (c) = Cosigned By      Initials Name Provider Type    DB Kim Arias PT Physical Therapist                   Mobility       Row Name 25 1515          Bed Mobility    Bed Mobility supine-sit;sit-supine  -DB     Supine-Sit Hood (Bed Mobility) supervision  -DB     Sit-Supine Hood (Bed Mobility) supervision  -DB     Assistive Device (Bed Mobility) bed rails;head of bed elevated  -DB       Row Name 25 1515          Sit-Stand Transfer    Sit-Stand Hood (Transfers) contact guard  -DB     Assistive Device (Sit-Stand Transfers) walker, front-wheeled  -DB       Row Name 25 1515          Gait/Stairs (Locomotion)    Hood Level (Gait) contact guard;minimum assist (75% patient effort)  -DB     Assistive Device (Gait) other (see comments)  no AD  -DB     Distance in Feet (Gait) 40  -DB      Deviations/Abnormal Patterns (Gait) mary decreased;gait speed decreased;stride length decreased  -DB     Bilateral Gait Deviations forward flexed posture  -DB     Comment, (Gait/Stairs) unsteady towards end of gait, ankle starting to give out on him  -DB               User Key  (r) = Recorded By, (t) = Taken By, (c) = Cosigned By      Initials Name Provider Type    DB Kim Arias, ANAY Physical Therapist                   Obj/Interventions       Row Name 04/27/25 1516          Balance    Balance Assessment sitting static balance;sitting dynamic balance;standing static balance;standing dynamic balance  -DB     Static Sitting Balance supervision  -DB     Dynamic Sitting Balance supervision  -DB     Position, Sitting Balance unsupported;sitting edge of bed  -DB     Static Standing Balance contact guard  -DB     Dynamic Standing Balance contact guard;minimal assist  -DB     Position/Device Used, Standing Balance unsupported  -DB     Balance Interventions sitting;standing;sit to stand  -DB               User Key  (r) = Recorded By, (t) = Taken By, (c) = Cosigned By      Initials Name Provider Type    DB Kim Arias, PT Physical Therapist                   Goals/Plan    No documentation.                  Clinical Impression       Row Name 04/27/25 1516          Plan of Care Review    Plan of Care Reviewed With patient  -DB     Progress improving  -DB     Outcome Evaluation Pt supine in bed at start of session and agreeable to work with PT. Pt was able to perform bed mobility with SV, STS with CGA, and ambulated 40' with no AD and CGA. Pt became unsteady towards end of gait d/t chronic ankle instability. Returns to supine at end of the session. He continues to benefit from skilled PT  -DB       Row Name 04/27/25 1516          Vital Signs    Pre Patient Position Supine  -DB     Intra Patient Position Standing  -DB     Post Patient Position Supine  -DB       Row Name 04/27/25 1516          Positioning and  Restraints    Pre-Treatment Position in bed  -DB     Post Treatment Position bed  -DB     In Bed supine;call light within reach;encouraged to call for assist;exit alarm on  -DB               User Key  (r) = Recorded By, (t) = Taken By, (c) = Cosigned By      Initials Name Provider Type    Kim Colmenares, ANAY Physical Therapist                   Outcome Measures       Row Name 04/27/25 1518 04/27/25 0835       How much help from another person do you currently need...    Turning from your back to your side while in flat bed without using bedrails? 4  -DB 4  -JK    Moving from lying on back to sitting on the side of a flat bed without bedrails? 4  -DB 4  -JK    Moving to and from a bed to a chair (including a wheelchair)? 4  -DB 4  -JK    Standing up from a chair using your arms (e.g., wheelchair, bedside chair)? 3  -DB 3  -JK    Climbing 3-5 steps with a railing? 2  -DB 3  -JK    To walk in hospital room? 3  -DB 3  -JK    AM-PAC 6 Clicks Score (PT) 20  -DB 21  -JK    Highest Level of Mobility Goal 6 --> Walk 10 steps or more  -DB 6 --> Walk 10 steps or more  -JK      Row Name 04/27/25 1518          Functional Assessment    Outcome Measure Options AM-PAC 6 Clicks Basic Mobility (PT)  -DB               User Key  (r) = Recorded By, (t) = Taken By, (c) = Cosigned By      Initials Name Provider Type    DB Kim Arias, ANAY Physical Therapist    Sruthi Mai RN Registered Nurse                                 Physical Therapy Education       Title: PT OT SLP Therapies (Done)       Topic: Physical Therapy (Done)       Point: Mobility training (Done)       Learning Progress Summary            Patient Acceptance, E, VU by DB at 4/27/2025 1518    Acceptance, E,TB,D, VU,NR by  at 4/25/2025 1344                      Point: Home exercise program (Done)       Learning Progress Summary            Patient Acceptance, E, VU by DB at 4/27/2025 1518                      Point: Body mechanics (Done)       Learning  Progress Summary            Patient Acceptance, E, VU by  at 4/27/2025 1518    Acceptance, E,TB,D, VU,NR by  at 4/25/2025 1344                      Point: Precautions (Done)       Learning Progress Summary            Patient Acceptance, E, VU by  at 4/27/2025 1518    Acceptance, E,TB,D, VU,NR by  at 4/25/2025 1344                                      User Key       Initials Effective Dates Name Provider Type Discipline     06/16/21 -  Em Ortiz PT Physical Therapist PT     06/16/21 -  Kim Arias PT Physical Therapist PT                  PT Recommendation and Plan     Progress: improving  Outcome Evaluation: Pt supine in bed at start of session and agreeable to work with PT. Pt was able to perform bed mobility with SV, STS with CGA, and ambulated 40' with no AD and CGA. Pt became unsteady towards end of gait d/t chronic ankle instability. Returns to supine at end of the session. He continues to benefit from skilled PT     Time Calculation:         PT Charges       Row Name 04/27/25 1518             Time Calculation    Start Time 1503  -DB      Stop Time 1514  -DB      Time Calculation (min) 11 min  -DB      PT Received On 04/27/25  -DB      PT - Next Appointment 04/28/25  -DB         Time Calculation- PT    Total Timed Code Minutes- PT 11 minute(s)  -DB                User Key  (r) = Recorded By, (t) = Taken By, (c) = Cosigned By      Initials Name Provider Type    DB Kim Arias, PT Physical Therapist                  Therapy Charges for Today       Code Description Service Date Service Provider Modifiers Qty    [id number]  PT THERAPEUTIC ACT EA 15 MIN 4/27/2025 Kim Arias PT GP 1            PT G-Codes  Outcome Measure Options: AM-PAC 6 Clicks Basic Mobility (PT)  AM-PAC 6 Clicks Score (PT): 20  PT Discharge Summary  Anticipated Discharge Disposition (PT): home with 24/7 care, skilled nursing facility (pending progress)    Kim Arias PT  4/27/2025

## 2025-04-27 NOTE — PROGRESS NOTES
"  Infectious Diseases Progress Note        [facility]  [place]: 4 days  Patient Identification:  Name: Arnold Ramírez  Age: 65 y.o.  Sex: male  :  1960  MRN: [id number]         Primary Care Physician: Anand Alford MD        Subjective: Complaining of being anxious shaky and sweaty.  She did have a low blood sugar episode which is corrected.  Denies any fever.  Back pain is improving.  Itching is better.  Interval History: See consultation note.    Objective:    Scheduled Meds:amLODIPine, 5 mg, Oral, Daily  atorvastatin, 20 mg, Oral, Nightly  cefTRIAXone, 2,000 mg, Intravenous, Q24H  diphenhydrAMINE, 25 mg, Oral, Q6H  DULoxetine, 60 mg, Oral, Daily  famotidine, 20 mg, Oral, BID AC  insulin glargine, 25 Units, Subcutaneous, Q12H  insulin lispro, 2-9 Units, Subcutaneous, 4x Daily AC & at Bedtime  magic mouthwash oral suspension (mixture) (diphenhydrAMINE HCl - aluminum & magnesium hydroxide-simethicone - lidocaine viscous) solution 55 mL, 10 mL, Swish & Spit, Q6H  metoprolol succinate XL, 50 mg, Oral, Daily  pregabalin, 150 mg, Oral, BID  sodium chloride, 10 mL, Intravenous, Q12H  torsemide, 100 mg, Oral, Daily      Continuous Infusions:     Vital signs in last 24 hours:  Temp:  [97.3 °F (36.3 °C)-98.1 °F (36.7 °C)] 97.5 °F (36.4 °C)  Heart Rate:  [78-94] 89  Resp:  [18] 18  BP: (105-125)/(64-82) 125/79    Intake/Output:    Intake/Output Summary (Last 24 hours) at 2025 1515  Last data filed at 2025 1400  Gross per 24 hour   Intake 1020 ml   Output 100 ml   Net 920 ml       Exam:  /79 (BP Location: Right arm, Patient Position: Lying)   Pulse 89   Temp 97.5 °F (36.4 °C) (Oral)   Resp 18   Ht 195.6 cm (77\")   Wt (!) 158 kg (348 lb 3.2 oz)   SpO2 95%   BMI 41.29 kg/m²   Patient is examined using the personal protective equipment as per guidelines from infection control for this particular patient as enacted.  Hand washing was performed before and after patient " interaction.  General Appearance:    Alert, cooperative, no distress, AAOx3                          Head:    Normocephalic, without obvious abnormality, atraumatic                           Eyes:    PERRL, conjunctivae/corneas clear, EOM's intact, both eyes                         Throat:   Lips, tongue, gums normal; oral mucosa pink and moist                           Neck:   Supple, symmetrical, trachea midline, no JVD                         Lungs:    Clear to auscultation bilaterally, respirations unlabored                 Chest Wall:    No tenderness or deformity                          Heart:  1 S2 regular                  Abdomen:   B soft nontender                 Extremities:   Extremities normal, atraumatic, no cyanosis or edema                        Pulses:   Pulses palpable in all extremities                            Skin: Has drug eruption due to delayed hypersensitivity to vancomycin.  Generalized body wall edema noted.                  Neurologic: Alert and oriented x 3       Data Review:    I reviewed the patient's new clinical results.  Results from last 7 days   Lab Units 04/27/25  0927 04/26/25  0601 04/25/25  0747 04/24/25  0944 04/23/25  0609 04/22/25  0537 04/21/25  1313   WBC 10*3/mm3 13.06* 13.13* 16.04* 13.82* 12.43* 10.09 11.12*   HEMOGLOBIN g/dL 12.2* 13.4 14.6 13.5 13.6 12.7* 12.8*   PLATELETS 10*3/mm3 276 246 325 248 284 299 291     Results from last 7 days   Lab Units 04/27/25  0640 04/26/25  0601 04/25/25  0747 04/24/25  0944 04/23/25  0609 04/22/25  0537 04/21/25  1313   SODIUM mmol/L 129*  132* 128* 133* 131* 131* 135* 137   POTASSIUM mmol/L 3.5  3.5 4.3 5.2 4.8 4.2 4.4 5.2   CHLORIDE mmol/L 92*  95* 96* 99 99 101 101 102   CO2 mmol/L 22.8  23.0 19.8* 19.3* 19.4* 21.6* 25.0 24.3   BUN mg/dL 36*  35* 36* 31* 28* 21 14 13   CREATININE mg/dL 2.52*  2.55* 2.87* 2.57* 2.46* 2.06* 1.88* 1.74*   CALCIUM mg/dL 7.6*  7.7* 7.8* 8.2* 8.0* 8.3* 8.5* 8.7   GLUCOSE mg/dL 222*  218*  163* 62* 159* 108* 123* 179*     Microbiology Results (last 10 days)       Procedure Component Value - Date/Time    Clostridioides difficile Toxin - Stool, Per Rectum [[id number]]  (Normal) Collected: 04/24/25 0008    Lab Status: Final result Specimen: Stool from Per Rectum Updated: 04/24/25 0204    Narrative:      The following orders were created for panel order Clostridioides difficile Toxin - Stool, Per Rectum.  Procedure                               Abnormality         Status                     ---------                               -----------         ------                     Clostridioides difficile...[[id number]]  Normal              Final result                 Please view results for these tests on the individual orders.    Clostridioides difficile Toxin, PCR - Stool, Per Rectum [[id number]]  (Normal) Collected: 04/24/25 0008    Lab Status: Final result Specimen: Stool from Per Rectum Updated: 04/24/25 0204     Toxigenic C. difficile by PCR Negative    Narrative:      The result indicates the absence of toxigenic C. difficile from stool specimen.     Blood Culture - Blood, Hand, Right [[id number]]  (Normal) Collected: 04/23/25 1200    Lab Status: Preliminary result Specimen: Blood from Hand, Right Updated: 04/27/25 1230     Blood Culture No growth at 4 days    Blood Culture - Blood, Hand, Right [[id number]]  (Normal) Collected: 04/23/25 1017    Lab Status: Preliminary result Specimen: Blood from Hand, Right Updated: 04/27/25 1045     Blood Culture No growth at 4 days    Eosinophil Smear - Urine, Urine, Clean Catch [[id number]]  (Normal) Collected: 04/22/25 1314    Lab Status: Final result Specimen: Urine, Clean Catch Updated: 04/22/25 1544     Eosinophil Smear 0 % EOS/100 Cells               Assessment:    Vancomycin flushing syndrome    Streptococcal bacteremia    Obesity    Dyslipidemia    HTN (hypertension)    MRSA colonization    Type 2 diabetes mellitus, with long-term current use of insulin     Diabetic peripheral neuropathy    ROCKY (acute kidney injury)  65-year-old male with    1-systemic vancomycin delayed hypersensitivity reaction with skin rash and renal insufficiency  2-acute kidney injury probably secondary to combination of factors including vancomycin  3-history of MRSA colonization  4-history of C. difficile diarrhea with colonization    5-history of strep intermedius bacteremia sepsis with paraspinal abscess and facet septic arthritis clinically improved with resolution of symptoms on course to finish his antibiotic treatment on 5/6/2025    6-poorly controlled diabetes  7-morbid obesity  8-hypertension  9-other diagnoses per primary team.     Recommendations/Discussions:  See discussion and recommendation on 4/22/2025  Continue IV ceftriaxone  Upon blood cultures, stool studies are negative for C. difficile.  Supportive care for renal insufficiency, avoidance of nephrotoxic agents and periodic review of systems identify other areas of secondary seeding due to strep intermedius bacteremia and possibility of line sepsis as well as recurrence of C. difficile infection in the setting of known MRSA colonization and C. difficile colonization.  Jaleesa Pryor MD  4/27/2025  15:15 EDT

## 2025-04-27 NOTE — PROGRESS NOTES
[facility] Progress Note      Patient Name: Arnold Ramírez  : 1960  MRN: [id number]  Primary Care Physician:  Anand Alford MD  Date of admission: 2025    Subjective     Interval History:   Follow-up acute kidney injury.     Patient sitting comfortably  Feeling little better  Urine output slowly improving per patient      Review of Systems:   As noted above    Objective     Vitals:   Temp:  [97.3 °F (36.3 °C)-98.1 °F (36.7 °C)] 97.7 °F (36.5 °C)  Heart Rate:  [78-97] 78  Resp:  [18] 18  BP: (105-123)/(64-82) 121/82    Intake/Output Summary (Last 24 hours) at 2025  Last data filed at 2025  Gross per 24 hour   Intake 1235 ml   Output 200 ml   Net 1035 ml       Physical Exam:    General Appearance: Awake, alert, chronically ill, morbidly obese, no acute distress  Skin: Significant diffuse total body erythema purpuric areas especially on his feet..  HEENT: oral mucosa dry, nonicteric sclera  Neck: No JVD  Lungs: Clear to auscultation bilaterally.  Unlabored on room air  Heart: RRR, normal S1 and S2.    Abdomen: soft, obese nontender, distended. +bs  : no palpable bladder  Extremities: 3+ upper and lower extremity edema  Neuro: normal speech and mental status     Scheduled Meds:     amLODIPine, 5 mg, Oral, Daily  atorvastatin, 20 mg, Oral, Nightly  cefTRIAXone, 2,000 mg, Intravenous, Q24H  diphenhydrAMINE, 25 mg, Oral, Q6H  DULoxetine, 60 mg, Oral, Daily  famotidine, 20 mg, Intravenous, Q12H  insulin glargine, 25 Units, Subcutaneous, Q12H  insulin lispro, 2-9 Units, Subcutaneous, 4x Daily AC & at Bedtime  magic mouthwash oral suspension (mixture) (diphenhydrAMINE HCl - aluminum & magnesium hydroxide-simethicone - lidocaine viscous) solution 55 mL, 10 mL, Swish & Spit, Q6H  metoprolol succinate XL, 50 mg, Oral, Daily  pregabalin, 150 mg, Oral, BID  sodium chloride, 10 mL, Intravenous, Q12H  torsemide, 100 mg, Oral, Daily      IV Meds:        Results  Reviewed:   I have personally reviewed the results from the time of this admission to 4/27/2025 08:24 EDT     Results from last 7 days   Lab Units 04/27/25  0640 04/26/25  0601 04/25/25  0747 04/24/25  0944 04/23/25  0609 04/22/25  0537   SODIUM mmol/L 132* 128* 133* 131*   < > 135*   POTASSIUM mmol/L 3.5 4.3 5.2 4.8   < > 4.4   CHLORIDE mmol/L 95* 96* 99 99   < > 101   CO2 mmol/L 23.0 19.8* 19.3* 19.4*   < > 25.0   BUN mg/dL 35* 36* 31* 28*   < > 14   CREATININE mg/dL 2.55* 2.87* 2.57* 2.46*   < > 1.88*   CALCIUM mg/dL 7.7* 7.8* 8.2* 8.0*   < > 8.5*   BILIRUBIN mg/dL  --  0.5  --  0.4  --  0.5   ALK PHOS U/L  --  91  --  127*  --  153*   ALT (SGPT) U/L  --  17  --  28  --  29   AST (SGOT) U/L  --  18  --  31  --  21   GLUCOSE mg/dL 218* 163* 62* 159*   < > 123*    < > = values in this interval not displayed.       Estimated Creatinine Clearance: 49.1 mL/min (A) (by C-G formula based on SCr of 2.55 mg/dL (H)).    Results from last 7 days   Lab Units 04/27/25  0640 04/26/25  0601 04/25/25  0747 04/24/25  0944 04/23/25  0609   MAGNESIUM mg/dL  --  1.8  --  1.7 1.8   PHOSPHORUS mg/dL 3.4 3.8 3.9 4.0 3.5       Results from last 7 days   Lab Units 04/27/25  0640 04/26/25  0601 04/23/25  0609 04/22/25  0537   URIC ACID mg/dL 10.2* 9.6* 6.2 5.7       Results from last 7 days   Lab Units 04/26/25  0601 04/25/25  0747 04/24/25  0944 04/23/25  0609 04/22/25  0537   WBC 10*3/mm3 13.13* 16.04* 13.82* 12.43* 10.09   HEMOGLOBIN g/dL 13.4 14.6 13.5 13.6 12.7*   PLATELETS 10*3/mm3 246 325 248 284 299       Results from last 7 days   Lab Units 04/21/25  1313   INR  1.16*       Assessment / Plan     ASSESSMENT:  Acute kidney injury related to acute interstitial nephritis related to delayed hypersensitivity reaction to vancomycin and being on ARB and inability to autoregulate.  Creatinine little better at 2.55 this morning from 2.87 yesterday.  Still has significant edema, with third spacing.  Sodium better at 132 this morning  2.   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    3.  Strep bacteremia with lumbar spinal abscess and facet septic arthritis.  Back pain improved.  Followed by ID  4.  Diabetes mellitus type 2.  Peripheral neuropathy.  Managed by primary team  5.  Morbid obesity.     PLAN:  Continue torsemide  Monitor for diuretic toxicity  Amlodipine dose decreased, wean off if blood pressure stays on low side  Surveillance labs    Reviewed the chart and other providers notes, reviewed labs.  I discussed the case with the patient.  Copied text in this note has been reviewed and is accurate as of 04/27/25.     Thank you for involving us in the care of Arnold Ramírez.  Please feel free to call with any questions.    Marquise Zapata MD  04/27/25  08:24 EDT    [facility]  [phone]    Please note that portions of this note were completed with a voice recognition program.

## 2025-04-27 NOTE — PLAN OF CARE
Goal Outcome Evaluation:  Plan of Care Reviewed With: patient        Progress: improving  Outcome Evaluation: Pt supine in bed at start of session and agreeable to work with PT. Pt was able to perform bed mobility with SV, STS with CGA, and ambulated 40' with no AD and CGA. Pt became unsteady towards end of gait d/t chronic ankle instability. Returns to supine at end of the session. He continues to benefit from skilled PT    Anticipated Discharge Disposition (PT): home with 24/7 care, skilled nursing facility (pending progress)

## 2025-04-27 NOTE — PLAN OF CARE
Goal Outcome Evaluation:   Patient alert follows commands prn pain meds given. No nausea noted. Iv antibx given. Minimal assistance prn. Refused bipap this shift. Saline spray for congestion ordered. No acute distress noted will continue to monitor

## 2025-04-28 LAB
ALBUMIN SERPL-MCNC: 3.2 G/DL (ref 3.5–5.2)
ALBUMIN/GLOB SERPL: 1.1 G/DL
ALP SERPL-CCNC: 112 U/L (ref 39–117)
ALT SERPL W P-5'-P-CCNC: 20 U/L (ref 1–41)
ANION GAP SERPL CALCULATED.3IONS-SCNC: 12.5 MMOL/L (ref 5–15)
AST SERPL-CCNC: 18 U/L (ref 1–40)
BACTERIA SPEC AEROBE CULT: NORMAL
BACTERIA SPEC AEROBE CULT: NORMAL
BASOPHILS # BLD MANUAL: 0.15 10*3/MM3 (ref 0–0.2)
BASOPHILS NFR BLD MANUAL: 1 % (ref 0–1.5)
BILIRUB SERPL-MCNC: 0.3 MG/DL (ref 0–1.2)
BUN SERPL-MCNC: 32 MG/DL (ref 8–23)
BUN/CREAT SERPL: 15.5 (ref 7–25)
CALCIUM SPEC-SCNC: 7.8 MG/DL (ref 8.6–10.5)
CHLORIDE SERPL-SCNC: 95 MMOL/L (ref 98–107)
CO2 SERPL-SCNC: 26.5 MMOL/L (ref 22–29)
CREAT SERPL-MCNC: 2.06 MG/DL (ref 0.76–1.27)
DEPRECATED RDW RBC AUTO: 43.1 FL (ref 37–54)
EGFRCR SERPLBLD CKD-EPI 2021: 35.5 ML/MIN/1.73
EOSINOPHIL # BLD MANUAL: 2.46 10*3/MM3 (ref 0–0.4)
EOSINOPHIL NFR BLD MANUAL: 16.5 % (ref 0.3–6.2)
ERYTHROCYTE [DISTWIDTH] IN BLOOD BY AUTOMATED COUNT: 14.9 % (ref 12.3–15.4)
GLOBULIN UR ELPH-MCNC: 3 GM/DL
GLUCOSE BLDC GLUCOMTR-MCNC: 164 MG/DL (ref 70–130)
GLUCOSE BLDC GLUCOMTR-MCNC: 196 MG/DL (ref 70–130)
GLUCOSE BLDC GLUCOMTR-MCNC: 282 MG/DL (ref 70–130)
GLUCOSE BLDC GLUCOMTR-MCNC: 290 MG/DL (ref 70–130)
GLUCOSE SERPL-MCNC: 215 MG/DL (ref 65–99)
HCT VFR BLD AUTO: 39.8 % (ref 37.5–51)
HGB BLD-MCNC: 12.8 G/DL (ref 13–17.7)
LYMPHOCYTES # BLD MANUAL: 4 10*3/MM3 (ref 0.7–3.1)
LYMPHOCYTES NFR BLD MANUAL: 6.2 % (ref 5–12)
MCH RBC QN AUTO: 25.8 PG (ref 26.6–33)
MCHC RBC AUTO-ENTMCNC: 32.2 G/DL (ref 31.5–35.7)
MCV RBC AUTO: 80.2 FL (ref 79–97)
MONOCYTES # BLD: 0.93 10*3/MM3 (ref 0.1–0.9)
NEUTROPHILS # BLD AUTO: 7.39 10*3/MM3 (ref 1.7–7)
NEUTROPHILS NFR BLD MANUAL: 49.5 % (ref 42.7–76)
PHOSPHATE SERPL-MCNC: 3.9 MG/DL (ref 2.5–4.5)
PLAT MORPH BLD: NORMAL
PLATELET # BLD AUTO: 317 10*3/MM3 (ref 140–450)
PMV BLD AUTO: 9.4 FL (ref 6–12)
POTASSIUM SERPL-SCNC: 3.6 MMOL/L (ref 3.5–5.2)
PROT SERPL-MCNC: 6.2 G/DL (ref 6–8.5)
RBC # BLD AUTO: 4.96 10*6/MM3 (ref 4.14–5.8)
RBC MORPH BLD: NORMAL
SMUDGE CELLS BLD QL SMEAR: ABNORMAL
SODIUM SERPL-SCNC: 134 MMOL/L (ref 136–145)
URATE SERPL-MCNC: 10.9 MG/DL (ref 3.4–7)
VARIANT LYMPHS NFR BLD MANUAL: 26.8 % (ref 19.6–45.3)
WBC NRBC COR # BLD AUTO: 14.92 10*3/MM3 (ref 3.4–10.8)

## 2025-04-28 PROCEDURE — 94799 UNLISTED PULMONARY SVC/PX: CPT

## 2025-04-28 PROCEDURE — 80053 COMPREHEN METABOLIC PANEL: CPT | Performed by: INTERNAL MEDICINE

## 2025-04-28 PROCEDURE — 25010000002 CEFTRIAXONE PER 250 MG: Performed by: HOSPITALIST

## 2025-04-28 PROCEDURE — 63710000001 DIPHENHYDRAMINE PER 50 MG: Performed by: HOSPITALIST

## 2025-04-28 PROCEDURE — 84550 ASSAY OF BLOOD/URIC ACID: CPT | Performed by: HOSPITALIST

## 2025-04-28 PROCEDURE — 63710000001 INSULIN LISPRO (HUMAN) PER 5 UNITS: Performed by: NURSE PRACTITIONER

## 2025-04-28 PROCEDURE — 84100 ASSAY OF PHOSPHORUS: CPT | Performed by: INTERNAL MEDICINE

## 2025-04-28 PROCEDURE — 94660 CPAP INITIATION&MGMT: CPT

## 2025-04-28 PROCEDURE — 63710000001 INSULIN GLARGINE PER 5 UNITS: Performed by: HOSPITALIST

## 2025-04-28 PROCEDURE — 82948 REAGENT STRIP/BLOOD GLUCOSE: CPT

## 2025-04-28 PROCEDURE — 85025 COMPLETE CBC W/AUTO DIFF WBC: CPT | Performed by: INTERNAL MEDICINE

## 2025-04-28 PROCEDURE — 85007 BL SMEAR W/DIFF WBC COUNT: CPT | Performed by: INTERNAL MEDICINE

## 2025-04-28 RX ORDER — TORSEMIDE 100 MG/1
100 TABLET ORAL
Status: DISCONTINUED | OUTPATIENT
Start: 2025-04-28 | End: 2025-05-01

## 2025-04-28 RX ORDER — NYSTATIN 100000 [USP'U]/ML
5 SUSPENSION ORAL 4 TIMES DAILY
Status: DISCONTINUED | OUTPATIENT
Start: 2025-04-28 | End: 2025-05-03 | Stop reason: HOSPADM

## 2025-04-28 RX ADMIN — DIPHENHYDRAMINE HYDROCHLORIDE 10 ML: 25 SOLUTION ORAL at 06:38

## 2025-04-28 RX ADMIN — NYSTATIN 500000 UNITS: 100000 SUSPENSION ORAL at 12:31

## 2025-04-28 RX ADMIN — DIPHENHYDRAMINE HYDROCHLORIDE 25 MG: 25 CAPSULE ORAL at 12:31

## 2025-04-28 RX ADMIN — DIPHENHYDRAMINE HYDROCHLORIDE 10 ML: 25 SOLUTION ORAL at 23:42

## 2025-04-28 RX ADMIN — FAMOTIDINE 20 MG: 20 TABLET, FILM COATED ORAL at 17:29

## 2025-04-28 RX ADMIN — PREGABALIN 150 MG: 75 CAPSULE ORAL at 08:50

## 2025-04-28 RX ADMIN — DIPHENHYDRAMINE HYDROCHLORIDE 25 MG: 25 CAPSULE ORAL at 06:38

## 2025-04-28 RX ADMIN — FAMOTIDINE 20 MG: 20 TABLET, FILM COATED ORAL at 06:38

## 2025-04-28 RX ADMIN — NYSTATIN 500000 UNITS: 100000 SUSPENSION ORAL at 17:29

## 2025-04-28 RX ADMIN — INSULIN GLARGINE 25 UNITS: 100 INJECTION, SOLUTION SUBCUTANEOUS at 21:28

## 2025-04-28 RX ADMIN — DULOXETINE 60 MG: 30 CAPSULE, DELAYED RELEASE ORAL at 08:50

## 2025-04-28 RX ADMIN — Medication 10 ML: at 08:51

## 2025-04-28 RX ADMIN — CEFTRIAXONE 2000 MG: 2 INJECTION, POWDER, FOR SOLUTION INTRAMUSCULAR; INTRAVENOUS at 21:27

## 2025-04-28 RX ADMIN — INSULIN LISPRO 2 UNITS: 100 INJECTION, SOLUTION INTRAVENOUS; SUBCUTANEOUS at 12:31

## 2025-04-28 RX ADMIN — HYDROCODONE BITARTRATE AND ACETAMINOPHEN 1 TABLET: 7.5; 325 TABLET ORAL at 21:27

## 2025-04-28 RX ADMIN — NYSTATIN 500000 UNITS: 100000 SUSPENSION ORAL at 21:27

## 2025-04-28 RX ADMIN — ATORVASTATIN CALCIUM 20 MG: 20 TABLET, FILM COATED ORAL at 21:27

## 2025-04-28 RX ADMIN — PREGABALIN 150 MG: 75 CAPSULE ORAL at 21:27

## 2025-04-28 RX ADMIN — INSULIN LISPRO 6 UNITS: 100 INJECTION, SOLUTION INTRAVENOUS; SUBCUTANEOUS at 17:29

## 2025-04-28 RX ADMIN — HYDROCODONE BITARTRATE AND ACETAMINOPHEN 1 TABLET: 7.5; 325 TABLET ORAL at 08:50

## 2025-04-28 RX ADMIN — TORSEMIDE 100 MG: 100 TABLET ORAL at 17:29

## 2025-04-28 RX ADMIN — INSULIN LISPRO 6 UNITS: 100 INJECTION, SOLUTION INTRAVENOUS; SUBCUTANEOUS at 21:28

## 2025-04-28 RX ADMIN — INSULIN GLARGINE 25 UNITS: 100 INJECTION, SOLUTION SUBCUTANEOUS at 08:50

## 2025-04-28 RX ADMIN — Medication 10 ML: at 21:27

## 2025-04-28 RX ADMIN — METOPROLOL SUCCINATE 50 MG: 25 TABLET, EXTENDED RELEASE ORAL at 08:50

## 2025-04-28 RX ADMIN — INSULIN LISPRO 2 UNITS: 100 INJECTION, SOLUTION INTRAVENOUS; SUBCUTANEOUS at 08:50

## 2025-04-28 RX ADMIN — DIPHENHYDRAMINE HYDROCHLORIDE 25 MG: 25 CAPSULE ORAL at 23:43

## 2025-04-28 RX ADMIN — AMLODIPINE BESYLATE 5 MG: 5 TABLET ORAL at 08:50

## 2025-04-28 RX ADMIN — DIPHENHYDRAMINE HYDROCHLORIDE 25 MG: 25 CAPSULE ORAL at 17:29

## 2025-04-28 NOTE — PROGRESS NOTES
Name: Arnold Ramírez ADMIT: 2025   : 1960  PCP: Anand Alford MD    MRN: [id number] LOS: 5 days   AGE/SEX: 65 y.o. male  ROOM: [facility]/     Subjective   Subjective   No chief complaint on file.    He has erythema and blisters/bullae.  Reports that the blistering was better on his right hand.  There are still blisters on his left hand which are pretty significant.  He does report that overall the edema in that hand has improved some.  He is having desquamation of his skin over some of his limbs.  He had been reporting some painful mouth and nursing noticed some plaques.  No open sores or skin sloughing was reported on mucosa.  He is not reporting any chest pain palpitations or acute shortness of breath.  No abdominal pain or nausea.     Objective   Objective   Vital Signs  Temp:  [97.5 °F (36.4 °C)-97.9 °F (36.6 °C)] 97.9 °F (36.6 °C)  Heart Rate:  [77-89] 89  Resp:  [16-18] 18  BP: (107-148)/() 120/83  SpO2:  [95 %-100 %] 98 %  on   ;   Device (Oxygen Therapy): room air  Body mass index is 41.15 kg/m².    Physical Exam  Results Review  I reviewed the patient's new clinical results.    Results from last 7 days   Lab Units 25  0830 25  0927 25  0601 25  0747   WBC 10*3/mm3 14.92* 13.06* 13.13* 16.04*   HEMOGLOBIN g/dL 12.8* 12.2* 13.4 14.6   PLATELETS 10*3/mm3 317 276 246 325     Results from last 7 days   Lab Units 25  0830 25  0640 25  0601 25  0747   SODIUM mmol/L 134* 129*  132* 128* 133*   POTASSIUM mmol/L 3.6 3.5  3.5 4.3 5.2   CHLORIDE mmol/L 95* 92*  95* 96* 99   CO2 mmol/L 26.5 22.8  23.0 19.8* 19.3*   BUN mg/dL 32* 36*  35* 36* 31*   CREATININE mg/dL 2.06* 2.52*  2.55* 2.87* 2.57*   GLUCOSE mg/dL 215* 222*  218* 163* 62*   EGFR mL/min/1.73 35.5* 27.9*  27.5* 23.9* 27.2*     Results from last 7 days   Lab Units 25  0830 25  0640 25  0601 25  0747 25  0944   ALBUMIN g/dL 3.2* 2.8*  2.8* 2.6* 3.0* 2.8*    BILIRUBIN mg/dL 0.3 0.3 0.5  --  0.4   ALK PHOS U/L 112 97 91  --  127*   AST (SGOT) U/L 18 18 18  --  31   ALT (SGPT) U/L 20 18 17  --  28     Results from last 7 days   Lab Units 04/28/25  0830 04/27/25  0640 04/26/25  0601 04/25/25  0747 04/24/25  0944 04/23/25  0609 04/22/25  1438 04/22/25  0537   CALCIUM mg/dL 7.8* 7.6*  7.7* 7.8* 8.2* 8.0* 8.3*  --   --    ALBUMIN g/dL 3.2* 2.8*  2.8* 2.6* 3.0* 2.8* 3.0*  --   --    MAGNESIUM mg/dL  --   --  1.8  --  1.7 1.8 1.7  --    PHOSPHORUS mg/dL 3.9 3.4 3.8 3.9 4.0 3.5  --    < >    < > = values in this interval not displayed.         Glucose   Date/Time Value Ref Range Status   04/28/2025 1048 196 (H) 70 - 130 mg/dL Final   04/28/2025 0623 164 (H) 70 - 130 mg/dL Final   04/27/2025 2112 299 (H) 70 - 130 mg/dL Final   04/27/2025 1546 373 (H) 70 - 130 mg/dL Final   04/27/2025 1041 285 (H) 70 - 130 mg/dL Final   04/27/2025 0606 207 (H) 70 - 130 mg/dL Final   04/26/2025 2029 298 (H) 70 - 130 mg/dL Final       No radiology results for the last day    I have personally reviewed all medications:  Scheduled Medications  amLODIPine, 5 mg, Oral, Daily  atorvastatin, 20 mg, Oral, Nightly  cefTRIAXone, 2,000 mg, Intravenous, Q24H  diphenhydrAMINE, 25 mg, Oral, Q6H  DULoxetine, 60 mg, Oral, Daily  famotidine, 20 mg, Oral, BID AC  insulin glargine, 25 Units, Subcutaneous, Q12H  insulin lispro, 2-9 Units, Subcutaneous, 4x Daily AC & at Bedtime  magic mouthwash oral suspension (mixture) (diphenhydrAMINE HCl - aluminum & magnesium hydroxide-simethicone - lidocaine viscous) solution 55 mL, 10 mL, Swish & Spit, Q6H  metoprolol succinate XL, 50 mg, Oral, Daily  nystatin, 5 mL, Swish & Swallow, 4x Daily  pregabalin, 150 mg, Oral, BID  sodium chloride, 10 mL, Intravenous, Q12H  torsemide, 100 mg, Oral, BID Diuretics      Infusions     Diet  Diet: Diabetic; Consistent Carbohydrate; Fluid Consistency: Thin (IDDSI 0)       Assessment/Plan     Active Hospital Problems    Diagnosis  POA     **Vancomycin flushing syndrome [L27.0, T36.8X5A]  Yes    ROCKY (acute kidney injury) [N17.9]  Yes    Type 2 diabetes mellitus, with long-term current use of insulin [E11.9, Z79.4]  Not Applicable    Diabetic peripheral neuropathy [E11.42]  Yes    Dyslipidemia [E78.5]  Yes    HTN (hypertension) [I10]  Yes    Obesity [E66.9]  Yes    Streptococcal bacteremia [R78.81, B95.5]  Yes    MRSA colonization [Z22.322]  Not Applicable      Resolved Hospital Problems   No resolved problems to display.       65 y.o. male admitted with Vancomycin flushing syndrome.    Vancomycin flushing syndrome: Was undergoing treatment for strep intermedius bacteremia.  Has been transitioned to Rocephin with plan through 5/6.  Blood cultures no growth to date.  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   Due to the plaque reported will start nystatin swish and swallow.  He has Magic mouthwash ordered but that does not contain nystatin anymore.  He does have eosinophilia but LFTs are okay and he is afebrile.  Leukocytosis present.  Wound care consult.  Infectious disease following.  ROCKY/AIN: Creatinine is improving.  He is on torsemide to aid with volume control.  Nephrology following.  Hypertension: Continue adjustment per nephrology needs.  Diabetes: A1c 10.2.  Had hypoglycemia earlier this admission so Lantus was held and is being reintroduced.  He received 50 units of Lantus yesterday.  Will monitor the SSI requirements.  He did not have any hypoglycemia last night.  PPX: SCD  Disposition: TBD    Expected Discharge Date: 4/29/2025; Expected Discharge Time:      Ky Ronquillo MD  [facility]  04/28/25  12:53 EDT    Dictated portions of note using Dragon dictation software.  Copied text in this note has been reviewed by me and remains accurate as of 04/28/25

## 2025-04-28 NOTE — PROGRESS NOTES
"  Infectious Diseases Progress Note    Lizette López MD     [facility]  Los: 5 days  Patient Identification:  Name: Arnold Ramírez  Age: 65 y.o.  Sex: male  :  1960  MRN: [id number]         Primary Care Physician: Anand Alford MD        Subjective: Started think that he is turning around and feeling better.  His hands are blistering but they are not as painful.  Overall swelling is better and sense of wellbeing is improved.  Interval History: See consultation note.    Objective:    Scheduled Meds:amLODIPine, 5 mg, Oral, Daily  atorvastatin, 20 mg, Oral, Nightly  cefTRIAXone, 2,000 mg, Intravenous, Q24H  diphenhydrAMINE, 25 mg, Oral, Q6H  DULoxetine, 60 mg, Oral, Daily  famotidine, 20 mg, Oral, BID AC  insulin glargine, 25 Units, Subcutaneous, Q12H  insulin lispro, 2-9 Units, Subcutaneous, 4x Daily AC & at Bedtime  magic mouthwash oral suspension (mixture) (diphenhydrAMINE HCl - aluminum & magnesium hydroxide-simethicone - lidocaine viscous) solution 55 mL, 10 mL, Swish & Spit, Q6H  metoprolol succinate XL, 50 mg, Oral, Daily  pregabalin, 150 mg, Oral, BID  sodium chloride, 10 mL, Intravenous, Q12H  torsemide, 100 mg, Oral, Daily      Continuous Infusions:     Vital signs in last 24 hours:  Temp:  [97.5 °F (36.4 °C)-97.9 °F (36.6 °C)] 97.5 °F (36.4 °C)  Heart Rate:  [77-89] 85  Resp:  [16-18] 18  BP: (107-148)/() 148/113    Intake/Output:    Intake/Output Summary (Last 24 hours) at 2025 0737  Last data filed at 2025 0600  Gross per 24 hour   Intake 960 ml   Output 1500 ml   Net -540 ml       Exam:  BP (!) 148/113 (BP Location: Right arm, Patient Position: Lying)   Pulse 85   Temp 97.5 °F (36.4 °C) (Oral)   Resp 18   Ht 195.6 cm (77\")   Wt (!) 157 kg (347 lb)   SpO2 100%   BMI 41.15 kg/m²   Patient is examined using the personal protective equipment as per guidelines from infection control for this particular patient as enacted.  Hand washing was performed before and " after patient interaction.  General Appearance:    Alert, cooperative, no distress, AAOx3                          Head:    Normocephalic, without obvious abnormality, atraumatic                           Eyes:    PERRL, conjunctivae/corneas clear, EOM's intact, both eyes                         Throat:   Lips, tongue, gums normal; oral mucosa pink and moist                           Neck:   Supple, symmetrical, trachea midline, no JVD                         Lungs:    Clear to auscultation bilaterally, respirations unlabored                 Chest Wall:    No tenderness or deformity                          Heart:  1 S2 regular                  Abdomen:   B soft nontender                 Extremities:   Extremities normal, atraumatic, no cyanosis or edema                        Pulses:   Pulses palpable in all extremities                            Skin: Has drug eruption due to delayed hypersensitivity to vancomycin.  Generalized body wall edema noted.  Blisters on left hand due to eruption noted.                  Neurologic: Alert and oriented x 3       Data Review:    I reviewed the patient's new clinical results.  Results from last 7 days   Lab Units 04/27/25  0927 04/26/25  0601 04/25/25  0747 04/24/25  0944 04/23/25  0609 04/22/25  0537 04/21/25  1313   WBC 10*3/mm3 13.06* 13.13* 16.04* 13.82* 12.43* 10.09 11.12*   HEMOGLOBIN g/dL 12.2* 13.4 14.6 13.5 13.6 12.7* 12.8*   PLATELETS 10*3/mm3 276 246 325 248 284 299 291     Results from last 7 days   Lab Units 04/27/25  0640 04/26/25  0601 04/25/25  0747 04/24/25  0944 04/23/25  0609 04/22/25  0537 04/21/25  1313   SODIUM mmol/L 129*  132* 128* 133* 131* 131* 135* 137   POTASSIUM mmol/L 3.5  3.5 4.3 5.2 4.8 4.2 4.4 5.2   CHLORIDE mmol/L 92*  95* 96* 99 99 101 101 102   CO2 mmol/L 22.8  23.0 19.8* 19.3* 19.4* 21.6* 25.0 24.3   BUN mg/dL 36*  35* 36* 31* 28* 21 14 13   CREATININE mg/dL 2.52*  2.55* 2.87* 2.57* 2.46* 2.06* 1.88* 1.74*   CALCIUM mg/dL 7.6*  7.7*  7.8* 8.2* 8.0* 8.3* 8.5* 8.7   GLUCOSE mg/dL 222*  218* 163* 62* 159* 108* 123* 179*     Microbiology Results (last 10 days)       Procedure Component Value - Date/Time    Clostridioides difficile Toxin - Stool, Per Rectum [[id number]]  (Normal) Collected: 04/24/25 0008    Lab Status: Final result Specimen: Stool from Per Rectum Updated: 04/24/25 0204    Narrative:      The following orders were created for panel order Clostridioides difficile Toxin - Stool, Per Rectum.  Procedure                               Abnormality         Status                     ---------                               -----------         ------                     Clostridioides difficile...[[id number]]  Normal              Final result                 Please view results for these tests on the individual orders.    Clostridioides difficile Toxin, PCR - Stool, Per Rectum [[id number]]  (Normal) Collected: 04/24/25 0008    Lab Status: Final result Specimen: Stool from Per Rectum Updated: 04/24/25 0204     Toxigenic C. difficile by PCR Negative    Narrative:      The result indicates the absence of toxigenic C. difficile from stool specimen.     Blood Culture - Blood, Hand, Right [[id number]]  (Normal) Collected: 04/23/25 1200    Lab Status: Preliminary result Specimen: Blood from Hand, Right Updated: 04/27/25 1230     Blood Culture No growth at 4 days    Blood Culture - Blood, Hand, Right [[id number]]  (Normal) Collected: 04/23/25 1017    Lab Status: Preliminary result Specimen: Blood from Hand, Right Updated: 04/27/25 1045     Blood Culture No growth at 4 days    Eosinophil Smear - Urine, Urine, Clean Catch [[id number]]  (Normal) Collected: 04/22/25 1314    Lab Status: Final result Specimen: Urine, Clean Catch Updated: 04/22/25 1544     Eosinophil Smear 0 % EOS/100 Cells               Assessment:    Vancomycin flushing syndrome    Streptococcal bacteremia    Obesity    Dyslipidemia    HTN (hypertension)    MRSA colonization    Type 2 diabetes  mellitus, with long-term current use of insulin    Diabetic peripheral neuropathy    ROCKY (acute kidney injury)  65-year-old male with  1-systemic vancomycin delayed hypersensitivity reaction with skin rash and renal insufficiency  2-acute kidney injury probably secondary to combination of factors including vancomycin  3-history of MRSA colonization  4-history of C. difficile diarrhea with colonization  5-history of strep intermedius bacteremia sepsis with paraspinal abscess and facet septic arthritis clinically improved with resolution of symptoms on course to finish his antibiotic treatment on 5/6/2025  6-poorly controlled diabetes  7-morbid obesity  8-hypertension  9-other diagnoses per primary team.     Recommendations/Discussions:  See my discussion and recommendation on 4/22/2025  Continue IV ceftriaxone  Upon blood cultures, stool studies are negative for C. difficile.  Supportive care for renal insufficiency, avoidance of nephrotoxic agents and periodic review of systems identify other areas of secondary seeding due to strep intermedius bacteremia and possibility of line sepsis as well as recurrence of C. difficile infection in the setting of known MRSA colonization and C. difficile colonization.  Lizette López MD  4/28/2025  07:37 EDT    Parts of this note may be an electronic transcription/translation of spoken language to printed text using the Dragon dictation system.

## 2025-04-28 NOTE — PLAN OF CARE
Goal Outcome Evaluation:      Pt resting in bed quietly. Denies pain. Up independently to bathroom.

## 2025-04-28 NOTE — PLAN OF CARE
Goal Outcome Evaluation:  Plan of Care Reviewed With: patient, spouse        Progress: no change  Outcome Evaluation: pt aox4, on room air wears bipap at night (baseline), sr/st on monitor, vss, thrush-like rash noted in back of throat & pt c/o soreness-- nystatin ordered, meds given per mar, PICC flushed-- unable to get blood return, up w/stand-by assist, wife at bedside,  wound care & hand surgeon consulted                               [Follow-Up: _____] : a [unfilled] follow-up visit

## 2025-04-28 NOTE — NURSING NOTE
Reason for Visit: WOC Team consult for left arm/ hand blisters, skin peeling. Pt is resting in bed, alert and oriented,  wife at bedside. Pt's entire body is red secondary to delayed vancomycin reaction vs other etiology ( he has been off the vancomycin for over 10 days now). The skin on his face is very dry and peeling. His left arm and hand with blistering noted, especially on the left hand. Pt reports that his left hand is improving slightly, but it is still very edematous, now with multiple purple bullae on his fingers.  His right hand looks better than the left, but the palms of both hands are purple.  I am concerned about the status of this hand and would like for hand surgery to evaluate.    Treatment Plan/Recommendations: I spoke with his RN and really believe that he could benefit from a hand surgery consult. Once they have evaluated, staff can begin xeroform dressings to blisters or any open areas (each finger will need to be wrapped separately). The his hand/ arm can be wrapped with Kerlix and ACE wraps. It should be elevated to decrease the edema. Wound care orders placed into EPIC.     Wound Team Follow up Plan: WOCN team will follow prn if needed for additional guidance.

## 2025-04-28 NOTE — CASE MANAGEMENT/SOCIAL WORK
Continued Stay Note  [facility]     Patient Name: Arnold Ramírez  MRN: [id number]  Today's Date: 4/28/2025    Admit Date: 4/21/2025    Plan: Plan home with spouse and [facility].  JOSUE Jenkins RN   Discharge Plan       Row Name 04/28/25 1317       Plan    Plan Plan home with spouse and [facility].  JOSUE Jenkins RN    Patient/Family in Agreement with Plan yes    Plan Comments Spoke with pt and pt's spouse ( Cinthia  [phone]) at bedside. Pt is current with [facility] and Lety  ( [phone]) is following.  Pt is also current with [facility].  Leigh ( [phone]) called to follow.  Plan home with spouse and [facility].  JOSUE Jenkins RN                   Discharge Codes    No documentation.                 Expected Discharge Date and Time       Expected Discharge Date Expected Discharge Time    Apr 29, 2025               Marian Jenkins RN

## 2025-04-28 NOTE — DISCHARGE PLACEMENT REQUEST
"Oneyda Ramírez (65 y.o. Male)       Date of Birth   1960    Social Security Number       Address   [address] [place] [address]    Home Phone   [phone]    MRN   [id number]       Holiness   None    Marital Status                               Admission Date   4/21/2025    Admission Type   Urgent    Admitting Provider   Abby Mackey MD    Attending Provider   Ky Ronquillo MD    Department, Room/Bed   [address], [address]       Discharge Date       Discharge Disposition       Discharge Destination                                 Attending Provider: Ky Ronquillo MD    Allergies: Naproxen, Nsaids, Sulfamethoxazole-trimethoprim, Vancomycin, Etodolac, Gabapentin, Zonisamide    Isolation: Contact   Infection: MRSA (04/23/25)   Code Status: CPR    Ht: 195.6 cm (77\")   Wt: 157 kg (347 lb)    Admission Cmt: None   Principal Problem: Vancomycin flushing syndrome [L27.0,T36.8X5A]                   Active Insurance as of 4/21/2025       Primary Coverage       Payor Plan Insurance Group Employer/Plan Group    HUMA[employer] MEDICARE REPLACEMENT HU[employer]NA MEDICARE ADVANTAGE PPO [id number]       Payor Plan Address Payor Plan Phone Number Payor Plan Fax Number Effective Dates    [address] [address] [phone]  1/1/2024 - None Entered    [facility] [address]         Subscriber Name Subscriber Birth Date Member ID       ONEYDA RAMÍREZ 1960 [id number]                     Emergency Contacts        (Rel.) Home Phone Work Phone Mobile Phone    ANA RAMÍREZ (Spouse) -- -- [phone]              "

## 2025-04-28 NOTE — PROGRESS NOTES
[facility] Progress Note      Patient Name: Arnold Ramírez  : 1960  MRN: [id number]  Primary Care Physician:  Anand Alford MD  Date of admission: 2025    Subjective     Interval History:   Follow-up acute kidney injury.     Patient is feeling better but his skin is continue to be significantly erythematous and he had blisters on his left hand.  Denies chest pain or shortness of air, no orthopnea or PND, no nausea or vomiting.      Review of Systems:   As noted above    Objective     Vitals:   Temp:  [97.5 °F (36.4 °C)-97.9 °F (36.6 °C)] 97.5 °F (36.4 °C)  Heart Rate:  [77-89] 85  Resp:  [16-18] 18  BP: (107-148)/() 148/113    Intake/Output Summary (Last 24 hours) at 2025 0934  Last data filed at 2025 0800  Gross per 24 hour   Intake 1200 ml   Output 1500 ml   Net -300 ml       Physical Exam:    General Appearance: Awake, alert, chronically ill, morbidly obese, no acute distress  Skin: Significant diffuse total body erythema purpuric areas especially on his feet..  He has a blister of the left hand  HEENT: oral mucosa dry, nonicteric sclera  Neck: No JVD  Lungs: Clear to auscultation bilaterally.  Unlabored on room air  Heart: RRR, normal S1 and S2.    Abdomen: soft, obese nontender, distended. +bs  : no palpable bladder  Extremities: 3+ upper and lower extremity edema  Neuro: normal speech and mental status     Scheduled Meds:     amLODIPine, 5 mg, Oral, Daily  atorvastatin, 20 mg, Oral, Nightly  cefTRIAXone, 2,000 mg, Intravenous, Q24H  diphenhydrAMINE, 25 mg, Oral, Q6H  DULoxetine, 60 mg, Oral, Daily  famotidine, 20 mg, Oral, BID AC  insulin glargine, 25 Units, Subcutaneous, Q12H  insulin lispro, 2-9 Units, Subcutaneous, 4x Daily AC & at Bedtime  magic mouthwash oral suspension (mixture) (diphenhydrAMINE HCl - aluminum & magnesium hydroxide-simethicone - lidocaine viscous) solution 55 mL, 10 mL, Swish & Spit, Q6H  metoprolol succinate XL, 50 mg,  Oral, Daily  nystatin, 5 mL, Swish & Swallow, 4x Daily  pregabalin, 150 mg, Oral, BID  sodium chloride, 10 mL, Intravenous, Q12H  torsemide, 100 mg, Oral, Daily      IV Meds:        Results Reviewed:   I have personally reviewed the results from the time of this admission to 4/28/2025 09:34 EDT     Results from last 7 days   Lab Units 04/28/25  0830 04/27/25  0640 04/26/25  0601   SODIUM mmol/L 134* 129*  132* 128*   POTASSIUM mmol/L 3.6 3.5  3.5 4.3   CHLORIDE mmol/L 95* 92*  95* 96*   CO2 mmol/L 26.5 22.8  23.0 19.8*   BUN mg/dL 32* 36*  35* 36*   CREATININE mg/dL 2.06* 2.52*  2.55* 2.87*   CALCIUM mg/dL 7.8* 7.6*  7.7* 7.8*   BILIRUBIN mg/dL 0.3 0.3 0.5   ALK PHOS U/L 112 97 91   ALT (SGPT) U/L 20 18 17   AST (SGOT) U/L 18 18 18   GLUCOSE mg/dL 215* 222*  218* 163*       Estimated Creatinine Clearance: 60.2 mL/min (A) (by C-G formula based on SCr of 2.06 mg/dL (H)).    Results from last 7 days   Lab Units 04/28/25  0830 04/27/25  0640 04/26/25  0601 04/25/25  0747 04/24/25  0944 04/23/25  0609   MAGNESIUM mg/dL  --   --  1.8  --  1.7 1.8   PHOSPHORUS mg/dL 3.9 3.4 3.8   < > 4.0 3.5    < > = values in this interval not displayed.       Results from last 7 days   Lab Units 04/28/25  0830 04/27/25  0640 04/26/25  0601 04/23/25  0609 04/22/25  0537   URIC ACID mg/dL 10.9* 10.2* 9.6* 6.2 5.7       Results from last 7 days   Lab Units 04/28/25  0830 04/27/25  0927 04/26/25  0601 04/25/25  0747 04/24/25  0944   WBC 10*3/mm3 14.92* 13.06* 13.13* 16.04* 13.82*   HEMOGLOBIN g/dL 12.8* 12.2* 13.4 14.6 13.5   PLATELETS 10*3/mm3 317 276 246 325 248       Results from last 7 days   Lab Units 04/21/25  1313   INR  1.16*       Assessment / Plan     ASSESSMENT:  Acute kidney injury related to acute interstitial nephritis related to delayed hypersensitivity reaction to vancomycin and being on ARB and inability to autoregulate.  Creatinine little better at 2.06, sodium 134 patient has Volumex  2.  Delayed hypersensitivity  reaction to vancomycin.    3.  Strep bacteremia with lumbar spinal abscess and facet septic arthritis.  Back pain improved.  Followed by ID  4.  Diabetes mellitus type 2.  Peripheral neuropathy.  Managed by primary team  5.  Morbid obesity.  6.  Pretension not controlled associate with volume excess     PLAN:  Continue torsemide  Monitor for diuretic toxicity  Amlodipine dose decreased, wean off if blood pressure stays on low side  Surveillance labs    Reviewed the chart and other providers notes, reviewed labs.  I discussed the case with the patient.  Copied text in this note has been reviewed and is accurate as of 04/28/25.     Thank you for involving us in the care of Arnold Ramírez.  Please feel free to call with any questions.    Wander Trinidad MD  04/28/25  09:34 EDT    [facility]  [phone]    Please note that portions of this note were completed with a voice recognition program.

## 2025-04-29 LAB
ALBUMIN SERPL-MCNC: 3.1 G/DL (ref 3.5–5.2)
ALBUMIN/GLOB SERPL: 1.1 G/DL
ALP SERPL-CCNC: 116 U/L (ref 39–117)
ALT SERPL W P-5'-P-CCNC: 19 U/L (ref 1–41)
ANION GAP SERPL CALCULATED.3IONS-SCNC: 13 MMOL/L (ref 5–15)
AST SERPL-CCNC: 21 U/L (ref 1–40)
BILIRUB SERPL-MCNC: 0.3 MG/DL (ref 0–1.2)
BUN SERPL-MCNC: 28 MG/DL (ref 8–23)
BUN/CREAT SERPL: 15.8 (ref 7–25)
CALCIUM SPEC-SCNC: 8 MG/DL (ref 8.6–10.5)
CHLORIDE SERPL-SCNC: 97 MMOL/L (ref 98–107)
CO2 SERPL-SCNC: 27 MMOL/L (ref 22–29)
CREAT SERPL-MCNC: 1.77 MG/DL (ref 0.76–1.27)
DEPRECATED RDW RBC AUTO: 43.7 FL (ref 37–54)
EGFRCR SERPLBLD CKD-EPI 2021: 42.6 ML/MIN/1.73
EOSINOPHIL # BLD MANUAL: 1.41 10*3/MM3 (ref 0–0.4)
EOSINOPHIL NFR BLD MANUAL: 9 % (ref 0.3–6.2)
ERYTHROCYTE [DISTWIDTH] IN BLOOD BY AUTOMATED COUNT: 15.2 % (ref 12.3–15.4)
GLOBULIN UR ELPH-MCNC: 2.9 GM/DL
GLUCOSE BLDC GLUCOMTR-MCNC: 172 MG/DL (ref 70–130)
GLUCOSE BLDC GLUCOMTR-MCNC: 200 MG/DL (ref 70–130)
GLUCOSE BLDC GLUCOMTR-MCNC: 228 MG/DL (ref 70–130)
GLUCOSE BLDC GLUCOMTR-MCNC: 311 MG/DL (ref 70–130)
GLUCOSE SERPL-MCNC: 158 MG/DL (ref 65–99)
HCT VFR BLD AUTO: 36.3 % (ref 37.5–51)
HGB BLD-MCNC: 11.7 G/DL (ref 13–17.7)
HYPOCHROMIA BLD QL: ABNORMAL
LYMPHOCYTES # BLD MANUAL: 5.66 10*3/MM3 (ref 0.7–3.1)
LYMPHOCYTES NFR BLD MANUAL: 13 % (ref 5–12)
MAGNESIUM SERPL-MCNC: 1.6 MG/DL (ref 1.6–2.4)
MCH RBC QN AUTO: 25.9 PG (ref 26.6–33)
MCHC RBC AUTO-ENTMCNC: 32.2 G/DL (ref 31.5–35.7)
MCV RBC AUTO: 80.5 FL (ref 79–97)
MONOCYTES # BLD: 2.04 10*3/MM3 (ref 0.1–0.9)
NEUTROPHILS # BLD AUTO: 6.6 10*3/MM3 (ref 1.7–7)
NEUTROPHILS NFR BLD MANUAL: 42 % (ref 42.7–76)
PHOSPHATE SERPL-MCNC: 3.6 MG/DL (ref 2.5–4.5)
PLAT MORPH BLD: NORMAL
PLATELET # BLD AUTO: 331 10*3/MM3 (ref 140–450)
PMV BLD AUTO: 9.5 FL (ref 6–12)
POTASSIUM SERPL-SCNC: 3.5 MMOL/L (ref 3.5–5.2)
PROT SERPL-MCNC: 6 G/DL (ref 6–8.5)
RBC # BLD AUTO: 4.51 10*6/MM3 (ref 4.14–5.8)
SODIUM SERPL-SCNC: 137 MMOL/L (ref 136–145)
URATE SERPL-MCNC: 11.1 MG/DL (ref 3.4–7)
VARIANT LYMPHS NFR BLD MANUAL: 15 % (ref 0–5)
VARIANT LYMPHS NFR BLD MANUAL: 21 % (ref 19.6–45.3)
WBC MORPH BLD: NORMAL
WBC NRBC COR # BLD AUTO: 15.71 10*3/MM3 (ref 3.4–10.8)

## 2025-04-29 PROCEDURE — 84550 ASSAY OF BLOOD/URIC ACID: CPT | Performed by: INTERNAL MEDICINE

## 2025-04-29 PROCEDURE — 63710000001 INSULIN LISPRO (HUMAN) PER 5 UNITS: Performed by: NURSE PRACTITIONER

## 2025-04-29 PROCEDURE — 63710000001 INSULIN GLARGINE PER 5 UNITS: Performed by: HOSPITALIST

## 2025-04-29 PROCEDURE — 85025 COMPLETE CBC W/AUTO DIFF WBC: CPT | Performed by: INTERNAL MEDICINE

## 2025-04-29 PROCEDURE — 94799 UNLISTED PULMONARY SVC/PX: CPT

## 2025-04-29 PROCEDURE — 83735 ASSAY OF MAGNESIUM: CPT | Performed by: INTERNAL MEDICINE

## 2025-04-29 PROCEDURE — 94660 CPAP INITIATION&MGMT: CPT

## 2025-04-29 PROCEDURE — 63710000001 INSULIN GLARGINE PER 5 UNITS: Performed by: INTERNAL MEDICINE

## 2025-04-29 PROCEDURE — 82948 REAGENT STRIP/BLOOD GLUCOSE: CPT

## 2025-04-29 PROCEDURE — 25010000002 CEFTRIAXONE PER 250 MG: Performed by: INTERNAL MEDICINE

## 2025-04-29 PROCEDURE — 80053 COMPREHEN METABOLIC PANEL: CPT | Performed by: INTERNAL MEDICINE

## 2025-04-29 PROCEDURE — 63710000001 INSULIN LISPRO (HUMAN) PER 5 UNITS: Performed by: INTERNAL MEDICINE

## 2025-04-29 PROCEDURE — 63710000001 DIPHENHYDRAMINE PER 50 MG: Performed by: HOSPITALIST

## 2025-04-29 PROCEDURE — 84100 ASSAY OF PHOSPHORUS: CPT | Performed by: INTERNAL MEDICINE

## 2025-04-29 PROCEDURE — 85007 BL SMEAR W/DIFF WBC COUNT: CPT | Performed by: INTERNAL MEDICINE

## 2025-04-29 RX ORDER — INSULIN LISPRO 100 [IU]/ML
10 INJECTION, SOLUTION INTRAVENOUS; SUBCUTANEOUS
Status: DISCONTINUED | OUTPATIENT
Start: 2025-04-29 | End: 2025-05-02

## 2025-04-29 RX ORDER — POTASSIUM CHLORIDE 1500 MG/1
20 TABLET, EXTENDED RELEASE ORAL 2 TIMES DAILY WITH MEALS
Status: DISCONTINUED | OUTPATIENT
Start: 2025-04-29 | End: 2025-05-03 | Stop reason: HOSPADM

## 2025-04-29 RX ORDER — METOPROLOL SUCCINATE 100 MG/1
100 TABLET, EXTENDED RELEASE ORAL DAILY
Status: DISCONTINUED | OUTPATIENT
Start: 2025-04-29 | End: 2025-05-03 | Stop reason: HOSPADM

## 2025-04-29 RX ADMIN — INSULIN LISPRO 4 UNITS: 100 INJECTION, SOLUTION INTRAVENOUS; SUBCUTANEOUS at 12:22

## 2025-04-29 RX ADMIN — INSULIN GLARGINE 15 UNITS: 100 INJECTION, SOLUTION SUBCUTANEOUS at 21:33

## 2025-04-29 RX ADMIN — NYSTATIN 500000 UNITS: 100000 SUSPENSION ORAL at 07:46

## 2025-04-29 RX ADMIN — Medication 10 ML: at 07:47

## 2025-04-29 RX ADMIN — NYSTATIN 500000 UNITS: 100000 SUSPENSION ORAL at 16:53

## 2025-04-29 RX ADMIN — INSULIN LISPRO 10 UNITS: 100 INJECTION, SOLUTION INTRAVENOUS; SUBCUTANEOUS at 16:53

## 2025-04-29 RX ADMIN — DULOXETINE 60 MG: 30 CAPSULE, DELAYED RELEASE ORAL at 07:44

## 2025-04-29 RX ADMIN — INSULIN LISPRO 7 UNITS: 100 INJECTION, SOLUTION INTRAVENOUS; SUBCUTANEOUS at 16:53

## 2025-04-29 RX ADMIN — DIPHENHYDRAMINE HYDROCHLORIDE 25 MG: 25 CAPSULE ORAL at 16:52

## 2025-04-29 RX ADMIN — PREGABALIN 150 MG: 75 CAPSULE ORAL at 07:44

## 2025-04-29 RX ADMIN — INSULIN LISPRO 2 UNITS: 100 INJECTION, SOLUTION INTRAVENOUS; SUBCUTANEOUS at 07:45

## 2025-04-29 RX ADMIN — Medication 10 ML: at 21:34

## 2025-04-29 RX ADMIN — DIPHENHYDRAMINE HYDROCHLORIDE 25 MG: 25 CAPSULE ORAL at 12:22

## 2025-04-29 RX ADMIN — DIPHENHYDRAMINE HYDROCHLORIDE 10 ML: 25 SOLUTION ORAL at 12:22

## 2025-04-29 RX ADMIN — POTASSIUM CHLORIDE 20 MEQ: 1500 TABLET, EXTENDED RELEASE ORAL at 16:52

## 2025-04-29 RX ADMIN — FAMOTIDINE 20 MG: 20 TABLET, FILM COATED ORAL at 16:52

## 2025-04-29 RX ADMIN — HYDROCODONE BITARTRATE AND ACETAMINOPHEN 1 TABLET: 7.5; 325 TABLET ORAL at 21:33

## 2025-04-29 RX ADMIN — INSULIN GLARGINE 25 UNITS: 100 INJECTION, SOLUTION SUBCUTANEOUS at 07:45

## 2025-04-29 RX ADMIN — NYSTATIN 500000 UNITS: 100000 SUSPENSION ORAL at 12:22

## 2025-04-29 RX ADMIN — FAMOTIDINE 20 MG: 20 TABLET, FILM COATED ORAL at 07:44

## 2025-04-29 RX ADMIN — TORSEMIDE 100 MG: 100 TABLET ORAL at 07:44

## 2025-04-29 RX ADMIN — METOPROLOL SUCCINATE 100 MG: 100 TABLET, EXTENDED RELEASE ORAL at 07:44

## 2025-04-29 RX ADMIN — TORSEMIDE 100 MG: 100 TABLET ORAL at 16:52

## 2025-04-29 RX ADMIN — ATORVASTATIN CALCIUM 20 MG: 20 TABLET, FILM COATED ORAL at 21:33

## 2025-04-29 RX ADMIN — DIPHENHYDRAMINE HYDROCHLORIDE 10 ML: 25 SOLUTION ORAL at 07:44

## 2025-04-29 RX ADMIN — NYSTATIN 500000 UNITS: 100000 SUSPENSION ORAL at 21:33

## 2025-04-29 RX ADMIN — POTASSIUM CHLORIDE 20 MEQ: 1500 TABLET, EXTENDED RELEASE ORAL at 12:22

## 2025-04-29 RX ADMIN — INSULIN LISPRO 4 UNITS: 100 INJECTION, SOLUTION INTRAVENOUS; SUBCUTANEOUS at 21:33

## 2025-04-29 RX ADMIN — PREGABALIN 150 MG: 75 CAPSULE ORAL at 21:33

## 2025-04-29 RX ADMIN — DIPHENHYDRAMINE HYDROCHLORIDE 10 ML: 25 SOLUTION ORAL at 16:54

## 2025-04-29 RX ADMIN — CEFTRIAXONE 2000 MG: 2 INJECTION, POWDER, FOR SOLUTION INTRAMUSCULAR; INTRAVENOUS at 21:34

## 2025-04-29 NOTE — PROGRESS NOTES
Name: Arnold Ramírez ADMIT: 2025   : 1960  PCP: Anand Alford MD    MRN: [id number] LOS: 6 days   AGE/SEX: 65 y.o. male  ROOM: E665/     Subjective   Subjective   No chief complaint on file.    His rash is diffusely desquamating now.  Over limbs and face.  It is sparing his mucosa.  The left hand still has the bullae and a few have burst.  He has had some slight improvement in swelling over all of his left hand.     Objective   Objective   Vital Signs  Temp:  [97.5 °F (36.4 °C)-97.9 °F (36.6 °C)] 97.7 °F (36.5 °C)  Heart Rate:  [84-97] 97  Resp:  [18] 18  BP: (111-125)/(53-90) 111/59  SpO2:  [98 %] 98 %  on   ;   Device (Oxygen Therapy): room air  Body mass index is 40.78 kg/m².    Physical Exam  Vitals and nursing note reviewed.   Constitutional:       General: He is not in acute distress.     Appearance: He is ill-appearing. He is not diaphoretic.   HENT:      Head: Atraumatic.   Cardiovascular:      Rate and Rhythm: Normal rate and regular rhythm.      Pulses: Normal pulses.   Pulmonary:      Effort: Pulmonary effort is normal.      Breath sounds: No wheezing.   Abdominal:      General: There is no distension.      Palpations: Abdomen is soft.   Musculoskeletal:         General: Swelling and tenderness present.   Skin:     Findings: Bruising and rash present.   Neurological:      Mental Status: He is alert.      Cranial Nerves: No cranial nerve deficit.   Psychiatric:         Mood and Affect: Mood normal.         Behavior: Behavior normal.       Results Review  I reviewed the patient's new clinical results.    Results from last 7 days   Lab Units 25  0622 25  0830 25  0927 25  0601   WBC 10*3/mm3 15.71* 14.92* 13.06* 13.13*   HEMOGLOBIN g/dL 11.7* 12.8* 12.2* 13.4   PLATELETS 10*3/mm3 331 317 276 246     Results from last 7 days   Lab Units 25  0622 25  0830 25  0640 25  0601   SODIUM mmol/L 137 134* 129*  132* 128*   POTASSIUM mmol/L 3.5 3.6  3.5  3.5 4.3   CHLORIDE mmol/L 97* 95* 92*  95* 96*   CO2 mmol/L 27.0 26.5 22.8  23.0 19.8*   BUN mg/dL 28* 32* 36*  35* 36*   CREATININE mg/dL 1.77* 2.06* 2.52*  2.55* 2.87*   GLUCOSE mg/dL 158* 215* 222*  218* 163*   EGFR mL/min/1.73 42.6* 35.5* 27.9*  27.5* 23.9*     Results from last 7 days   Lab Units 04/29/25  0622 04/28/25  0830 04/27/25  0640 04/26/25  0601   ALBUMIN g/dL 3.1* 3.2* 2.8*  2.8* 2.6*   BILIRUBIN mg/dL 0.3 0.3 0.3 0.5   ALK PHOS U/L 116 112 97 91   AST (SGOT) U/L 21 18 18 18   ALT (SGPT) U/L 19 20 18 17     Results from last 7 days   Lab Units 04/29/25  0622 04/28/25  0830 04/27/25  0640 04/26/25  0601 04/25/25  0747 04/24/25  0944 04/23/25  0609   CALCIUM mg/dL 8.0* 7.8* 7.6*  7.7* 7.8*   < > 8.0* 8.3*   ALBUMIN g/dL 3.1* 3.2* 2.8*  2.8* 2.6*   < > 2.8* 3.0*   MAGNESIUM mg/dL 1.6  --   --  1.8  --  1.7 1.8   PHOSPHORUS mg/dL 3.6 3.9 3.4 3.8   < > 4.0 3.5    < > = values in this interval not displayed.         Glucose   Date/Time Value Ref Range Status   04/29/2025 1058 200 (H) 70 - 130 mg/dL Final   04/29/2025 0602 172 (H) 70 - 130 mg/dL Final   04/28/2025 2029 290 (H) 70 - 130 mg/dL Final   04/28/2025 1555 282 (H) 70 - 130 mg/dL Final   04/28/2025 1048 196 (H) 70 - 130 mg/dL Final   04/28/2025 0623 164 (H) 70 - 130 mg/dL Final   04/27/2025 2112 299 (H) 70 - 130 mg/dL Final       No radiology results for the last day    I have personally reviewed all medications:  Scheduled Medications  atorvastatin, 20 mg, Oral, Nightly  cefTRIAXone, 2,000 mg, Intravenous, Q24H  diphenhydrAMINE, 25 mg, Oral, Q6H  DULoxetine, 60 mg, Oral, Daily  famotidine, 20 mg, Oral, BID AC  insulin glargine, 25 Units, Subcutaneous, Q12H  insulin lispro, 2-9 Units, Subcutaneous, 4x Daily AC & at Bedtime  magic mouthwash oral suspension (mixture) (diphenhydrAMINE HCl - aluminum & magnesium hydroxide-simethicone - lidocaine viscous) solution 55 mL, 10 mL, Swish & Spit, Q6H  metoprolol succinate XL, 100 mg, Oral,  Daily  nystatin, 5 mL, Swish & Swallow, 4x Daily  potassium chloride, 20 mEq, Oral, BID With Meals  pregabalin, 150 mg, Oral, BID  sodium chloride, 10 mL, Intravenous, Q12H  torsemide, 100 mg, Oral, BID Diuretics      Infusions     Diet  Diet: Diabetic; Consistent Carbohydrate; Fluid Consistency: Thin (IDDSI 0)       Assessment/Plan     Active Hospital Problems    Diagnosis  POA    **Vancomycin flushing syndrome [L27.0, T36.8X5A]  Yes    ROCKY (acute kidney injury) [N17.9]  Yes    Type 2 diabetes mellitus, with long-term current use of insulin [E11.9, Z79.4]  Not Applicable    Diabetic peripheral neuropathy [E11.42]  Yes    Dyslipidemia [E78.5]  Yes    HTN (hypertension) [I10]  Yes    Obesity [E66.9]  Yes    Streptococcal bacteremia [R78.81, B95.5]  Yes    MRSA colonization [Z22.322]  Not Applicable      Resolved Hospital Problems   No resolved problems to display.       65 y.o. male admitted with Vancomycin flushing syndrome.    Vancomycin flushing syndrome: Was undergoing treatment for strep intermedius bacteremia.  Has been transitioned to Rocephin with plan through 5/6.  Blood cultures no growth to date.  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   Continue nystatin for thrush.  He does have eosinophilia but LFTs are okay and he is afebrile.  Leukocytosis present.  Wound care evaluated, hand surgery consult.  Infectious disease following.  ROCKY/AIN: Creatinine is improving.  He is on torsemide to aid with volume control.  Nephrology following.  Hypertension: Continue adjustment per nephrology needs.  Diabetes: A1c 10.2.  Had hypoglycemia earlier this admission so Lantus was held and is being reintroduced.  Adjust Lantus to 15 units twice daily, Premeal 10 units plus SSI.  Monitor requirements and continue to titrate.  PPX: SCD  Disposition: TBD    Expected Discharge Date: 5/2/2025; Expected Discharge Time:      Ky Ronquillo MD  [facility]  04/29/25  13:24 EDT    Dictated portions of note using Dragon  dictation software.  Copied text in this note has been reviewed by me and remains accurate as of 04/29/25

## 2025-04-29 NOTE — PLAN OF CARE
Problem: Adult Inpatient Plan of Care  Goal: Plan of Care Review  Outcome: Progressing  Flowsheets (Taken 4/29/2025 1525)  Progress: improving  Outcome Evaluation: Patient has been pleasant and cooperative during shift. Replacing K. Hand dressing. Hand Surgeon has seen patient. AOx4, ad gianluca, room air, SR. No complaints of nausea or SOA. Mild pain generalized. VSS. Wife at bedside. Will continue to monitor and assist patient as needed.  Plan of Care Reviewed With: patient  Goal: Patient-Specific Goal (Individualized)  Outcome: Progressing  Goal: Absence of Hospital-Acquired Illness or Injury  Outcome: Progressing  Intervention: Identify and Manage Fall Risk  Recent Flowsheet Documentation  Taken 4/29/2025 1400 by Magen Orta RN  Safety Promotion/Fall Prevention:   assistive device/personal items within reach   fall prevention program maintained   nonskid shoes/slippers when out of bed   safety round/check completed  Taken 4/29/2025 1200 by Magen Orta RN  Safety Promotion/Fall Prevention:   assistive device/personal items within reach   fall prevention program maintained   nonskid shoes/slippers when out of bed   safety round/check completed  Taken 4/29/2025 1000 by Magen Orta RN  Safety Promotion/Fall Prevention:   assistive device/personal items within reach   fall prevention program maintained   nonskid shoes/slippers when out of bed   safety round/check completed  Taken 4/29/2025 0748 by Magen Orta RN  Safety Promotion/Fall Prevention:   assistive device/personal items within reach   fall prevention program maintained   nonskid shoes/slippers when out of bed   safety round/check completed  Intervention: Prevent Skin Injury  Recent Flowsheet Documentation  Taken 4/29/2025 1400 by Magen Orta RN  Body Position: supine  Taken 4/29/2025 1200 by Magen Orta RN  Body Position: dangle, side of bed  Taken 4/29/2025 1000 by Magen Orta RN  Body Position: supine  Taken  4/29/2025 0748 by Magen Orta RN  Body Position: supine  Goal: Optimal Comfort and Wellbeing  Outcome: Progressing  Goal: Readiness for Transition of Care  Outcome: Progressing     Problem: Comorbidity Management  Goal: Blood Glucose Level Within Target Range  Outcome: Progressing  Goal: Blood Pressure in Desired Range  Outcome: Progressing     Problem: Fall Injury Risk  Goal: Absence of Fall and Fall-Related Injury  Outcome: Progressing  Intervention: Promote Injury-Free Environment  Recent Flowsheet Documentation  Taken 4/29/2025 1400 by Magen Orta RN  Safety Promotion/Fall Prevention:   assistive device/personal items within reach   fall prevention program maintained   nonskid shoes/slippers when out of bed   safety round/check completed  Taken 4/29/2025 1200 by Magen Orta RN  Safety Promotion/Fall Prevention:   assistive device/personal items within reach   fall prevention program maintained   nonskid shoes/slippers when out of bed   safety round/check completed  Taken 4/29/2025 1000 by Magen Orta RN  Safety Promotion/Fall Prevention:   assistive device/personal items within reach   fall prevention program maintained   nonskid shoes/slippers when out of bed   safety round/check completed  Taken 4/29/2025 0748 by Magen Orat RN  Safety Promotion/Fall Prevention:   assistive device/personal items within reach   fall prevention program maintained   nonskid shoes/slippers when out of bed   safety round/check completed     Problem: Sepsis/Septic Shock  Goal: Optimal Coping  Outcome: Progressing  Goal: Absence of Bleeding  Outcome: Progressing  Goal: Blood Glucose Level Within Target Range  Outcome: Progressing  Goal: Absence of Infection Signs and Symptoms  Outcome: Progressing  Intervention: Promote Recovery  Recent Flowsheet Documentation  Taken 4/29/2025 1400 by Magen Orta RN  Activity Management: up ad gianluca  Taken 4/29/2025 1200 by Magen Orta RN  Activity  Management:   up ad gianluca   sitting, edge of bed  Taken 4/29/2025 1000 by Magen Orta RN  Activity Management: up ad gianluca  Taken 4/29/2025 0748 by Magen Orta RN  Activity Management: up ad gianluca  Goal: Optimal Nutrition Delivery  Outcome: Progressing     Problem: Infection  Goal: Absence of Infection Signs and Symptoms  Outcome: Progressing     Problem: Fatigue  Goal: Improved Activity Tolerance  Outcome: Progressing  Intervention: Promote Improved Energy  Recent Flowsheet Documentation  Taken 4/29/2025 1400 by Magen Orta RN  Activity Management: up ad gianluca  Taken 4/29/2025 1200 by Magen Orta RN  Activity Management:   up ad gianluca   sitting, edge of bed  Taken 4/29/2025 1000 by Magen Orta RN  Activity Management: up ad gianluca  Taken 4/29/2025 0748 by Magen Orta RN  Activity Management: up ad gianluca     Problem: Noninvasive Ventilation Acute  Goal: Effective Unassisted Ventilation and Oxygenation  Outcome: Progressing     Problem: Skin Injury Risk Increased  Goal: Skin Health and Integrity  Outcome: Progressing  Intervention: Optimize Skin Protection  Recent Flowsheet Documentation  Taken 4/29/2025 1400 by Magen Orta RN  Activity Management: up ad gianluca  Head of Bed (HOB) Positioning: HOB at 30 degrees  Pressure Reduction Devices: pressure-redistributing mattress utilized  Taken 4/29/2025 1200 by Magen Orta RN  Activity Management:   up ad gianluca   sitting, edge of bed  Taken 4/29/2025 1000 by Magen Orta RN  Activity Management: up ad gianluca  Head of Bed (HOB) Positioning: HOB at 30 degrees  Taken 4/29/2025 0748 by Magen Orta RN  Activity Management: up ad gianluca  Pressure Reduction Techniques: frequent weight shift encouraged  Head of Bed (HOB) Positioning: HOB at 30 degrees  Pressure Reduction Devices: pressure-redistributing mattress utilized   Goal Outcome Evaluation:  Plan of Care Reviewed With: patient        Progress: improving  Outcome Evaluation: Patient has been  pleasant and cooperative during shift. Replacing K. Hand dressing. Hand Surgeon has seen patient. AOx4, ad gianluca, room air, SR. No complaints of nausea or SOA. Mild pain generalized. VSS. Wife at bedside. Will continue to monitor and assist patient as needed.

## 2025-04-29 NOTE — CONSULTS
"  Orthopaedic Consult    Kanwal Luevano MD      Patient: Arnold Ramírez    Date of Admission: 4/21/2025  7:48 PM    YOB: 1960    Medical Record Number: [id number]    Attending Physician: Ky Ronquillo MD  Consulting Physician: Kanwal Luevano MD      Chief Complaints: Vancomycin flushing syndrome [L27.0, T36.8X5A]      History of Present Illness: 65 y.o. male admitted to [facility] with Vancomycin flushing syndrome [L27.0, T36.8X5A]. I was consulted for further evaluation and treatment.  Wound care was concerned regarding his hands specially his left hand yesterday so I was consulted for further evaluation and treatment.  Patient reports he was receiving vancomycin for a wound on his leg.  He reports he feels better than he did.  He is feeling from his hands and face.  He is on disability.  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  Bilateral hand pain    Allergies:   Allergies   Allergen Reactions    Naproxen Anaphylaxis    Nsaids Anaphylaxis    Sulfamethoxazole-Trimethoprim Shortness Of Breath and Swelling    Vancomycin Hives    Etodolac Other (See Comments)     stomach irritation  Annotation - [id number]: STOMACH      Gabapentin Dizziness     Annotation - [id number]: \"Felt Drunk\"    Zonisamide Other (See Comments) and Myalgia     Drowsy, Fatigue         Medications:   Home Medications:  Medications Prior to Admission   Medication Sig Dispense Refill Last Dose/Taking    amLODIPine (NORVASC) 10 MG tablet Take 1 tablet by mouth Daily.   4/21/2025    atorvastatin (LIPITOR) 20 MG tablet Take 1 tablet by mouth Every Night.   4/20/2025    DULoxetine (CYMBALTA) 60 MG capsule Take 1 capsule by mouth Daily.   4/21/2025    Insulin Degludec (TRESIBA FLEXTOUCH) 200 UNIT/ML solution pen-injector pen injection Inject 150 Units under the skin into the appropriate area as directed 2 (Two) Times a Day.   4/21/2025    losartan (COZAAR) 100 MG tablet Take " 1 tablet by mouth Daily.   4/21/2025    metFORMIN ER (GLUCOPHAGE-XR) 500 MG 24 hr tablet Take 1 tablet by mouth 2 (Two) Times a Day.   4/21/2025    methocarbamol (ROBAXIN) 750 MG tablet Take 1 tablet by mouth Every 6 (Six) Hours As Needed for Muscle Spasms (low back pain). 15 tablet 0 Past Month    metoprolol succinate XL (TOPROL-XL) 50 MG 24 hr tablet Take 1 tablet by mouth Daily.   4/21/2025    pregabalin (LYRICA) 150 MG capsule Take 1 capsule by mouth 2 (Two) Times a Day.   4/21/2025    Testosterone Cypionate (DEPOTESTOTERONE CYPIONATE) 200 MG/ML injection Inject 1 mL into the appropriate muscle as directed by prescriber Every 14 (Fourteen) Days.   Past Month    triamcinolone (KENALOG) 0.1 % cream Apply 1 Application topically to the appropriate area as directed Every 12 (Twelve) Hours.   4/20/2025    Continuous Glucose Sensor (Dexcom G7 Sensor) misc Use 1 each Every 10 (Ten) Days. Change sensors every 10 days 3 each 0     glucose blood test strip Use to test blood glucose up to four times daily as needed. Formulary Compliance Approval. Diagnosis: Type 2 Diabetes - Insulin Dependent 100 each 12     Blood Glucose Monitoring Suppl (Accu-Chek Guide Me) w/Device kit Use to test blood glucose up to four times daily as needed. Formulary Compliance Approval. Diagnosis: Type 2 Diabetes - Insulin Dependent 1 kit 0     HYDROcodone-acetaminophen (NORCO) 7.5-325 MG per tablet Take 1 tablet by mouth Every 4 (Four) Hours As Needed for Severe Pain. 15 tablet 0     Lancets misc Use to test blood glucose up to four times daily as needed. Formulary Compliance Approval. Diagnosis: Type 2 Diabetes - Insulin Dependent 100 each 12     Lidocaine 4 % Place 2 patches on the skin as directed by provider Daily. Remove & Discard patch within 12 hours or as directed by MD 15 patch 0 More than a month       Current Medications:  Scheduled Meds:atorvastatin, 20 mg, Oral, Nightly  cefTRIAXone, 2,000 mg, Intravenous, Q24H  diphenhydrAMINE, 25  mg, Oral, Q6H  DULoxetine, 60 mg, Oral, Daily  famotidine, 20 mg, Oral, BID AC  insulin glargine, 15 Units, Subcutaneous, Q12H  insulin lispro, 10 Units, Subcutaneous, TID With Meals  insulin lispro, 2-9 Units, Subcutaneous, 4x Daily AC & at Bedtime  magic mouthwash oral suspension (mixture) (diphenhydrAMINE HCl - aluminum & magnesium hydroxide-simethicone - lidocaine viscous) solution 55 mL, 10 mL, Swish & Spit, Q6H  metoprolol succinate XL, 100 mg, Oral, Daily  nystatin, 5 mL, Swish & Swallow, 4x Daily  potassium chloride, 20 mEq, Oral, BID With Meals  pregabalin, 150 mg, Oral, BID  sodium chloride, 10 mL, Intravenous, Q12H  torsemide, 100 mg, Oral, BID Diuretics      Continuous Infusions:   PRN Meds:.  acetaminophen **OR** acetaminophen **OR** acetaminophen    aluminum-magnesium hydroxide-simethicone    senna-docusate sodium **AND** polyethylene glycol **AND** bisacodyl **AND** bisacodyl    dextrose    dextrose    diphenhydrAMINE    glucagon (human recombinant)    HYDROcodone-acetaminophen    melatonin    methocarbamol    nitroglycerin    ondansetron ODT **OR** ondansetron    sodium chloride    sodium chloride    sodium chloride    sodium chloride    sodium chloride    Past Medical History:   Diagnosis Date    Diabetes mellitus     Hyperlipidemia     Hypertension      History reviewed. No pertinent surgical history.  Social History     Occupational History    Not on file   Tobacco Use    Smoking status: Never    Smokeless tobacco: Never   Vaping Use    Vaping status: Never Used   Substance and Sexual Activity    Alcohol use: Not Currently    Drug use: Defer    Sexual activity: Defer      Social History     Social History Narrative    Not on file     History reviewed. No pertinent family history.      Review of Systems:   Constitutional: Negative for fatigue, fever, or weight loss  HEENT: Patient denies any headaches, vision changes, sore throat. Admits to wearing glasses  Pulmonary: Patient denies any cough,  congestion, SOA, or wheezing  Cardiovascular: Patient denies any chest pain, palpitations, weakness,  Gastrointestinal:  Patient denies nausea, vomiting, diarrhea, constipation  Genital/Urinary: Patient denies dysuria, urinary frequency, urgency, incontinence, retention  Musculoskeletal: Positive for bilateral hand pain  Neurological: Patient denies dizziness, paresthesia, loss of sensation, seizures, syncope  Endocrine system: Patient denies tremors, cold or heat intolerance  Psychological: Patient denies thoughts/plans or harming self or other; hallucinations,  insomnia  Skin: Patient denies any bruising, rashes, lesions, or ulcers   Hematopoietic: Patient denies history of MRSA or slow wound healing,  spontaneous or excessive bleeding    Physical Exam: 65 y.o. male  Vitals:    04/28/25 2336 04/29/25 0428 04/29/25 0704 04/29/25 1245   BP: 119/90  120/53 111/59   BP Location: Right arm  Right arm Right arm   Patient Position: Lying  Lying Lying   Pulse: 94  91 97   Resp: 18  18 18   Temp: 97.9 °F (36.6 °C)  97.5 °F (36.4 °C) 97.7 °F (36.5 °C)   TempSrc: Oral  Oral Oral   SpO2:   98% 98%   Weight:  (!) 156 kg (343 lb 14.4 oz)     Height:                                General Appearance:  Alert, cooperative, in no acute distress    HEENT:    Normocephalic,  Atraumatic, Pupils are equal   Neck:   No adenopathy, supple, trachea midline, no thyromegaly       Lungs:     Clear to auscultation, equal expansion bilaterally, respirations regular, even and               unlabored    Heart:    Regular rhythm and normal rate, normal S1 and S2, no murmur, no gallops   Chest Wall:    Within normal limits   Abdomen:     Normal bowel sounds, no masses,  soft non-tender, non-distended,       Rectal:  Musculoskeletal:     Deferred    exam of bilateral hands demonstrates that he has redness and swelling.  He reports he is neurovascular intact median/radial/ulnar nerve.  His hand is warm and well-perfused.  He is able to make a fist.   He has blisters some which are popping on his left side.  No signs of obvious infection.    Negative for muscle cramps   Extremities:   No clubbing, no edema, no cyanosis   Pulses  Neurovascular:   Pulses palpable and equal bilaterally in all 4 extremities    Patient was alert and oriented x 3 spheres   Skin:   No lesions, ulcers or rashes   Neurologic:   Cranial nerves 2 - 12 grossly intact, sensation intact            Diagnostic Tests:          Assessment:    Vancomycin flushing syndrome    Streptococcal bacteremia    Obesity    Dyslipidemia    HTN (hypertension)    MRSA colonization    Type 2 diabetes mellitus, with long-term current use of insulin    Diabetic peripheral neuropathy    ROCKY (acute kidney injury)            Plan:      From my standpoint there is no surgical intervention needed to take place.  I would recommend just local wound care.  I typically do show recommend Dry dressings.  The blisters will pop I would try to leave on the top layer.  I will follow along peripherally please contact me with any further questions or concerns.  I do not believe he needs outpatient follow-up with me for this problem.  Date: 4/29/2025  Kanwal Luevano MD  Orthopaedic Surgeon    [facility]  [phone]  [email or web address]

## 2025-04-29 NOTE — PROGRESS NOTES
[facility] Progress Note      Patient Name: Arnold Ramírez  : 1960  MRN: [id number]  Primary Care Physician:  Anand Alford MD  Date of admission: 2025    Subjective     Interval History:   F/u ROCKY    Review of Systems:   UOP NR  Wt down 343 by standing scale   Denies dyspnea     Objective     Vitals:   Temp:  [97.5 °F (36.4 °C)-97.9 °F (36.6 °C)] 97.5 °F (36.4 °C)  Heart Rate:  [84-94] 91  Resp:  [18] 18  BP: (119-125)/(53-90) 120/53    Intake/Output Summary (Last 24 hours) at 2025 0914  Last data filed at 2025  Gross per 24 hour   Intake 400 ml   Output --   Net 400 ml       Physical Exam:    General Appearance: alert on RA  Derm diffuse erythematous scaly rash  Neck: supple, no JVD  Lungs: CTA bilat no rales  Heart: RRR, normal S1 and S2  Abdomen: soft, nontender, nondistended  Extremities: 2+ BLE edema     Scheduled Meds:     atorvastatin, 20 mg, Oral, Nightly  cefTRIAXone, 2,000 mg, Intravenous, Q24H  diphenhydrAMINE, 25 mg, Oral, Q6H  DULoxetine, 60 mg, Oral, Daily  famotidine, 20 mg, Oral, BID AC  insulin glargine, 25 Units, Subcutaneous, Q12H  insulin lispro, 2-9 Units, Subcutaneous, 4x Daily AC & at Bedtime  magic mouthwash oral suspension (mixture) (diphenhydrAMINE HCl - aluminum & magnesium hydroxide-simethicone - lidocaine viscous) solution 55 mL, 10 mL, Swish & Spit, Q6H  metoprolol succinate XL, 100 mg, Oral, Daily  nystatin, 5 mL, Swish & Swallow, 4x Daily  potassium chloride, 20 mEq, Oral, BID With Meals  pregabalin, 150 mg, Oral, BID  sodium chloride, 10 mL, Intravenous, Q12H  torsemide, 100 mg, Oral, BID Diuretics      IV Meds:        Results Reviewed:   I have personally reviewed the results from the time of this admission to 2025 09:14 EDT     Results from last 7 days   Lab Units 25  0622 25  0830 25  0640   SODIUM mmol/L 137 134* 129*  132*   POTASSIUM mmol/L 3.5 3.6 3.5  3.5   CHLORIDE mmol/L 97* 95* 92*  95*    CO2 mmol/L 27.0 26.5 22.8  23.0   BUN mg/dL 28* 32* 36*  35*   CREATININE mg/dL 1.77* 2.06* 2.52*  2.55*   CALCIUM mg/dL 8.0* 7.8* 7.6*  7.7*   BILIRUBIN mg/dL 0.3 0.3 0.3   ALK PHOS U/L 116 112 97   ALT (SGPT) U/L 19 20 18   AST (SGOT) U/L 21 18 18   GLUCOSE mg/dL 158* 215* 222*  218*     Estimated Creatinine Clearance: 70.1 mL/min (A) (by C-G formula based on SCr of 1.77 mg/dL (H)).  Results from last 7 days   Lab Units 04/29/25  0622 04/28/25  0830 04/27/25  0640 04/26/25  0601 04/25/25  0747 04/24/25  0944   MAGNESIUM mg/dL 1.6  --   --  1.8  --  1.7   PHOSPHORUS mg/dL 3.6 3.9 3.4 3.8   < > 4.0    < > = values in this interval not displayed.     Results from last 7 days   Lab Units 04/29/25  0622 04/28/25  0830 04/27/25  0640 04/26/25  0601 04/23/25  0609   URIC ACID mg/dL 11.1* 10.9* 10.2* 9.6* 6.2     Results from last 7 days   Lab Units 04/29/25  0622 04/28/25  0830 04/27/25  0927 04/26/25  0601 04/25/25  0747   WBC 10*3/mm3 15.71* 14.92* 13.06* 13.13* 16.04*   HEMOGLOBIN g/dL 11.7* 12.8* 12.2* 13.4 14.6   PLATELETS 10*3/mm3 331 317 276 603 363           Assessment / Plan     ASSESSMENT:  Acute kidney injury related to acute interstitial nephritis related to delayed hypersensitivity reaction to vancomycin and being on ARB and inability to autoregulate.  Creatinine improving, down to 2.0 yesterday  2.  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    3.  Strep bacteremia with lumbar spinal abscess and facet septic arthritis.  Followed by ID  4.  Diabetes mellitus type 2.  Peripheral neuropathy.  Managed by primary team  5.  Vol overload - diuresing well (Wt NR but weight trending down).  Alb low 3.2.  Also on CCB   6.  Hypertension - controlled     PLAN:  Follow up labs   Continue torsemide 100 BID  DC norvasc due to edema and double metop XL    Addendum: labs back, Cr stable 1.8.  K 3.5 - sched KCL 20 meq BID w diuretic    Magen Crum MD  04/29/25  09:14 EDT    [facility]  [phone]

## 2025-04-29 NOTE — SIGNIFICANT NOTE
04/29/25 1017   OTHER   Discipline physical therapist   Rehab Time/Intention   Session Not Performed other (see comments)  (PT spoke with pt - reports he is up ad gianluca and has been walking in [place] with his wife as able. Pt with no further acute PT needs, plans home with family, PT will sign-off.)   Therapy Assessment/Plan (PT)   Criteria for Skilled Interventions Met (PT) no;no problems identified which require skilled intervention

## 2025-04-29 NOTE — PLAN OF CARE
Goal Outcome Evaluation:   Patient alert follows commands prn pain med given. No c/o nausea noted. Up ad gianluca with minimal assistance. Picc line drsg changed this shift. Chantelle bradford rn cosigned as second nurse. Iv antibx given. No acute distress noted will continue to monitor

## 2025-04-29 NOTE — PROGRESS NOTES
"  Infectious Diseases Progress Note    Lizette López MD     [facility]  Los: 6 days  Patient Identification:  Name: Arnold Ramírez  Age: 65 y.o.  Sex: male  :  1960  MRN: [id number]         Primary Care Physician: Anand Alford MD        Subjective: Overall feeling better in terms of diarrhea back pain fever chills.  Appetite is improving.  Frustrated about ongoing issues with his kidney function and sluggish resolution of his skin lesions.  Interval History: See consultation note.    Objective:    Scheduled Meds:atorvastatin, 20 mg, Oral, Nightly  cefTRIAXone, 2,000 mg, Intravenous, Q24H  diphenhydrAMINE, 25 mg, Oral, Q6H  DULoxetine, 60 mg, Oral, Daily  famotidine, 20 mg, Oral, BID AC  insulin glargine, 25 Units, Subcutaneous, Q12H  insulin lispro, 2-9 Units, Subcutaneous, 4x Daily AC & at Bedtime  magic mouthwash oral suspension (mixture) (diphenhydrAMINE HCl - aluminum & magnesium hydroxide-simethicone - lidocaine viscous) solution 55 mL, 10 mL, Swish & Spit, Q6H  metoprolol succinate XL, 100 mg, Oral, Daily  nystatin, 5 mL, Swish & Swallow, 4x Daily  pregabalin, 150 mg, Oral, BID  sodium chloride, 10 mL, Intravenous, Q12H  torsemide, 100 mg, Oral, BID Diuretics      Continuous Infusions:     Vital signs in last 24 hours:  Temp:  [97.5 °F (36.4 °C)-97.9 °F (36.6 °C)] 97.5 °F (36.4 °C)  Heart Rate:  [84-94] 91  Resp:  [18] 18  BP: (119-125)/(53-90) 120/53    Intake/Output:    Intake/Output Summary (Last 24 hours) at 2025 0845  Last data filed at 20257  Gross per 24 hour   Intake 400 ml   Output --   Net 400 ml       Exam:  /53 (BP Location: Right arm, Patient Position: Lying)   Pulse 91   Temp 97.5 °F (36.4 °C) (Oral)   Resp 18   Ht 195.6 cm (77\")   Wt (!) 156 kg (343 lb 14.4 oz)   SpO2 98%   BMI 40.78 kg/m²   Patient is examined using the personal protective equipment as per guidelines from infection control for this particular patient as enacted.  Hand washing " was performed before and after patient interaction.  General Appearance:    Alert, cooperative, no distress, AAOx3                          Head:    Normocephalic, without obvious abnormality, atraumatic                           Eyes:    PERRL, conjunctivae/corneas clear, EOM's intact, both eyes                         Throat:   Lips, tongue, gums normal; oral mucosa pink and moist                           Neck:   Supple, symmetrical, trachea midline, no JVD                         Lungs:    Clear to auscultation bilaterally, respirations unlabored                 Chest Wall:    No tenderness or deformity                          Heart:  1 S2 regular                  Abdomen:   B soft nontender                 Extremities:   Extremities normal, atraumatic, no cyanosis or edema                        Pulses:   Pulses palpable in all extremities                            Skin: Has drug eruption due to delayed hypersensitivity to vancomycin.  Generalized body wall edema noted.  Blisters on left hand appears to have ruptured and currently dressed.                  Neurologic: Alert and oriented x 3       Data Review:    I reviewed the patient's new clinical results.  Results from last 7 days   Lab Units 04/29/25  0622 04/28/25  0830 04/27/25  0927 04/26/25  0601 04/25/25  0747 04/24/25  0944 04/23/25  0609   WBC 10*3/mm3 15.71* 14.92* 13.06* 13.13* 16.04* 13.82* 12.43*   HEMOGLOBIN g/dL 11.7* 12.8* 12.2* 13.4 14.6 13.5 13.6   PLATELETS 10*3/mm3 331 317 276 246 325 248 284     Results from last 7 days   Lab Units 04/29/25  0622 04/28/25  0830 04/27/25  0640 04/26/25  0601 04/25/25  0747 04/24/25  0944 04/23/25  0609   SODIUM mmol/L 137 134* 129*  132* 128* 133* 131* 131*   POTASSIUM mmol/L 3.5 3.6 3.5  3.5 4.3 5.2 4.8 4.2   CHLORIDE mmol/L 97* 95* 92*  95* 96* 99 99 101   CO2 mmol/L 27.0 26.5 22.8  23.0 19.8* 19.3* 19.4* 21.6*   BUN mg/dL 28* 32* 36*  35* 36* 31* 28* 21   CREATININE mg/dL 1.77* 2.06* 2.52*   2.55* 2.87* 2.57* 2.46* 2.06*   CALCIUM mg/dL 8.0* 7.8* 7.6*  7.7* 7.8* 8.2* 8.0* 8.3*   GLUCOSE mg/dL 158* 215* 222*  218* 163* 62* 159* 108*     Microbiology Results (last 10 days)       Procedure Component Value - Date/Time    Clostridioides difficile Toxin - Stool, Per Rectum [[id number]]  (Normal) Collected: 04/24/25 0008    Lab Status: Final result Specimen: Stool from Per Rectum Updated: 04/24/25 0204    Narrative:      The following orders were created for panel order Clostridioides difficile Toxin - Stool, Per Rectum.  Procedure                               Abnormality         Status                     ---------                               -----------         ------                     Clostridioides difficile...[[id number]]  Normal              Final result                 Please view results for these tests on the individual orders.    Clostridioides difficile Toxin, PCR - Stool, Per Rectum [[id number]]  (Normal) Collected: 04/24/25 0008    Lab Status: Final result Specimen: Stool from Per Rectum Updated: 04/24/25 0204     Toxigenic C. difficile by PCR Negative    Narrative:      The result indicates the absence of toxigenic C. difficile from stool specimen.     Blood Culture - Blood, Hand, Right [[id number]]  (Normal) Collected: 04/23/25 1200    Lab Status: Final result Specimen: Blood from Hand, Right Updated: 04/28/25 1230     Blood Culture No growth at 5 days    Blood Culture - Blood, Hand, Right [[id number]]  (Normal) Collected: 04/23/25 1017    Lab Status: Final result Specimen: Blood from Hand, Right Updated: 04/28/25 1045     Blood Culture No growth at 5 days    Eosinophil Smear - Urine, Urine, Clean Catch [[id number]]  (Normal) Collected: 04/22/25 1314    Lab Status: Final result Specimen: Urine, Clean Catch Updated: 04/22/25 1544     Eosinophil Smear 0 % EOS/100 Cells               Assessment:    Vancomycin flushing syndrome    Streptococcal bacteremia    Obesity    Dyslipidemia    HTN  (hypertension)    MRSA colonization    Type 2 diabetes mellitus, with long-term current use of insulin    Diabetic peripheral neuropathy    ROCKY (acute kidney injury)  65-year-old male with  1-systemic vancomycin delayed hypersensitivity reaction with skin rash and renal insufficiency  2-acute kidney injury probably secondary to combination of factors including vancomycin  3-history of MRSA colonization  4-history of C. difficile diarrhea with colonization  5-history of strep intermedius bacteremia sepsis with paraspinal abscess and facet septic arthritis clinically improved with resolution of symptoms on course to finish his antibiotic treatment on 5/6/2025  6-poorly controlled diabetes  7-morbid obesity  8-hypertension  9-other diagnoses per primary team.     Recommendations/Discussions:  See my discussion and recommendation on 4/22/2025  Continue IV ceftriaxone  Upon blood cultures, stool studies are negative for C. difficile.  Supportive care for renal insufficiency, avoidance of nephrotoxic agents and periodic review of systems identify other areas of secondary seeding due to strep intermedius bacteremia and possibility of line sepsis as well as recurrence of C. difficile infection in the setting of known MRSA colonization and C. difficile colonization.  Lizette López MD  4/29/2025  08:45 EDT    Parts of this note may be an electronic transcription/translation of spoken language to printed text using the Dragon dictation system.

## 2025-04-30 LAB
ALBUMIN SERPL-MCNC: 3 G/DL (ref 3.5–5.2)
ANION GAP SERPL CALCULATED.3IONS-SCNC: 13 MMOL/L (ref 5–15)
BASOPHILS # BLD AUTO: 0.08 10*3/MM3 (ref 0–0.2)
BASOPHILS NFR BLD AUTO: 0.5 % (ref 0–1.5)
BUN SERPL-MCNC: 26 MG/DL (ref 8–23)
BUN/CREAT SERPL: 15.4 (ref 7–25)
CALCIUM SPEC-SCNC: 8.3 MG/DL (ref 8.6–10.5)
CHLORIDE SERPL-SCNC: 99 MMOL/L (ref 98–107)
CO2 SERPL-SCNC: 29 MMOL/L (ref 22–29)
CREAT SERPL-MCNC: 1.69 MG/DL (ref 0.76–1.27)
DEPRECATED RDW RBC AUTO: 42.7 FL (ref 37–54)
EGFRCR SERPLBLD CKD-EPI 2021: 45.1 ML/MIN/1.73
EOSINOPHIL # BLD AUTO: 2.38 10*3/MM3 (ref 0–0.4)
EOSINOPHIL NFR BLD AUTO: 13.9 % (ref 0.3–6.2)
ERYTHROCYTE [DISTWIDTH] IN BLOOD BY AUTOMATED COUNT: 15 % (ref 12.3–15.4)
GLUCOSE BLDC GLUCOMTR-MCNC: 147 MG/DL (ref 70–130)
GLUCOSE BLDC GLUCOMTR-MCNC: 186 MG/DL (ref 70–130)
GLUCOSE BLDC GLUCOMTR-MCNC: 281 MG/DL (ref 70–130)
GLUCOSE BLDC GLUCOMTR-MCNC: 297 MG/DL (ref 70–130)
GLUCOSE SERPL-MCNC: 168 MG/DL (ref 65–99)
HCT VFR BLD AUTO: 36.5 % (ref 37.5–51)
HGB BLD-MCNC: 11.7 G/DL (ref 13–17.7)
IMM GRANULOCYTES # BLD AUTO: 0.43 10*3/MM3 (ref 0–0.05)
IMM GRANULOCYTES NFR BLD AUTO: 2.5 % (ref 0–0.5)
LYMPHOCYTES # BLD AUTO: 5.35 10*3/MM3 (ref 0.7–3.1)
LYMPHOCYTES NFR BLD AUTO: 31.2 % (ref 19.6–45.3)
MCH RBC QN AUTO: 25.7 PG (ref 26.6–33)
MCHC RBC AUTO-ENTMCNC: 32.1 G/DL (ref 31.5–35.7)
MCV RBC AUTO: 80 FL (ref 79–97)
MONOCYTES # BLD AUTO: 1.97 10*3/MM3 (ref 0.1–0.9)
MONOCYTES NFR BLD AUTO: 11.5 % (ref 5–12)
NEUTROPHILS NFR BLD AUTO: 40.4 % (ref 42.7–76)
NEUTROPHILS NFR BLD AUTO: 6.95 10*3/MM3 (ref 1.7–7)
NRBC BLD AUTO-RTO: 0 /100 WBC (ref 0–0.2)
PHOSPHATE SERPL-MCNC: 3.8 MG/DL (ref 2.5–4.5)
PLATELET # BLD AUTO: 352 10*3/MM3 (ref 140–450)
PMV BLD AUTO: 9.5 FL (ref 6–12)
POTASSIUM SERPL-SCNC: 3.6 MMOL/L (ref 3.5–5.2)
RBC # BLD AUTO: 4.56 10*6/MM3 (ref 4.14–5.8)
SODIUM SERPL-SCNC: 141 MMOL/L (ref 136–145)
WBC NRBC COR # BLD AUTO: 17.16 10*3/MM3 (ref 3.4–10.8)

## 2025-04-30 PROCEDURE — 25010000002 CEFTRIAXONE PER 250 MG: Performed by: INTERNAL MEDICINE

## 2025-04-30 PROCEDURE — 85025 COMPLETE CBC W/AUTO DIFF WBC: CPT | Performed by: INTERNAL MEDICINE

## 2025-04-30 PROCEDURE — 63710000001 INSULIN LISPRO (HUMAN) PER 5 UNITS: Performed by: INTERNAL MEDICINE

## 2025-04-30 PROCEDURE — 63710000001 INSULIN GLARGINE PER 5 UNITS: Performed by: INTERNAL MEDICINE

## 2025-04-30 PROCEDURE — 63710000001 INSULIN LISPRO (HUMAN) PER 5 UNITS: Performed by: NURSE PRACTITIONER

## 2025-04-30 PROCEDURE — 63710000001 DIPHENHYDRAMINE PER 50 MG: Performed by: HOSPITALIST

## 2025-04-30 PROCEDURE — 80069 RENAL FUNCTION PANEL: CPT | Performed by: INTERNAL MEDICINE

## 2025-04-30 PROCEDURE — 82948 REAGENT STRIP/BLOOD GLUCOSE: CPT

## 2025-04-30 RX ORDER — EMOLLIENT COMBINATION NO.110
LOTION (ML) TOPICAL EVERY 12 HOURS
Status: DISCONTINUED | OUTPATIENT
Start: 2025-04-30 | End: 2025-05-03 | Stop reason: HOSPADM

## 2025-04-30 RX ADMIN — PREGABALIN 150 MG: 75 CAPSULE ORAL at 21:15

## 2025-04-30 RX ADMIN — NYSTATIN 500000 UNITS: 100000 SUSPENSION ORAL at 17:04

## 2025-04-30 RX ADMIN — DIPHENHYDRAMINE HYDROCHLORIDE 10 ML: 25 SOLUTION ORAL at 12:12

## 2025-04-30 RX ADMIN — HYDROCODONE BITARTRATE AND ACETAMINOPHEN 1 TABLET: 7.5; 325 TABLET ORAL at 21:15

## 2025-04-30 RX ADMIN — INSULIN LISPRO 2 UNITS: 100 INJECTION, SOLUTION INTRAVENOUS; SUBCUTANEOUS at 12:11

## 2025-04-30 RX ADMIN — DIPHENHYDRAMINE HYDROCHLORIDE 10 ML: 25 SOLUTION ORAL at 08:26

## 2025-04-30 RX ADMIN — INSULIN LISPRO 10 UNITS: 100 INJECTION, SOLUTION INTRAVENOUS; SUBCUTANEOUS at 08:24

## 2025-04-30 RX ADMIN — DIPHENHYDRAMINE HYDROCHLORIDE 10 ML: 25 SOLUTION ORAL at 00:31

## 2025-04-30 RX ADMIN — HYDROCODONE BITARTRATE AND ACETAMINOPHEN 1 TABLET: 7.5; 325 TABLET ORAL at 05:51

## 2025-04-30 RX ADMIN — DULOXETINE 60 MG: 30 CAPSULE, DELAYED RELEASE ORAL at 08:25

## 2025-04-30 RX ADMIN — DIPHENHYDRAMINE HYDROCHLORIDE 25 MG: 25 CAPSULE ORAL at 05:51

## 2025-04-30 RX ADMIN — NYSTATIN 500000 UNITS: 100000 SUSPENSION ORAL at 21:15

## 2025-04-30 RX ADMIN — POTASSIUM CHLORIDE 20 MEQ: 1500 TABLET, EXTENDED RELEASE ORAL at 17:05

## 2025-04-30 RX ADMIN — DIPHENHYDRAMINE HYDROCHLORIDE 25 MG: 25 CAPSULE ORAL at 12:11

## 2025-04-30 RX ADMIN — FAMOTIDINE 20 MG: 20 TABLET, FILM COATED ORAL at 17:05

## 2025-04-30 RX ADMIN — INSULIN GLARGINE 15 UNITS: 100 INJECTION, SOLUTION SUBCUTANEOUS at 21:15

## 2025-04-30 RX ADMIN — TORSEMIDE 100 MG: 100 TABLET ORAL at 17:05

## 2025-04-30 RX ADMIN — DIPHENHYDRAMINE HYDROCHLORIDE 10 ML: 25 SOLUTION ORAL at 17:04

## 2025-04-30 RX ADMIN — INSULIN LISPRO 10 UNITS: 100 INJECTION, SOLUTION INTRAVENOUS; SUBCUTANEOUS at 12:11

## 2025-04-30 RX ADMIN — INSULIN LISPRO 2 UNITS: 100 INJECTION, SOLUTION INTRAVENOUS; SUBCUTANEOUS at 08:24

## 2025-04-30 RX ADMIN — ATORVASTATIN CALCIUM 20 MG: 20 TABLET, FILM COATED ORAL at 21:15

## 2025-04-30 RX ADMIN — CEFTRIAXONE 2000 MG: 2 INJECTION, POWDER, FOR SOLUTION INTRAMUSCULAR; INTRAVENOUS at 21:32

## 2025-04-30 RX ADMIN — NYSTATIN 500000 UNITS: 100000 SUSPENSION ORAL at 08:24

## 2025-04-30 RX ADMIN — TORSEMIDE 100 MG: 100 TABLET ORAL at 08:25

## 2025-04-30 RX ADMIN — INSULIN LISPRO 10 UNITS: 100 INJECTION, SOLUTION INTRAVENOUS; SUBCUTANEOUS at 17:05

## 2025-04-30 RX ADMIN — PREGABALIN 150 MG: 75 CAPSULE ORAL at 08:24

## 2025-04-30 RX ADMIN — FAMOTIDINE 20 MG: 20 TABLET, FILM COATED ORAL at 08:25

## 2025-04-30 RX ADMIN — NYSTATIN 500000 UNITS: 100000 SUSPENSION ORAL at 12:12

## 2025-04-30 RX ADMIN — INSULIN LISPRO 6 UNITS: 100 INJECTION, SOLUTION INTRAVENOUS; SUBCUTANEOUS at 21:16

## 2025-04-30 RX ADMIN — Medication 10 ML: at 08:25

## 2025-04-30 RX ADMIN — INSULIN GLARGINE 15 UNITS: 100 INJECTION, SOLUTION SUBCUTANEOUS at 08:24

## 2025-04-30 RX ADMIN — DIPHENHYDRAMINE HYDROCHLORIDE 25 MG: 25 CAPSULE ORAL at 17:05

## 2025-04-30 RX ADMIN — INSULIN LISPRO 6 UNITS: 100 INJECTION, SOLUTION INTRAVENOUS; SUBCUTANEOUS at 17:05

## 2025-04-30 RX ADMIN — POTASSIUM CHLORIDE 20 MEQ: 1500 TABLET, EXTENDED RELEASE ORAL at 08:24

## 2025-04-30 RX ADMIN — Medication 10 ML: at 21:16

## 2025-04-30 NOTE — PLAN OF CARE
Problem: Adult Inpatient Plan of Care  Goal: Plan of Care Review  Outcome: Progressing  Flowsheets (Taken 4/30/2025 1640)  Progress: improving  Outcome Evaluation: Patient has been pleasant and cooperative during shift. AOx4, ad gianluca, room air, SR. Strict I/O. Drssing on hand changed. Eucerin ordered for BLE/dry skin. No complaints of pain, nausea or SOA. Will continue to monitor and assist patient as needed.  Plan of Care Reviewed With: patient  Goal: Patient-Specific Goal (Individualized)  Outcome: Progressing  Goal: Absence of Hospital-Acquired Illness or Injury  Outcome: Progressing  Intervention: Identify and Manage Fall Risk  Recent Flowsheet Documentation  Taken 4/30/2025 1400 by Magen Orta RN  Safety Promotion/Fall Prevention:   assistive device/personal items within reach   fall prevention program maintained   nonskid shoes/slippers when out of bed   safety round/check completed  Taken 4/30/2025 1200 by Magen Orta RN  Safety Promotion/Fall Prevention:   assistive device/personal items within reach   fall prevention program maintained   nonskid shoes/slippers when out of bed   safety round/check completed  Taken 4/30/2025 1000 by Magen Orta RN  Safety Promotion/Fall Prevention:   assistive device/personal items within reach   fall prevention program maintained   nonskid shoes/slippers when out of bed   safety round/check completed  Taken 4/30/2025 0816 by Magen Orta RN  Safety Promotion/Fall Prevention:   assistive device/personal items within reach   fall prevention program maintained   nonskid shoes/slippers when out of bed   safety round/check completed  Intervention: Prevent Skin Injury  Recent Flowsheet Documentation  Taken 4/30/2025 1400 by Magen Otra RN  Body Position: supine  Taken 4/30/2025 1200 by Magen Orta RN  Body Position: dangle, side of bed  Taken 4/30/2025 1000 by Magen Orta RN  Body Position: dangle, side of bed  Taken 4/30/2025 0816 by  Magen Orta RN  Body Position:   left   turned  Skin Protection: incontinence pads utilized  Goal: Optimal Comfort and Wellbeing  Outcome: Progressing  Goal: Readiness for Transition of Care  Outcome: Progressing     Problem: Comorbidity Management  Goal: Blood Glucose Level Within Target Range  Outcome: Progressing  Goal: Blood Pressure in Desired Range  Outcome: Progressing     Problem: Fall Injury Risk  Goal: Absence of Fall and Fall-Related Injury  Outcome: Progressing  Intervention: Promote Injury-Free Environment  Recent Flowsheet Documentation  Taken 4/30/2025 1400 by Magen Orta RN  Safety Promotion/Fall Prevention:   assistive device/personal items within reach   fall prevention program maintained   nonskid shoes/slippers when out of bed   safety round/check completed  Taken 4/30/2025 1200 by Magen Orta RN  Safety Promotion/Fall Prevention:   assistive device/personal items within reach   fall prevention program maintained   nonskid shoes/slippers when out of bed   safety round/check completed  Taken 4/30/2025 1000 by Magen Orta RN  Safety Promotion/Fall Prevention:   assistive device/personal items within reach   fall prevention program maintained   nonskid shoes/slippers when out of bed   safety round/check completed  Taken 4/30/2025 0816 by Magen Orta RN  Safety Promotion/Fall Prevention:   assistive device/personal items within reach   fall prevention program maintained   nonskid shoes/slippers when out of bed   safety round/check completed     Problem: Sepsis/Septic Shock  Goal: Optimal Coping  Outcome: Progressing  Goal: Absence of Bleeding  Outcome: Progressing  Goal: Blood Glucose Level Within Target Range  Outcome: Progressing  Goal: Absence of Infection Signs and Symptoms  Outcome: Progressing  Intervention: Initiate Sepsis Management  Recent Flowsheet Documentation  Taken 4/30/2025 0816 by Magen Orta RN  Isolation Precautions:   contact   precautions  maintained  Intervention: Promote Recovery  Recent Flowsheet Documentation  Taken 4/30/2025 1400 by Magen Orta RN  Activity Management: up ad gianluca  Taken 4/30/2025 1200 by Magen Orta RN  Activity Management:   up ad gianluca   sitting, edge of bed  Taken 4/30/2025 1000 by Magen Orta RN  Activity Management:   up ad gianluca   sitting, edge of bed  Taken 4/30/2025 0816 by Magen Orta RN  Activity Management: up ad gianluca  Goal: Optimal Nutrition Delivery  Outcome: Progressing     Problem: Infection  Goal: Absence of Infection Signs and Symptoms  Outcome: Progressing  Intervention: Prevent or Manage Infection  Recent Flowsheet Documentation  Taken 4/30/2025 0816 by Magen Orta RN  Isolation Precautions:   contact   precautions maintained     Problem: Fatigue  Goal: Improved Activity Tolerance  Outcome: Progressing  Intervention: Promote Improved Energy  Recent Flowsheet Documentation  Taken 4/30/2025 1400 by Magen Orta RN  Activity Management: up ad gianluca  Taken 4/30/2025 1200 by Magen Orta RN  Activity Management:   up ad gianluca   sitting, edge of bed  Taken 4/30/2025 1000 by Magen Orta RN  Activity Management:   up ad gianluca   sitting, edge of bed  Taken 4/30/2025 0816 by Magen Orta RN  Activity Management: up ad gianluca     Problem: Noninvasive Ventilation Acute  Goal: Effective Unassisted Ventilation and Oxygenation  Outcome: Progressing     Problem: Skin Injury Risk Increased  Goal: Skin Health and Integrity  Outcome: Progressing  Intervention: Optimize Skin Protection  Recent Flowsheet Documentation  Taken 4/30/2025 1400 by Magen Orta RN  Activity Management: up ad gianluca  Head of Bed (HOB) Positioning: HOB at 30 degrees  Taken 4/30/2025 1200 by Magen Orta RN  Activity Management:   up ad gianluca   sitting, edge of bed  Taken 4/30/2025 1000 by Magen Orta RN  Activity Management:   up ad gianluca   sitting, edge of bed  Taken 4/30/2025 0816 by Magen Orta  RN  Activity Management: up ad gianluca  Pressure Reduction Techniques: frequent weight shift encouraged  Head of Bed (HOB) Positioning: HOB at 15 degrees  Pressure Reduction Devices: pressure-redistributing mattress utilized  Skin Protection: incontinence pads utilized   Goal Outcome Evaluation:  Plan of Care Reviewed With: patient        Progress: improving  Outcome Evaluation: Patient has been pleasant and cooperative during shift. AOx4, ad gianluca, room air, SR. Strict I/O. Drssing on hand changed. Eucerin ordered for BLE/dry skin. No complaints of pain, nausea or SOA. Will continue to monitor and assist patient as needed.

## 2025-04-30 NOTE — CASE MANAGEMENT/SOCIAL WORK
Continued Stay Note  [facility]     Patient Name: Arnold Ramírez  MRN: [id number]  Today's Date: 4/30/2025    Admit Date: 4/21/2025    Plan: Plan home with spouse and [facility]. JOSUE Jenkins RN   Discharge Plan       Row Name 04/30/25 1358       Plan    Plan Plan home with spouse and [facility]. JOSUE Jenkins RN    Plan Comments Spoke with pt at bedside. Pt is current with [facility] and Lety ( [phone]) is following. Pt is also current with [facility]. Leigh ( [phone]) called to follow. Plan home with spouse and [facility]. JOSUE Jenkins RN                   Discharge Codes    No documentation.                 Expected Discharge Date and Time       Expected Discharge Date Expected Discharge Time    May 2, 2025               Marian Jenkins RN

## 2025-04-30 NOTE — PLAN OF CARE
Goal Outcome Evaluation:   Patient alert follows commands prn pain meds given, n o nausea noted. Reinforced drsg on left hand. Blood glucose monitored and insulin given. Iv anitbx given. No acute distress noted will continue to monitor

## 2025-04-30 NOTE — PROGRESS NOTES
"  Infectious Diseases Progress Note    Lizette López MD     [facility]  Los: 7 days  Patient Identification:  Name: Arnold Ramírez  Age: 65 y.o.  Sex: male  :  1960  MRN: [id number]         Primary Care Physician: Anand Alford MD        Subjective: Feeling much better than yesterday.  Skin improving appetite improving making more urine.  Still concerned about his hands.  Interval History: See consultation note.    Objective:    Scheduled Meds:atorvastatin, 20 mg, Oral, Nightly  cefTRIAXone, 2,000 mg, Intravenous, Q24H  diphenhydrAMINE, 25 mg, Oral, Q6H  DULoxetine, 60 mg, Oral, Daily  famotidine, 20 mg, Oral, BID AC  insulin glargine, 15 Units, Subcutaneous, Q12H  insulin lispro, 10 Units, Subcutaneous, TID With Meals  insulin lispro, 2-9 Units, Subcutaneous, 4x Daily AC & at Bedtime  magic mouthwash oral suspension (mixture) (diphenhydrAMINE HCl - aluminum & magnesium hydroxide-simethicone - lidocaine viscous) solution 55 mL, 10 mL, Swish & Spit, Q6H  metoprolol succinate XL, 100 mg, Oral, Daily  nystatin, 5 mL, Swish & Swallow, 4x Daily  potassium chloride, 20 mEq, Oral, BID With Meals  pregabalin, 150 mg, Oral, BID  sodium chloride, 10 mL, Intravenous, Q12H  torsemide, 100 mg, Oral, BID Diuretics      Continuous Infusions:     Vital signs in last 24 hours:  Temp:  [97.7 °F (36.5 °C)-98.2 °F (36.8 °C)] 97.7 °F (36.5 °C)  Heart Rate:  [80-97] 82  Resp:  [16-18] 18  BP: (111-138)/(50-93) 114/50    Intake/Output:    Intake/Output Summary (Last 24 hours) at 2025 1101  Last data filed at 2025 2134  Gross per 24 hour   Intake 400 ml   Output --   Net 400 ml       Exam:  /50 (BP Location: Right arm, Patient Position: Lying)   Pulse 82   Temp 97.7 °F (36.5 °C) (Oral)   Resp 18   Ht 195.6 cm (77\")   Wt (!) 156 kg (343 lb 14.4 oz)   SpO2 98%   BMI 40.78 kg/m²   Patient is examined using the personal protective equipment as per guidelines from infection control for this " particular patient as enacted.  Hand washing was performed before and after patient interaction.  General Appearance:    Alert, cooperative, no distress, AAOx3                          Head:    Normocephalic, without obvious abnormality, atraumatic                           Eyes:    PERRL, conjunctivae/corneas clear, EOM's intact, both eyes                         Throat:   Lips, tongue, gums normal; oral mucosa pink and moist                           Neck:   Supple, symmetrical, trachea midline, no JVD                         Lungs:    Clear to auscultation bilaterally, respirations unlabored                 Chest Wall:    No tenderness or deformity                          Heart:  1 S2 regular                  Abdomen:   B soft nontender                 Extremities:   Extremities normal, atraumatic, no cyanosis or edema                        Pulses:   Pulses palpable in all extremities                            Skin: Has drug eruption due to delayed hypersensitivity to vancomycin.  Generalized body wall edema noted.  Blisters on left hand appears to have ruptured and currently dressed.                  Neurologic: Alert and oriented x 3       Data Review:    I reviewed the patient's new clinical results.  Results from last 7 days   Lab Units 04/30/25  0609 04/29/25  0622 04/28/25  0830 04/27/25  0927 04/26/25  0601 04/25/25  0747 04/24/25  0944   WBC 10*3/mm3 17.16* 15.71* 14.92* 13.06* 13.13* 16.04* 13.82*   HEMOGLOBIN g/dL 11.7* 11.7* 12.8* 12.2* 13.4 14.6 13.5   PLATELETS 10*3/mm3 352 331 317 276 246 325 248     Results from last 7 days   Lab Units 04/30/25  0609 04/29/25  0622 04/28/25  0830 04/27/25  0640 04/26/25  0601 04/25/25  0747 04/24/25  0944   SODIUM mmol/L 141 137 134* 129*  132* 128* 133* 131*   POTASSIUM mmol/L 3.6 3.5 3.6 3.5  3.5 4.3 5.2 4.8   CHLORIDE mmol/L 99 97* 95* 92*  95* 96* 99 99   CO2 mmol/L 29.0 27.0 26.5 22.8  23.0 19.8* 19.3* 19.4*   BUN mg/dL 26* 28* 32* 36*  35* 36* 31* 28*    CREATININE mg/dL 1.69* 1.77* 2.06* 2.52*  2.55* 2.87* 2.57* 2.46*   CALCIUM mg/dL 8.3* 8.0* 7.8* 7.6*  7.7* 7.8* 8.2* 8.0*   GLUCOSE mg/dL 168* 158* 215* 222*  218* 163* 62* 159*     Microbiology Results (last 10 days)       Procedure Component Value - Date/Time    Clostridioides difficile Toxin - Stool, Per Rectum [[id number]]  (Normal) Collected: 04/24/25 0008    Lab Status: Final result Specimen: Stool from Per Rectum Updated: 04/24/25 0204    Narrative:      The following orders were created for panel order Clostridioides difficile Toxin - Stool, Per Rectum.  Procedure                               Abnormality         Status                     ---------                               -----------         ------                     Clostridioides difficile...[[id number]]  Normal              Final result                 Please view results for these tests on the individual orders.    Clostridioides difficile Toxin, PCR - Stool, Per Rectum [[id number]]  (Normal) Collected: 04/24/25 0008    Lab Status: Final result Specimen: Stool from Per Rectum Updated: 04/24/25 0204     Toxigenic C. difficile by PCR Negative    Narrative:      The result indicates the absence of toxigenic C. difficile from stool specimen.     Blood Culture - Blood, Hand, Right [[id number]]  (Normal) Collected: 04/23/25 1200    Lab Status: Final result Specimen: Blood from Hand, Right Updated: 04/28/25 1230     Blood Culture No growth at 5 days    Blood Culture - Blood, Hand, Right [[id number]]  (Normal) Collected: 04/23/25 1017    Lab Status: Final result Specimen: Blood from Hand, Right Updated: 04/28/25 1045     Blood Culture No growth at 5 days    Eosinophil Smear - Urine, Urine, Clean Catch [[id number]]  (Normal) Collected: 04/22/25 1314    Lab Status: Final result Specimen: Urine, Clean Catch Updated: 04/22/25 1544     Eosinophil Smear 0 % EOS/100 Cells               Assessment:    Vancomycin flushing syndrome    Streptococcal bacteremia     Obesity    Dyslipidemia    HTN (hypertension)    MRSA colonization    Type 2 diabetes mellitus, with long-term current use of insulin    Diabetic peripheral neuropathy    ROCKY (acute kidney injury)  65-year-old male with  1-systemic vancomycin delayed hypersensitivity reaction with skin rash and renal insufficiency  2-acute kidney injury probably secondary to combination of factors including vancomycin  3-history of MRSA colonization  4-history of C. difficile diarrhea with colonization  5-history of strep intermedius bacteremia sepsis with paraspinal abscess and facet septic arthritis clinically improved with resolution of symptoms on course to finish his antibiotic treatment on 5/6/2025  6-poorly controlled diabetes  7-morbid obesity  8-hypertension  9-other diagnoses per primary team.     Recommendations/Discussions:  See my discussion and recommendation on 4/22/2025  Continue IV ceftriaxone  Follow-up blood cultures, stool studies are negative for C. difficile.  Supportive care for renal insufficiency, avoidance of nephrotoxic agents and periodic review of systems identify other areas of secondary seeding due to strep intermedius bacteremia and possibility of line sepsis as well as recurrence of C. difficile infection in the setting of known MRSA colonization and C. difficile colonization.  Lizette López MD  4/30/2025  11:01 EDT    Parts of this note may be an electronic transcription/translation of spoken language to printed text using the Dragon dictation system.

## 2025-04-30 NOTE — PROGRESS NOTES
Name: Arnold Ramírez ADMIT: 2025   : 1960  PCP: Anand Alford MD    MRN: [id number] LOS: 7 days   AGE/SEX: 65 y.o. male  ROOM: [facility]     Subjective   Subjective   Feeling pretty good today. Skin progressing. No back/hip/flank pain. No SOA or CP. Voiding well. No N/V/D/abd pain. Tolerating diet.        Objective   Objective   Vital Signs  Temp:  [97.7 °F (36.5 °C)-98.2 °F (36.8 °C)] 97.7 °F (36.5 °C)  Heart Rate:  [80-94] 94  Resp:  [16-18] 18  BP: (114-143)/(50-93) 143/65     on   ;   Device (Oxygen Therapy): room air  Body mass index is 40.78 kg/m².  Physical Exam  Vitals and nursing note reviewed.   Constitutional:       General: He is not in acute distress.     Appearance: He is not ill-appearing, toxic-appearing or diaphoretic.   HENT:      Head: Normocephalic.      Nose: Nose normal.      Mouth/Throat:      Mouth: Mucous membranes are moist.      Pharynx: Oropharynx is clear.   Eyes:      General: No scleral icterus.        Right eye: No discharge.         Left eye: No discharge.      Conjunctiva/sclera: Conjunctivae normal.   Cardiovascular:      Rate and Rhythm: Normal rate.      Pulses: Normal pulses.   Pulmonary:      Effort: Pulmonary effort is normal. No respiratory distress.      Breath sounds: Normal breath sounds. No wheezing or rales.   Abdominal:      General: Bowel sounds are normal. There is no distension.      Palpations: Abdomen is soft.      Tenderness: There is no abdominal tenderness.   Musculoskeletal:         General: Swelling (in all 4 extremities) present.      Cervical back: Neck supple.   Skin:     General: Skin is warm and dry.      Capillary Refill: Capillary refill takes less than 2 seconds.      Comments: Widespread superficial desquamating rash, left hand dressed   Neurological:      General: No focal deficit present.      Mental Status: He is alert. Mental status is at baseline.   Psychiatric:         Mood and Affect: Mood normal.         Behavior: Behavior  normal.       Results Review     I reviewed the patient's new clinical results.  Results from last 7 days   Lab Units 04/30/25  0609 04/29/25  0622 04/28/25  0830 04/27/25  0927   WBC 10*3/mm3 17.16* 15.71* 14.92* 13.06*   HEMOGLOBIN g/dL 11.7* 11.7* 12.8* 12.2*   PLATELETS 10*3/mm3 352 331 317 276     Results from last 7 days   Lab Units 04/30/25  0609 04/29/25  0622 04/28/25  0830 04/27/25  0640   SODIUM mmol/L 141 137 134* 129*  132*   POTASSIUM mmol/L 3.6 3.5 3.6 3.5  3.5   CHLORIDE mmol/L 99 97* 95* 92*  95*   CO2 mmol/L 29.0 27.0 26.5 22.8  23.0   BUN mg/dL 26* 28* 32* 36*  35*   CREATININE mg/dL 1.69* 1.77* 2.06* 2.52*  2.55*   GLUCOSE mg/dL 168* 158* 215* 222*  218*   EGFR mL/min/1.73 45.1* 42.6* 35.5* 27.9*  27.5*     Results from last 7 days   Lab Units 04/30/25  0609 04/29/25  0622 04/28/25  0830 04/27/25  0640 04/26/25  0601   ALBUMIN g/dL 3.0* 3.1* 3.2* 2.8*  2.8* 2.6*   BILIRUBIN mg/dL  --  0.3 0.3 0.3 0.5   ALK PHOS U/L  --  116 112 97 91   AST (SGOT) U/L  --  21 18 18 18   ALT (SGPT) U/L  --  19 20 18 17     Results from last 7 days   Lab Units 04/30/25  0609 04/29/25  0622 04/28/25  0830 04/27/25  0640 04/26/25  0601 04/25/25  0747 04/24/25  0944   CALCIUM mg/dL 8.3* 8.0* 7.8* 7.6*  7.7* 7.8*   < > 8.0*   ALBUMIN g/dL 3.0* 3.1* 3.2* 2.8*  2.8* 2.6*   < > 2.8*   MAGNESIUM mg/dL  --  1.6  --   --  1.8  --  1.7   PHOSPHORUS mg/dL 3.8 3.6 3.9 3.4 3.8   < > 4.0    < > = values in this interval not displayed.       Glucose   Date/Time Value Ref Range Status   04/30/2025 1646 297 (H) 70 - 130 mg/dL Final   04/30/2025 1100 186 (H) 70 - 130 mg/dL Final   04/30/2025 0602 147 (H) 70 - 130 mg/dL Final   04/29/2025 2118 228 (H) 70 - 130 mg/dL Final   04/29/2025 1540 311 (H) 70 - 130 mg/dL Final   04/29/2025 1058 200 (H) 70 - 130 mg/dL Final   04/29/2025 0602 172 (H) 70 - 130 mg/dL Final       No radiology results for the last day    I have personally reviewed all medications:  Scheduled  Medications  atorvastatin, 20 mg, Oral, Nightly  cefTRIAXone, 2,000 mg, Intravenous, Q24H  diphenhydrAMINE, 25 mg, Oral, Q6H  DULoxetine, 60 mg, Oral, Daily  Eucerin original healing lotion, , Topical, Q12H  famotidine, 20 mg, Oral, BID AC  insulin glargine, 15 Units, Subcutaneous, Q12H  insulin lispro, 10 Units, Subcutaneous, TID With Meals  insulin lispro, 2-9 Units, Subcutaneous, 4x Daily AC & at Bedtime  magic mouthwash oral suspension (mixture) (diphenhydrAMINE HCl - aluminum & magnesium hydroxide-simethicone - lidocaine viscous) solution 55 mL, 10 mL, Swish & Spit, Q6H  metoprolol succinate XL, 100 mg, Oral, Daily  nystatin, 5 mL, Swish & Swallow, 4x Daily  potassium chloride, 20 mEq, Oral, BID With Meals  pregabalin, 150 mg, Oral, BID  sodium chloride, 10 mL, Intravenous, Q12H  torsemide, 100 mg, Oral, BID Diuretics    Infusions   Diet  Diet: Diabetic; Consistent Carbohydrate; Fluid Consistency: Thin (IDDSI 0)    I have personally reviewed:  [x]  Laboratory   [x]  Microbiology   []  Radiology   [x]  EKG/Telemetry  []  Cardiology/Vascular   []  Pathology    [x]  Records       Assessment/Plan     Active Hospital Problems    Diagnosis  POA    **Vancomycin flushing syndrome [L27.0, T36.8X5A]  Yes    ROCKY (acute kidney injury) [N17.9]  Yes    Type 2 diabetes mellitus, with long-term current use of insulin [E11.9, Z79.4]  Not Applicable    Diabetic peripheral neuropathy [E11.42]  Yes    Dyslipidemia [E78.5]  Yes    HTN (hypertension) [I10]  Yes    Obesity [E66.9]  Yes    Streptococcal bacteremia [R78.81, B95.5]  Yes    MRSA colonization [Z22.322]  Not Applicable      Resolved Hospital Problems   No resolved problems to display.       65 y.o. male with HTN, HLD, morbid obesity, MARGO, DM2 with DPN, MRSA colonization, and recent admission for left hip/flank/low back pain, possible lumbar facet septic arthritis (though fluid culture negative), and Strep intermedius bacteremia, who presented to ER with worsening renal  function and skin rash, and was admitted with ROCKY and skin rash due to systemic Vancomycin delayed hypersensitivity reaction.     Vancomycin delayed hypersensitivity reaction, desquamating rash: Was undergoing treatment for strep intermedius bacteremia.  Has here been transitioned to Rocephin with plan to take through 5/6 per Dr. López (ID).  Blood cultures negative.  C. difficile carrier so watching closely for s/sx of CDiff.   Continue nystatin for thrush.  He does have eosinophilia but LFTs are okay and he is afebrile.  Leukocytosis present.  WOCN evaluated and recommended hand surgery consult.  Dr. Luevano (Hand) recommends only continued local wound care.  ROCKY/AIN with volume overload: Cr is improving.  He is on torsemide to aid with volume mgmt.  Nephrology following.  Hypertension: Continue Metoprolol at higher dose. Amlodipine stopped due to swelling. Defer mgmt to Nephrology.  DM2 with DPN: A1c 10.2.  Had hypoglycemia earlier in admission so Lantus was held--now reintroduced.  Adjusted Lantus to 15 units twice daily with 10 units short-acting with meals, continue SSI.  Monitor requirements and continue to adjust as necessary. Pt actually taking 160 units of Lantus BID at home . . . (!?). Continue Lyrica and Cymbalta.      SCDs for DVT prophylaxis.  Full code.  Discussed with patient and CCP.  Anticipate discharge home with home health in 1-2 days.  Expected Discharge Date: 5/2/2025; Expected Discharge Time:       Elder Hernandez MD  [facility]  04/30/25  17:31 EDT

## 2025-04-30 NOTE — CASE MANAGEMENT/SOCIAL WORK
Continued Stay Note  [facility]     Patient Name: Arnold Ramírez  MRN: [id number]  Today's Date: 4/30/2025    Admit Date: 4/21/2025    Plan: Plan home with spouse and [facility].  JOSUE Jenkins RN   Discharge Plan       Row Name 04/30/25 1358       Plan    Plan Comments Spoke with pt at bedside. Pt is current with [facility] and Lety ( [phone]) is following. Pt is also current with [facility]. Leigh ( [phone]) called to follow. Plan home with spouse and [facility]. JOSUE Jenkins RN                   Discharge Codes    No documentation.                 Expected Discharge Date and Time       Expected Discharge Date Expected Discharge Time    May 2, 2025               Marian Jenkins, RN

## 2025-04-30 NOTE — PROGRESS NOTES
[facility] Progress Note      Patient Name: Arnold Ramírez  : 1960  MRN: [id number]  Primary Care Physician:  Anand Alford MD  Date of admission: 2025    Subjective     Interval History:   Follow-up acute kidney injury.  Urine output not recorded.  Feeling better overall.  Eating.  No more chills or rigors.  No back pain.  Desquamating.  Left hand feels better able to move it.  Dressing loose.  Her extremity edema improving.  Says he is making a lot of urine    Vitals:   Temp:  [97.7 °F (36.5 °C)-98.2 °F (36.8 °C)] 97.7 °F (36.5 °C)  Heart Rate:  [80-97] 82  Resp:  [16-18] 18  BP: (111-138)/(50-93) 114/50    Intake/Output Summary (Last 24 hours) at 2025 1239  Last data filed at 2025 2134  Gross per 24 hour   Intake 400 ml   Output --   Net 400 ml       Physical Exam:    General Appearance: Looks much better than last week.  Face less flushed.  Peeling around his mouth and nose .  Skin: Erythema and peeling over his extremities.  No purpuric lesions  HEENT: oral mucosa dry, nonicteric sclera  Neck: supple, no JVD  Lungs: Clear to auscultation bilaterally.  Unlabored on room air  Heart: RRR, normal S1 and S2.    Abdomen: soft, obese nontender, distended. +bs  : no palpable bladder  Extremities: 2 plus upper and lower extremity edema.  Left hand dressed.  Neuro normal speech and mental status     Scheduled Meds:     atorvastatin, 20 mg, Oral, Nightly  cefTRIAXone, 2,000 mg, Intravenous, Q24H  diphenhydrAMINE, 25 mg, Oral, Q6H  DULoxetine, 60 mg, Oral, Daily  famotidine, 20 mg, Oral, BID AC  insulin glargine, 15 Units, Subcutaneous, Q12H  insulin lispro, 10 Units, Subcutaneous, TID With Meals  insulin lispro, 2-9 Units, Subcutaneous, 4x Daily AC & at Bedtime  magic mouthwash oral suspension (mixture) (diphenhydrAMINE HCl - aluminum & magnesium hydroxide-simethicone - lidocaine viscous) solution 55 mL, 10 mL, Swish & Spit, Q6H  metoprolol succinate XL, 100 mg,  Oral, Daily  nystatin, 5 mL, Swish & Swallow, 4x Daily  potassium chloride, 20 mEq, Oral, BID With Meals  pregabalin, 150 mg, Oral, BID  sodium chloride, 10 mL, Intravenous, Q12H  torsemide, 100 mg, Oral, BID Diuretics      IV Meds:        Results Reviewed:   I have personally reviewed the results from the time of this admission to 4/30/2025 12:39 EDT     Results from last 7 days   Lab Units 04/30/25  0609 04/29/25  0622 04/28/25  0830 04/27/25  0640   SODIUM mmol/L 141 137 134* 129*  132*   POTASSIUM mmol/L 3.6 3.5 3.6 3.5  3.5   CHLORIDE mmol/L 99 97* 95* 92*  95*   CO2 mmol/L 29.0 27.0 26.5 22.8  23.0   BUN mg/dL 26* 28* 32* 36*  35*   CREATININE mg/dL 1.69* 1.77* 2.06* 2.52*  2.55*   CALCIUM mg/dL 8.3* 8.0* 7.8* 7.6*  7.7*   BILIRUBIN mg/dL  --  0.3 0.3 0.3   ALK PHOS U/L  --  116 112 97   ALT (SGPT) U/L  --  19 20 18   AST (SGOT) U/L  --  21 18 18   GLUCOSE mg/dL 168* 158* 215* 222*  218*       Estimated Creatinine Clearance: 73.4 mL/min (A) (by C-G formula based on SCr of 1.69 mg/dL (H)).    Results from last 7 days   Lab Units 04/30/25  0609 04/29/25  0622 04/28/25  0830 04/27/25  0640 04/26/25  0601 04/25/25  0747 04/24/25  0944   MAGNESIUM mg/dL  --  1.6  --   --  1.8  --  1.7   PHOSPHORUS mg/dL 3.8 3.6 3.9   < > 3.8   < > 4.0    < > = values in this interval not displayed.       Results from last 7 days   Lab Units 04/29/25  0622 04/28/25  0830 04/27/25  0640 04/26/25  0601   URIC ACID mg/dL 11.1* 10.9* 10.2* 9.6*       Results from last 7 days   Lab Units 04/30/25  0609 04/29/25  0622 04/28/25  0830 04/27/25  0927 04/26/25  0601   WBC 10*3/mm3 17.16* 15.71* 14.92* 13.06* 13.13*   HEMOGLOBIN g/dL 11.7* 11.7* 12.8* 12.2* 13.4   PLATELETS 10*3/mm3 352 331 317 133 487               Assessment / Plan     ASSESSMENT:  Acute kidney injury related to delayed hypersensitivity reaction to the vancomycin in the setting of ARB. Urinalysis with trace ketones and leukocytes and 100 mg/dL protein.    FeNa 0.17.   Edema much improved.  Waste products improving.  2.  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  Continues on IV Benadryl.  3.  Strep bacteremia with lumbar spinal abscess and facet septic arthritis.  Back pain improved.  Antibiotic course to be completed 5/6/2025  on Rocephin.  No further chills.  4.  Diabetes mellitus type 2.  Peripheral neuropathy.  Blood sugars 147-311.  5.  Morbid obesity.     PLAN:  Lotion for his desquamation.  Continue to follow chemistries.  Continue oral torsemide and monitor for diuretic toxicity.  Thank you for involving us in the care of Arnold THI Ramírez.  Please feel free to call with any questions.    Sujata Kilpatrick MD  04/30/25  12:39 EDT    [facility]  [phone]    Please note that portions of this note were completed with a voice recognition program.

## 2025-05-01 LAB
ALBUMIN SERPL-MCNC: 3.3 G/DL (ref 3.5–5.2)
ALBUMIN/GLOB SERPL: 1.1 G/DL
ALP SERPL-CCNC: 121 U/L (ref 39–117)
ALT SERPL W P-5'-P-CCNC: 24 U/L (ref 1–41)
ANION GAP SERPL CALCULATED.3IONS-SCNC: 14 MMOL/L (ref 5–15)
AST SERPL-CCNC: 19 U/L (ref 1–40)
BASOPHILS # BLD AUTO: 0.12 10*3/MM3 (ref 0–0.2)
BASOPHILS NFR BLD AUTO: 0.8 % (ref 0–1.5)
BILIRUB SERPL-MCNC: 0.4 MG/DL (ref 0–1.2)
BUN SERPL-MCNC: 23 MG/DL (ref 8–23)
BUN/CREAT SERPL: 12.4 (ref 7–25)
CALCIUM SPEC-SCNC: 8.3 MG/DL (ref 8.6–10.5)
CHLORIDE SERPL-SCNC: 96 MMOL/L (ref 98–107)
CO2 SERPL-SCNC: 31 MMOL/L (ref 22–29)
CREAT SERPL-MCNC: 1.85 MG/DL (ref 0.76–1.27)
DEPRECATED RDW RBC AUTO: 43.1 FL (ref 37–54)
EGFRCR SERPLBLD CKD-EPI 2021: 40.4 ML/MIN/1.73
EOSINOPHIL # BLD AUTO: 2.13 10*3/MM3 (ref 0–0.4)
EOSINOPHIL NFR BLD AUTO: 14.2 % (ref 0.3–6.2)
ERYTHROCYTE [DISTWIDTH] IN BLOOD BY AUTOMATED COUNT: 15.1 % (ref 12.3–15.4)
GLOBULIN UR ELPH-MCNC: 3.1 GM/DL
GLUCOSE BLDC GLUCOMTR-MCNC: 208 MG/DL (ref 70–130)
GLUCOSE BLDC GLUCOMTR-MCNC: 268 MG/DL (ref 70–130)
GLUCOSE BLDC GLUCOMTR-MCNC: 278 MG/DL (ref 70–130)
GLUCOSE BLDC GLUCOMTR-MCNC: 316 MG/DL (ref 70–130)
GLUCOSE SERPL-MCNC: 238 MG/DL (ref 65–99)
HCT VFR BLD AUTO: 35.9 % (ref 37.5–51)
HGB BLD-MCNC: 11.4 G/DL (ref 13–17.7)
IMM GRANULOCYTES # BLD AUTO: 0.37 10*3/MM3 (ref 0–0.05)
IMM GRANULOCYTES NFR BLD AUTO: 2.5 % (ref 0–0.5)
LYMPHOCYTES # BLD AUTO: 3.84 10*3/MM3 (ref 0.7–3.1)
LYMPHOCYTES NFR BLD AUTO: 25.7 % (ref 19.6–45.3)
MAGNESIUM SERPL-MCNC: 1.5 MG/DL (ref 1.6–2.4)
MCH RBC QN AUTO: 25.5 PG (ref 26.6–33)
MCHC RBC AUTO-ENTMCNC: 31.8 G/DL (ref 31.5–35.7)
MCV RBC AUTO: 80.3 FL (ref 79–97)
MONOCYTES # BLD AUTO: 1.71 10*3/MM3 (ref 0.1–0.9)
MONOCYTES NFR BLD AUTO: 11.4 % (ref 5–12)
NEUTROPHILS NFR BLD AUTO: 45.4 % (ref 42.7–76)
NEUTROPHILS NFR BLD AUTO: 6.8 10*3/MM3 (ref 1.7–7)
NRBC BLD AUTO-RTO: 0 /100 WBC (ref 0–0.2)
PHOSPHATE SERPL-MCNC: 3.3 MG/DL (ref 2.5–4.5)
PLATELET # BLD AUTO: 339 10*3/MM3 (ref 140–450)
PMV BLD AUTO: 9.2 FL (ref 6–12)
POTASSIUM SERPL-SCNC: 3.6 MMOL/L (ref 3.5–5.2)
PROT SERPL-MCNC: 6.4 G/DL (ref 6–8.5)
RBC # BLD AUTO: 4.47 10*6/MM3 (ref 4.14–5.8)
SODIUM SERPL-SCNC: 141 MMOL/L (ref 136–145)
WBC NRBC COR # BLD AUTO: 14.97 10*3/MM3 (ref 3.4–10.8)

## 2025-05-01 PROCEDURE — 25010000002 CEFTRIAXONE PER 250 MG: Performed by: INTERNAL MEDICINE

## 2025-05-01 PROCEDURE — 63710000001 INSULIN LISPRO (HUMAN) PER 5 UNITS: Performed by: INTERNAL MEDICINE

## 2025-05-01 PROCEDURE — 85025 COMPLETE CBC W/AUTO DIFF WBC: CPT | Performed by: HOSPITALIST

## 2025-05-01 PROCEDURE — 63710000001 INSULIN GLARGINE PER 5 UNITS: Performed by: INTERNAL MEDICINE

## 2025-05-01 PROCEDURE — 84100 ASSAY OF PHOSPHORUS: CPT | Performed by: HOSPITALIST

## 2025-05-01 PROCEDURE — 80053 COMPREHEN METABOLIC PANEL: CPT | Performed by: INTERNAL MEDICINE

## 2025-05-01 PROCEDURE — 82948 REAGENT STRIP/BLOOD GLUCOSE: CPT

## 2025-05-01 PROCEDURE — 63710000001 INSULIN GLARGINE PER 5 UNITS: Performed by: HOSPITALIST

## 2025-05-01 PROCEDURE — 83735 ASSAY OF MAGNESIUM: CPT | Performed by: HOSPITALIST

## 2025-05-01 PROCEDURE — 63710000001 DIPHENHYDRAMINE PER 50 MG: Performed by: HOSPITALIST

## 2025-05-01 PROCEDURE — 63710000001 INSULIN LISPRO (HUMAN) PER 5 UNITS: Performed by: NURSE PRACTITIONER

## 2025-05-01 PROCEDURE — 94799 UNLISTED PULMONARY SVC/PX: CPT

## 2025-05-01 PROCEDURE — 25010000002 MAGNESIUM SULFATE IN D5W 1G/100ML (PREMIX) 1-5 GM/100ML-% SOLUTION: Performed by: INTERNAL MEDICINE

## 2025-05-01 RX ORDER — MAGNESIUM SULFATE 1 G/100ML
1 INJECTION INTRAVENOUS ONCE
Status: COMPLETED | OUTPATIENT
Start: 2025-05-01 | End: 2025-05-01

## 2025-05-01 RX ADMIN — DIPHENHYDRAMINE HYDROCHLORIDE 25 MG: 25 CAPSULE ORAL at 13:08

## 2025-05-01 RX ADMIN — INSULIN LISPRO 7 UNITS: 100 INJECTION, SOLUTION INTRAVENOUS; SUBCUTANEOUS at 18:01

## 2025-05-01 RX ADMIN — INSULIN LISPRO 6 UNITS: 100 INJECTION, SOLUTION INTRAVENOUS; SUBCUTANEOUS at 13:08

## 2025-05-01 RX ADMIN — POTASSIUM CHLORIDE 20 MEQ: 1500 TABLET, EXTENDED RELEASE ORAL at 18:01

## 2025-05-01 RX ADMIN — HYDROCODONE BITARTRATE AND ACETAMINOPHEN 1 TABLET: 7.5; 325 TABLET ORAL at 03:38

## 2025-05-01 RX ADMIN — INSULIN GLARGINE 20 UNITS: 100 INJECTION, SOLUTION SUBCUTANEOUS at 22:02

## 2025-05-01 RX ADMIN — METOPROLOL SUCCINATE 100 MG: 100 TABLET, EXTENDED RELEASE ORAL at 08:52

## 2025-05-01 RX ADMIN — POTASSIUM CHLORIDE 20 MEQ: 1500 TABLET, EXTENDED RELEASE ORAL at 08:52

## 2025-05-01 RX ADMIN — DIPHENHYDRAMINE HYDROCHLORIDE 25 MG: 25 CAPSULE ORAL at 03:38

## 2025-05-01 RX ADMIN — NYSTATIN 500000 UNITS: 100000 SUSPENSION ORAL at 08:53

## 2025-05-01 RX ADMIN — PREGABALIN 150 MG: 75 CAPSULE ORAL at 08:52

## 2025-05-01 RX ADMIN — ATORVASTATIN CALCIUM 20 MG: 20 TABLET, FILM COATED ORAL at 22:02

## 2025-05-01 RX ADMIN — DIPHENHYDRAMINE HYDROCHLORIDE 25 MG: 25 CAPSULE ORAL at 18:01

## 2025-05-01 RX ADMIN — DULOXETINE 60 MG: 30 CAPSULE, DELAYED RELEASE ORAL at 08:53

## 2025-05-01 RX ADMIN — NYSTATIN 500000 UNITS: 100000 SUSPENSION ORAL at 22:03

## 2025-05-01 RX ADMIN — FAMOTIDINE 20 MG: 20 TABLET, FILM COATED ORAL at 03:38

## 2025-05-01 RX ADMIN — INSULIN LISPRO 10 UNITS: 100 INJECTION, SOLUTION INTRAVENOUS; SUBCUTANEOUS at 18:01

## 2025-05-01 RX ADMIN — PREGABALIN 150 MG: 75 CAPSULE ORAL at 22:02

## 2025-05-01 RX ADMIN — DIPHENHYDRAMINE HYDROCHLORIDE 10 ML: 25 SOLUTION ORAL at 22:03

## 2025-05-01 RX ADMIN — Medication: at 14:00

## 2025-05-01 RX ADMIN — INSULIN LISPRO 10 UNITS: 100 INJECTION, SOLUTION INTRAVENOUS; SUBCUTANEOUS at 08:53

## 2025-05-01 RX ADMIN — Medication 10 ML: at 22:03

## 2025-05-01 RX ADMIN — INSULIN LISPRO 10 UNITS: 100 INJECTION, SOLUTION INTRAVENOUS; SUBCUTANEOUS at 13:08

## 2025-05-01 RX ADMIN — Medication: at 00:30

## 2025-05-01 RX ADMIN — DIPHENHYDRAMINE HYDROCHLORIDE 10 ML: 25 SOLUTION ORAL at 08:53

## 2025-05-01 RX ADMIN — DIPHENHYDRAMINE HYDROCHLORIDE 10 ML: 25 SOLUTION ORAL at 14:04

## 2025-05-01 RX ADMIN — NYSTATIN 500000 UNITS: 100000 SUSPENSION ORAL at 11:20

## 2025-05-01 RX ADMIN — INSULIN GLARGINE 15 UNITS: 100 INJECTION, SOLUTION SUBCUTANEOUS at 08:53

## 2025-05-01 RX ADMIN — Medication 10 ML: at 10:00

## 2025-05-01 RX ADMIN — INSULIN LISPRO 6 UNITS: 100 INJECTION, SOLUTION INTRAVENOUS; SUBCUTANEOUS at 22:02

## 2025-05-01 RX ADMIN — FAMOTIDINE 20 MG: 20 TABLET, FILM COATED ORAL at 18:01

## 2025-05-01 RX ADMIN — INSULIN LISPRO 4 UNITS: 100 INJECTION, SOLUTION INTRAVENOUS; SUBCUTANEOUS at 08:51

## 2025-05-01 RX ADMIN — CEFTRIAXONE 2000 MG: 2 INJECTION, POWDER, FOR SOLUTION INTRAMUSCULAR; INTRAVENOUS at 22:01

## 2025-05-01 RX ADMIN — NYSTATIN 500000 UNITS: 100000 SUSPENSION ORAL at 18:01

## 2025-05-01 RX ADMIN — MAGNESIUM SULFATE HEPTAHYDRATE 1 G: 10 INJECTION, SOLUTION INTRAVENOUS at 11:19

## 2025-05-01 NOTE — CASE MANAGEMENT/SOCIAL WORK
Continued Stay Note  [facility]     Patient Name: Arnold Ramírez  MRN: [id number]  Today's Date: 5/1/2025    Admit Date: 4/21/2025    Plan: Plan home with spouse, [facility] and [facility]. JOSUE Jenkins RN   Discharge Plan       Row Name 05/01/25 0748       Plan    Plan Plan home with spouse, [facility] and [facility]. JOSUE Jenkins RN    Plan Comments Per Dr. López pt will need-Given the fact that he is not tolerating vancomycin and has significant skin eruption and renal insufficiency which was a concern at the time of creation of antibiotic regimen on 3/28/2025, going forward ongoing use of vancomycin is not an option.  To complete the treatment of his septic arthritis of his facet joints in the back due to combination of strep intermedius bacteremia we will switch him to IV Rocephin with end of treatment will be 5/6/2025.  Leigh  ( [phone]) called for cost of IV Rocephin.   Lety  ( [phone]) is following for [facility] . Plan [facility] with spouse, [facility] JOSUE Jenkins RN                   Discharge Codes    No documentation.                 Expected Discharge Date and Time       Expected Discharge Date Expected Discharge Time    May 2, 2025               Marian Jenkins RN

## 2025-05-01 NOTE — CASE MANAGEMENT/SOCIAL WORK
Continued Stay Note  [facility]     Patient Name: Arnold Ramírez  MRN: [id number]  Today's Date: 5/1/2025    Admit Date: 4/21/2025    Plan: Plan home with spouse, [facility] and [facility]. JOSUE Jenkins RN   Discharge Plan       Row Name 05/01/25 1116       Plan    Plan Plan home with spouse, [facility] and [facility]. JOSUE Jenkins RN    Plan Comments Per Vanda ( [phone]) pt's Rocephin cost is $42.84.  Pt informed of cost of antibiotics and states he can obtain the antibiotic.  Lety ( [phone]) is following for [facility].  Plan home with spouse, [facility] and [facility]. JOSUE Jenkins RN                   Discharge Codes    No documentation.                 Expected Discharge Date and Time       Expected Discharge Date Expected Discharge Time    May 2, 2025               Marian Jenkins RN

## 2025-05-01 NOTE — PROGRESS NOTES
[facility] Progress Note      Patient Name: Arnold Ramírez  : 1960  MRN: [id number]  Primary Care Physician:  Anand Alford MD  Date of admission: 2025    Subjective     Interval History:   Follow-up acute kidney injury.  Urine output  3.6 L.  He thinks his arms look more normal size.  Still with a lot of skin peeling and itching on his back especially.  Eucerin lotion being applied.  Left hand feels better.  Dressing in place.  Eating.  Vitals:   Temp:  [97.2 °F (36.2 °C)-97.7 °F (36.5 °C)] 97.2 °F (36.2 °C)  Heart Rate:  [87-97] 90  Resp:  [16-18] 16  BP: (139-147)/(64-75) 143/70    Intake/Output Summary (Last 24 hours) at 2025 0944  Last data filed at 2025 0400  Gross per 24 hour   Intake 300 ml   Output 3650 ml   Net -3350 ml       Physical Exam:    General Appearance: Looks much better.Face less flushed.  Peeling around his mouth and nose .  Skin: Erythema and peeling over his extremities.  No purpuric lesions.  Back peeling.  HEENT: oral mucosa dry, nonicteric sclera  Neck: supple, no JVD  Lungs: Clear to auscultation bilaterally.  Unlabored on room air  Heart: RRR, normal S1 and S2.    Abdomen: soft, obese nontender, distended. +bs  : no palpable bladder  Extremities: 1 + upper and 2+ lower extremity edema.  Left hand dressed.  Neuro normal speech and mental status     Scheduled Meds:     atorvastatin, 20 mg, Oral, Nightly  cefTRIAXone, 2,000 mg, Intravenous, Q24H  diphenhydrAMINE, 25 mg, Oral, Q6H  DULoxetine, 60 mg, Oral, Daily  Eucerin original healing lotion, , Topical, Q12H  famotidine, 20 mg, Oral, BID AC  insulin glargine, 15 Units, Subcutaneous, Q12H  insulin lispro, 10 Units, Subcutaneous, TID With Meals  insulin lispro, 2-9 Units, Subcutaneous, 4x Daily AC & at Bedtime  magic mouthwash oral suspension (mixture) (diphenhydrAMINE HCl - aluminum & magnesium hydroxide-simethicone - lidocaine viscous) solution 55 mL, 10 mL, Swish & Spit,  Q6H  magnesium sulfate, 1 g, Intravenous, Once  metoprolol succinate XL, 100 mg, Oral, Daily  nystatin, 5 mL, Swish & Swallow, 4x Daily  potassium chloride, 20 mEq, Oral, BID With Meals  pregabalin, 150 mg, Oral, BID  sodium chloride, 10 mL, Intravenous, Q12H      IV Meds:        Results Reviewed:   I have personally reviewed the results from the time of this admission to 5/1/2025 09:44 EDT     Results from last 7 days   Lab Units 05/01/25 0753 04/30/25  0609 04/29/25  0622 04/28/25  0830   SODIUM mmol/L 141 141 137 134*   POTASSIUM mmol/L 3.6 3.6 3.5 3.6   CHLORIDE mmol/L 96* 99 97* 95*   CO2 mmol/L 31.0* 29.0 27.0 26.5   BUN mg/dL 23 26* 28* 32*   CREATININE mg/dL 1.85* 1.69* 1.77* 2.06*   CALCIUM mg/dL 8.3* 8.3* 8.0* 7.8*   BILIRUBIN mg/dL 0.4  --  0.3 0.3   ALK PHOS U/L 121*  --  116 112   ALT (SGPT) U/L 24  --  19 20   AST (SGOT) U/L 19  --  21 18   GLUCOSE mg/dL 238* 168* 158* 215*       Estimated Creatinine Clearance: 65.9 mL/min (A) (by C-G formula based on SCr of 1.85 mg/dL (H)).    Results from last 7 days   Lab Units 05/01/25 0753 04/30/25  0609 04/29/25  0622 04/27/25  0640 04/26/25  0601   MAGNESIUM mg/dL 1.5*  --  1.6  --  1.8   PHOSPHORUS mg/dL 3.3 3.8 3.6   < > 3.8    < > = values in this interval not displayed.       Results from last 7 days   Lab Units 04/29/25  0622 04/28/25  0830 04/27/25  0640 04/26/25  0601   URIC ACID mg/dL 11.1* 10.9* 10.2* 9.6*       Results from last 7 days   Lab Units 05/01/25 0753 04/30/25  0609 04/29/25  0622 04/28/25  0830 04/27/25  0927   WBC 10*3/mm3 14.97* 17.16* 15.71* 14.92* 13.06*   HEMOGLOBIN g/dL 11.4* 11.7* 11.7* 12.8* 12.2*   PLATELETS 10*3/mm3 339 352 331 317 276               Assessment / Plan     ASSESSMENT:  Acute kidney injury related to delayed hypersensitivity reaction to the vancomycin in the setting of ARB. Urinalysis with trace ketones and leukocytes and 100 mg/dL protein.    FeNa 0.17.  Edema much improved.  Creatinine up some today.  Metabolic  alkalosis may be getting too intravascularly dry especially with desquamation.  Stop diuretic today.  Replace magnesium IV  2.  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  Continues on IV Benadryl.  3.  Strep bacteremia with lumbar spinal abscess and facet septic arthritis.  Back pain improved.  Antibiotic course to be completed 5/6/2025  on Rocephin.  No further chills.  4.  Diabetes mellitus type 2.  Peripheral neuropathy.  Blood sugars 168-297.  A managing.  5.  Morbid obesity.     PLAN:  Replace magnesium IV  Stop torsemide  Recheck chemistries in the morning.  Thank you for involving us in the care of Arnold Ramírez.  Please feel free to call with any questions.    Sujata Kilpatrick MD  05/01/25  09:44 EDT    [facility]  [phone]    Please note that portions of this note were completed with a voice recognition program.

## 2025-05-01 NOTE — PLAN OF CARE
"Goal Outcome Evaluation:  Plan of Care Reviewed With: patient        Progress: no change  Outcome Evaluation: Pt up ad gianluca, gait steady, no c/o p ain this shift, c/o itching r/t generalized dry, peeling skin, wife gave pt bath, no chg r/t skin issues at thsi time. eating and drinking well, using toilet , voiding adequately, \"lots\" per pt, iv mag given to correct low mag per nephro, vss, sr on tele, edema improving, r hand drsg done, cont to have x2 blister and edema, redness all over body. nad.                             "

## 2025-05-01 NOTE — PROGRESS NOTES
Name: Arnold Ramírez ADMIT: 2025   : 1960  PCP: Anand Alford MD    MRN: [id number] LOS: 8 days   AGE/SEX: 65 y.o. male  ROOM: 65/     Subjective   Subjective   Feeling pretty good again today. Skin progressing. No back/hip/flank pain. No SOA or CP. Voiding well. No N/V/D/abd pain. Tolerating diet.        Objective   Objective   Vital Signs  Temp:  [97.2 °F (36.2 °C)-97.9 °F (36.6 °C)] 97.9 °F (36.6 °C)  Heart Rate:  [85-97] 85  Resp:  [16-18] 16  BP: (132-147)/(63-75) 132/63  SpO2:  [87 %-97 %] 97 %  on   ;   Device (Oxygen Therapy): room air  Body mass index is 39.54 kg/m².    (No change in exam today)    Physical Exam  Vitals and nursing note reviewed. Exam conducted with a chaperone present (Family x 1).   Constitutional:       General: He is not in acute distress.     Appearance: He is not ill-appearing, toxic-appearing or diaphoretic.   HENT:      Head: Normocephalic.      Nose: Nose normal.      Mouth/Throat:      Mouth: Mucous membranes are moist.      Pharynx: Oropharynx is clear.   Eyes:      General: No scleral icterus.        Right eye: No discharge.         Left eye: No discharge.      Conjunctiva/sclera: Conjunctivae normal.   Cardiovascular:      Rate and Rhythm: Normal rate.      Pulses: Normal pulses.   Pulmonary:      Effort: Pulmonary effort is normal. No respiratory distress.      Breath sounds: Normal breath sounds. No wheezing or rales.   Abdominal:      General: Bowel sounds are normal. There is no distension.      Palpations: Abdomen is soft.      Tenderness: There is no abdominal tenderness.   Musculoskeletal:         General: Swelling (in all 4 extremities) present.      Cervical back: Neck supple.   Skin:     General: Skin is warm and dry.      Capillary Refill: Capillary refill takes less than 2 seconds.      Comments: Widespread superficial desquamating rash, left hand dressed   Neurological:      General: No focal deficit present.      Mental Status: He is alert.  Mental status is at baseline.   Psychiatric:         Mood and Affect: Mood normal.         Behavior: Behavior normal.       Results Review     I reviewed the patient's new clinical results.  Results from last 7 days   Lab Units 05/01/25 0753 04/30/25  0609 04/29/25  0622 04/28/25  0830   WBC 10*3/mm3 14.97* 17.16* 15.71* 14.92*   HEMOGLOBIN g/dL 11.4* 11.7* 11.7* 12.8*   PLATELETS 10*3/mm3 339 352 331 317     Results from last 7 days   Lab Units 05/01/25 0753 04/30/25  0609 04/29/25  0622 04/28/25  0830   SODIUM mmol/L 141 141 137 134*   POTASSIUM mmol/L 3.6 3.6 3.5 3.6   CHLORIDE mmol/L 96* 99 97* 95*   CO2 mmol/L 31.0* 29.0 27.0 26.5   BUN mg/dL 23 26* 28* 32*   CREATININE mg/dL 1.85* 1.69* 1.77* 2.06*   GLUCOSE mg/dL 238* 168* 158* 215*   EGFR mL/min/1.73 40.4* 45.1* 42.6* 35.5*     Results from last 7 days   Lab Units 05/01/25 0753 04/30/25  0609 04/29/25  0622 04/28/25  0830 04/27/25  0640   ALBUMIN g/dL 3.3* 3.0* 3.1* 3.2* 2.8*  2.8*   BILIRUBIN mg/dL 0.4  --  0.3 0.3 0.3   ALK PHOS U/L 121*  --  116 112 97   AST (SGOT) U/L 19  --  21 18 18   ALT (SGPT) U/L 24  --  19 20 18     Results from last 7 days   Lab Units 05/01/25 0753 04/30/25  0609 04/29/25  0622 04/28/25  0830 04/27/25  0640 04/26/25  0601   CALCIUM mg/dL 8.3* 8.3* 8.0* 7.8*   < > 7.8*   ALBUMIN g/dL 3.3* 3.0* 3.1* 3.2*   < > 2.6*   MAGNESIUM mg/dL 1.5*  --  1.6  --   --  1.8   PHOSPHORUS mg/dL 3.3 3.8 3.6 3.9   < > 3.8    < > = values in this interval not displayed.       Glucose   Date/Time Value Ref Range Status   05/01/2025 1056 268 (H) 70 - 130 mg/dL Final   05/01/2025 0629 208 (H) 70 - 130 mg/dL Final   04/30/2025 2058 281 (H) 70 - 130 mg/dL Final   04/30/2025 1646 297 (H) 70 - 130 mg/dL Final   04/30/2025 1100 186 (H) 70 - 130 mg/dL Final   04/30/2025 0602 147 (H) 70 - 130 mg/dL Final   04/29/2025 2118 228 (H) 70 - 130 mg/dL Final       No radiology results for the last day    I have personally reviewed all medications:  Scheduled  Medications  atorvastatin, 20 mg, Oral, Nightly  cefTRIAXone, 2,000 mg, Intravenous, Q24H  diphenhydrAMINE, 25 mg, Oral, Q6H  DULoxetine, 60 mg, Oral, Daily  Eucerin original healing lotion, , Topical, Q12H  famotidine, 20 mg, Oral, BID AC  insulin glargine, 15 Units, Subcutaneous, Q12H  insulin lispro, 10 Units, Subcutaneous, TID With Meals  insulin lispro, 2-9 Units, Subcutaneous, 4x Daily AC & at Bedtime  magic mouthwash oral suspension (mixture) (diphenhydrAMINE HCl - aluminum & magnesium hydroxide-simethicone - lidocaine viscous) solution 55 mL, 10 mL, Swish & Spit, Q6H  metoprolol succinate XL, 100 mg, Oral, Daily  nystatin, 5 mL, Swish & Swallow, 4x Daily  potassium chloride, 20 mEq, Oral, BID With Meals  pregabalin, 150 mg, Oral, BID  sodium chloride, 10 mL, Intravenous, Q12H    Infusions   Diet  Diet: Diabetic; Consistent Carbohydrate; Fluid Consistency: Thin (IDDSI 0)    I have personally reviewed:  [x]  Laboratory   []  Microbiology   []  Radiology   [x]  EKG/Telemetry  []  Cardiology/Vascular   []  Pathology    []  Records       Assessment/Plan     Active Hospital Problems    Diagnosis  POA    **Vancomycin flushing syndrome [L27.0, T36.8X5A]  Yes    ROCKY (acute kidney injury) [N17.9]  Yes    Type 2 diabetes mellitus, with long-term current use of insulin [E11.9, Z79.4]  Not Applicable    Diabetic peripheral neuropathy [E11.42]  Yes    Dyslipidemia [E78.5]  Yes    HTN (hypertension) [I10]  Yes    Obesity [E66.9]  Yes    Streptococcal bacteremia [R78.81, B95.5]  Yes    MRSA colonization [Z22.322]  Not Applicable      Resolved Hospital Problems   No resolved problems to display.       65 y.o. male with HTN, HLD, morbid obesity, MARGO, DM2 with DPN, MRSA colonization, and recent admission for left hip/flank/low back pain, possible lumbar facet septic arthritis (though fluid culture negative), Strep intermedius bacteremia and CDiff, who presented to ER with worsening renal function and skin rash, and was  admitted with ROCKY and skin rash due to systemic Vancomycin delayed hypersensitivity reaction.     Vancomycin delayed hypersensitivity reaction, desquamating rash: Was undergoing treatment for strep intermedius bacteremia. Has here been transitioned to Rocephin with plan to take through 5/6 per Dr. López (ID). Blood cultures negative. 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  Continue nystatin for thrush. He does have eosinophilia but LFTs are okay and he is afebrile. Leukocytosis present but improved today. WOCN evaluated and recommended hand surgery consult. Dr. Luevano (Hand) recommends only continued local wound care.  ROCKY/AIN with volume overload: Cr worse today. He has been on torsemide to aid with volume mgmt per Renal. They have stopped today give rise in Cr.  Hypertension: Continue Metoprolol at higher dose. Amlodipine stopped due to swelling. BPs acceptable.  DM2 with DPN: A1c 10.2. Had hypoglycemia earlier in admission so Lantus was held--now reintroduced. Monitoring requirements and continue to adjust as necessary--increase Lantus to 20 units BID (pt actually taking 160 units of Lantus BID at home . . . !?). Continue mealtime short-acting insulin and SSI. Continue Lyrica and Cymbalta.      SCDs for DVT prophylaxis.  Full code.  Discussed with patient and CCP.  Anticipate discharge home with home health tomorrow if labs okay.  Expected Discharge Date: 5/2/2025; Expected Discharge Time:       Elder Hernandez MD  [facility]  05/01/25  14:26 EDT

## 2025-05-01 NOTE — PLAN OF CARE
Goal Outcome Evaluation:   Patient alert follows commands prn pain meds given. No nausea noted monitoring intake and output, patient is using the urinal. Skin improving eucerin cream lotion applied to back arms and legs this shift. Drsg on left hand intact. No acute distress noted monitoring blood glucose. Will continue to monitor

## 2025-05-02 LAB
ALBUMIN SERPL-MCNC: 3.2 G/DL (ref 3.5–5.2)
ALP SERPL-CCNC: 117 U/L (ref 39–117)
ALT SERPL W P-5'-P-CCNC: 20 U/L (ref 1–41)
ANION GAP SERPL CALCULATED.3IONS-SCNC: 11 MMOL/L (ref 5–15)
AST SERPL-CCNC: 17 U/L (ref 1–40)
BASOPHILS # BLD AUTO: 0.11 10*3/MM3 (ref 0–0.2)
BASOPHILS NFR BLD AUTO: 0.7 % (ref 0–1.5)
BILIRUB CONJ SERPL-MCNC: 0.1 MG/DL (ref 0–0.3)
BILIRUB INDIRECT SERPL-MCNC: 0.2 MG/DL
BILIRUB SERPL-MCNC: 0.3 MG/DL (ref 0–1.2)
BUN SERPL-MCNC: 22 MG/DL (ref 8–23)
BUN/CREAT SERPL: 12.1 (ref 7–25)
CALCIUM SPEC-SCNC: 8.2 MG/DL (ref 8.6–10.5)
CHLORIDE SERPL-SCNC: 100 MMOL/L (ref 98–107)
CO2 SERPL-SCNC: 29 MMOL/L (ref 22–29)
CREAT SERPL-MCNC: 1.82 MG/DL (ref 0.76–1.27)
DEPRECATED RDW RBC AUTO: 46.7 FL (ref 37–54)
EGFRCR SERPLBLD CKD-EPI 2021: 41.2 ML/MIN/1.73
EOSINOPHIL # BLD AUTO: 2.11 10*3/MM3 (ref 0–0.4)
EOSINOPHIL NFR BLD AUTO: 14.1 % (ref 0.3–6.2)
ERYTHROCYTE [DISTWIDTH] IN BLOOD BY AUTOMATED COUNT: 15.2 % (ref 12.3–15.4)
GLUCOSE BLDC GLUCOMTR-MCNC: 179 MG/DL (ref 70–130)
GLUCOSE BLDC GLUCOMTR-MCNC: 202 MG/DL (ref 70–130)
GLUCOSE BLDC GLUCOMTR-MCNC: 223 MG/DL (ref 70–130)
GLUCOSE BLDC GLUCOMTR-MCNC: 275 MG/DL (ref 70–130)
GLUCOSE SERPL-MCNC: 182 MG/DL (ref 65–99)
HCT VFR BLD AUTO: 37.3 % (ref 37.5–51)
HGB BLD-MCNC: 11.6 G/DL (ref 13–17.7)
IMM GRANULOCYTES # BLD AUTO: 0.37 10*3/MM3 (ref 0–0.05)
IMM GRANULOCYTES NFR BLD AUTO: 2.5 % (ref 0–0.5)
LYMPHOCYTES # BLD AUTO: 3.47 10*3/MM3 (ref 0.7–3.1)
LYMPHOCYTES NFR BLD AUTO: 23.1 % (ref 19.6–45.3)
MAGNESIUM SERPL-MCNC: 1.8 MG/DL (ref 1.6–2.4)
MCH RBC QN AUTO: 26.1 PG (ref 26.6–33)
MCHC RBC AUTO-ENTMCNC: 31.1 G/DL (ref 31.5–35.7)
MCV RBC AUTO: 83.8 FL (ref 79–97)
MONOCYTES # BLD AUTO: 1.84 10*3/MM3 (ref 0.1–0.9)
MONOCYTES NFR BLD AUTO: 12.3 % (ref 5–12)
NEUTROPHILS NFR BLD AUTO: 47.3 % (ref 42.7–76)
NEUTROPHILS NFR BLD AUTO: 7.1 10*3/MM3 (ref 1.7–7)
NRBC BLD AUTO-RTO: 0 /100 WBC (ref 0–0.2)
PHOSPHATE SERPL-MCNC: 3.2 MG/DL (ref 2.5–4.5)
PLATELET # BLD AUTO: 314 10*3/MM3 (ref 140–450)
PMV BLD AUTO: 9.8 FL (ref 6–12)
POTASSIUM SERPL-SCNC: 3.8 MMOL/L (ref 3.5–5.2)
PROT SERPL-MCNC: 6.2 G/DL (ref 6–8.5)
RBC # BLD AUTO: 4.45 10*6/MM3 (ref 4.14–5.8)
SODIUM SERPL-SCNC: 140 MMOL/L (ref 136–145)
URATE SERPL-MCNC: 11.9 MG/DL (ref 3.4–7)
WBC NRBC COR # BLD AUTO: 15 10*3/MM3 (ref 3.4–10.8)

## 2025-05-02 PROCEDURE — 84550 ASSAY OF BLOOD/URIC ACID: CPT | Performed by: INTERNAL MEDICINE

## 2025-05-02 PROCEDURE — 82948 REAGENT STRIP/BLOOD GLUCOSE: CPT

## 2025-05-02 PROCEDURE — 63710000001 DIPHENHYDRAMINE PER 50 MG: Performed by: HOSPITALIST

## 2025-05-02 PROCEDURE — 63710000001 INSULIN LISPRO (HUMAN) PER 5 UNITS: Performed by: NURSE PRACTITIONER

## 2025-05-02 PROCEDURE — 25010000002 DIPHENHYDRAMINE PER 50 MG: Performed by: HOSPITALIST

## 2025-05-02 PROCEDURE — 80048 BASIC METABOLIC PNL TOTAL CA: CPT | Performed by: INTERNAL MEDICINE

## 2025-05-02 PROCEDURE — 85025 COMPLETE CBC W/AUTO DIFF WBC: CPT | Performed by: HOSPITALIST

## 2025-05-02 PROCEDURE — 84100 ASSAY OF PHOSPHORUS: CPT | Performed by: INTERNAL MEDICINE

## 2025-05-02 PROCEDURE — 80076 HEPATIC FUNCTION PANEL: CPT | Performed by: HOSPITALIST

## 2025-05-02 PROCEDURE — 63710000001 INSULIN LISPRO (HUMAN) PER 5 UNITS: Performed by: INTERNAL MEDICINE

## 2025-05-02 PROCEDURE — 63710000001 INSULIN GLARGINE PER 5 UNITS: Performed by: HOSPITALIST

## 2025-05-02 PROCEDURE — 83735 ASSAY OF MAGNESIUM: CPT | Performed by: INTERNAL MEDICINE

## 2025-05-02 PROCEDURE — 25010000002 CEFTRIAXONE PER 250 MG: Performed by: INTERNAL MEDICINE

## 2025-05-02 PROCEDURE — 63710000001 INSULIN LISPRO (HUMAN) PER 5 UNITS: Performed by: HOSPITALIST

## 2025-05-02 RX ORDER — INSULIN LISPRO 100 [IU]/ML
12 INJECTION, SOLUTION INTRAVENOUS; SUBCUTANEOUS
Status: DISCONTINUED | OUTPATIENT
Start: 2025-05-02 | End: 2025-05-03 | Stop reason: HOSPADM

## 2025-05-02 RX ADMIN — METOPROLOL SUCCINATE 100 MG: 100 TABLET, EXTENDED RELEASE ORAL at 09:12

## 2025-05-02 RX ADMIN — INSULIN LISPRO 4 UNITS: 100 INJECTION, SOLUTION INTRAVENOUS; SUBCUTANEOUS at 17:06

## 2025-05-02 RX ADMIN — DIPHENHYDRAMINE HYDROCHLORIDE 10 ML: 25 SOLUTION ORAL at 12:16

## 2025-05-02 RX ADMIN — NYSTATIN 500000 UNITS: 100000 SUSPENSION ORAL at 12:16

## 2025-05-02 RX ADMIN — DIPHENHYDRAMINE HYDROCHLORIDE 10 ML: 25 SOLUTION ORAL at 09:12

## 2025-05-02 RX ADMIN — INSULIN LISPRO 12 UNITS: 100 INJECTION, SOLUTION INTRAVENOUS; SUBCUTANEOUS at 12:16

## 2025-05-02 RX ADMIN — POTASSIUM CHLORIDE 20 MEQ: 1500 TABLET, EXTENDED RELEASE ORAL at 09:12

## 2025-05-02 RX ADMIN — INSULIN LISPRO 4 UNITS: 100 INJECTION, SOLUTION INTRAVENOUS; SUBCUTANEOUS at 21:32

## 2025-05-02 RX ADMIN — INSULIN GLARGINE 20 UNITS: 100 INJECTION, SOLUTION SUBCUTANEOUS at 21:32

## 2025-05-02 RX ADMIN — INSULIN LISPRO 12 UNITS: 100 INJECTION, SOLUTION INTRAVENOUS; SUBCUTANEOUS at 17:06

## 2025-05-02 RX ADMIN — DIPHENHYDRAMINE HYDROCHLORIDE 10 ML: 25 SOLUTION ORAL at 17:05

## 2025-05-02 RX ADMIN — Medication 10 ML: at 21:37

## 2025-05-02 RX ADMIN — NYSTATIN 500000 UNITS: 100000 SUSPENSION ORAL at 21:32

## 2025-05-02 RX ADMIN — INSULIN GLARGINE 20 UNITS: 100 INJECTION, SOLUTION SUBCUTANEOUS at 09:12

## 2025-05-02 RX ADMIN — DIPHENHYDRAMINE HYDROCHLORIDE 25 MG: 25 CAPSULE ORAL at 09:12

## 2025-05-02 RX ADMIN — INSULIN LISPRO 10 UNITS: 100 INJECTION, SOLUTION INTRAVENOUS; SUBCUTANEOUS at 09:13

## 2025-05-02 RX ADMIN — FAMOTIDINE 20 MG: 20 TABLET, FILM COATED ORAL at 09:12

## 2025-05-02 RX ADMIN — NYSTATIN 500000 UNITS: 100000 SUSPENSION ORAL at 09:11

## 2025-05-02 RX ADMIN — POTASSIUM CHLORIDE 20 MEQ: 1500 TABLET, EXTENDED RELEASE ORAL at 17:06

## 2025-05-02 RX ADMIN — DIPHENHYDRAMINE HYDROCHLORIDE 25 MG: 50 INJECTION, SOLUTION INTRAMUSCULAR; INTRAVENOUS at 21:37

## 2025-05-02 RX ADMIN — DIPHENHYDRAMINE HYDROCHLORIDE 25 MG: 25 CAPSULE ORAL at 00:15

## 2025-05-02 RX ADMIN — HYDROCODONE BITARTRATE AND ACETAMINOPHEN 1 TABLET: 7.5; 325 TABLET ORAL at 09:12

## 2025-05-02 RX ADMIN — DULOXETINE 60 MG: 30 CAPSULE, DELAYED RELEASE ORAL at 09:12

## 2025-05-02 RX ADMIN — INSULIN LISPRO 2 UNITS: 100 INJECTION, SOLUTION INTRAVENOUS; SUBCUTANEOUS at 09:13

## 2025-05-02 RX ADMIN — PREGABALIN 150 MG: 75 CAPSULE ORAL at 09:12

## 2025-05-02 RX ADMIN — PREGABALIN 150 MG: 75 CAPSULE ORAL at 21:32

## 2025-05-02 RX ADMIN — Medication: at 01:38

## 2025-05-02 RX ADMIN — DIPHENHYDRAMINE HYDROCHLORIDE 25 MG: 25 CAPSULE ORAL at 12:16

## 2025-05-02 RX ADMIN — INSULIN LISPRO 6 UNITS: 100 INJECTION, SOLUTION INTRAVENOUS; SUBCUTANEOUS at 12:16

## 2025-05-02 RX ADMIN — NYSTATIN 500000 UNITS: 100000 SUSPENSION ORAL at 17:06

## 2025-05-02 RX ADMIN — DIPHENHYDRAMINE HYDROCHLORIDE 25 MG: 25 CAPSULE ORAL at 17:06

## 2025-05-02 RX ADMIN — HYDROCODONE BITARTRATE AND ACETAMINOPHEN 1 TABLET: 7.5; 325 TABLET ORAL at 17:06

## 2025-05-02 RX ADMIN — FAMOTIDINE 20 MG: 20 TABLET, FILM COATED ORAL at 17:06

## 2025-05-02 RX ADMIN — CEFTRIAXONE 2000 MG: 2 INJECTION, POWDER, FOR SOLUTION INTRAMUSCULAR; INTRAVENOUS at 21:32

## 2025-05-02 RX ADMIN — Medication: at 12:55

## 2025-05-02 RX ADMIN — Medication 10 ML: at 09:13

## 2025-05-02 RX ADMIN — ATORVASTATIN CALCIUM 20 MG: 20 TABLET, FILM COATED ORAL at 21:31

## 2025-05-02 NOTE — PROGRESS NOTES
"  Infectious Diseases Progress Note    Lizette López MD     [facility]  Los: 8 days  Patient Identification:  Name: Arnold Ramírez  Age: 65 y.o.  Sex: male  :  1960  MRN: [id number]         Primary Care Physician: Anand Alford MD        Subjective: feeling better  Interval History: See consultation note.    Objective:    Scheduled Meds:atorvastatin, 20 mg, Oral, Nightly  cefTRIAXone, 2,000 mg, Intravenous, Q24H  diphenhydrAMINE, 25 mg, Oral, Q6H  DULoxetine, 60 mg, Oral, Daily  Eucerin original healing lotion, , Topical, Q12H  famotidine, 20 mg, Oral, BID AC  insulin glargine, 20 Units, Subcutaneous, Q12H  insulin lispro, 10 Units, Subcutaneous, TID With Meals  insulin lispro, 2-9 Units, Subcutaneous, 4x Daily AC & at Bedtime  magic mouthwash oral suspension (mixture) (diphenhydrAMINE HCl - aluminum & magnesium hydroxide-simethicone - lidocaine viscous) solution 55 mL, 10 mL, Swish & Spit, Q6H  metoprolol succinate XL, 100 mg, Oral, Daily  nystatin, 5 mL, Swish & Swallow, 4x Daily  potassium chloride, 20 mEq, Oral, BID With Meals  pregabalin, 150 mg, Oral, BID  sodium chloride, 10 mL, Intravenous, Q12H      Continuous Infusions:     Vital signs in last 24 hours:  Temp:  [97.2 °F (36.2 °C)-98 °F (36.7 °C)] 98 °F (36.7 °C)  Heart Rate:  [85-92] 92  Resp:  [16-18] 16  BP: (122-147)/(61-70) 122/61    Intake/Output:    Intake/Output Summary (Last 24 hours) at 2025  Last data filed at 2025 1800  Gross per 24 hour   Intake 1260 ml   Output 3200 ml   Net -1940 ml       Exam:  /61   Pulse 92   Temp 98 °F (36.7 °C) (Oral)   Resp 16   Ht 195.6 cm (77\")   Wt (!) 151 kg (333 lb 6.4 oz)   SpO2 94%   BMI 39.54 kg/m²   Patient is examined using the personal protective equipment as per guidelines from infection control for this particular patient as enacted.  Hand washing was performed before and after patient interaction.  General Appearance:    Alert, cooperative, no distress, " AAOx3                          Head:    Normocephalic, without obvious abnormality, atraumatic                           Eyes:    PERRL, conjunctivae/corneas clear, EOM's intact, both eyes                         Throat:   Lips, tongue, gums normal; oral mucosa pink and moist                           Neck:   Supple, symmetrical, trachea midline, no JVD                         Lungs:    Clear to auscultation bilaterally, respirations unlabored                 Chest Wall:    No tenderness or deformity                          Heart:  1 S2 regular                  Abdomen:   B soft nontender                 Extremities:   Extremities normal, atraumatic, no cyanosis or edema                        Pulses:   Pulses palpable in all extremities                            Skin: Has drug eruption due to delayed hypersensitivity to vancomycin.  Generalized body wall edema noted.  Blisters on left hand appears to have ruptured and currently dressed.                  Neurologic: Alert and oriented x 3       Data Review:    I reviewed the patient's new clinical results.  Results from last 7 days   Lab Units 05/01/25  0753 04/30/25  0609 04/29/25  0622 04/28/25  0830 04/27/25  0927 04/26/25  0601 04/25/25  0747   WBC 10*3/mm3 14.97* 17.16* 15.71* 14.92* 13.06* 13.13* 16.04*   HEMOGLOBIN g/dL 11.4* 11.7* 11.7* 12.8* 12.2* 13.4 14.6   PLATELETS 10*3/mm3 339 352 331 317 276 246 325     Results from last 7 days   Lab Units 05/01/25  0753 04/30/25  0609 04/29/25  0622 04/28/25  0830 04/27/25  0640 04/26/25  0601 04/25/25  0747   SODIUM mmol/L 141 141 137 134* 129*  132* 128* 133*   POTASSIUM mmol/L 3.6 3.6 3.5 3.6 3.5  3.5 4.3 5.2   CHLORIDE mmol/L 96* 99 97* 95* 92*  95* 96* 99   CO2 mmol/L 31.0* 29.0 27.0 26.5 22.8  23.0 19.8* 19.3*   BUN mg/dL 23 26* 28* 32* 36*  35* 36* 31*   CREATININE mg/dL 1.85* 1.69* 1.77* 2.06* 2.52*  2.55* 2.87* 2.57*   CALCIUM mg/dL 8.3* 8.3* 8.0* 7.8* 7.6*  7.7* 7.8* 8.2*   GLUCOSE mg/dL 238* 168*  158* 215* 222*  218* 163* 62*     Microbiology Results (last 10 days)       Procedure Component Value - Date/Time    Clostridioides difficile Toxin - Stool, Per Rectum [[id number]]  (Normal) Collected: 04/24/25 0008    Lab Status: Final result Specimen: Stool from Per Rectum Updated: 04/24/25 0204    Narrative:      The following orders were created for panel order Clostridioides difficile Toxin - Stool, Per Rectum.  Procedure                               Abnormality         Status                     ---------                               -----------         ------                     Clostridioides difficile...[[id number]]  Normal              Final result                 Please view results for these tests on the individual orders.    Clostridioides difficile Toxin, PCR - Stool, Per Rectum [[id number]]  (Normal) Collected: 04/24/25 0008    Lab Status: Final result Specimen: Stool from Per Rectum Updated: 04/24/25 0204     Toxigenic C. difficile by PCR Negative    Narrative:      The result indicates the absence of toxigenic C. difficile from stool specimen.     Blood Culture - Blood, Hand, Right [[id number]]  (Normal) Collected: 04/23/25 1200    Lab Status: Final result Specimen: Blood from Hand, Right Updated: 04/28/25 1230     Blood Culture No growth at 5 days    Blood Culture - Blood, Hand, Right [[id number]]  (Normal) Collected: 04/23/25 1017    Lab Status: Final result Specimen: Blood from Hand, Right Updated: 04/28/25 1045     Blood Culture No growth at 5 days    Eosinophil Smear - Urine, Urine, Clean Catch [[id number]]  (Normal) Collected: 04/22/25 1314    Lab Status: Final result Specimen: Urine, Clean Catch Updated: 04/22/25 1544     Eosinophil Smear 0 % EOS/100 Cells               Assessment:    Vancomycin flushing syndrome    Streptococcal bacteremia    Obesity    Dyslipidemia    HTN (hypertension)    MRSA colonization    Type 2 diabetes mellitus, with long-term current use of insulin    Diabetic  peripheral neuropathy    ROCKY (acute kidney injury)  65-year-old male with  1-systemic vancomycin delayed hypersensitivity reaction with skin rash and renal insufficiency  2-acute kidney injury probably secondary to combination of factors including vancomycin  3-history of MRSA colonization  4-history of C. difficile diarrhea with colonization  5-history of strep intermedius bacteremia sepsis with paraspinal abscess and facet septic arthritis clinically improved with resolution of symptoms on course to finish his antibiotic treatment on 5/6/2025  6-poorly controlled diabetes  7-morbid obesity  8-hypertension  9-other diagnoses per primary team.     Recommendations/Discussions:  See my discussion and recommendation on 4/22/2025  Continue IV ceftriaxone - Thru 5/6/2025  Follow-up blood cultures, stool studies are negative for C. difficile.  Supportive care for renal insufficiency, avoidance of nephrotoxic agents and periodic review of systems identify other areas of secondary seeding due to strep intermedius bacteremia and possibility of line sepsis as well as recurrence of C. difficile infection in the setting of known MRSA colonization and C. difficile colonization.  Lizette López MD  5/1/2025  21:26 EDT    Parts of this note may be an electronic transcription/translation of spoken language to printed text using the Dragon dictation system.

## 2025-05-02 NOTE — NURSING NOTE
CWOCN- spoke with RN, charge RN, and CON manager- patient with wound/moist blister that was noted during the PICC dressing change. See photo. It is just to the outside of a circular indention to the skin- it looks like he probably had a Biopatch on at some time. The gel antimicrobial patch lays over top of the wound so RN said he could not visualize it prior. Discussed using the Biopatch so that it does not sit on top of the wound. If wound/blister is wet, place sterile gauze over top for moisture management. Will need to be changed tomorrow if sterile gauze is placed. Central line dressing will likely be wet tomorrow however if no gauze is placed related to the blister, so the dressing will need to be changed either way.  Notification to CON Manager and weekend CON of the above.

## 2025-05-02 NOTE — PLAN OF CARE
Goal Outcome Evaluation:         Patient resting in bed.Medicated per MAR.Continues w IV ABX.Up gianluca.VSS.Room air.Refused Cpap at night.NSR

## 2025-05-02 NOTE — PROGRESS NOTES
Name: Arnold Ramírez ADMIT: 2025   : 1960  PCP: Anand Alford MD    MRN: [id number] LOS: 9 days   AGE/SEX: 65 y.o. male  ROOM: E665/1     Subjective   Subjective   Feeling pretty good again today. Skin progressing. No back/hip/flank pain. No SOA or CP. Voiding well. No N/V/D/abd pain. Tolerating diet. Eager to go home.       Objective   Objective   Vital Signs  Temp:  [97.7 °F (36.5 °C)-98 °F (36.7 °C)] 97.7 °F (36.5 °C)  Heart Rate:  [85-92] 90  Resp:  [16] 16  BP: (122-145)/(57-78) 145/78  SpO2:  [94 %-97 %] 97 %  on   ;   Device (Oxygen Therapy): room air  Body mass index is 39.63 kg/m².    (No change in exam today)    Physical Exam  Vitals and nursing note reviewed.   Constitutional:       General: He is not in acute distress.     Appearance: He is not ill-appearing, toxic-appearing or diaphoretic.   HENT:      Head: Normocephalic.      Nose: Nose normal.      Mouth/Throat:      Mouth: Mucous membranes are moist.      Pharynx: Oropharynx is clear.   Eyes:      General: No scleral icterus.        Right eye: No discharge.         Left eye: No discharge.      Conjunctiva/sclera: Conjunctivae normal.   Cardiovascular:      Rate and Rhythm: Normal rate.      Pulses: Normal pulses.   Pulmonary:      Effort: Pulmonary effort is normal. No respiratory distress.      Breath sounds: Normal breath sounds. No wheezing or rales.   Abdominal:      General: Bowel sounds are normal. There is no distension.      Palpations: Abdomen is soft.      Tenderness: There is no abdominal tenderness.   Musculoskeletal:         General: Swelling (in all 4 extremities) present.      Cervical back: Neck supple.   Skin:     General: Skin is warm and dry.      Capillary Refill: Capillary refill takes less than 2 seconds.      Comments: Widespread superficial desquamating rash, left hand dressed   Neurological:      General: No focal deficit present.      Mental Status: He is alert. Mental status is at baseline.    Psychiatric:         Mood and Affect: Mood normal.         Behavior: Behavior normal.       Results Review     I reviewed the patient's new clinical results.  Results from last 7 days   Lab Units 05/02/25 0529 05/01/25 0753 04/30/25 0609 04/29/25  0622   WBC 10*3/mm3 15.00* 14.97* 17.16* 15.71*   HEMOGLOBIN g/dL 11.6* 11.4* 11.7* 11.7*   PLATELETS 10*3/mm3 314 339 352 331     Results from last 7 days   Lab Units 05/02/25 0529 05/01/25 0753 04/30/25  0609 04/29/25  0622   SODIUM mmol/L 140 141 141 137   POTASSIUM mmol/L 3.8 3.6 3.6 3.5   CHLORIDE mmol/L 100 96* 99 97*   CO2 mmol/L 29.0 31.0* 29.0 27.0   BUN mg/dL 22 23 26* 28*   CREATININE mg/dL 1.82* 1.85* 1.69* 1.77*   GLUCOSE mg/dL 182* 238* 168* 158*   EGFR mL/min/1.73 41.2* 40.4* 45.1* 42.6*     Results from last 7 days   Lab Units 05/02/25 0529 05/01/25 0753 04/30/25  0609 04/29/25  0622 04/28/25  0830   ALBUMIN g/dL 3.2* 3.3* 3.0* 3.1* 3.2*   BILIRUBIN mg/dL 0.3 0.4  --  0.3 0.3   ALK PHOS U/L 117 121*  --  116 112   AST (SGOT) U/L 17 19  --  21 18   ALT (SGPT) U/L 20 24  --  19 20     Results from last 7 days   Lab Units 05/02/25 0529 05/01/25 0753 04/30/25  0609 04/29/25  0622 04/27/25  0640 04/26/25  0601   CALCIUM mg/dL 8.2* 8.3* 8.3* 8.0*   < > 7.8*   ALBUMIN g/dL 3.2* 3.3* 3.0* 3.1*   < > 2.6*   MAGNESIUM mg/dL 1.8 1.5*  --  1.6  --  1.8   PHOSPHORUS mg/dL 3.2 3.3 3.8 3.6   < > 3.8    < > = values in this interval not displayed.       Glucose   Date/Time Value Ref Range Status   05/02/2025 1045 275 (H) 70 - 130 mg/dL Final   05/02/2025 0604 179 (H) 70 - 130 mg/dL Final   05/01/2025 2052 278 (H) 70 - 130 mg/dL Final   05/01/2025 1615 316 (H) 70 - 130 mg/dL Final   05/01/2025 1056 268 (H) 70 - 130 mg/dL Final   05/01/2025 0629 208 (H) 70 - 130 mg/dL Final   04/30/2025 2058 281 (H) 70 - 130 mg/dL Final       No radiology results for the last day    I have personally reviewed all medications:  Scheduled Medications  atorvastatin, 20 mg, Oral,  Nightly  cefTRIAXone, 2,000 mg, Intravenous, Q24H  diphenhydrAMINE, 25 mg, Oral, Q6H  DULoxetine, 60 mg, Oral, Daily  Eucerin original healing lotion, , Topical, Q12H  famotidine, 20 mg, Oral, BID AC  insulin glargine, 20 Units, Subcutaneous, Q12H  insulin lispro, 10 Units, Subcutaneous, TID With Meals  insulin lispro, 2-9 Units, Subcutaneous, 4x Daily AC & at Bedtime  magic mouthwash oral suspension (mixture) (diphenhydrAMINE HCl - aluminum & magnesium hydroxide-simethicone - lidocaine viscous) solution 55 mL, 10 mL, Swish & Spit, Q6H  metoprolol succinate XL, 100 mg, Oral, Daily  nystatin, 5 mL, Swish & Swallow, 4x Daily  potassium chloride, 20 mEq, Oral, BID With Meals  pregabalin, 150 mg, Oral, BID  sodium chloride, 10 mL, Intravenous, Q12H    Infusions   Diet  Diet: Diabetic; Consistent Carbohydrate; Fluid Consistency: Thin (IDDSI 0)    I have personally reviewed:  [x]  Laboratory   []  Microbiology   []  Radiology   [x]  EKG/Telemetry  []  Cardiology/Vascular   []  Pathology    []  Records       Assessment/Plan     Active Hospital Problems    Diagnosis  POA    **Vancomycin flushing syndrome [L27.0, T36.8X5A]  Yes    ROCKY (acute kidney injury) [N17.9]  Yes    Type 2 diabetes mellitus, with long-term current use of insulin [E11.9, Z79.4]  Not Applicable    Diabetic peripheral neuropathy [E11.42]  Yes    Dyslipidemia [E78.5]  Yes    HTN (hypertension) [I10]  Yes    Obesity [E66.9]  Yes    Streptococcal bacteremia [R78.81, B95.5]  Yes    MRSA colonization [Z22.322]  Not Applicable      Resolved Hospital Problems   No resolved problems to display.       65 y.o. male with HTN, HLD, morbid obesity, MARGO, DM2 with DPN, MRSA colonization, and recent admission for left hip/flank/low back pain, possible lumbar facet septic arthritis (though fluid culture negative), Strep intermedius bacteremia and CDiff, who presented to ER with worsening renal function and skin rash, and was admitted with ROCKY and skin rash due to systemic  Vancomycin delayed hypersensitivity reaction.     Vancomycin delayed hypersensitivity reaction, desquamating rash: Was undergoing treatment for strep intermedius bacteremia. Has here been transitioned to Rocephin with plan to take through 5/6 per Dr. López (ID). Blood cultures negative. 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  Continue nystatin for thrush. He does have eosinophilia but LFTs are okay and he is afebrile. Leukocytosis present, stable today. WOCN evaluated and recommended hand surgery consult for edema of left hand. Dr. Luevano (Hand) recommended only continued local wound care.  ROCKY/AIN with volume overload: Cr still 1.82 today. He had been on torsemide to aid with volume mgmt per Renal. Remains on hold today.  Hypertension: Continue Metoprolol at higher dose. Amlodipine stopped due to swelling. BPs acceptable.  DM2 with DPN: A1c 10.2. Sugars still high. Had hypoglycemia earlier in admission so Lantus was held--now reintroduced. Monitoring requirements and continue to adjust as necessary--increased Lantus to 20 units BID (pt actually taking 160 units of Lantus BID at home . . . !?). Increase mealtime short-acting insulin and continue SSI. Continue Lyrica and Cymbalta.      SCDs for DVT prophylaxis.  Full code.  Discussed with patient and RN.  Anticipate discharge home with home health tomorrow if okay with Renal.  Expected Discharge Date: 5/3/2025; Expected Discharge Time:       Elder Hernandez MD  [facility]  05/02/25  10:48 EDT

## 2025-05-02 NOTE — NURSING NOTE
Wound/pustule noticed underneath PICC dressing when changing it. Picture taken and charted. Spoke with [facility], they recommended biopatch with sterile gauze. Dressing changed. Will be changed tomorrow, 5/3, with nurse and wound/specialized nurse.

## 2025-05-02 NOTE — PROGRESS NOTES
Nutrition Services    Patient Name: Arnold Ramírez  YOB: 1960  MRN: [id number]  Admission date: 4/21/2025    PROGRESS NOTE      Encounter Information: Length of stay   65 yoM admitted with ROCKY and skin rash    PMH HTN, DM, dyslipidemia   PO Diet: Diet: Diabetic; Consistent Carbohydrate; Fluid Consistency: Thin (IDDSI 0)   PO Supplements: n/a   PO Intake:  100%       Current nutrition support: Oral diet    Nutrition support review: Pt eating well and tolerating diet, weight stable. 3-4+ edema.        Weight: Weight: (!) 152 kg (334 lb 3.2 oz) (05/02/25 0615)       Medications: reviewed   Labs: reviewed        GI Function:  last bowel movement: 5/1       Nutrition Intervention Updates: Recommend healthy weight loss outpatient   Continue with [facility] diet for glucose control, MD managing insulin regimen        Results from last 7 days   Lab Units 05/02/25  0529 05/01/25  0753 04/30/25  0609 04/29/25  0622   SODIUM mmol/L 140 141 141 137   POTASSIUM mmol/L 3.8 3.6 3.6 3.5   CHLORIDE mmol/L 100 96* 99 97*   CO2 mmol/L 29.0 31.0* 29.0 27.0   BUN mg/dL 22 23 26* 28*   CREATININE mg/dL 1.82* 1.85* 1.69* 1.77*   CALCIUM mg/dL 8.2* 8.3* 8.3* 8.0*   BILIRUBIN mg/dL 0.3 0.4  --  0.3   ALK PHOS U/L 117 121*  --  116   ALT (SGPT) U/L 20 24  --  19   AST (SGOT) U/L 17 19  --  21   GLUCOSE mg/dL 182* 238* 168* 158*     Results from last 7 days   Lab Units 05/02/25  0529 05/01/25  0753 04/30/25  0609 04/29/25  0622   MAGNESIUM mg/dL 1.8 1.5*  --  1.6   PHOSPHORUS mg/dL 3.2 3.3   < > 3.6   HEMOGLOBIN g/dL 11.6* 11.4*   < > 11.7*   HEMATOCRIT % 37.3* 35.9*   < > 36.3*    < > = values in this interval not displayed.     COVID19   Date Value Ref Range Status   03/20/2025 Not Detected Not Detected - Ref. Range Final     Lab Results   Component Value Date    HGBA1C 10.20 (H) 04/22/2025       RD to follow up per protocol.    Electronically signed by:  Mile Higgins RD  05/02/25 14:15 EDT

## 2025-05-02 NOTE — PLAN OF CARE
Problem: Adult Inpatient Plan of Care  Goal: Plan of Care Review  Outcome: Progressing  Flowsheets (Taken 5/2/2025 1434)  Progress: improving  Outcome Evaluation: Patient has been cooperative during shift. AOx4, ad gianluca, room air, SR. Patient treated for pain. No nausea or SOA. Monitoring labwork. PICC line in place. Dressing changed on left hand. Will continue to monitor and assist patient as needed.  Plan of Care Reviewed With: patient  Goal: Patient-Specific Goal (Individualized)  Outcome: Progressing  Goal: Absence of Hospital-Acquired Illness or Injury  Outcome: Progressing  Intervention: Identify and Manage Fall Risk  Recent Flowsheet Documentation  Taken 5/2/2025 1200 by Magen Orta RN  Safety Promotion/Fall Prevention:   assistive device/personal items within reach   fall prevention program maintained   nonskid shoes/slippers when out of bed   safety round/check completed  Taken 5/2/2025 1000 by Magen Orta RN  Safety Promotion/Fall Prevention:   assistive device/personal items within reach   fall prevention program maintained   nonskid shoes/slippers when out of bed   safety round/check completed  Taken 5/2/2025 0915 by Magen Orta RN  Safety Promotion/Fall Prevention:   assistive device/personal items within reach   fall prevention program maintained   nonskid shoes/slippers when out of bed   safety round/check completed  Intervention: Prevent Skin Injury  Recent Flowsheet Documentation  Taken 5/2/2025 1200 by Magen Orta RN  Body Position: dangle, side of bed  Taken 5/2/2025 1000 by Magen Orta RN  Body Position: dangle, side of bed  Taken 5/2/2025 0915 by Magen Orta RN  Body Position: supine  Goal: Optimal Comfort and Wellbeing  Outcome: Progressing  Goal: Readiness for Transition of Care  Outcome: Progressing     Problem: Comorbidity Management  Goal: Blood Glucose Level Within Target Range  Outcome: Progressing  Goal: Blood Pressure in Desired Range  Outcome:  Progressing     Problem: Fall Injury Risk  Goal: Absence of Fall and Fall-Related Injury  Outcome: Progressing  Intervention: Promote Injury-Free Environment  Recent Flowsheet Documentation  Taken 5/2/2025 1200 by Magen Orta RN  Safety Promotion/Fall Prevention:   assistive device/personal items within [facility]   fall prevention program maintained   nonskid shoes/slippers when out of bed   safety round/check completed  Taken 5/2/2025 1000 by Magen Orta RN  Safety Promotion/Fall Prevention:   assistive device/personal items within reach   fall prevention program maintained   nonskid shoes/slippers when out of bed   safety round/check completed  Taken 5/2/2025 0915 by Magen Orta RN  Safety Promotion/Fall Prevention:   assistive device/personal items within reach   fall prevention program maintained   nonskid shoes/slippers when out of bed   safety round/check completed     Problem: Sepsis/Septic Shock  Goal: Optimal Coping  Outcome: Progressing  Goal: Absence of Bleeding  Outcome: Progressing  Goal: Blood Glucose Level Within Target Range  Outcome: Progressing  Goal: Absence of Infection Signs and Symptoms  Outcome: Progressing  Intervention: Promote Recovery  Recent Flowsheet Documentation  Taken 5/2/2025 1200 by Magen Orta RN  Activity Management:   up ad gianluca   sitting, edge of bed  Taken 5/2/2025 1000 by Magen Orta RN  Activity Management:   up ad gianluca   sitting, edge of bed  Taken 5/2/2025 0915 by Magen Orta RN  Activity Management: up ad gianluca  Goal: Optimal Nutrition Delivery  Outcome: Progressing     Problem: Infection  Goal: Absence of Infection Signs and Symptoms  Outcome: Progressing     Problem: Fatigue  Goal: Improved Activity Tolerance  Outcome: Progressing  Intervention: Promote Improved Energy  Recent Flowsheet Documentation  Taken 5/2/2025 1200 by Magen Orta RN  Activity Management:   up ad gianluca   sitting, edge of bed  Taken 5/2/2025 1000 by Magen Orta  RN  Activity Management:   up ad gianluca   sitting, edge of bed  Taken 5/2/2025 0915 by Magen Orta RN  Activity Management: up ad gianluca     Problem: Noninvasive Ventilation Acute  Goal: Effective Unassisted Ventilation and Oxygenation  Outcome: Progressing     Problem: Skin Injury Risk Increased  Goal: Skin Health and Integrity  Outcome: Progressing  Intervention: Optimize Skin Protection  Recent Flowsheet Documentation  Taken 5/2/2025 1200 by Magen Orta RN  Activity Management:   up ad gianluca   sitting, edge of bed  Taken 5/2/2025 1000 by Magne Orta RN  Activity Management:   up ad gianluca   sitting, edge of bed  Taken 5/2/2025 0915 by Magen Orta RN  Activity Management: up ad gianluca  Pressure Reduction Techniques: frequent weight shift encouraged  Head of Bed (HOB) Positioning: HOB at 30 degrees  Pressure Reduction Devices: pressure-redistributing mattress utilized   Goal Outcome Evaluation:  Plan of Care Reviewed With: patient        Progress: improving  Outcome Evaluation: Patient has been cooperative during shift. AOx4, ad gianluca, room air, SR. Patient treated for pain. No nausea or SOA. Monitoring labwork. PICC line in place. Dressing changed on left hand. Will continue to monitor and assist patient as needed.

## 2025-05-02 NOTE — PROGRESS NOTES
[facility] Progress Note      Patient Name: Arnold Ramírez  : 1960  MRN: [id number]  Primary Care Physician:  Anand Alford MD  Date of admission: 2025    Subjective     Interval History:   Follow-up acute kidney injury.    The patient is feeling the same, complaining of itching and his skin is peeling.  He continued to have lower extremity edema, no chest pain, no nausea or vomiting, no dysuria or gross hematuria.    Vitals:   Temp:  [97.7 °F (36.5 °C)-98 °F (36.7 °C)] 97.7 °F (36.5 °C)  Heart Rate:  [85-92] 90  Resp:  [16] 16  BP: (122-145)/(57-78) 145/78    Intake/Output Summary (Last 24 hours) at 2025 1004  Last data filed at 2025 0441  Gross per 24 hour   Intake 600 ml   Output 700 ml   Net -100 ml       Physical Exam:    General Appearance: Looks much better.Face less flushed.  Peeling around his mouth and nose .  Skin: Erythema and peeling over his extremities.  No purpuric lesions.  Back peeling.  HEENT: oral mucosa dry, nonicteric sclera  Neck: No JVD  Lungs: Clear to auscultation bilaterally.  Unlabored on room air  Heart: RRR, normal S1 and S2.    Abdomen: soft, obese nontender, distended. +bs  : no palpable bladder  Extremities: 1 + upper and 2+ lower extremity edema.  Left hand dressed.  Neuro normal speech and mental status     Scheduled Meds:     atorvastatin, 20 mg, Oral, Nightly  cefTRIAXone, 2,000 mg, Intravenous, Q24H  diphenhydrAMINE, 25 mg, Oral, Q6H  DULoxetine, 60 mg, Oral, Daily  Eucerin original healing lotion, , Topical, Q12H  famotidine, 20 mg, Oral, BID AC  insulin glargine, 20 Units, Subcutaneous, Q12H  insulin lispro, 10 Units, Subcutaneous, TID With Meals  insulin lispro, 2-9 Units, Subcutaneous, 4x Daily AC & at Bedtime  magic mouthwash oral suspension (mixture) (diphenhydrAMINE HCl - aluminum & magnesium hydroxide-simethicone - lidocaine viscous) solution 55 mL, 10 mL, Swish & Spit, Q6H  metoprolol succinate XL, 100 mg, Oral,  Daily  nystatin, 5 mL, Swish & Swallow, 4x Daily  potassium chloride, 20 mEq, Oral, BID With Meals  pregabalin, 150 mg, Oral, BID  sodium chloride, 10 mL, Intravenous, Q12H      IV Meds:        Results Reviewed:   I have personally reviewed the results from the time of this admission to 5/2/2025 10:04 EDT     Results from last 7 days   Lab Units 05/02/25  0529 05/01/25  0753 04/30/25  0609 04/29/25  0622   SODIUM mmol/L 140 141 141 137   POTASSIUM mmol/L 3.8 3.6 3.6 3.5   CHLORIDE mmol/L 100 96* 99 97*   CO2 mmol/L 29.0 31.0* 29.0 27.0   BUN mg/dL 22 23 26* 28*   CREATININE mg/dL 1.82* 1.85* 1.69* 1.77*   CALCIUM mg/dL 8.2* 8.3* 8.3* 8.0*   BILIRUBIN mg/dL 0.3 0.4  --  0.3   ALK PHOS U/L 117 121*  --  116   ALT (SGPT) U/L 20 24  --  19   AST (SGOT) U/L 17 19  --  21   GLUCOSE mg/dL 182* 238* 168* 158*       Estimated Creatinine Clearance: 67 mL/min (A) (by C-G formula based on SCr of 1.82 mg/dL (H)).    Results from last 7 days   Lab Units 05/02/25  0529 05/01/25  0753 04/30/25  0609 04/29/25  0622   MAGNESIUM mg/dL 1.8 1.5*  --  1.6   PHOSPHORUS mg/dL 3.2 3.3 3.8 3.6       Results from last 7 days   Lab Units 05/02/25  0529 04/29/25  0622 04/28/25  0830 04/27/25  0640 04/26/25  0601   URIC ACID mg/dL 11.9* 11.1* 10.9* 10.2* 9.6*       Results from last 7 days   Lab Units 05/02/25  0529 05/01/25  0753 04/30/25  0609 04/29/25  0622 04/28/25  0830   WBC 10*3/mm3 15.00* 14.97* 17.16* 15.71* 14.92*   HEMOGLOBIN g/dL 11.6* 11.4* 11.7* 11.7* 12.8*   PLATELETS 10*3/mm3 314 339 352 331 317               Assessment / Plan     ASSESSMENT:  Acute kidney injury related to delayed hypersensitivity reaction to the vancomycin in the setting of ARB.  Creatinine today 1.82, stable for the past 24 hours, electrolyte within acceptable range.  He has mild volume excess.  His diuretics were put on hold yesterday because of the increased creatinine from the day prior.  2.  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  Treated by the  primary team   3.  Strep bacteremia with lumbar spinal abscess and facet septic arthritis.  Back pain improved.  Antibiotic course to be completed 5/6/2025 .  4.  Diabetes mellitus type 2.  Peripheral neuropathy.  By the primary.  5.  Morbid obesity.  6.  Hyperuricemia associated, with decreased effective intravascular     PLAN:  Continue the same treat  No diuretics today  Surveillance labs    I reviewed the chart and other providers notes, reviewed labs.  I discussed the case with the patient  Copied text in this note has been reviewed and is accurate as of 05/02/25.     Thank you for involving us in the care of Arnold Ramírez.  Please feel free to call with any questions.    Wander Trinidad MD  05/02/25  10:04 EDT    [facility]  [phone]    Please note that portions of this note were completed with a voice recognition program.

## 2025-05-02 NOTE — PROGRESS NOTES
Infectious Diseases Progress Note    Lizette López MD     [facility]  [place]: 9 days  Patient Identification:  Name: Arnold Ramírez  Age: 65 y.o.  Sex: male  :  1960  MRN: [id number]         Primary Care Physician: Anand Alford MD        Subjective: Continue to feel better.  Wants to get out to enjoy fresh air and look at flowers.  Denies any back pain diarrhea fever or chills.  Left hand feels better but still has significant rash and also left arm with significant rash.  Overall rash involving face bilateral lower extremity and torso is much improved.  Interval History: See consultation note.    Objective:    Scheduled Meds:atorvastatin, 20 mg, Oral, Nightly  cefTRIAXone, 2,000 mg, Intravenous, Q24H  diphenhydrAMINE, 25 mg, Oral, Q6H  DULoxetine, 60 mg, Oral, Daily  Eucerin original healing lotion, , Topical, Q12H  famotidine, 20 mg, Oral, BID AC  insulin glargine, 20 Units, Subcutaneous, Q12H  insulin lispro, 10 Units, Subcutaneous, TID With Meals  insulin lispro, 2-9 Units, Subcutaneous, 4x Daily AC & at Bedtime  magic mouthwash oral suspension (mixture) (diphenhydrAMINE HCl - aluminum & magnesium hydroxide-simethicone - lidocaine viscous) solution 55 mL, 10 mL, Swish & Spit, Q6H  metoprolol succinate XL, 100 mg, Oral, Daily  nystatin, 5 mL, Swish & Swallow, 4x Daily  potassium chloride, 20 mEq, Oral, BID With Meals  pregabalin, 150 mg, Oral, BID  sodium chloride, 10 mL, Intravenous, Q12H      Continuous Infusions:     Vital signs in last 24 hours:  Temp:  [97.7 °F (36.5 °C)-98 °F (36.7 °C)] 97.7 °F (36.5 °C)  Heart Rate:  [85-92] 90  Resp:  [16] 16  BP: (122-145)/(57-78) 145/78    Intake/Output:    Intake/Output Summary (Last 24 hours) at 2025 0731  Last data filed at 2025 0441  Gross per 24 hour   Intake 960 ml   Output 700 ml   Net 260 ml       Exam:  /78 (BP Location: Right arm, Patient Position: Lying)   Pulse 90   Temp 97.7 °F (36.5 °C) (Oral)   Resp 16   Ht 195.6  "cm (77\")   Wt (!) 152 kg (334 lb 3.2 oz)   SpO2 97%   BMI 39.63 kg/m²   Patient is examined using the personal protective equipment as per guidelines from infection control for this particular patient as enacted.  Hand washing was performed before and after patient interaction.  General Appearance:    Alert, cooperative, no distress, AAOx3                          Head:    Normocephalic, without obvious abnormality, atraumatic                           Eyes:    PERRL, conjunctivae/corneas clear, EOM's intact, both eyes                         Throat:   Lips, tongue, gums normal; oral mucosa pink and moist                           Neck:   Supple, symmetrical, trachea midline, no JVD                         Lungs:    Clear to auscultation bilaterally, respirations unlabored                 Chest Wall:    No tenderness or deformity                          Heart:  S1 S2 regular                  Abdomen:   B soft nontender                 Extremities:   Extremities normal, atraumatic, no cyanosis or edema                        Pulses:   Pulses palpable in all extremities                            Skin: Significant improvement in his generalized delayed hypersensitivity rash except for the left arm and left hand.                  Neurologic: Alert and oriented x 3       Data Review:    I reviewed the patient's new clinical results.  Results from last 7 days   Lab Units 05/02/25  0529 05/01/25  0753 04/30/25  0609 04/29/25  0622 04/28/25  0830 04/27/25  0927 04/26/25  0601   WBC 10*3/mm3 15.00* 14.97* 17.16* 15.71* 14.92* 13.06* 13.13*   HEMOGLOBIN g/dL 11.6* 11.4* 11.7* 11.7* 12.8* 12.2* 13.4   PLATELETS 10*3/mm3 314 339 352 331 317 817 397     Results from last 7 days   Lab Units 05/02/25  0529 05/01/25  0753 04/30/25  0609 04/29/25  0622 04/28/25  0830 04/27/25  0640 04/26/25  0601   SODIUM mmol/L 140 141 141 137 134* 129*  132* 128*   POTASSIUM mmol/L 3.8 3.6 3.6 3.5 3.6 3.5  3.5 4.3   CHLORIDE mmol/L 100 96* " 99 97* 95* 92*  95* 96*   CO2 mmol/L 29.0 31.0* 29.0 27.0 26.5 22.8  23.0 19.8*   BUN mg/dL 22 23 26* 28* 32* 36*  35* 36*   CREATININE mg/dL 1.82* 1.85* 1.69* 1.77* 2.06* 2.52*  2.55* 2.87*   CALCIUM mg/dL 8.2* 8.3* 8.3* 8.0* 7.8* 7.6*  7.7* 7.8*   GLUCOSE mg/dL 182* 238* 168* 158* 215* 222*  218* 163*     Microbiology Results (last 10 days)       Procedure Component Value - Date/Time    Clostridioides difficile Toxin - Stool, Per Rectum [[id number]]  (Normal) Collected: 04/24/25 0008    Lab Status: Final result Specimen: Stool from Per Rectum Updated: 04/24/25 0204    Narrative:      The following orders were created for panel order Clostridioides difficile Toxin - Stool, Per Rectum.  Procedure                               Abnormality         Status                     ---------                               -----------         ------                     Clostridioides difficile...[[id number]]  Normal              Final result                 Please view results for these tests on the individual orders.    Clostridioides difficile Toxin, PCR - Stool, Per Rectum [[id number]]  (Normal) Collected: 04/24/25 0008    Lab Status: Final result Specimen: Stool from Per Rectum Updated: 04/24/25 0204     Toxigenic C. difficile by PCR Negative    Narrative:      The result indicates the absence of toxigenic C. difficile from stool specimen.     Blood Culture - Blood, Hand, Right [[id number]]  (Normal) Collected: 04/23/25 1200    Lab Status: Final result Specimen: Blood from Hand, Right Updated: 04/28/25 1230     Blood Culture No growth at 5 days    Blood Culture - Blood, Hand, Right [[id number]]  (Normal) Collected: 04/23/25 1017    Lab Status: Final result Specimen: Blood from Hand, Right Updated: 04/28/25 1045     Blood Culture No growth at 5 days    Eosinophil Smear - Urine, Urine, Clean Catch [[id number]]  (Normal) Collected: 04/22/25 1314    Lab Status: Final result Specimen: Urine, Clean Catch Updated: 04/22/25 1549      Eosinophil Smear 0 % EOS/100 Cells               Assessment:    Vancomycin flushing syndrome    Streptococcal bacteremia    Obesity    Dyslipidemia    HTN (hypertension)    MRSA colonization    Type 2 diabetes mellitus, with long-term current use of insulin    Diabetic peripheral neuropathy    ROCKY (acute kidney injury)  65-year-old male with  1-systemic vancomycin delayed hypersensitivity reaction with skin rash and renal insufficiency  2-acute kidney injury probably secondary to combination of factors including vancomycin  3-history of MRSA colonization  4-history of C. difficile diarrhea with colonization  5-history of strep intermedius bacteremia sepsis with paraspinal abscess and facet septic arthritis clinically improved with resolution of symptoms on course to finish his antibiotic treatment on 5/6/2025  6-poorly controlled diabetes  7-morbid obesity  8-hypertension  9-other diagnoses per primary team.     Recommendations/Discussions:  See my discussion and recommendation on 4/22/2025  Continue IV ceftriaxone - Thru 5/6/2025  Follow-up blood cultures, stool studies are negative for C. difficile.  Supportive care for renal insufficiency, avoidance of nephrotoxic agents and periodic review of systems identify other areas of secondary seeding due to strep intermedius bacteremia and possibility of line sepsis as well as recurrence of C. difficile infection in the setting of known MRSA colonization and C. difficile colonization.  Lizette López MD  5/2/2025  07:31 EDT    Parts of this note may be an electronic transcription/translation of spoken language to printed text using the Dragon dictation system.

## 2025-05-03 ENCOUNTER — READMISSION MANAGEMENT (OUTPATIENT)
Dept: CALL CENTER | Facility: HOSPITAL | Age: 65
End: 2025-05-03
Payer: MEDICARE

## 2025-05-03 VITALS
OXYGEN SATURATION: 90 % | HEIGHT: 77 IN | RESPIRATION RATE: 17 BRPM | DIASTOLIC BLOOD PRESSURE: 79 MMHG | TEMPERATURE: 97.9 F | WEIGHT: 315 LBS | HEART RATE: 78 BPM | BODY MASS INDEX: 37.19 KG/M2 | SYSTOLIC BLOOD PRESSURE: 128 MMHG

## 2025-05-03 LAB
ALBUMIN SERPL-MCNC: 3.2 G/DL (ref 3.5–5.2)
ALBUMIN/GLOB SERPL: 1 G/DL
ALP SERPL-CCNC: 106 U/L (ref 39–117)
ALT SERPL W P-5'-P-CCNC: 15 U/L (ref 1–41)
ANION GAP SERPL CALCULATED.3IONS-SCNC: 7.5 MMOL/L (ref 5–15)
AST SERPL-CCNC: 16 U/L (ref 1–40)
BASOPHILS # BLD AUTO: 0.1 10*3/MM3 (ref 0–0.2)
BASOPHILS NFR BLD AUTO: 0.8 % (ref 0–1.5)
BILIRUB SERPL-MCNC: 0.4 MG/DL (ref 0–1.2)
BUN SERPL-MCNC: 17 MG/DL (ref 8–23)
BUN/CREAT SERPL: 10.1 (ref 7–25)
CALCIUM SPEC-SCNC: 8.7 MG/DL (ref 8.6–10.5)
CHLORIDE SERPL-SCNC: 98 MMOL/L (ref 98–107)
CO2 SERPL-SCNC: 29.5 MMOL/L (ref 22–29)
CREAT SERPL-MCNC: 1.68 MG/DL (ref 0.76–1.27)
DEPRECATED RDW RBC AUTO: 44.8 FL (ref 37–54)
EGFRCR SERPLBLD CKD-EPI 2021: 45.4 ML/MIN/1.73
EOSINOPHIL # BLD AUTO: 1.73 10*3/MM3 (ref 0–0.4)
EOSINOPHIL NFR BLD AUTO: 13.4 % (ref 0.3–6.2)
ERYTHROCYTE [DISTWIDTH] IN BLOOD BY AUTOMATED COUNT: 15 % (ref 12.3–15.4)
GLOBULIN UR ELPH-MCNC: 3.3 GM/DL
GLUCOSE BLDC GLUCOMTR-MCNC: 160 MG/DL (ref 70–130)
GLUCOSE BLDC GLUCOMTR-MCNC: 235 MG/DL (ref 70–130)
GLUCOSE SERPL-MCNC: 178 MG/DL (ref 65–99)
HCT VFR BLD AUTO: 37.3 % (ref 37.5–51)
HGB BLD-MCNC: 11.6 G/DL (ref 13–17.7)
IMM GRANULOCYTES # BLD AUTO: 0.23 10*3/MM3 (ref 0–0.05)
IMM GRANULOCYTES NFR BLD AUTO: 1.8 % (ref 0–0.5)
LYMPHOCYTES # BLD AUTO: 3.02 10*3/MM3 (ref 0.7–3.1)
LYMPHOCYTES NFR BLD AUTO: 23.4 % (ref 19.6–45.3)
MAGNESIUM SERPL-MCNC: 1.9 MG/DL (ref 1.6–2.4)
MCH RBC QN AUTO: 25.7 PG (ref 26.6–33)
MCHC RBC AUTO-ENTMCNC: 31.1 G/DL (ref 31.5–35.7)
MCV RBC AUTO: 82.7 FL (ref 79–97)
MONOCYTES # BLD AUTO: 1.72 10*3/MM3 (ref 0.1–0.9)
MONOCYTES NFR BLD AUTO: 13.3 % (ref 5–12)
NEUTROPHILS NFR BLD AUTO: 47.3 % (ref 42.7–76)
NEUTROPHILS NFR BLD AUTO: 6.09 10*3/MM3 (ref 1.7–7)
NRBC BLD AUTO-RTO: 0 /100 WBC (ref 0–0.2)
PHOSPHATE SERPL-MCNC: 3.1 MG/DL (ref 2.5–4.5)
PLATELET # BLD AUTO: 316 10*3/MM3 (ref 140–450)
PMV BLD AUTO: 9.7 FL (ref 6–12)
POTASSIUM SERPL-SCNC: 3.7 MMOL/L (ref 3.5–5.2)
PROT SERPL-MCNC: 6.5 G/DL (ref 6–8.5)
RBC # BLD AUTO: 4.51 10*6/MM3 (ref 4.14–5.8)
SODIUM SERPL-SCNC: 135 MMOL/L (ref 136–145)
URATE SERPL-MCNC: 9.5 MG/DL (ref 3.4–7)
WBC NRBC COR # BLD AUTO: 12.89 10*3/MM3 (ref 3.4–10.8)

## 2025-05-03 PROCEDURE — 63710000001 INSULIN LISPRO (HUMAN) PER 5 UNITS: Performed by: HOSPITALIST

## 2025-05-03 PROCEDURE — 82948 REAGENT STRIP/BLOOD GLUCOSE: CPT

## 2025-05-03 PROCEDURE — 25010000002 CEFTRIAXONE PER 250 MG: Performed by: INTERNAL MEDICINE

## 2025-05-03 PROCEDURE — 83735 ASSAY OF MAGNESIUM: CPT | Performed by: INTERNAL MEDICINE

## 2025-05-03 PROCEDURE — 63710000001 INSULIN LISPRO (HUMAN) PER 5 UNITS: Performed by: NURSE PRACTITIONER

## 2025-05-03 PROCEDURE — 85025 COMPLETE CBC W/AUTO DIFF WBC: CPT | Performed by: INTERNAL MEDICINE

## 2025-05-03 PROCEDURE — 84550 ASSAY OF BLOOD/URIC ACID: CPT | Performed by: INTERNAL MEDICINE

## 2025-05-03 PROCEDURE — 63710000001 INSULIN GLARGINE PER 5 UNITS: Performed by: HOSPITALIST

## 2025-05-03 PROCEDURE — 80053 COMPREHEN METABOLIC PANEL: CPT | Performed by: INTERNAL MEDICINE

## 2025-05-03 PROCEDURE — 63710000001 DIPHENHYDRAMINE PER 50 MG: Performed by: HOSPITALIST

## 2025-05-03 PROCEDURE — 84100 ASSAY OF PHOSPHORUS: CPT | Performed by: INTERNAL MEDICINE

## 2025-05-03 RX ORDER — EMOLLIENT COMBINATION NO.110
10 LOTION (ML) TOPICAL EVERY 12 HOURS
COMMUNITY
Start: 2025-05-03

## 2025-05-03 RX ORDER — METOPROLOL SUCCINATE 100 MG/1
100 TABLET, EXTENDED RELEASE ORAL DAILY
Qty: 30 TABLET | Refills: 0 | Status: SHIPPED | OUTPATIENT
Start: 2025-05-04

## 2025-05-03 RX ORDER — POTASSIUM CHLORIDE 1500 MG/1
20 TABLET, EXTENDED RELEASE ORAL 2 TIMES DAILY WITH MEALS
Qty: 60 TABLET | Refills: 0 | Status: SHIPPED | OUTPATIENT
Start: 2025-05-03

## 2025-05-03 RX ORDER — FAMOTIDINE 20 MG/1
20 TABLET, FILM COATED ORAL
Qty: 60 TABLET | Refills: 0 | Status: SHIPPED | OUTPATIENT
Start: 2025-05-03

## 2025-05-03 RX ORDER — NYSTATIN 100000 [USP'U]/ML
5 SUSPENSION ORAL 4 TIMES DAILY
Qty: 60 ML | Refills: 0 | Status: SHIPPED | OUTPATIENT
Start: 2025-05-03 | End: 2025-05-06

## 2025-05-03 RX ADMIN — INSULIN LISPRO 12 UNITS: 100 INJECTION, SOLUTION INTRAVENOUS; SUBCUTANEOUS at 12:26

## 2025-05-03 RX ADMIN — NYSTATIN 500000 UNITS: 100000 SUSPENSION ORAL at 09:20

## 2025-05-03 RX ADMIN — POTASSIUM CHLORIDE 20 MEQ: 1500 TABLET, EXTENDED RELEASE ORAL at 09:30

## 2025-05-03 RX ADMIN — DIPHENHYDRAMINE HYDROCHLORIDE 10 ML: 25 SOLUTION ORAL at 13:29

## 2025-05-03 RX ADMIN — Medication: at 13:29

## 2025-05-03 RX ADMIN — INSULIN GLARGINE 20 UNITS: 100 INJECTION, SOLUTION SUBCUTANEOUS at 09:29

## 2025-05-03 RX ADMIN — DIPHENHYDRAMINE HYDROCHLORIDE 25 MG: 25 CAPSULE ORAL at 12:26

## 2025-05-03 RX ADMIN — INSULIN LISPRO 2 UNITS: 100 INJECTION, SOLUTION INTRAVENOUS; SUBCUTANEOUS at 09:31

## 2025-05-03 RX ADMIN — Medication: at 00:51

## 2025-05-03 RX ADMIN — Medication 10 ML: at 09:34

## 2025-05-03 RX ADMIN — CEFTRIAXONE 2000 MG: 2 INJECTION, POWDER, FOR SOLUTION INTRAMUSCULAR; INTRAVENOUS at 13:28

## 2025-05-03 RX ADMIN — INSULIN LISPRO 4 UNITS: 100 INJECTION, SOLUTION INTRAVENOUS; SUBCUTANEOUS at 12:25

## 2025-05-03 RX ADMIN — DULOXETINE 60 MG: 30 CAPSULE, DELAYED RELEASE ORAL at 09:30

## 2025-05-03 RX ADMIN — NYSTATIN 500000 UNITS: 100000 SUSPENSION ORAL at 12:26

## 2025-05-03 RX ADMIN — DIPHENHYDRAMINE HYDROCHLORIDE 25 MG: 25 CAPSULE ORAL at 07:27

## 2025-05-03 RX ADMIN — FAMOTIDINE 20 MG: 20 TABLET, FILM COATED ORAL at 07:27

## 2025-05-03 RX ADMIN — DIPHENHYDRAMINE HYDROCHLORIDE 10 ML: 25 SOLUTION ORAL at 09:30

## 2025-05-03 RX ADMIN — METOPROLOL SUCCINATE 100 MG: 100 TABLET, EXTENDED RELEASE ORAL at 09:30

## 2025-05-03 RX ADMIN — INSULIN LISPRO 12 UNITS: 100 INJECTION, SOLUTION INTRAVENOUS; SUBCUTANEOUS at 09:29

## 2025-05-03 RX ADMIN — PREGABALIN 150 MG: 75 CAPSULE ORAL at 09:29

## 2025-05-03 NOTE — PROGRESS NOTES
[facility] Progress Note      Patient Name: Arnold Ramírez  : 1960  MRN: [id number]  Primary Care Physician:  Anand Alford MD  Date of admission: 2025    Subjective     Interval History:   Follow-up acute kidney injury.    The patient is feeling the same, complaining of itching and his skin is peeling.  He continued to have lower extremity edema, no chest pain, no nausea or vomiting, no dysuria or gross hematuria.    Vitals:   Temp:  [97.5 °F (36.4 °C)-98.7 °F (37.1 °C)] 98.7 °F (37.1 °C)  Heart Rate:  [73-90] 90  Resp:  [16-18] 17  BP: ()/(51-74) 127/74  Flow (L/min) (Oxygen Therapy):  [2] 2    Intake/Output Summary (Last 24 hours) at 5/3/2025 1323  Last data filed at 5/3/2025 1000  Gross per 24 hour   Intake 460 ml   Output 1880 ml   Net -1420 ml       Physical Exam:    General Appearance: Looks much better.Face less flushed.  Peeling around his mouth and nose .  Skin: Erythema and peeling over his extremities.  No purpuric lesions.  Back peeling.  HEENT: oral mucosa dry, nonicteric sclera  Neck: No JVD  Lungs: Clear to auscultation bilaterally.  Unlabored on room air  Heart: RRR, normal S1 and S2.    Abdomen: soft, obese nontender, distended. +bs  : no palpable bladder  Extremities: 1 + upper and 2+ lower extremity edema.  Left hand dressed.  Neuro normal speech and mental status     Scheduled Meds:     atorvastatin, 20 mg, Oral, Nightly  cefTRIAXone, 2,000 mg, Intravenous, Q24H  diphenhydrAMINE, 25 mg, Oral, Q6H  DULoxetine, 60 mg, Oral, Daily  Eucerin original healing lotion, , Topical, Q12H  famotidine, 20 mg, Oral, BID AC  insulin glargine, 20 Units, Subcutaneous, Q12H  insulin lispro, 12 Units, Subcutaneous, TID With Meals  insulin lispro, 2-9 Units, Subcutaneous, 4x Daily AC & at Bedtime  magic mouthwash oral suspension (mixture) (diphenhydrAMINE HCl - aluminum & magnesium hydroxide-simethicone - lidocaine viscous) solution 55 mL, 10 mL, Swish & Spit,  Q6H  metoprolol succinate XL, 100 mg, Oral, Daily  nystatin, 5 mL, Swish & Swallow, 4x Daily  potassium chloride, 20 mEq, Oral, BID With Meals  pregabalin, 150 mg, Oral, BID  sodium chloride, 10 mL, Intravenous, Q12H      IV Meds:        Results Reviewed:   I have personally reviewed the results from the time of this admission to 5/3/2025 13:23 EDT     Results from last 7 days   Lab Units 05/03/25 0600 05/02/25 0529 05/01/25  0753   SODIUM mmol/L 135* 140 141   POTASSIUM mmol/L 3.7 3.8 3.6   CHLORIDE mmol/L 98 100 96*   CO2 mmol/L 29.5* 29.0 31.0*   BUN mg/dL 17 22 23   CREATININE mg/dL 1.68* 1.82* 1.85*   CALCIUM mg/dL 8.7 8.2* 8.3*   BILIRUBIN mg/dL 0.4 0.3 0.4   ALK PHOS U/L 106 117 121*   ALT (SGPT) U/L 15 20 24   AST (SGOT) U/L 16 17 19   GLUCOSE mg/dL 178* 182* 238*       Estimated Creatinine Clearance: 72.6 mL/min (A) (by C-G formula based on SCr of 1.68 mg/dL (H)).    Results from last 7 days   Lab Units 05/03/25 0600 05/02/25 0529 05/01/25  0753   MAGNESIUM mg/dL 1.9 1.8 1.5*   PHOSPHORUS mg/dL 3.1 3.2 3.3       Results from last 7 days   Lab Units 05/03/25  0600 05/02/25  0529 04/29/25  0622 04/28/25  0830 04/27/25  0640   URIC ACID mg/dL 9.5* 11.9* 11.1* 10.9* 10.2*       Results from last 7 days   Lab Units 05/03/25  0600 05/02/25  0529 05/01/25  0753 04/30/25  0609 04/29/25  0622   WBC 10*3/mm3 12.89* 15.00* 14.97* 17.16* 15.71*   HEMOGLOBIN g/dL 11.6* 11.6* 11.4* 11.7* 11.7*   PLATELETS 10*3/mm3 316 314 339 352 331               Assessment / Plan     ASSESSMENT:  Acute kidney injury related to delayed hypersensitivity reaction to the vancomycin in the setting of ARB.  Creatinine today 1.68, stable for the past 24 hours, electrolyte within acceptable range.  He has mild volume excess.  His diuretics were put on hold yesterday because of the increased creatinine from the day prior.  2.  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  Treated by the primary team   3.  Strep bacteremia with lumbar  spinal abscess and facet septic arthritis.  Back pain improved.  Antibiotic course to be completed 5/6/2025 .  4.  Diabetes mellitus type 2.  Peripheral neuropathy.  By the primary.  5.  Morbid obesity.  6.  Hyperuricemia associated, with decreased effective intravascular     PLAN:  Continue the same treat  He is cleared for discharge, I discussed the case with Dr. Hernandez and will plan follow-up in our office within 1 week for follow-up visit.    I reviewed the chart and other providers notes, reviewed labs.  I discussed the case with the patient and his family member at the bedside.  Copied text in this note has been reviewed and is accurate as of 05/03/25.     Thank you for involving us in the care of Arnold Ramírez.  Please feel free to call with any questions.    Wander Trinidad MD  05/03/25  13:23 EDT    [facility]  [phone]    Please note that portions of this note were completed with a voice recognition program.

## 2025-05-03 NOTE — PLAN OF CARE
Goal Outcome Evaluation:  Plan of Care Reviewed With: patient        Progress: no change  Outcome Evaluation: Pt napping between care, up to toilet twice this shift. c/o itching, is on benadryl po scheduled, remains with generalized edema and red , Is on room air satting hi 90's. Pt PICC line drsg changed this am, x2 moist areas above pic insertion site , pt has generalized rash and redness from IV vanc reaction and also has blisters all over, some dry some still intact. PIC line flushed withx 4 ns 10cc to re-established blood return, L arm repositioned ,  noted blood return after 4 flushes. Rocephin IV dose transfused just now , [facility] is going [facility] of pt [facility]. Dressing to left hand changed. L arm elevated on pillow. Pt educated on keeping Left arm/hand elevated. sr on tele, nad.

## 2025-05-03 NOTE — PROGRESS NOTES
Continued Stay Note  [facility]     Patient Name: Arnold Ramírez  MRN: [id number]  Today's Date: 5/3/2025    Admit Date: 4/21/2025    Plan: Return home with spouse and IV Abx with [facility], and [facility].   Discharge Plan       Row Name 05/03/25 1135       Plan    Plan Return home with spouse and IV Abx with [facility], and [facility].    Patient/Family in Agreement with Plan yes    Plan Comments Inbound call from RN to notify CCP of DC order. Per previous notes patient arranged for home IV Abx with [facility] and [facility]. Call placed to [facility], will check staffing availability and get back with CCP. S/W [facility], she will process delivery and arrange for Abx to be delivered to patient's home this evening. Return call from [facility] stating no nurse availability to go today, and their  nurses do not make visits after 6pm, can arrange for someone to go to patient's home tomorrow during they day. Message to MD and RN via MD DANIELLE states ok for patient to receive evening dose earlier prior to DC today. Update to RN. Update to [facility], states they will still deliver to home this evening so it will be ready when HH arrives tomorrow. Update to [facility], pt. on nurse's schedule for home visit tomorrow. Plan home today. Attempt to contact patient in room to update, no answer X2......Katey VILLASENOR                   Discharge Codes    No documentation.                 Expected Discharge Date and Time       Expected Discharge Date Expected Discharge Time    May 3, 2025               Yolette Tyson, HAMILTON

## 2025-05-03 NOTE — PLAN OF CARE
Goal Outcome Evaluation:      Patient resting in bed.Medicated per MAR.VSS.Home Cpap overnight.

## 2025-05-03 NOTE — DISCHARGE SUMMARY
Patient Name: Arnold Ramírez  : 1960  MRN: [id number]    Date of Admission: 2025  Date of Discharge:  5/3/2025  Primary Care Physician: Anand Alford MD      Chief Complaint:   No chief complaint on file.      Discharge Diagnoses     Active Hospital Problems    Diagnosis  POA    **Vancomycin flushing syndrome [L27.0, T36.8X5A]  Yes    ROCKY (acute kidney injury) [N17.9]  Yes    Type 2 diabetes mellitus, with long-term current use of insulin [E11.9, Z79.4]  Not Applicable    Diabetic peripheral neuropathy [E11.42]  Yes    Dyslipidemia [E78.5]  Yes    HTN (hypertension) [I10]  Yes    Obesity [E66.9]  Yes    Streptococcal bacteremia [R78.81, B95.5]  Yes    MRSA colonization [Z22.322]  Not Applicable      Resolved Hospital Problems   No resolved problems to display.        Hospital Course     65 y.o. male with HTN, HLD, morbid obesity, MARGO, DM2 with DPN, MRSA colonization, and recent admission for left hip/flank/low back pain, possible lumbar facet septic arthritis (though fluid culture negative), Strep intermedius bacteremia and CDiff, who presented to ER with worsening renal function and skin rash, and was admitted with ROCKY and skin rash due to systemic Vancomycin delayed hypersensitivity reaction. Please see below for details of admission by problem:      Vancomycin delayed hypersensitivity reaction, desquamating rash: Was undergoing treatment for strep intermedius bacteremia. Has here been transitioned to Rocephin with plan to take through  per Dr. López (ID). Blood cultures negative. 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  Continue nystatin for thrush. He does have eosinophilia but LFTs are okay and he is afebrile. Leukocytosis present, improved today. WOCN evaluated and recommended hand surgery consult for edema of left hand. Dr. Luevano (Hand) recommended only continued local wound care.  ROCKY/AIN with volume overload: Cr down to 1.68 today. He had been on torsemide to aid with  volume mgmt per Renal. Remains on hold at [place]. Okay to [place] today per Dr. Trinidad. F/u with Renal next week.  Hypertension: Continued Metoprolol at higher dose. Amlodipine stopped due to swelling. BPs acceptable. F/u with PCP.  DM2 with DPN: A1c 10.2. Had hypoglycemia earlier in admission so Lantus was held--now reintroduced. Monitoring requirements and continue to adjust as necessary--increased Lantus to 20 units BID (pt actually taking 160 units of Lantus BID at home . . . !?). Increased mealtime short-acting insulin and continued SSI. Pt will need more long-acting insulin when he returns home as his diet will liberalize. I have discussed with him starting at 50 units BID and titrating up as needed. He does not plan to use any short-acting insulin at home and his Metformin has been dc'd due to ROCKY. F/u with PCP at next available appt. Continue Lyrica and Cymbalta.        SCDs for DVT prophylaxis while here.  Full code confirmed.  Discussed with patient and RN.  Discharge home with [facility] infusion therapy.      Day of Discharge     Subjective:  Feeling pretty good again today. Skin progressing. No back/hip/flank pain. No SOA or CP. Voiding well. No N/V/D/abd pain. Tolerating diet. Eager to go home.     Physical Exam:  Temp:  [97.5 °F (36.4 °C)-98.7 °F (37.1 °C)] 98.7 °F (37.1 °C)  Heart Rate:  [73-90] 90  Resp:  [16-18] 17  BP: ()/(51-82) 127/74  Body mass index is 39.86 kg/m².  Physical Exam  Vitals and nursing note reviewed.   Constitutional:       General: He is not in acute distress.     Appearance: He is not ill-appearing, toxic-appearing or diaphoretic.   Cardiovascular:      Rate and Rhythm: Normal rate.      Pulses: Normal pulses.   Pulmonary:      Effort: Pulmonary effort is normal. No respiratory distress.      Breath sounds: Normal breath sounds. No wheezing or rales.   Abdominal:      General: Bowel sounds are normal. There is no distension.      Palpations: Abdomen is  soft.      Tenderness: There is no abdominal tenderness.   Musculoskeletal:         General: Swelling (in all 4 extremities) present.      Cervical back: Neck supple.   Skin:     General: Skin is warm and dry.      Capillary Refill: Capillary refill takes less than 2 seconds.      Comments: Widespread superficial desquamating rash, left hand dressed   Neurological:      General: No focal deficit present.      Mental Status: He is alert. Mental status is at baseline.   Psychiatric:         Mood and Affect: Mood normal.         Behavior: Behavior normal.         Consultants     Consult Orders (all) (From admission, onward)       Start     Ordered    04/28/25 1528  Inpatient Hand Surgery Consult  Once        Provider:  Anand North MD    04/28/25 1528    04/27/25 1940  Inpatient Case Management  Consult  Once        Provider:  (Not yet assigned)    04/27/25 1939    04/24/25 1647  Inpatient Pulmonology Consult  Once        Specialty:  Pulmonary Disease  Provider:  Isaias Doshi MD    04/24/25 1647    04/22/25 1308  Inpatient Nephrology Consult  Once        Specialty:  Nephrology  Provider:  Ky Su MD    04/22/25 1307    04/22/25 0134  Inpatient Infectious Diseases Consult  Once        Specialty:  Infectious Diseases  Provider:  Lizette López MD    04/22/25 0134                  Procedures     * Surgery not found *    Imaging Results (All)       Procedure Component Value Units Date/Time    US Renal Bilateral [[id number]] Collected: 04/22/25 1809     Updated: 04/22/25 2259    Narrative:      RENAL ULTRASOUND     HISTORY: Acute kidney failure     COMPARISON: None     TECHNIQUE: Grayscale, color Doppler images of the kidneys and bladder  were obtained.     FINDINGS:  Grayscale and color Doppler images of the kidneys and bladder were  obtained.     The right kidney measures 12.8 cm in length.     The left kidney measures 14.4 cm in length.     The kidneys demonstrate normal  echogenicity and cortical thickness.  There is no hydronephrosis. There are no sonographically visualized  solid contour deforming renal masses or renal calculi.     The bladder is underdistended and cannot be evaluated.     Visualized portions of the aorta and IVC are normal in caliber and  appearance.       Impression:      1.  No hydronephrosis. Bladder is underdistended and cannot be  evaluated.                    This report was finalized on 4/22/2025 10:56 PM by Dr. Joseluis Dunlap M.D on Workstation: [id number]       XR Chest 1 View [[id number]] Collected: 04/22/25 0132     Updated: 04/22/25 0136    Narrative:      CXR ONE VIEW      HISTORY: To verify PICC line placement     COMPARISON: 3/20/2025     TECHNIQUE: single portable AP       Impression:         The heart size is within normal limits.     The lungs are normally aerated. There is no pleural effusion or  pneumothorax.     Right arm PICC line present, distal tip in the mid to upper SVC.     This report was finalized on 4/22/2025 1:33 AM by Dr. Harry Shields M.D on Workstation: [id number]               Results for orders placed during the hospital encounter of 03/20/25    Adult Transthoracic Echo Complete W/ Cont if Necessary Per Protocol    Interpretation Summary    Left ventricular systolic function is normal. Calculated left ventricular EF = 62.1%    The left ventricular cavity is mildly dilated.    Left ventricular diastolic function was normal.    Moderate dilation of the aortic root is present. The aortic root measures 4.7 cm. Mild dilation of the ascending aorta is present 3.7 cm.    Borderline dilation of the aortic root is present. Aortic root = 3.7 cm borderline dilation of the ascending aorta is present. Ascending aorta = 3.7 cm    Pertinent Labs     Results from last 7 days   Lab Units 05/03/25  0600 05/02/25  0529 05/01/25  0753 04/30/25  0609   WBC 10*3/mm3 12.89* 15.00* 14.97* 17.16*   HEMOGLOBIN g/dL 11.6* 11.6* 11.4* 11.7*  "  PLATELETS 10*3/mm3 316 314 339 352     Results from last 7 days   Lab Units 05/03/25  0600 05/02/25  0529 05/01/25 0753 04/30/25  0609   SODIUM mmol/L 135* 140 141 141   POTASSIUM mmol/L 3.7 3.8 3.6 3.6   CHLORIDE mmol/L 98 100 96* 99   CO2 mmol/L 29.5* 29.0 31.0* 29.0   BUN mg/dL 17 22 23 26*   CREATININE mg/dL 1.68* 1.82* 1.85* 1.69*   GLUCOSE mg/dL 178* 182* 238* 168*   EGFR mL/min/1.73 45.4* 41.2* 40.4* 45.1*     Results from last 7 days   Lab Units 05/03/25  0600 05/02/25 0529 05/01/25 0753 04/30/25  0609 04/29/25  0622   ALBUMIN g/dL 3.2* 3.2* 3.3* 3.0* 3.1*   BILIRUBIN mg/dL 0.4 0.3 0.4  --  0.3   ALK PHOS U/L 106 117 121*  --  116   AST (SGOT) U/L 16 17 19  --  21   ALT (SGPT) U/L 15 20 24  --  19     Results from last 7 days   Lab Units 05/03/25 0600 05/02/25 0529 05/01/25 0753 04/30/25  0609 04/29/25  0622   CALCIUM mg/dL 8.7 8.2* 8.3* 8.3* 8.0*   ALBUMIN g/dL 3.2* 3.2* 3.3* 3.0* 3.1*   MAGNESIUM mg/dL 1.9 1.8 1.5*  --  1.6   PHOSPHORUS mg/dL 3.1 3.2 3.3 3.8 3.6         Results from last 7 days   Lab Units 05/03/25  0600   URIC ACID mg/dL 9.5*         Invalid input(s): \"LDLCALC\"          Test Results Pending at Discharge     Pending Results       None              Discharge Details        Discharge Medications        New Medications        Instructions Start Date   cefTRIAXone 2,000 mg in sodium chloride 0.9 % 100 mL IVPB   2,000 mg, Intravenous, Every 24 Hours      Eucerin original healing lotion lotion   10 mL, Topical, Every 12 Hours      famotidine 20 MG tablet  Commonly known as: PEPCID   20 mg, Oral, 2 Times Daily Before Meals      nystatin 100,000 unit/mL suspension  Commonly known as: MYCOSTATIN   500,000 Units, Swish & Swallow, 4 Times Daily      potassium chloride 20 MEQ CR tablet  Commonly known as: KLOR-CON M20   20 mEq, Oral, 2 Times Daily With Meals             Changes to Medications        Instructions Start Date   Insulin Degludec 200 UNIT/ML solution pen-injector pen " injection  Commonly known as: TRESIBA FLEXTOUCH  What changed: how much to take   50 Units, Subcutaneous, 2 Times Daily      metoprolol succinate  MG 24 hr tablet  Commonly known as: TOPROL-XL  What changed:   medication strength  how much to take   100 mg, Oral, Daily   Start Date: May 4, 2025            Continue These Medications        Instructions Start Date   Accu-Chek Guide Me w/Device kit   Use to test blood glucose up to four times daily as needed. Formulary Compliance Approval. Diagnosis: Type 2 Diabetes - Insulin Dependent      Accu-Chek Guide Test test strip  Generic drug: glucose blood   Use to test blood glucose up to four times daily as needed. Formulary Compliance Approval. Diagnosis: Type 2 Diabetes - Insulin Dependent      Accu-Chek Softclix Lancets lancets   Use to test blood glucose up to four times daily as needed. Formulary Compliance Approval. Diagnosis: Type 2 Diabetes - Insulin Dependent      atorvastatin 20 MG tablet  Commonly known as: LIPITOR   1 tablet, Nightly      Dexcom G7 Sensor misc   1 each, Not Applicable, Every 10 Days, Change sensors every 10 days      DULoxetine 60 MG capsule  Commonly known as: CYMBALTA   1 capsule, Daily      HYDROcodone-acetaminophen 7.5-325 MG per tablet  Commonly known as: NORCO   1 tablet, Oral, Every 4 Hours PRN      methocarbamol 750 MG tablet  Commonly known as: ROBAXIN   750 mg, Oral, Every 6 Hours PRN      pregabalin 150 MG capsule  Commonly known as: LYRICA   150 mg, 2 Times Daily             Stop These Medications      amLODIPine 10 MG tablet  Commonly known as: NORVASC     Lidocaine 4 %     losartan 100 MG tablet  Commonly known as: COZAAR     metFORMIN  MG 24 hr tablet  Commonly known as: GLUCOPHAGE-XR     Testosterone Cypionate 200 MG/ML injection  Commonly known as: DEPOTESTOTERONE CYPIONATE     triamcinolone 0.1 % cream  Commonly known as: KENALOG              Allergies   Allergen Reactions    Naproxen Anaphylaxis    Nsaids  "Anaphylaxis    Sulfamethoxazole-Trimethoprim Shortness Of Breath and Swelling    Vancomycin Hives    Etodolac Other (See Comments)     stomach irritation  Annotation - [id number]: STOMACH      Gabapentin Dizziness     Annotation - 02Oct2019: \"Felt Drunk\"    Zonisamide Other (See Comments) and Myalgia     Drowsy, Fatigue         Discharge Disposition:  Home-Health Care Svc      Discharge Diet:  Diet Order   Procedures    Diet: Diabetic; Consistent Carbohydrate; Fluid Consistency: Thin (IDDSI 0)       Discharge Activity:   As tolerated    CODE STATUS:    Code Status and Medical Interventions: CPR (Attempt to Resuscitate); Full Support   Ordered at: 04/21/25 2032     Code Status (Patient has no pulse and is not breathing):    CPR (Attempt to Resuscitate)     Medical Interventions (Patient has pulse or is breathing):    Full Support       Future Appointments   Date Time Provider Department Center   6/13/2025  1:00 PM Avani Monk APRN [facility] [address] [facility]     Additional Instructions for the Follow-ups that You Need to Schedule       Ambulatory Referral to Home Health   As directed      Face to Face Visit Date: 5/3/2025   Follow-up provider for Plan of Care?: I treated the patient in an acute care facility and will not continue treatment after discharge.   Follow-up provider: RICO ALFORD [[id number]]   Reason/Clinical Findings: Strep bacteremia, Vancomycin delayed hypersensitivity rash, DM2, HTN, ROCKY   Describe mobility limitations that make leaving home difficult: requires the assistance of another to leave the home   Nursing/Therapeutic Services Requested: Skilled Nursing   Skilled nursing orders: Medication education Infusion therapy Vascular access care/instruction Wound care dressing/changes   Select type: PICC   Frequency: 1 Week 1        Discharge Follow-up with PCP   As directed       Currently Documented PCP:    Rico Alford MD    PCP Phone Number:    [phone]     Follow Up Details: Dr. Alford (PCP) at next " available appt        Discharge Follow-up with Specified Provider: Dr. Trinidad (Renal); 1 Week   As directed      To: Dr. Trinidad (Renal)   Follow Up: 1 Week               Contact information for follow-up providers       Anand Alford MD .    Specialty: Family Medicine  Why: Dr. Alford (PCP) at next available appt  Contact information:  [address] [facility]  [place] [address]  [phone]                       Contact information for after-discharge care       Home Medical [facility]       [facility] .    Services: Home Nursing, Home Rehabilitation  Contact information:  [address] [place] [id number] [phone]                                   Additional Instructions for the Follow-ups that You Need to Schedule       Ambulatory Referral to Home Health   As directed      Face to Face Visit Date: 5/3/2025   Follow-up provider for Plan of Care?: I treated the patient in an acute care facility and will not continue treatment after discharge.   Follow-up provider: ANAND ALFORD [[id number]]   Reason/Clinical Findings: Strep bacteremia, Vancomycin delayed hypersensitivity rash, DM2, HTN, ROCKY   Describe mobility limitations that make leaving home difficult: requires the assistance of another to leave the home   Nursing/Therapeutic Services Requested: Skilled Nursing   Skilled nursing orders: Medication education Infusion therapy Vascular access care/instruction Wound care dressing/changes   Select type: PICC   Frequency: 1 Week 1        Discharge Follow-up with PCP   As directed       Currently Documented PCP:    Anand Alford MD    PCP Phone Number:    [phone]     Follow Up Details: Dr. Alford (PCP) at next available appt        Discharge Follow-up with Specified Provider: Dr. Trinidad (Renal); 1 Week   As directed      To: Dr. Trinidad (Renal)   Follow Up: 1 Week            Time Spent on Discharge:  Greater than 30 minutes      Elder Hernandez MD  [facility]  05/03/25  10:31 EDT

## 2025-05-03 NOTE — CASE MANAGEMENT/SOCIAL WORK
Case Management Discharge Note      Final Note: home with [facility]    Provided Post Acute Provider List?: Refused  Refused Provider List Comment: declined; current [facility] and doesn't want to change    Selected Continued Care - Discharged on 5/3/2025 Admission date: 4/21/2025 - Discharge disposition: Home-Health Care Svc      Destination    No services have been selected for the patient.                Durable Medical Equipment    No services have been selected for the patient.                Dialysis/Infusion Coordination complete.      Service Provider Services Address Phone Fax Patient Preferred    [facility] [address] [address] [address] [phone] --              Home Medical Care Coordination complete.      Service Provider Services Address Phone Fax Patient Preferred    [facility] [address] [place] [id number] [phone] --              Therapy    No services have been selected for the patient.                Community Resources    No services have been selected for the patient.                [facility]    No services have been selected for the patient.                    Selected Continued Care - Prior Encounters Includes continued care and service providers with selected services from prior encounters from 1/21/2025 to 5/3/2025      Discharged on 3/28/2025 Admission date: 3/24/2025 - Discharge disposition: Home-Health Care Svc      Dialysis/Infusion       Service Provider Services Address Phone Fax Patient Preferred    [facility]fusion and IV Therapy [address] [address] [address] [phone] --              Home Medical Care       Service Provider Services Address Phone Fax Patient Preferred    [facility] [address] [place] [address]  [facility] [address] [phone] --                          Transportation Services  Private: Car    Final Discharge Disposition Code: 06 - home with home health care

## 2025-05-04 NOTE — OUTREACH NOTE
Prep Survey      Flowsheet Row Responses   [facility] facility patient discharged from? [place]   Is LACE score < 7 ? No   Eligibility Readm Mgmt   Discharge diagnosis Vancomycin flushing syndrome, Vancomycin delayed hypersensitivity reaction   Does the patient have one of the following disease processes/diagnoses(primary or secondary)? Other   Does the patient have Home health ordered? Yes   What is the Home health agency?  [facility]x   Is there a DME ordered? No   Prep survey completed? Yes            Radha HE - Registered Nurse

## 2025-05-07 ENCOUNTER — READMISSION MANAGEMENT (OUTPATIENT)
Dept: CALL CENTER | Facility: HOSPITAL | Age: 65
End: 2025-05-07
Payer: MEDICARE

## 2025-05-07 NOTE — OUTREACH NOTE
Medical Week 1 Survey      Flowsheet Row Responses   [facility] patient discharged from? [place]   Does the patient have one of the following disease processes/diagnoses(primary or secondary)? Other   Week 1 attempt successful? Yes   Call start time 1419   Call end time 1423   Discharge diagnosis Vancomycin flushing syndrome, streptococcal bacteremia   Is patient permission given to speak with other caregiver? Yes   Person spoke with today (if not patient) and relationship spouse, Cinthia   Medication alerts for this patient PICC Line IV abx.   Meds reviewed with patient/caregiver? Yes   Does the patient have all medications ordered at discharge? Yes   Is the patient taking all medications as directed (includes completed medication regime)? Yes   Does the patient have a primary care provider?  Yes   Does the patient have an appointment with their PCP within 7 days of discharge? Yes   Comments regarding PCP Follow up with Anand ROBLES. Has appt tomorrow   Has the patient kept scheduled appointments due by today? N/A   What is the Home health agency?  [facility] for [facility] abx   Has home health visited the patient within 72 hours of discharge? Yes   Psychosocial issues? No   Did the patient receive a copy of their discharge instructions? Yes   Nursing interventions Reviewed instructions with patient   What is the patient's perception of their health status since discharge? Improving   Is the patient/caregiver able to teach back signs and symptoms related to disease process for when to call PCP? Yes   Is the patient/caregiver able to teach back signs and symptoms related to disease process for when to call 911? Yes   Is the patient/caregiver able to teach back the hierarchy of who to call/visit for symptoms/problems? PCP, Specialist, Home health nurse, Urgent Care, ED, 911 Yes   If the patient is a current smoker, are they able to teach back resources for cessation? Not a smoker   Week 1 call  completed? Yes   Would this patient benefit from a Referral to [facility] Social Work? No   Is the patient interested in additional calls from an ambulatory ? No   Call end time 2451            DEV ALMAZAN - Registered Nurse

## 2025-05-19 ENCOUNTER — READMISSION MANAGEMENT (OUTPATIENT)
Dept: CALL CENTER | Facility: HOSPITAL | Age: 65
End: 2025-05-19
Payer: MEDICARE

## 2025-05-19 NOTE — OUTREACH NOTE
Medical Week 2 Survey      Flowsheet Row Responses   [facility] patient discharged from? [place]   Does the patient have one of the following disease processes/diagnoses(primary or secondary)? Other   Week 2 attempt successful? No   Unsuccessful attempts Attempt 1   oke Pinky Granados Registered Nurse

## 2025-05-23 ENCOUNTER — LAB REQUISITION (OUTPATIENT)
Dept: LAB | Facility: HOSPITAL | Age: 65
End: 2025-05-23
Payer: MEDICARE

## 2025-05-23 DIAGNOSIS — E11.42 TYPE 2 DIABETES MELLITUS WITH DIABETIC POLYNEUROPATHY: ICD-10-CM

## 2025-05-23 DIAGNOSIS — N17.9 ACUTE KIDNEY FAILURE, UNSPECIFIED: ICD-10-CM

## 2025-05-23 LAB
ALBUMIN SERPL-MCNC: 3.9 G/DL (ref 3.5–5.2)
ALBUMIN/GLOB SERPL: 0.9 G/DL
ALP SERPL-CCNC: 102 U/L (ref 39–117)
ALT SERPL W P-5'-P-CCNC: 17 U/L (ref 1–41)
ANION GAP SERPL CALCULATED.3IONS-SCNC: 12.2 MMOL/L (ref 5–15)
AST SERPL-CCNC: 21 U/L (ref 1–40)
BASOPHILS # BLD AUTO: 0.06 10*3/MM3 (ref 0–0.2)
BASOPHILS NFR BLD AUTO: 0.6 % (ref 0–1.5)
BILIRUB SERPL-MCNC: 0.6 MG/DL (ref 0–1.2)
BUN SERPL-MCNC: 22 MG/DL (ref 8–23)
BUN/CREAT SERPL: 13.7 (ref 7–25)
CALCIUM SPEC-SCNC: 9.6 MG/DL (ref 8.6–10.5)
CHLORIDE SERPL-SCNC: 96 MMOL/L (ref 98–107)
CO2 SERPL-SCNC: 22.8 MMOL/L (ref 22–29)
CREAT SERPL-MCNC: 1.61 MG/DL (ref 0.76–1.27)
DEPRECATED RDW RBC AUTO: 48.9 FL (ref 37–54)
EGFRCR SERPLBLD CKD-EPI 2021: 47.8 ML/MIN/1.73
EOSINOPHIL # BLD AUTO: 0.46 10*3/MM3 (ref 0–0.4)
EOSINOPHIL NFR BLD AUTO: 4.6 % (ref 0.3–6.2)
ERYTHROCYTE [DISTWIDTH] IN BLOOD BY AUTOMATED COUNT: 17.1 % (ref 12.3–15.4)
GLOBULIN UR ELPH-MCNC: 4.4 GM/DL
GLUCOSE SERPL-MCNC: 288 MG/DL (ref 65–99)
HBA1C MFR BLD: 9.23 % (ref 4.8–5.6)
HCT VFR BLD AUTO: 40.1 % (ref 37.5–51)
HGB BLD-MCNC: 12.8 G/DL (ref 13–17.7)
IMM GRANULOCYTES # BLD AUTO: 0.03 10*3/MM3 (ref 0–0.05)
IMM GRANULOCYTES NFR BLD AUTO: 0.3 % (ref 0–0.5)
LYMPHOCYTES # BLD AUTO: 2.05 10*3/MM3 (ref 0.7–3.1)
LYMPHOCYTES NFR BLD AUTO: 20.6 % (ref 19.6–45.3)
MCH RBC QN AUTO: 25.4 PG (ref 26.6–33)
MCHC RBC AUTO-ENTMCNC: 31.9 G/DL (ref 31.5–35.7)
MCV RBC AUTO: 79.7 FL (ref 79–97)
MONOCYTES # BLD AUTO: 1.15 10*3/MM3 (ref 0.1–0.9)
MONOCYTES NFR BLD AUTO: 11.6 % (ref 5–12)
NEUTROPHILS NFR BLD AUTO: 6.18 10*3/MM3 (ref 1.7–7)
NEUTROPHILS NFR BLD AUTO: 62.3 % (ref 42.7–76)
NRBC BLD AUTO-RTO: 0 /100 WBC (ref 0–0.2)
PLATELET # BLD AUTO: 343 10*3/MM3 (ref 140–450)
PMV BLD AUTO: 10.4 FL (ref 6–12)
POTASSIUM SERPL-SCNC: 5.4 MMOL/L (ref 3.5–5.2)
PROT SERPL-MCNC: 8.3 G/DL (ref 6–8.5)
RBC # BLD AUTO: 5.03 10*6/MM3 (ref 4.14–5.8)
SODIUM SERPL-SCNC: 131 MMOL/L (ref 136–145)
WBC NRBC COR # BLD AUTO: 9.93 10*3/MM3 (ref 3.4–10.8)

## 2025-05-23 PROCEDURE — 80053 COMPREHEN METABOLIC PANEL: CPT | Performed by: FAMILY MEDICINE

## 2025-05-23 PROCEDURE — 83036 HEMOGLOBIN GLYCOSYLATED A1C: CPT | Performed by: FAMILY MEDICINE

## 2025-05-23 PROCEDURE — 85025 COMPLETE CBC W/AUTO DIFF WBC: CPT | Performed by: FAMILY MEDICINE
